# Patient Record
Sex: FEMALE | Race: WHITE | NOT HISPANIC OR LATINO | Employment: FULL TIME
[De-identification: names, ages, dates, MRNs, and addresses within clinical notes are randomized per-mention and may not be internally consistent; named-entity substitution may affect disease eponyms.]

---

## 2017-06-02 DIAGNOSIS — F41.9 ANXIETY: ICD-10-CM

## 2017-06-02 NOTE — TELEPHONE ENCOUNTER
sertraline (ZOLOFT) 50 MG tablet     Last Written Prescription Date: 12/02/16  Last Fill Quantity: 90, # refills: 1  Last Office Visit with [facility] primary care provider:  06/22/16        Last PHQ-9 score on record=   PHQ-9 SCORE 5/5/2015   Total Score 0           [facility]

## 2017-06-06 NOTE — TELEPHONE ENCOUNTER
Medication is being filled for 1 time refill only due to:  Patient needs to be seen because needs office visit for future refills.   Blanca Bell RN

## 2017-06-07 DIAGNOSIS — F41.9 ANXIETY: ICD-10-CM

## 2017-06-08 NOTE — TELEPHONE ENCOUNTER
The following prescription was sent to [place], [place]:    sertraline (ZOLOFT) 50 MG tablet 30 tablet 0 6/6/2017  No   Sig: TAKE 1 TABLET EVERY DAY   Class: E-Prescribe   Notes to Pharmacy: Medication is being filled for 1 time refill only due to:  Patient needs to be seen because needs office visit for future refills.   Order: [id number]   E-Prescribing Status: Receipt confirmed by pharmacy (6/6/2017 11:36 AM CDT)     Request denied too soon to fill.  Lety Garland RN

## 2017-06-23 ENCOUNTER — HEALTH MAINTENANCE LETTER (OUTPATIENT)
Age: 37
End: 2017-06-23

## 2017-06-27 ENCOUNTER — TELEPHONE (OUTPATIENT)
Dept: OBGYN | Facility: OTHER | Age: 37
End: 2017-06-27

## 2017-06-27 ENCOUNTER — OFFICE VISIT (OUTPATIENT)
Dept: FAMILY MEDICINE | Facility: OTHER | Age: 37
End: 2017-06-27
Payer: COMMERCIAL

## 2017-06-27 VITALS
RESPIRATION RATE: 14 BRPM | BODY MASS INDEX: 22.15 KG/M2 | TEMPERATURE: 98.7 F | SYSTOLIC BLOOD PRESSURE: 118 MMHG | WEIGHT: 125 LBS | OXYGEN SATURATION: 100 % | HEIGHT: 63 IN | DIASTOLIC BLOOD PRESSURE: 66 MMHG | HEART RATE: 61 BPM

## 2017-06-27 DIAGNOSIS — N89.8 VAGINAL DISCHARGE: ICD-10-CM

## 2017-06-27 DIAGNOSIS — Z12.4 SCREENING FOR CERVICAL CANCER: ICD-10-CM

## 2017-06-27 DIAGNOSIS — B96.89 BACTERIAL VAGINOSIS: Primary | ICD-10-CM

## 2017-06-27 DIAGNOSIS — N76.0 BACTERIAL VAGINOSIS: Primary | ICD-10-CM

## 2017-06-27 DIAGNOSIS — R06.83 SNORING: ICD-10-CM

## 2017-06-27 DIAGNOSIS — F41.9 ANXIETY: ICD-10-CM

## 2017-06-27 DIAGNOSIS — Z13.220 SCREENING FOR LIPOID DISORDERS: Primary | ICD-10-CM

## 2017-06-27 LAB
MICRO REPORT STATUS: ABNORMAL
SPECIMEN SOURCE: ABNORMAL
WET PREP SPEC: ABNORMAL

## 2017-06-27 PROCEDURE — G0145 SCR C/V CYTO,THINLAYER,RESCR: HCPCS | Performed by: FAMILY MEDICINE

## 2017-06-27 PROCEDURE — 99395 PREV VISIT EST AGE 18-39: CPT | Performed by: FAMILY MEDICINE

## 2017-06-27 PROCEDURE — 87210 SMEAR WET MOUNT SALINE/INK: CPT | Performed by: FAMILY MEDICINE

## 2017-06-27 PROCEDURE — 80061 LIPID PANEL: CPT | Performed by: FAMILY MEDICINE

## 2017-06-27 PROCEDURE — 36415 COLL VENOUS BLD VENIPUNCTURE: CPT | Performed by: FAMILY MEDICINE

## 2017-06-27 PROCEDURE — 87591 N.GONORRHOEAE DNA AMP PROB: CPT | Performed by: FAMILY MEDICINE

## 2017-06-27 PROCEDURE — 87491 CHLMYD TRACH DNA AMP PROBE: CPT | Performed by: FAMILY MEDICINE

## 2017-06-27 PROCEDURE — 87624 HPV HI-RISK TYP POOLED RSLT: CPT | Performed by: FAMILY MEDICINE

## 2017-06-27 RX ORDER — METRONIDAZOLE 500 MG/1
500 TABLET ORAL 2 TIMES DAILY
Qty: 14 TABLET | Refills: 0 | Status: SHIPPED | OUTPATIENT
Start: 2017-06-27 | End: 2018-05-11

## 2017-06-27 ASSESSMENT — PATIENT HEALTH QUESTIONNAIRE - PHQ9
10. IF YOU CHECKED OFF ANY PROBLEMS, HOW DIFFICULT HAVE THESE PROBLEMS MADE IT FOR YOU TO DO YOUR WORK, TAKE CARE OF THINGS AT HOME, OR GET ALONG WITH OTHER PEOPLE: NOT DIFFICULT AT ALL
SUM OF ALL RESPONSES TO PHQ QUESTIONS 1-9: 0
SUM OF ALL RESPONSES TO PHQ QUESTIONS 1-9: 0

## 2017-06-27 ASSESSMENT — ANXIETY QUESTIONNAIRES
7. FEELING AFRAID AS IF SOMETHING AWFUL MIGHT HAPPEN: NOT AT ALL
GAD7 TOTAL SCORE: 0
3. WORRYING TOO MUCH ABOUT DIFFERENT THINGS: NOT AT ALL
6. BECOMING EASILY ANNOYED OR IRRITABLE: NOT AT ALL
1. FEELING NERVOUS, ANXIOUS, OR ON EDGE: NOT AT ALL
7. FEELING AFRAID AS IF SOMETHING AWFUL MIGHT HAPPEN: NOT AT ALL
2. NOT BEING ABLE TO STOP OR CONTROL WORRYING: NOT AT ALL
GAD7 TOTAL SCORE: 0
GAD7 TOTAL SCORE: 0
4. TROUBLE RELAXING: NOT AT ALL
5. BEING SO RESTLESS THAT IT IS HARD TO SIT STILL: NOT AT ALL

## 2017-06-27 ASSESSMENT — PAIN SCALES - GENERAL: PAINLEVEL: NO PAIN (0)

## 2017-06-27 NOTE — MR AVS SNAPSHOT
After Visit Summary   6/27/2017    Zainab Bolton    MRN: [id number]           Patient Information     Date Of Birth          1980        Visit Information        Provider Department      6/27/2017 2:20 PM Tessie Sanchez MD [facility]        Today's Diagnoses     Screening for lipoid disorders    -  1    Anxiety        Screening for cervical cancer        Vaginal discharge        Snoring          Care Instructions      Preventive Health Recommendations  Female Ages 26 - 39  Yearly exam:   See your health care provider every year in order to    Review health changes.     Discuss preventive care.      Review your medicines if you your doctor has prescribed any.    Until age 30: Get a Pap test every three years (more often if you have had an abnormal result).    After age 30: Talk to your doctor about whether you should have a Pap test every 3 years or have a Pap test with HPV screening every 5 years.   You do not need a Pap test if your uterus was removed (hysterectomy) and you have not had cancer.  You should be tested each year for STDs (sexually transmitted diseases), if you're at risk.   Talk to your provider about how often to have your cholesterol checked.  If you are at risk for diabetes, you should have a diabetes test (fasting glucose).  Shots: Get a flu shot each year. Get a tetanus shot every 10 years.   Nutrition:     Eat at least 5 servings of fruits and vegetables each day.    Eat whole-grain bread, whole-wheat pasta and brown rice instead of white grains and rice.    Talk to your provider about Calcium and Vitamin D.     Lifestyle    Exercise at least 150 minutes a week (30 minutes a day, 5 days of the week). This will help you control your weight and prevent disease.    Limit alcohol to one drink per day.    No smoking.     Wear sunscreen to prevent skin cancer.    See your dentist every six months for an exam and cleaning.            Follow-ups after your  visit        Additional Services     SLEEP EVALUATION & MANAGEMENT REFERRAL - ADULT       Please be aware that coverage of these services is subject to the terms and limitations of your health insurance plan.  Call member services at your health plan with any benefit or coverage questions.      Please bring the following to your appointment:    >>   List of current medications   >>   This referral request   >>   Any documents/labs given to you for this referral    [facility]  Ph [phone]  (Age 13 if over 100 lbs and up)                  Follow-up notes from your care team     Return in about 1 year (around 6/27/2018).      Future tests that were ordered for you today     Open Future Orders        Priority Expected Expires Ordered    SLEEP EVALUATION & MANAGEMENT REFERRAL - ADULT Routine  6/27/2018 6/27/2017    Comprehensive metabolic panel Routine  6/27/2018 6/27/2017            Who to contact     If you have questions or need follow up information about today's clinic visit or your schedule please contact [facility] directly at [phone].  Normal or non-critical lab and imaging results will be communicated to you by DabKick[employer], letter or phone within 4 business days after the clinic has received the results. If you do not hear from us within 7 days, please contact the clinic through Anonym[employer] or phone. If you have a critical or abnormal lab result, we will notify you by phone as soon as possible.  Submit refill requests through [employer] or call your pharmacy and they will forward the refill request to us. Please allow 3 business days for your refill to be completed.          Additional Information About Your Visit        [employer] Information     [employer] gives you secure access to your electronic health record. If you see a primary care provider, you can also send messages to your care team and make appointments. If you have questions, please call your primary care clinic.  If  "you do not have a primary care provider, please call [phone] and they will assist you.        Care EveryWhere ID     This is your Care EveryWhere ID. This could be used by other organizations to access your [place] medical records  [id number]        Your Vitals Were     Pulse Temperature Respirations Height Last Period Pulse Oximetry    61 98.7  F (37.1  C) (Oral) 14 5' 3\" (1.6 m) 06/04/2017 100%    BMI (Body Mass Index)                   22.14 kg/m2            Blood Pressure from Last 3 Encounters:   06/27/17 118/66   06/22/16 109/74   05/11/16 110/73    Weight from Last 3 Encounters:   06/27/17 125 lb (56.7 kg)   06/22/16 134 lb (60.8 kg)   05/11/16 135 lb (61.2 kg)              We Performed the Following     CHLAMYDIA TRACHOMATIS PCR     HPV High Risk Types DNA Cervical     Lipid panel reflex to direct LDL     NEISSERIA GONORRHOEA PCR     Pap imaged thin layer screen with HPV - recommended age 30 - 65     Wet prep          Today's Medication Changes          These changes are accurate as of: 6/27/17  3:01 PM.  If you have any questions, ask your nurse or doctor.               These medicines have changed or have updated prescriptions.        Dose/Directions    sertraline 50 MG tablet   Commonly known as:  ZOLOFT   This may have changed:  See the new instructions.   Used for:  Anxiety   Changed by:  Tessie Sanchez MD        Dose:  50 mg   Take 1 tablet (50 mg) by mouth daily   Quantity:  90 tablet   Refills:  1            Where to get your medicines      These medications were sent to [place] [address] [place] [address] [address]     Phone:  [phone]     sertraline 50 MG tablet                Primary Care Provider Office Phone # Fax #    Kiarra Harrington [phone]       [address] [address]        Equal Access to Services     FLORA ARCOS AH: jhonny Jones, " bobyb cookalshobha carrasquilloshahla wally consuelo saha ah. So [facility] [phone].    ATENCIÓN: Si zeinala kimberly, tiene a arana disposición servicios gratuitos de asistencia lingüística. Margret al [phone].    We comply with applicable federal civil rights laws and [place] laws. We do not discriminate on the basis of race, color, national origin, age, disability sex, sexual orientation or gender identity.            Thank you!     Thank you for choosing [facility]  for your care. Our goal is always to provide you with excellent care. Hearing back from our patients is one way we can continue to improve our services. Please take a few minutes to complete the written survey that you may receive in the mail after your visit with us. Thank you!             Your Updated Medication List - Protect others around you: Learn how to safely use, store and throw away your medicines at www.disposemymeds.org.          This list is accurate as of: 6/27/17  3:01 PM.  Always use your most recent med list.                   Brand Name Dispense Instructions for use Diagnosis    sertraline 50 MG tablet    ZOLOFT    90 tablet    Take 1 tablet (50 mg) by mouth daily    Anxiety

## 2017-06-27 NOTE — NURSING NOTE
"Chief Complaint   Patient presents with     Physical     Health Maintenance       Initial /66 (BP Location: Right arm, Patient Position: Chair, Cuff Size: Adult Regular)  Pulse 61  Temp 98.7  F (37.1  C) (Oral)  Resp 14  Ht 5' 3\" (1.6 m)  Wt 125 lb (56.7 kg)  LMP 06/04/2017  SpO2 100%  BMI 22.14 kg/m2 Estimated body mass index is 22.14 kg/(m^2) as calculated from the following:    Height as of this encounter: 5' 3\" (1.6 m).    Weight as of this encounter: 125 lb (56.7 kg).  Medication Reconciliation: complete   Julia Macias CMA (AAMA)      "

## 2017-06-27 NOTE — PROGRESS NOTES
SUBJECTIVE:   CC: Zainab Bolton is an 36 year old woman who presents for preventive health visit.     Physical   Annual:     Getting at least 3 servings of Calcium per day::  Yes    Bi-annual eye exam::  Yes    Dental care twice a year::  Yes    Sleep apnea or symptoms of sleep apnea::  None    Diet::  Regular (no restrictions)    Frequency of exercise::  6-7 days/week    Duration of exercise::  45-60 minutes    Taking medications regularly::  Yes    Medication side effects::  None    Additional concerns today::  No    Answers for HPI/ROS submitted by the patient on 6/27/2017   PHQ-2 Score: 0  GEOVANY 7 TOTAL SCORE: 0  If you checked off any problems, how difficult have these problems made it for you to do your work, take care of things at home, or get along with other people?: Not difficult at all  PHQ9 TOTAL SCORE: 0    Today's PHQ-2 Score:   PHQ-2 ( 1999 Pfizer) 6/27/2017   Q1: Little interest or pleasure in doing things Not at all   Q2: Feeling down, depressed or hopeless Not at all   PHQ-2 Score 0       Abuse: Current or Past(Physical, Sexual or Emotional)- No  Do you feel safe in your environment - Yes    Social History   Substance Use Topics     Smoking status: Former Smoker     Packs/day: 1.00     Years: 4.00     Types: Cigarettes     Quit date: 7/1/2012     Smokeless tobacco: Former User     Quit date: 8/1/2013      Comment: since age 12, but stopped with each baby     Alcohol use 0.0 oz/week     0 Standard drinks or equivalent per week      Comment: 1-2  every 2-3 months if any     The patient does not drink >3 drinks per day nor >7 drinks per week.    Reviewed orders with patient.  Reviewed health maintenance and updated orders accordingly - Yes      Mammogram not appropriate for this patient based on age.    Pertinent mammograms are reviewed under the imaging tab.  History of abnormal Pap smear: YES - LÁZARO 2/3 on biopsy - PAP/HPV co-testing at 12, 24 months.  If two negative results repeat co-testing  "in 3 years, if negative then routine screening.    Reviewed and updated as needed this visit by clinical staff  Tobacco  Allergies  Med Hx  Surg Hx  Fam Hx  Soc Hx        Reviewed and updated as needed this visit by Provider          Past Medical History:   Diagnosis Date     Anxiety 2010     Anxiety state, unspecified     chronic anxiety     Hypercholesteremia 2010     Obesity 2010     Other acne      Papanicolaou smear of cervix with low grade squamous intraepithelial lesion (LGSIL) (aka LSIL) 5/5/15, 16    LSIL/+ HR HPV     TOBACCO ABUSE-CONTINUOUS       Past Surgical History:   Procedure Laterality Date     C LIGATE FALLOPIAN TUBE  10/3/2003     CONIZATION LEEP  7/16/15    LÁZARO 2     CONIZATION LEEP N/A 2015    Procedure: CONIZATION LEEP;  Surgeon: Omayra Cunningham DO;  Location:  OR     Obstetric History       T0      L0     SAB1   TAB0   Ectopic0   Multiple0   Live Births0       # Outcome Date GA Lbr Sal/2nd Weight Sex Delivery Anes PTL Lv   4             3             2             1 TAB                   ROS:  C: NEGATIVE for fever, chills, change in weight  I: NEGATIVE for worrisome rashes, moles or lesions  E: NEGATIVE for vision changes or irritation  ENT: NEGATIVE for ear, mouth and throat problems  R: NEGATIVE for significant cough or SOB  B: NEGATIVE for masses, tenderness or discharge  CV: NEGATIVE for chest pain, palpitations or peripheral edema  GI: NEGATIVE for nausea, abdominal pain, heartburn, or change in bowel habits  : NEGATIVE for unusual urinary or vaginal symptoms. Periods are regular.  M: NEGATIVE for significant arthralgias or myalgia  N: NEGATIVE for weakness, dizziness or paresthesias  P: NEGATIVE for changes in mood or affect     OBJECTIVE:   /66 (BP Location: Right arm, Patient Position: Chair, Cuff Size: Adult Regular)  Pulse 61  Temp 98.7  F (37.1  C) (Oral)  Resp 14  Ht 5' 3\" (1.6 m)  Wt 125 lb " "(56.7 kg)  LMP 06/04/2017  SpO2 100%  BMI 22.14 kg/m2  EXAM:  /66 (BP Location: Right arm, Patient Position: Chair, Cuff Size: Adult Regular)  Pulse 61  Temp 98.7  F (37.1  C) (Oral)  Resp 14  Ht 5' 3\" (1.6 m)  Wt 125 lb (56.7 kg)  LMP 06/04/2017  SpO2 100%  BMI 22.14 kg/m2  Physical Exam   Constitutional: She is oriented to person, place, and time. She appears well-developed and well-nourished.   HENT:   Head: Normocephalic and atraumatic.   Right Ear: External ear normal.   Left Ear: External ear normal.   Mouth/Throat: Oropharynx is clear and moist.   Eyes: EOM are normal.   Neck: Neck supple.   Cardiovascular: Normal rate and regular rhythm.    Pulmonary/Chest: Effort normal and breath sounds normal.   Abdominal: Soft. Bowel sounds are normal.   Genitourinary: Vaginal discharge found.   Musculoskeletal: Normal range of motion.   Neurological: She is alert and oriented to person, place, and time.   Psychiatric: She has a normal mood and affect.         ASSESSMENT/PLAN:     Problem List Items Addressed This Visit     Anxiety    Relevant Medications    sertraline (ZOLOFT) 50 MG tablet    Other Relevant Orders    Comprehensive metabolic panel      Other Visit Diagnoses     Screening for lipoid disorders    -  Primary    Relevant Orders    Lipid panel reflex to direct LDL    Screening for cervical cancer        Relevant Orders    Pap imaged thin layer screen with HPV - recommended age 30 - 65 (Completed)    HPV High Risk Types DNA Cervical    Vaginal discharge        Relevant Orders    Wet prep    NEISSERIA GONORRHOEA PCR    CHLAMYDIA TRACHOMATIS PCR            COUNSELING:  Reviewed preventive health counseling, as reflected in patient instructions       Regular exercise       Healthy diet/nutrition       Vision screening       Hearing screening    BP Screening:   Last 3 BP Readings:    BP Readings from Last 3 Encounters:   06/27/17 118/66   06/22/16 109/74   05/11/16 110/73            reports that " "she quit smoking about 4 years ago. Her smoking use included Cigarettes. She has a 4.00 pack-year smoking history. She quit smokeless tobacco use about 3 years ago.    Estimated body mass index is 22.14 kg/(m^2) as calculated from the following:    Height as of this encounter: 5' 3\" (1.6 m).    Weight as of this encounter: 125 lb (56.7 kg).       Counseling Resources:  ATP IV Guidelines  Pooled Cohorts Equation Calculator  Breast Cancer Risk Calculator  FRAX Risk Assessment  ICSI Preventive Guidelines  Dietary Guidelines for Americans, 2010  USDA's MyPlate  ASA Prophylaxis  Lung CA Screening    Tessie Sanchez MD  [facility]  "

## 2017-06-27 NOTE — TELEPHONE ENCOUNTER
Please inform pt that vaginal discharged showed signs of bacterial vagiosis(overgrowth of vaginal bacteria ). Recommend antibiotics. abx sent to pharmacy

## 2017-06-28 LAB
CHOLEST SERPL-MCNC: 158 MG/DL
HDLC SERPL-MCNC: 57 MG/DL
LDLC SERPL CALC-MCNC: 85 MG/DL
NONHDLC SERPL-MCNC: 101 MG/DL
TRIGL SERPL-MCNC: 82 MG/DL

## 2017-06-28 ASSESSMENT — ANXIETY QUESTIONNAIRES: GAD7 TOTAL SCORE: 0

## 2017-06-28 ASSESSMENT — PATIENT HEALTH QUESTIONNAIRE - PHQ9: SUM OF ALL RESPONSES TO PHQ QUESTIONS 1-9: 0

## 2017-06-29 LAB
C TRACH DNA SPEC QL NAA+PROBE: NORMAL
N GONORRHOEA DNA SPEC QL NAA+PROBE: NORMAL
SPECIMEN SOURCE: NORMAL
SPECIMEN SOURCE: NORMAL

## 2017-06-30 LAB
COPATH REPORT: NORMAL
PAP: NORMAL

## 2017-07-03 LAB
FINAL DIAGNOSIS: NORMAL
HPV HR 12 DNA CVX QL NAA+PROBE: NEGATIVE
HPV16 DNA SPEC QL NAA+PROBE: NEGATIVE
HPV18 DNA SPEC QL NAA+PROBE: NEGATIVE
SPECIMEN DESCRIPTION: NORMAL

## 2017-07-10 ENCOUNTER — MYC MEDICAL ADVICE (OUTPATIENT)
Dept: FAMILY MEDICINE | Facility: OTHER | Age: 37
End: 2017-07-10

## 2017-07-10 DIAGNOSIS — Z98.890 S/P LEEP OF CERVIX: ICD-10-CM

## 2017-07-10 NOTE — TELEPHONE ENCOUNTER
Advised patient of results through My Chart Result notes.   JESISCA SingletaryN, RN, Pap Tracking Nurse

## 2018-01-07 DIAGNOSIS — F41.9 ANXIETY: ICD-10-CM

## 2018-01-09 NOTE — TELEPHONE ENCOUNTER
"Requested Prescriptions   Pending Prescriptions Disp Refills     sertraline (ZOLOFT) 50 MG tablet [Pharmacy Med Name: SERTRALINE HCL 50 MG TABLET] 90 tablet 1     Sig: TAKE 1 TABLET (50 MG) BY MOUTH DAILY    SSRIs Protocol Failed    1/8/2018  4:25 PM       Failed - PHQ-9 score less than 5 in past 6 months    The PHQ-9 criteria is meant to fail. It requires a PHQ-9 score review  PHQ-9 score:    PHQ-9 SCORE 6/27/2017   Total Score -   Total Score MyChart 0   Total Score 0          DX anxiety           Failed - Recent (6 mo) or future visit with authorizing provider's specialty    Patient had office visit in the last 6 months or has a visit in the next 30 days with authorizing provider.  See \"Patient Info\" tab in inbasket, or \"Choose Columns\" in Meds & Orders section of the refill encounter.          Passed - Recent or future visit with authorizing provider    Patient had office visit in the last year or has a visit in the next 30 days with authorizing provider.  See \"Patient Info\" tab in inbasket, or \"Choose Columns\" in Meds & Orders section of the refill encounter.     06/27/2017:           Passed - Patient is age 18 or older       Passed - No active pregnancy on record       Passed - No positive pregnancy test in last 12 months        Prescription approved per [facility] Refill Protocol.  Emanuel Small, RN, BSN              "

## 2018-05-11 ENCOUNTER — OFFICE VISIT (OUTPATIENT)
Dept: FAMILY MEDICINE | Facility: CLINIC | Age: 38
End: 2018-05-11
Payer: COMMERCIAL

## 2018-05-11 VITALS
TEMPERATURE: 99.1 F | HEART RATE: 68 BPM | DIASTOLIC BLOOD PRESSURE: 77 MMHG | BODY MASS INDEX: 22.68 KG/M2 | SYSTOLIC BLOOD PRESSURE: 117 MMHG | WEIGHT: 128 LBS | HEIGHT: 63 IN

## 2018-05-11 DIAGNOSIS — F41.9 ANXIETY: Primary | ICD-10-CM

## 2018-05-11 PROCEDURE — 99214 OFFICE O/P EST MOD 30 MIN: CPT | Performed by: NURSE PRACTITIONER

## 2018-05-11 RX ORDER — HYDROXYZINE HYDROCHLORIDE 25 MG/1
25-50 TABLET, FILM COATED ORAL EVERY 6 HOURS PRN
Qty: 60 TABLET | Refills: 1 | Status: SHIPPED | OUTPATIENT
Start: 2018-05-11 | End: 2023-02-03

## 2018-05-11 ASSESSMENT — ANXIETY QUESTIONNAIRES
6. BECOMING EASILY ANNOYED OR IRRITABLE: NEARLY EVERY DAY
1. FEELING NERVOUS, ANXIOUS, OR ON EDGE: MORE THAN HALF THE DAYS
2. NOT BEING ABLE TO STOP OR CONTROL WORRYING: NEARLY EVERY DAY
5. BEING SO RESTLESS THAT IT IS HARD TO SIT STILL: SEVERAL DAYS
7. FEELING AFRAID AS IF SOMETHING AWFUL MIGHT HAPPEN: NEARLY EVERY DAY
IF YOU CHECKED OFF ANY PROBLEMS ON THIS QUESTIONNAIRE, HOW DIFFICULT HAVE THESE PROBLEMS MADE IT FOR YOU TO DO YOUR WORK, TAKE CARE OF THINGS AT HOME, OR GET ALONG WITH OTHER PEOPLE: SOMEWHAT DIFFICULT
3. WORRYING TOO MUCH ABOUT DIFFERENT THINGS: NEARLY EVERY DAY
GAD7 TOTAL SCORE: 17

## 2018-05-11 ASSESSMENT — PATIENT HEALTH QUESTIONNAIRE - PHQ9: 5. POOR APPETITE OR OVEREATING: MORE THAN HALF THE DAYS

## 2018-05-11 NOTE — MR AVS SNAPSHOT
"              After Visit Summary   5/11/2018    Zainab Bolton    MRN: [id number]           Patient Information     Date Of Birth          1980        Visit Information        Provider Department      5/11/2018 1:40 PM Yanci Camarena APRN CNP [facility]        Today's Diagnoses     Anxiety    -  1       Follow-ups after your visit        Who to contact     If you have questions or need follow up information about today's clinic visit or your schedule please contact [facility] directly at [phone].  Normal or non-critical lab and imaging results will be communicated to you by [employer]hart, letter or phone within 4 business days after the clinic has received the results. If you do not hear from us within 7 days, please contact the clinic through Somot[employer] or phone. If you have a critical or abnormal lab result, we will notify you by phone as soon as possible.  Submit refill requests through [employer] or call your pharmacy and they will forward the refill request to us. Please allow 3 business days for your refill to be completed.          Additional Information About Your Visit        MyChart Information     [employer] gives you secure access to your electronic health record. If you see a primary care provider, you can also send messages to your care team and make appointments. If you have questions, please call your primary care clinic.  If you do not have a primary care provider, please call [phone] and they will assist you.        Care EveryWhere ID     This is your Care EveryWhere ID. This could be used by other organizations to access your [place] medical records  [id number]        Your Vitals Were     Pulse Temperature Height BMI (Body Mass Index)          68 99.1  F (37.3  C) (Tympanic) 5' 3.25\" (1.607 m) 22.5 kg/m2         Blood Pressure from Last 3 Encounters:   05/11/18 117/77   06/27/17 118/66   06/22/16 109/74    Weight from Last 3 Encounters:   05/11/18 " 128 lb (58.1 kg)   06/27/17 125 lb (56.7 kg)   06/22/16 134 lb (60.8 kg)              Today, you had the following     No orders found for display         Today's Medication Changes          These changes are accurate as of 5/11/18  2:42 PM.  If you have any questions, ask your nurse or doctor.               Start taking these medicines.        Dose/Directions    hydrOXYzine 25 MG tablet   Commonly known as:  ATARAX   Used for:  Anxiety   Started by:  Yanci Camarena APRN CNP        Dose:  25-50 mg   Take 1-2 tablets (25-50 mg) by mouth every 6 hours as needed for anxiety   Quantity:  60 tablet   Refills:  1         These medicines have changed or have updated prescriptions.        Dose/Directions    sertraline 50 MG tablet   Commonly known as:  ZOLOFT   This may have changed:  See the new instructions.   Used for:  Anxiety   Changed by:  Yanci Camarena APRN CNP        Dose:  75 mg   Take 1.5 tablets (75 mg) by mouth daily   Quantity:  135 tablet   Refills:  1            Where to get your medicines      These medications were sent to [place] [address] [place] [address] [address]     Phone:  [phone]     hydrOXYzine 25 MG tablet    sertraline 50 MG tablet                Primary Care Provider Office Phone # Fax #    Kiarar Harrington [phone]       [address] [place] [address]        Equal Access to Services     [facility] AH: Hadii clement salomon hadasho Soelianaali, waaxda luqadaha, qaybta kaalmada adeegyada, lucero martin. So [facility] [phone].    ATENCIÓN: Si habla español, tiene a arana disposición servicios gratuitos de asistencia lingüística. Llame al [phone].    We comply with applicable federal civil rights laws and [place] laws. We do not discriminate on the basis of race, color, national origin, age, disability, sex, sexual orientation, or gender identity.            Thank  you!     Thank you for choosing [facility]  for your care. Our goal is always to provide you with excellent care. Hearing back from our patients is one way we can continue to improve our services. Please take a few minutes to complete the written survey that you may receive in the mail after your visit with us. Thank you!             Your Updated Medication List - Protect others around you: Learn how to safely use, store and throw away your medicines at www.disposemymeds.org.          This list is accurate as of 5/11/18  2:42 PM.  Always use your most recent med list.                   Brand Name Dispense Instructions for use Diagnosis    hydrOXYzine 25 MG tablet    ATARAX    60 tablet    Take 1-2 tablets (25-50 mg) by mouth every 6 hours as needed for anxiety    Anxiety       sertraline 50 MG tablet    ZOLOFT    135 tablet    Take 1.5 tablets (75 mg) by mouth daily    Anxiety

## 2018-05-11 NOTE — PROGRESS NOTES
"  SUBJECTIVE:   Zainab Bolton is a 37 year old female who presents to clinic today for the following health issues:      Anxiety Follow-Up    Status since last visit: Worsened     Lots of stress recently - moved, son is addicted to heroin, boyfriend got a new job and will be traveling a lot.    Lots of worried thoughts.    Can't sleep.    Crying a lot.    No thoughts of self harm or suicide.    Has been on Zoloft 50 mg daily since she was a teenager - has always worked well, no side effects.    Other associated symptoms:see phq9    Complicating factors:   Significant life event: Yes-  Move, driving from [place] to [place]   Current substance abuse: None  Depression symptoms: Yes  GEOVANY-7 SCORE 1/8/2014 5/6/2015 6/27/2017   Total Score 0 0 -   Total Score - - 0 (minimal anxiety)   Total Score - - 0         Amount of exercise or physical activity: tries to    Problems taking medications regularly: No    Medication side effects: none    Diet: regular (no restrictions)          Problem list and histories reviewed & adjusted, as indicated.  Additional history: as documented    Reviewed and updated as needed this visit by clinical staff  Tobacco  Allergies  Meds       Reviewed and updated as needed this visit by Provider         ROS:  Constitutional, HEENT, cardiovascular, pulmonary, gi and gu systems are negative, except as otherwise noted.    OBJECTIVE:     /77 (BP Location: Left arm)  Pulse 68  Temp 99.1  F (37.3  C) (Tympanic)  Ht 5' 3.25\" (1.607 m)  Wt 128 lb (58.1 kg)  BMI 22.5 kg/m2  Body mass index is 22.5 kg/(m^2).  GENERAL: healthy, alert and no distress  PSYCH: mentation appears normal, affect normal/bright    PHQ-9 SCORE 5/5/2015 6/27/2017 5/11/2018   Total Score 0 - -   Total Score MyChart - 0 -   Total Score - 0 7     GEOVANY-7 SCORE 5/6/2015 6/27/2017 5/11/2018   Total Score 0 - -   Total Score - 0 (minimal anxiety) -   Total Score - 0 17       ASSESSMENT/PLAN:       ICD-10-CM    1. Anxiety " F41.9 sertraline (ZOLOFT) 50 MG tablet     hydrOXYzine (ATARAX) 25 MG tablet     Poorly controlled.  Increase Zoloft to 75 mg daily.  Hydroxyzine 1-2 tabs every 6 hours as needed for anxiety - advised that it may cause drowsiness.  Recommended counseling - info given for our [facility].    The risks, benefits and treatment options of prescribed medications or other treatments have been discussed with the patient. The patient verbalized their understanding and should call or follow up if no improvement or if they develop further problems.    THOMAS [facility]

## 2018-05-12 ASSESSMENT — ANXIETY QUESTIONNAIRES: GAD7 TOTAL SCORE: 17

## 2018-05-12 ASSESSMENT — PATIENT HEALTH QUESTIONNAIRE - PHQ9: SUM OF ALL RESPONSES TO PHQ QUESTIONS 1-9: 7

## 2018-07-25 DIAGNOSIS — F41.9 ANXIETY: ICD-10-CM

## 2018-07-25 NOTE — TELEPHONE ENCOUNTER
Sertraline    Sent 5/11/2018 with 6 month supply. Refill not appropriate at this time.     lCair Garcia, RN, BSN

## 2018-07-28 ENCOUNTER — HEALTH MAINTENANCE LETTER (OUTPATIENT)
Age: 38
End: 2018-07-28

## 2018-08-01 DIAGNOSIS — F41.9 ANXIETY: ICD-10-CM

## 2018-08-01 NOTE — TELEPHONE ENCOUNTER
"Requested Prescriptions   Pending Prescriptions Disp Refills     sertraline (ZOLOFT) 50 MG tablet 135 tablet 1     Sig: Take 1.5 tablets (75 mg) by mouth daily    SSRIs Protocol Failed    8/1/2018  1:44 PM       Failed - Recent (12 mo) or future (30 days) visit within the authorizing provider's specialty    Patient had office visit in the last 12 months or has a visit in the next 30 days with authorizing provider or within the authorizing provider's specialty.  See \"Patient Info\" tab in inbasket, or \"Choose Columns\" in Meds & Orders section of the refill encounter.    PHQ-9 SCORE 5/5/2015 6/27/2017 5/11/2018   Total Score 0 - -   Total Score MyChart - 0 -   Total Score - 0 7          Passed - Patient is age 18 or older       Passed - No active pregnancy on record       Passed - No positive pregnancy test in last 12 months        sertraline (ZOLOFT) 50 MG tablet  Rx was sent 05/11/2018 for 135 tabs and 1 refills.   Pharmacy notified via E-prescribe refusal.  Marlena Yeung, RN, BSN       "

## 2018-08-24 ENCOUNTER — OFFICE VISIT (OUTPATIENT)
Dept: PSYCHOLOGY | Facility: CLINIC | Age: 38
End: 2018-08-24
Payer: COMMERCIAL

## 2018-08-24 DIAGNOSIS — F41.1 GENERALIZED ANXIETY DISORDER: ICD-10-CM

## 2018-08-24 DIAGNOSIS — F32.1 MAJOR DEPRESSIVE DISORDER, SINGLE EPISODE, MODERATE (H): Primary | ICD-10-CM

## 2018-08-24 PROCEDURE — 90791 PSYCH DIAGNOSTIC EVALUATION: CPT | Performed by: MARRIAGE & FAMILY THERAPIST

## 2018-08-24 ASSESSMENT — ANXIETY QUESTIONNAIRES
3. WORRYING TOO MUCH ABOUT DIFFERENT THINGS: NEARLY EVERY DAY
7. FEELING AFRAID AS IF SOMETHING AWFUL MIGHT HAPPEN: NEARLY EVERY DAY
2. NOT BEING ABLE TO STOP OR CONTROL WORRYING: NEARLY EVERY DAY
6. BECOMING EASILY ANNOYED OR IRRITABLE: NEARLY EVERY DAY
GAD7 TOTAL SCORE: 18
5. BEING SO RESTLESS THAT IT IS HARD TO SIT STILL: MORE THAN HALF THE DAYS
1. FEELING NERVOUS, ANXIOUS, OR ON EDGE: MORE THAN HALF THE DAYS
IF YOU CHECKED OFF ANY PROBLEMS ON THIS QUESTIONNAIRE, HOW DIFFICULT HAVE THESE PROBLEMS MADE IT FOR YOU TO DO YOUR WORK, TAKE CARE OF THINGS AT HOME, OR GET ALONG WITH OTHER PEOPLE: SOMEWHAT DIFFICULT

## 2018-08-24 ASSESSMENT — PATIENT HEALTH QUESTIONNAIRE - PHQ9: 5. POOR APPETITE OR OVEREATING: MORE THAN HALF THE DAYS

## 2018-08-24 NOTE — Clinical Note
Client is getting started in the counseling center and will be addressing anxiety, depressed mood and relational issues.  Thank you!  Julia Alfaro

## 2018-08-24 NOTE — PROGRESS NOTES
Progress Note - Initial Session    Client Name:  Zainab Bolton Date: 8-24-18         Service Type: Individual      Session Start Time: 11am  Session End Time: 12pm      Session Length: 53 - 60      Session #: 1     Attendees: Client attended alone         Diagnostic Assessment in progress.  Unable to complete documentation at the conclusion of the first session due to lack of paperwork time later in the day.        Mental Status Assessment:  Appearance:   Appropriate   Eye Contact:   Good   Psychomotor Behavior: Normal   Attitude:   Cooperative   Orientation:   All  Speech   Rate / Production: Normal    Volume:  Normal   Mood:    Anxious  Depressed   Affect:    Worrisome   Thought Content:  Clear   Thought Form:  Coherent  Logical   Insight:    Good       Safety Issues and Plan for Safety and Risk Management:  Client denies current fears or concerns for personal safety.  Client denies current or recent suicidal ideation or behaviors.  Client denies current or recent homicidal ideation or behaviors.  Client denies current or recent self injurious behavior or ideation.  Client denies other safety concerns.  A safety and risk management plan has not been developed at this time, however client was given the after-hours number / 911 should there be a change in any of these risk factors.  Client reports there are no firearms in the house.      Diagnostic Criteria:  A. Excessive anxiety and worry about a number of events or activities (such as work or school performance).   B. The person finds it difficult to control the worry.  C. Select 3 or more symptoms (required for diagnosis). Only one item is required in children.   - Restlessness or feeling keyed up or on edge.    - Being easily fatigued.    - Difficulty concentrating or mind going blank.    - Irritability.    - Muscle tension.    - Sleep disturbance (difficulty falling or staying asleep, or restless unsatisfying sleep).   D. The focus of the  anxiety and worry is not confined to features of an Axis I disorder.  E. The anxiety, worry, or physical symptoms cause clinically significant distress or impairment in social, occupational, or other important areas of functioning.   F. The disturbance is not due to the direct physiological effects of a substance (e.g., a drug of abuse, a medication) or a general medical condition (e.g., hyperthyroidism) and does not occur exclusively during a Mood Disorder, a Psychotic Disorder, or a Pervasive Developmental Disorder.    - The aformentioned symptoms began - year(s) ago and occurs 5 days per week and is experienced as moderate.  A) Single episode - symptoms have been present during the same 2-week period and represent a change from previous functioning 5 or more symptoms (required for diagnosis)   - Depressed mood. Note: In children and adolescents, can be irritable mood.     - Diminished interest or pleasure in all, or almost all, activities.    - Decreased sleep.    - Psychomotor activity retardation.    - Fatigue or loss of energy.    - Feelings of worthlessness or inappropriate guilt.    - Diminished ability to think or concentrate, or indecisiveness.   B) The symptoms cause clinically significant distress or impairment in social, occupational, or other important areas of functioning  C) The episode is not attributable to the physiological effects of a substance or to another medical condition  D) The occurence of major depressive episode is not better explained by other thought / psychotic disorders  E) There has never been a manic episode or hypomanic episode        DSM5 Diagnoses: (Sustained by DSM5 Criteria Listed Above)  Diagnoses: 296.22 (F32.1)  Major Depressive Disorder, Single Episode, Moderate _ and With anxious distress  300.02 (F41.1) Generalized Anxiety Disorder  Psychosocial & Contextual Factors: Relational stressors   WHODAS 2.0 (12 item)            This questionnaire asks about difficulties due to  health conditions. Health conditions  include  disease or illnesses, other health problems that may be short or long lasting,  injuries, mental health or emotional problems, and problems with alcohol or drugs.                     Think back over the past 30 days and answer these questions, thinking about how much  difficulty you had doing the following activities. For each question, please [place] only  one response.    S1 Standing for long periods such as 30 minutes? None =         1   S2 Taking care of household responsibilities? None =         1   S3 Learning a new task, for example, learning how to get to a new place? None =         1   S4 How much of a problem do you have joining community activities (for example, festivals, Yazidism or other activities) in the same way as anyone else can? Mild =           2   S5 How much have you been emotionally affected by your health problems? Moderate =   3     In the past 30 days, how much difficulty did you have in:   S6 Concentrating on doing something for ten minutes? Mild =           2   S7 Walking a long distance such as a kilometer (or equivalent)? None =         1   S8 Washing your whole body? None =         1   S9 Getting dressed? None =         1   S10 Dealing with people you do not know? Mild =           2   S11 Maintaining a friendship? Moderate =   3   S12 Your day to day work? None =         1     H1 Overall, in the past 30 days, how many days were these difficulties present? Record number of days 3   H2 In the past 30 days, for how many days were you totally unable to carry out your usual activities or work because of any health condition? Record number of days  0   H3 In the past 30 days, not counting the days that you were totally unable, for how many days did you cut back or reduce your usual activities or work because of any health condition? Record number of days 0       Collateral Reports Completed:  Routed note to PCP      PLAN: (Homework,  other):  Client stated that she may follow up for ongoing services with [facility].  Client has a follow up appointment in two weeks.        Julia Alfaro, TH

## 2018-08-24 NOTE — MR AVS SNAPSHOT
MRN:[id number]                      After Visit Summary   8/24/2018    Zainab Bolton    MRN: [id number]           Visit Information        Provider Department      8/24/2018 11:00 AM Julia Alfaro [facility]      Your next 10 appointments already scheduled     Sep 11, 2018 12:00 PM CDT   Return Visit with Julia Alfaro [facility])    [address] [address]   [phone]            Sep 25, 2018  2:00 PM CDT   Return Visit with Julia Alfaro [facility] ([facility])    [address] [id number] [phone]            Oct 15, 2018  5:00 PM CDT   Return Visit with Julia Alfaro [facility] ([facility])    [address] [place] [id number] [phone]            Oct 25, 2018  4:00 PM CDT   Return Visit with Julia Alfaro [facility] ([facility])    [address] [id number] [phone]              MyChart Information     [id number] gives you secure access to your electronic health record. If you see a primary care provider, you can also send messages to your care team and make appointments. If you have questions, please call your primary care clinic.  If you do not have a primary care provider, please call [phone] and they will assist you.        Care EveryWhere ID     This is your Care EveryWhere ID. This could be used by other organizations to access your [facility] medical records  [id number]        Equal Access to Services     SHAYLA ARCOS : Hadii clement Lozoya, waswapnilda lugordon, qaybta kaallucero carrasquillo. So [facility] [phone].    ATENCIÓN: Si habla español, tiene a arana disposición  servicios gratuitos de asistencia lingüística. Llame al [phone].    We comply with applicable federal civil rights laws and [place] laws. We do not discriminate on the basis of race, color, national origin, age, disability, sex, sexual orientation, or gender identity.

## 2018-08-25 ASSESSMENT — ANXIETY QUESTIONNAIRES: GAD7 TOTAL SCORE: 18

## 2018-08-25 ASSESSMENT — PATIENT HEALTH QUESTIONNAIRE - PHQ9: SUM OF ALL RESPONSES TO PHQ QUESTIONS 1-9: 11

## 2018-09-01 ENCOUNTER — HEALTH MAINTENANCE LETTER (OUTPATIENT)
Age: 38
End: 2018-09-01

## 2018-09-05 ENCOUNTER — FCC EXTENDED DOCUMENTATION (OUTPATIENT)
Dept: PSYCHOLOGY | Facility: CLINIC | Age: 38
End: 2018-09-05

## 2018-09-05 NOTE — PROGRESS NOTES
Adult Intake Structured Interview  Standard Diagnostic Assessment      CLIENT'S NAME: Zainab Bolton  MRN:   [id number]  :   1980  ACCT. NUMBER: [id number]  DATE OF SERVICE: 18      Identifying Information:  Client is a 38 year old, , partnered / significant other female. Client was referred for counseling by a family friend/ personal decision. Client is currently employed full time. Client attended the session alone.       Client's Statement of Presenting Concern:  Client reports the reason for seeking therapy at this time as new changes along with past issues.  She states she would like to address relational stressors as well as some issues tied to her significant other traveling for work.  Client stated that her symptoms have resulted in the following functional impairments: home life with family, relationship(s), self-care and social interactions      History of Presenting Concern:  Client reports that these problem(s) began a couple of years ago and recently escalated. Client has attempted to resolve these concerns in the past through self-help and thinking before she acts. Client reports that other professional(s) are not involved in providing support / services.       Social History:  Client reported she grew up in [place], [place]. They were the second born of 5 children. Parents are .  Client reported that her childhood was good but at times she felt like she did not know where she belonged. Client described her current relationships with family of origin as (did not specify).    Client reported a history of 3 committed relationships or marriages. Client has been partnered / significant other for a couple of years. Client reported having 3 children. Client identified some stable and meaningful social connections.  Client reported that she has not been involved with the legal system.  Client's highest education level was did not specify. Client did not identify any learning problems. There are no ethnic, cultural or Spiritism factors that may be relevant for therapy. Client identified her preferred language to be English. Client reported she does not need the assistance of an  or other support involved in therapy. Modifications will not be used to assist communication in therapy. Client did not serve in the .     Client reports family history includes Arthritis in her maternal grandmother; Cancer in her father and maternal grandfather; Diabetes in her maternal grandmother; HEART DISEASE in her maternal aunt; Hypertension in her maternal grandmother; Thyroid Disease in her maternal grandmother.    Mental Health History:  Client reported the following biological family members or relatives with mental health issues: Father experienced suicide and Son experienced ADHD and Depression.  Client previously received the following mental health diagnosis: Anxiety.  Client has received the following mental health services in the past: medication(s) from physician / PCP.  Hospitalizations: None.  Client is currently receiving the following services: physician / PCP.      Chemical Health History:  Client reported the following biological family members or relatives with chemical health issues: Son reportedly uses heroin . Client has not received chemical dependency treatment in the past. Client is not currently receiving any chemical dependency treatment. Client reports no problems as a result of their drinking / drug use.      Client Reports:  Client reports using alcohol 3 times per week and has 2 drinks at a time. Client first started drinking at age 37.  Client reports using tobacco 15 times per day. Client started using tobacco at age 37..  Client reports using marijuana very sparingly.    Client reports using  caffeine 2 times per day and drinks 1 at a time. Client started using caffeine at age did not specify.  Client denies using street drugs.  Client denies the non-medical use of prescription or over the counter drugs.    CAGE: C     Patient felt they ought to CUT down on your drinking (or drug use).  G     Patient felt bad or GUILTY about their drinking (or drug use).   Based on the positive Cage-Aid score and clinical interview there  are indications of drug or alcohol abuse. Recommendation for substance abuse disorder evaluation with a substance use professional was given. Therapist did recommend client to reduce use or abstain from alcohol or substance use. Therapist did recommend structured treatment and or community support (AA, 12 step group, etc.). Client is not interested in an assessment at this time.    Discussed the general effects of drugs and alcohol on health and well-being. Therapist gave client printed information about the effects of chemical use on her health and well being.      Significant Losses / Trauma / Abuse / Neglect Issues:  There are indications or report of significant loss, trauma, abuse or neglect issues related to: -   -Loss of father  -Relationship changes-  from spouse and still not   -Children were very distant for two years after the separation     Issues of possible neglect are not present.      Medical Issues:  Client has had a physical exam to rule out medical causes for current symptoms. Date of last physical exam was within the past year. Client was encouraged to follow up with PCP if symptoms were to develop. The client has a [facility] Provider, who is named Kiarra Harrington.. The client reports not having a psychiatrist. Client reports no current medical concerns. The client denies the presence of chronic or episodic pain. There are not significant nutritional concerns.     Client reports current meds as:   Current Outpatient Prescriptions    Medication Sig     hydrOXYzine (ATARAX) 25 MG tablet Take 1-2 tablets (25-50 mg) by mouth every 6 hours as needed for anxiety     sertraline (ZOLOFT) 50 MG tablet Take 1.5 tablets (75 mg) by mouth daily     No current facility-administered medications for this visit.        Client Allergies:  Allergies   Allergen Reactions     No Known Drug Allergies          Medical History:  Past Medical History:   Diagnosis Date     Anxiety 8/16/2010     Anxiety state, unspecified 1997    chronic anxiety     Hypercholesteremia 8/16/2010     Obesity 8/16/2010     Other acne      Papanicolaou smear of cervix with low grade squamous intraepithelial lesion (LGSIL) (aka LSIL) 5/5/15, 5/11/16    LSIL/+ HR HPV     TOBACCO ABUSE-CONTINUOUS          Medication Adherence:  Client reports taking prescribed medications as prescribed.    Client was provided recommendation to follow-up with prescribing physician.    Mental Status Assessment:  Appearance:                                                          Appropriate   Eye Contact:                                                         Good   Psychomotor Behavior:                    Normal   Attitude:                                                                 Cooperative   Orientation:                                                           All  Speech                        Rate / Production:                 Normal                         Volume:                                           Normal   Mood:                                                                              Anxious  Depressed   Affect:                                                                              Worrisome   Thought Content:                                        Clear   Thought Form:                                            Coherent  Logical   Insight:                                                                             Good     Review of Symptoms:  Depression: Sleep Interest Guilt  Energy Concentration Appetite Hopeless Helpless Ruminations Irritability  Laxmi:  No symptoms  Psychosis: No symptoms  Anxiety: Worries Nervousness  Panic:  Palpitations Shortness of Breath Sense of Impending Doom  Post Traumatic Stress Disorder: Trauma  Obsessive Compulsive Disorder: No symptoms  Eating Disorder: No symptoms  ADD / ADHD: No symptoms    Safety Assessment:    History of Safety Concerns:   Client reported a history of suicidal ideation.  Onset: years ago and frequency: 1 time.  Client identified the following triggers to suicidal ideation: relational issues  Client denied a history of suicide attempts.    Client denied a history of homicidal ideation.    Client denied a history of self-injurious ideation and behaviors.    Client denied a history of personal safety concerns.    Client denied a history of assaultive behaviors.        Current Safety Concerns:  Client denies current suicidal ideation.    Client denies current homicidal ideation and behaviors.  Client denies current self-injurious ideation and behaviors.    Client denies current concerns for personal safety.    Client reports the following protective factors: restricted access to lethal means no guns in the home, dedication to family/friends, safe and stable environment, regular sleep, regular physical activity, secure attachment, adherence with prescribed medication, living with other people, daily obligations, structured day, effective problem-solving skills, committment to well-being, sense of meaning, positive social skills, financial stability, strong sense of self-worth/esteem, sense of personal control or determination and access to a variety of clinical interventions    Client reports there are no firearms in the house.     Plan for Safety and Risk Management:  A safety and risk management plan has not been developed at this time, however client was given the after-hours number / 911 should there be a change in any of these risk  "factors.    Client's Strengths and Limitations:  Client identified the following strengths or resources that will help her succeed in counseling: commitment to health and well being, friends / good social support, family support, intelligence and positive work environment. Client identified the following supports: family and friends. Things that may interfere with the client's success in counseling include: \"negativity in the way I think.\"    Diagnostic Criteria:  A. Excessive anxiety and worry about a number of events or activities (such as work or school performance).   B. The person finds it difficult to control the worry.  C. Select 3 or more symptoms (required for diagnosis). Only one item is required in children.   - Restlessness or feeling keyed up or on edge.    - Being easily fatigued.    - Difficulty concentrating or mind going blank.    - Irritability.    - Muscle tension.    - Sleep disturbance (difficulty falling or staying asleep, or restless unsatisfying sleep).   D. The focus of the anxiety and worry is not confined to features of an Axis I disorder.  E. The anxiety, worry, or physical symptoms cause clinically significant distress or impairment in social, occupational, or other important areas of functioning.   F. The disturbance is not due to the direct physiological effects of a substance (e.g., a drug of abuse, a medication) or a general medical condition (e.g., hyperthyroidism) and does not occur exclusively during a Mood Disorder, a Psychotic Disorder, or a Pervasive Developmental Disorder.    - The aformentioned symptoms began - year(s) ago and occurs 5 days per week and is experienced as moderate.  A) Single episode - symptoms have been present during the same 2-week period and represent a change from previous functioning 5 or more symptoms (required for diagnosis)   - Depressed mood. Note: In children and adolescents, can be irritable mood.     - Diminished interest or pleasure in all, or " almost all, activities.    - Decreased sleep.    - Psychomotor activity retardation.    - Fatigue or loss of energy.    - Feelings of worthlessness or inappropriate guilt.    - Diminished ability to think or concentrate, or indecisiveness.   B) The symptoms cause clinically significant distress or impairment in social, occupational, or other important areas of functioning  C) The episode is not attributable to the physiological effects of a substance or to another medical condition  D) The occurence of major depressive episode is not better explained by other thought / psychotic disorders  E) There has never been a manic episode or hypomanic episode      Functional Status:  Client's symptoms have caused and are causing reduced functional status in the following areas:   Activities of Daily Living   Social / Relational       DSM5 Diagnoses: (Sustained by DSM5 Criteria Listed Above)  Diagnoses:            296.22 (F32.1)  Major Depressive Disorder, Single Episode, Moderate _ and With anxious distress  300.02 (F41.1) Generalized Anxiety Disorder  Psychosocial & Contextual Factors: Relational stressors   WHODAS 2.0 (12 item)                          This questionnaire asks about difficulties due to health conditions. Health conditions                   include                        disease or illnesses, other health problems that may be short or long lasting,                    injuries, mental health or emotional problems, and problems with alcohol or drugs.                              Think back over the past 30 days and answer these questions, thinking about how much              difficulty you had doing the following activities. For each question, please [place] only                   one response.     S1 Standing for long periods such as 30 minutes? None =         1   S2 Taking care of household responsibilities? None =         1   S3 Learning a new task, for example, learning how to get to a new place? None =          1   S4 How much of a problem do you have joining community activities (for example, festivals, [place] or other activities) in the same way as anyone else can? Mild =           2   S5 How much have you been emotionally affected by your health problems? Moderate =   3      In the past 30 days, how much difficulty did you have in:   S6 Concentrating on doing something for ten minutes? Mild =           2   S7 Walking a long distance such as a kilometer (or equivalent)? None =         1   S8 Washing your whole body? None =         1   S9 Getting dressed? None =         1   S10 Dealing with people you do not know? Mild =           2   S11 Maintaining a friendship? Moderate =   3   S12 Your day to day work? None =         1      H1 Overall, in the past 30 days, how many days were these difficulties present? Record number of days 3   H2 In the past 30 days, for how many days were you totally unable to carry out your usual activities or work because of any health condition? Record number of days  0   H3 In the past 30 days, not counting the days that you were totally unable, for how many days did you cut back or reduce your usual activities or work because of any health condition? Record number of days 0          Attendance Agreement:  Client has signed Attendance Agreement:Yes      Collaboration:  The client is receiving treatment / structured support from the following professional(s) / service and treatment. Collaboration will be initiated with: primary care physician.      Preliminary Treatment Plan:  The client reports no currently identified [place], ethnic or cultural issues relevant to therapy.     services are not indicated.    Modifications to assist communication are not indicated.    The concerns identified by the client will be addressed in therapy.    Initial Treatment will focus on: Relational Problems related to: Conflict or difficulties with partner/spouse.    As a preliminary treatment  goal, client will address relationship difficulties in a more adaptive manner.    The focus of initial interventions will be to alleviate anxiety, alleviate depressed mood, facilitate appropriate expression of feelings, increase ability to function adaptively, increase coping skills, teach CBT skills, teach communication skills, teach conflict management skills and teach stress mangement techniques.    Referral to another professional/service is not indicated at this time..    A Release of Information is not needed at this time.    Report to child / adult protection services was NA.    Client will have access to their [facility]' medical record.    Julia Alfaro, TH  September 5, 2018

## 2018-09-11 ENCOUNTER — OFFICE VISIT (OUTPATIENT)
Dept: PSYCHOLOGY | Facility: CLINIC | Age: 38
End: 2018-09-11
Payer: COMMERCIAL

## 2018-09-11 DIAGNOSIS — F41.1 GENERALIZED ANXIETY DISORDER: ICD-10-CM

## 2018-09-11 DIAGNOSIS — F32.1 MAJOR DEPRESSIVE DISORDER, SINGLE EPISODE, MODERATE (H): Primary | ICD-10-CM

## 2018-09-11 PROCEDURE — 90834 PSYTX W PT 45 MINUTES: CPT | Performed by: MARRIAGE & FAMILY THERAPIST

## 2018-09-11 NOTE — MR AVS SNAPSHOT
MRN:[id number]                      After Visit Summary   9/11/2018    Zainab Bolton    MRN: [id number]           Visit Information        Provider Department      9/11/2018 12:00 PM Julia Alfaro [facility] Generic      Your next 10 appointments already scheduled     Sep 25, 2018  2:00 PM CDT   Return Visit with Julia Alfaro [facility] ([facility])    [address] [address]   [phone]            Oct 15, 2018  5:00 PM CDT   Return Visit with Julia Alfaro [facility] ([facility])    [address] [id number] [phone]            Oct 25, 2018  4:00 PM CDT   Return Visit with Julia Alfaro [facility] ([facility])    [address] [id number] [phone]            Nov 14, 2018  4:00 PM CST   Return Visit with Julia Alfaro [facility] ([facility])    [address] [id number] [phone]              MyChart Information     [facility] gives you secure access to your electronic health record. If you see a primary care provider, you can also send messages to your care team and make appointments. If you have questions, please call your primary care clinic.  If you do not have a primary care provider, please call [phone] and they will assist you.        Care EveryWhere ID     This is your Care EveryWhere ID. This could be used by other organizations to access your [facility] medical records  [id number]        Equal Access to Services     SHAYLA ARCOS : Hadii clement Lozoya, waswapnilda lugordon, qaybta kaallucero carrasquillo. So [facility] [phone].    ATENCIÓN: Si habla español, tiene a araan disposición  servicios gratuitos de asistencia lingüística. Llame al [phone].    We comply with applicable federal civil rights laws and [place] laws. We do not discriminate on the basis of race, color, national origin, age, disability, sex, sexual orientation, or gender identity.

## 2018-09-12 ASSESSMENT — ANXIETY QUESTIONNAIRES
3. WORRYING TOO MUCH ABOUT DIFFERENT THINGS: MORE THAN HALF THE DAYS
GAD7 TOTAL SCORE: 14
6. BECOMING EASILY ANNOYED OR IRRITABLE: MORE THAN HALF THE DAYS
2. NOT BEING ABLE TO STOP OR CONTROL WORRYING: MORE THAN HALF THE DAYS
IF YOU CHECKED OFF ANY PROBLEMS ON THIS QUESTIONNAIRE, HOW DIFFICULT HAVE THESE PROBLEMS MADE IT FOR YOU TO DO YOUR WORK, TAKE CARE OF THINGS AT HOME, OR GET ALONG WITH OTHER PEOPLE: SOMEWHAT DIFFICULT
5. BEING SO RESTLESS THAT IT IS HARD TO SIT STILL: SEVERAL DAYS
7. FEELING AFRAID AS IF SOMETHING AWFUL MIGHT HAPPEN: MORE THAN HALF THE DAYS
1. FEELING NERVOUS, ANXIOUS, OR ON EDGE: NEARLY EVERY DAY

## 2018-09-12 ASSESSMENT — PATIENT HEALTH QUESTIONNAIRE - PHQ9: 5. POOR APPETITE OR OVEREATING: MORE THAN HALF THE DAYS

## 2018-09-12 NOTE — PROGRESS NOTES
"                                             Progress Note    Client Name: Zainab Bolton  Date: 9-11-18         Service Type: Individual      Session Start Time: 12pm  Session End Time: 1250pm      Session Length: 50min     Session #: 2     Attendees: Client attended alone    Treatment Plan Last Reviewed: 9-11-18  PHQ-9 / GEOVANY-7 : 9-11-18     DATA      Progress Since Last Session (Related to Symptoms / Goals / Homework):   Symptoms: Client reports anxiety tied to current relational stressors.      Homework: Completed in session      Episode of Care Goals: Minimal progress - ACTION (Actively working towards change); Intervened by reinforcing change plan / affirming steps taken     Current / Ongoing Stressors and Concerns:   -Client reports she has a difficult time trusting her significant other.  She reports at times he will only tell the \"half truth.\"  She reports her anxiety then starts to spiral and she thinks the worst.       Treatment Objective(s) Addressed in This Session:   Defining goals  Discussion of history of current stressors     Intervention:   Solution Focused: - Client will invite significant other in for a couples session.  She will communicate openly and clearly and stress her concerns in the relationship.          ASSESSMENT: Current Emotional / Mental Status (status of significant symptoms):   Risk status (Self / Other harm or suicidal ideation)   Client denies current fears or concerns for personal safety.   Client denies current or recent suicidal ideation or behaviors.   Client denies current or recent homicidal ideation or behaviors.   Client denies current or recent self injurious behavior or ideation.   Client denies other safety concerns.   Client Client reports there has been no change in risk factors since their last session.     Client Client reports there has been no change in protective factors since their last session.     A safety and risk management plan has not been developed " at this time, however client was given the after-hours number / 911 should there be a change in any of these risk factors.     Appearance:   Appropriate    Eye Contact:   Good    Psychomotor Behavior: Normal    Attitude:   Cooperative    Orientation:   All   Speech    Rate / Production: Normal     Volume:  Normal    Mood:    Anxious    Affect:    Worrisome    Thought Content:  Clear    Thought Form:  Coherent  Logical    Insight:    Good      Medication Review:   No changes to current psychiatric medication(s)     Medication Compliance:   Yes     Changes in Health Issues:   None reported     Chemical Use Review:   Substance Use: Chemical use reviewed, no active concerns identified      Tobacco Use: No change in amount of tobacco use since last session.  Reviewed information and resources for quitting     Collateral Reports Completed:   Not Applicable    PLAN: (Client Tasks / Therapist Tasks / Other)  Client will return in two weeks.  She will invite her significant other in for a future session to discuss relational concerns.          Julia Alfaro,                                                          ________________________________________________________________________    Treatment Plan    Client's Name: Zainab Bolton  YOB: 1980    Date: 9-11-18    Diagnoses:            296.22 (F32.1)  Major Depressive Disorder, Single Episode, Moderate _ and With anxious distress  300.02 (F41.1) Generalized Anxiety Disorder  Psychosocial & Contextual Factors: Relational stressors   WHODAS 2.0 (12 item)                          This questionnaire asks about difficulties due to health conditions. Health conditions                   include                        disease or illnesses, other health problems that may be short or long lasting,                    injuries, mental health or emotional problems, and problems with alcohol or drugs.                              Think back over the past 30 days  and answer these questions, thinking about how much              difficulty you had doing the following activities. For each question, please [place] only                   one response.      S1 Standing for long periods such as 30 minutes? None =         1   S2 Taking care of household responsibilities? None =         1   S3 Learning a new task, for example, learning how to get to a new place? None =         1   S4 How much of a problem do you have joining community activities (for example, festivals, [employer] orth[employer] or other activities) in the same way as anyone else can? Mild =           2   S5 How much have you been emotionally affected by your health problems? Moderate =   3            In the past 30 days, how much difficulty did you have in:   S6 Concentrating on doing something for ten minutes? Mild =           2   S7 Walking a long distance such as a kilometer (or equivalent)? None =         1   S8 Washing your whole body? None =         1   S9 Getting dressed? None =         1   S10 Dealing with people you do not know? Mild =           2   S11 Maintaining a friendship? Moderate =   3   S12 Your day to day work? None =         1       H1 Overall, in the past 30 days, how many days were these difficulties present? Record number of days 3   H2 In the past 30 days, for how many days were you totally unable to carry out your usual activities or work because of any health condition? Record number of days  0   H3 In the past 30 days, not counting the days that you were totally unable, for how many days did you cut back or reduce your usual activities or work because of any health condition? Record number of days 0            Referral / Collaboration:  Referral to another professional/service is not indicated at this time..    Anticipated number of session or this episode of care: 4-6      MeasurableTreatment Goal(s) related to diagnosis / functional impairment(s)  Goal 1: Client will decrease level of anxiety as measured  by GEOVANY-7 scoring.      I will know I've met my goal when I am not always second guessing.      Objective #A (Client Action)    Client will use at least 8 coping skills for anxiety management in the next 12 weeks.  Status: New - Date: 9-11-18     Intervention(s)  Therapist will use CBT and solution focused therapy.    Objective #B  Client will use relaxation strategies 2 times per day to reduce the physical symptoms of anxiety.  Status: New - Date: 9-11-18     Intervention(s)  Therapist will use CBT and solution focused therapy.    Objective #C  Client will track and record at least 5 pleasant exchanges with significant other each week.   Status: New - Date: 9-11-18     Intervention(s)  Therapist will encourge couples therapy.          Client has reviewed and agreed to the above plan.      Julia Alfaro, TH September 12, 2018

## 2018-09-13 ASSESSMENT — ANXIETY QUESTIONNAIRES: GAD7 TOTAL SCORE: 14

## 2018-09-13 ASSESSMENT — PATIENT HEALTH QUESTIONNAIRE - PHQ9: SUM OF ALL RESPONSES TO PHQ QUESTIONS 1-9: 6

## 2018-09-25 ENCOUNTER — OFFICE VISIT (OUTPATIENT)
Dept: PSYCHOLOGY | Facility: CLINIC | Age: 38
End: 2018-09-25
Payer: COMMERCIAL

## 2018-09-25 DIAGNOSIS — F41.1 GENERALIZED ANXIETY DISORDER: ICD-10-CM

## 2018-09-25 DIAGNOSIS — F32.1 MAJOR DEPRESSIVE DISORDER, SINGLE EPISODE, MODERATE (H): Primary | ICD-10-CM

## 2018-09-25 PROCEDURE — 90834 PSYTX W PT 45 MINUTES: CPT | Performed by: MARRIAGE & FAMILY THERAPIST

## 2018-09-25 NOTE — MR AVS SNAPSHOT
MRN:[id number]                      After Visit Summary   9/25/2018    Zainab Bolton    MRN: [id number]           Visit Information        Provider Department      9/25/2018 2:00 PM Julia Alfaro [facility]      Your next 10 appointments already scheduled     Oct 15, 2018  5:00 PM CDT   Return Visit with Julia Alfaro [facility] ([facility])    [address] [address]   [phone]            Oct 25, 2018  4:00 PM CDT   Return Visit with Julia Alfaro [facility] ([facility])    [address] [id number] [phone]            Nov 14, 2018  4:00 PM CST   Return Visit with Julia Alfaro [facility] ([facility])    [address] [id number] [phone]              MyChart Information     [employer] gives you secure access to your electronic health record. If you see a primary care provider, you can also send messages to your care team and make appointments. If you have questions, please call your primary care clinic.  If you do not have a primary care provider, please call [phone] and they will assist you.        Care EveryWhere ID     This is your Care EveryWhere ID. This could be used by other organizations to access your [place] medical records  [id number]        Equal Access to Services     FLORA ARCOS AH: Hadii clement herro Soelianaali, waaxda luqadaha, qaybta kaalmada adeegyada, lucero martin. So [facility] [phone].    ATENCIÓN: Si habla español, tiene a arana disposición servicios gratuitos de asistencia lingüística. Llame al [phone].    We comply with applicable federal civil rights laws and [place] laws. We do not discriminate on the basis of race, color, national origin, age,  disability, sex, sexual orientation, or gender identity.

## 2018-10-15 ENCOUNTER — OFFICE VISIT (OUTPATIENT)
Dept: PSYCHOLOGY | Facility: CLINIC | Age: 38
End: 2018-10-15
Payer: COMMERCIAL

## 2018-10-15 DIAGNOSIS — F41.1 GENERALIZED ANXIETY DISORDER: Primary | ICD-10-CM

## 2018-10-15 DIAGNOSIS — F32.1 MAJOR DEPRESSIVE DISORDER, SINGLE EPISODE, MODERATE (H): ICD-10-CM

## 2018-10-15 PROCEDURE — 90834 PSYTX W PT 45 MINUTES: CPT | Performed by: MARRIAGE & FAMILY THERAPIST

## 2018-10-15 ASSESSMENT — ANXIETY QUESTIONNAIRES
5. BEING SO RESTLESS THAT IT IS HARD TO SIT STILL: SEVERAL DAYS
1. FEELING NERVOUS, ANXIOUS, OR ON EDGE: MORE THAN HALF THE DAYS
GAD7 TOTAL SCORE: 11
3. WORRYING TOO MUCH ABOUT DIFFERENT THINGS: MORE THAN HALF THE DAYS
7. FEELING AFRAID AS IF SOMETHING AWFUL MIGHT HAPPEN: SEVERAL DAYS
6. BECOMING EASILY ANNOYED OR IRRITABLE: MORE THAN HALF THE DAYS
2. NOT BEING ABLE TO STOP OR CONTROL WORRYING: MORE THAN HALF THE DAYS
IF YOU CHECKED OFF ANY PROBLEMS ON THIS QUESTIONNAIRE, HOW DIFFICULT HAVE THESE PROBLEMS MADE IT FOR YOU TO DO YOUR WORK, TAKE CARE OF THINGS AT HOME, OR GET ALONG WITH OTHER PEOPLE: SOMEWHAT DIFFICULT

## 2018-10-15 ASSESSMENT — PATIENT HEALTH QUESTIONNAIRE - PHQ9: 5. POOR APPETITE OR OVEREATING: SEVERAL DAYS
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
## 2018-10-15 NOTE — MR AVS SNAPSHOT
MRN:[id number]                      After Visit Summary   10/15/2018    Zainab Bolton    MRN: [id number]           Visit Information        Provider Department      10/15/2018 5:00 PM Angelina Julia FRAZIER [facility]      Your next 10 appointments already scheduled     Oct 25, 2018  4:00 PM CDT   Return Visit with NICKY Fong   [facility] ([facility])    [address] [address] [phone]            Nov 14, 2018  4:00 PM CST   Return Visit with NICKY Fong   [facility] ([facility])    [address] [address] [phone]              MyChart Information     [employer] gives you secure access to your electronic health record. If you see a primary care provider, you can also send messages to your care team and make appointments. If you have questions, please call your primary care clinic.  If you do not have a primary care provider, please call [phone] and they will assist you.        Care EveryWhere ID     This is your Care EveryWhere ID. This could be used by other organizations to access your [place] medical records  [id number]        Equal Access to Services     LFORA ARCOS : Suleman burgess Soshanda, waaxda luqadaha, qaybta kaalmada adenirmal, lucero martin. So [facility] [phone].    ATENCIÓN: Si habla español, tiene a arana disposición servicios gratuitos de asistencia lingüística. Llame al [phone].    We comply with applicable federal civil rights laws and [place] laws. We do not discriminate on the basis of race, color, national origin, age, disability, sex, sexual orientation, or gender identity.

## 2018-10-16 ASSESSMENT — ANXIETY QUESTIONNAIRES: GAD7 TOTAL SCORE: 11

## 2018-10-16 ASSESSMENT — PATIENT HEALTH QUESTIONNAIRE - PHQ9: SUM OF ALL RESPONSES TO PHQ QUESTIONS 1-9: 4

## 2018-10-25 ENCOUNTER — OFFICE VISIT (OUTPATIENT)
Dept: PSYCHOLOGY | Facility: CLINIC | Age: 38
End: 2018-10-25
Payer: COMMERCIAL

## 2018-10-25 DIAGNOSIS — F32.1 MAJOR DEPRESSIVE DISORDER, SINGLE EPISODE, MODERATE (H): Primary | ICD-10-CM

## 2018-10-25 DIAGNOSIS — F41.1 GENERALIZED ANXIETY DISORDER: ICD-10-CM

## 2018-10-25 PROCEDURE — 90834 PSYTX W PT 45 MINUTES: CPT | Performed by: MARRIAGE & FAMILY THERAPIST

## 2018-10-25 NOTE — MR AVS SNAPSHOT
MRN:[id number]                      After Visit Summary   10/25/2018    Zainab Bolton    MRN: [id number]           Visit Information        Provider Department      10/25/2018 4:00 PM Angelina Julia FRAZIER [facility]      Your next 10 appointments already scheduled     Nov 14, 2018  4:00 PM CST   Return Visit with NICKY Fong   [facility] ([facility])    [address] [address] [phone]            Dec 10, 2018  4:00 PM CST   Return Visit with NICKY Fong   [facility] ([facility])    [address] [address] [phone]              MyChart Information     [employer] gives you secure access to your electronic health record. If you see a primary care provider, you can also send messages to your care team and make appointments. If you have questions, please call your primary care clinic.  If you do not have a primary care provider, please call [phone] and they will assist you.        Care EveryWhere ID     This is your Care EveryWhere ID. This could be used by other organizations to access your [place] medical records  [id number]        Equal Access to Services     FLORA ARCOS AH: Suleman burgess Soshanda, waswapnilda lulakishaadaha, qaybta kaalmada adenirmal, lucero martin. So [facility] [phone].    ATENCIÓN: Si habla español, tiene a arana disposición servicios gratuitos de asistencia lingüística. Llame al [phone].    We comply with applicable federal civil rights laws and [place] laws. We do not discriminate on the basis of race, color, national origin, age, disability, sex, sexual orientation, or gender identity.

## 2018-10-26 NOTE — PROGRESS NOTES
Progress Note    Client Name: Zainab Bolton  Date: 10-25-18         Service Type: Individual      Session Start Time: 4pm  Session End Time: 450pm      Session Length: 50min     Session #: 5     Attendees: Client    Treatment Plan Last Reviewed: 9-11-18  PHQ-9 / GEOVANY-7 : 10-15-18     DATA      Progress Since Last Session (Related to Symptoms / Goals / Homework):   Symptoms: Client reports stress tied to her son living in the home and continued struggles in her relationship.      Homework: Achieved / completed to satisfaction  Completed in session      Episode of Care Goals: Satisfactory progress - ACTION (Actively working towards change); Intervened by reinforcing change plan / affirming steps taken     Current / Ongoing Stressors and Concerns:   -Clients 18 year old son is now living with her.  She has to transport him to [facility] [facility] [employer] everyday which is a big challenge.   -Client reports her relational needs continue to not be met.       Treatment Objective(s) Addressed in This Session:   Supporting son  Relational issues      Intervention:   Solution Focused: - Client will accept help from others in order to get her son back and forth from school.  She will encourage her significant other to attend a future session.  She is working on being assertive about her personal needs and wants.           ASSESSMENT: Current Emotional / Mental Status (status of significant symptoms):   Risk status (Self / Other harm or suicidal ideation)   Client denies current fears or concerns for personal safety.   Client denies current or recent suicidal ideation or behaviors.   Client denies current or recent homicidal ideation or behaviors.   Client denies current or recent self injurious behavior or ideation.   Client denies other safety concerns.   Client Client reports there has been no change in risk factors since their last session.     Client Client reports there has been no  change in protective factors since their last session.     A safety and risk management plan has not been developed at this time, however client was given the after-hours number / 911 should there be a change in any of these risk factors.     Appearance:   Appropriate    Eye Contact:   Good    Psychomotor Behavior: Normal    Attitude:   Cooperative    Orientation:   All   Speech    Rate / Production: Normal     Volume:  Normal    Mood:    Anxious    Affect:    Worrisome    Thought Content:  Clear    Thought Form:  Coherent  Logical    Insight:    Good      Medication Review:   No changes to current psychiatric medication(s)     Medication Compliance:   Yes     Changes in Health Issues:   None reported     Chemical Use Review:   Substance Use: Chemical use reviewed, no active concerns identified      Tobacco Use: No change in amount of tobacco use since last session.  Reviewed information and resources for quitting     Collateral Reports Completed:   Not Applicable    PLAN: (Client Tasks / Therapist Tasks / Other)  Client will return in two weeks.  She will invite her significant other in for a future session to discuss relational concerns.  She will set clear boundaries with her son while offering good support.           Julia Alfaro,                                                          ________________________________________________________________________    Treatment Plan    Client's Name: Zainab Bolton  YOB: 1980    Date: 9-11-18    Diagnoses:            296.22 (F32.1)  Major Depressive Disorder, Single Episode, Moderate _ and With anxious distress  300.02 (F41.1) Generalized Anxiety Disorder  Psychosocial & Contextual Factors: Relational stressors   WHODAS 2.0 (12 item)                          This questionnaire asks about difficulties due to health conditions. Health conditions                   include                        disease or illnesses, other health problems that may  be short or long lasting,                    injuries, mental health or emotional problems, and problems with alcohol or drugs.                              Think back over the past 30 days and answer these questions, thinking about how much              difficulty you had doing the following activities. For each question, please [place] only                   one response.      S1 Standing for long periods such as 30 minutes? None =         1   S2 Taking care of household responsibilities? None =         1   S3 Learning a new task, for example, learning how to get to a new place? None =         1   S4 How much of a problem do you have joining community activities (for example, festivals, Sikhism or other activities) in the same way as anyone else can? Mild =           2   S5 How much have you been emotionally affected by your health problems? Moderate =   3            In the past 30 days, how much difficulty did you have in:   S6 Concentrating on doing something for ten minutes? Mild =           2   S7 Walking a long distance such as a kilometer (or equivalent)? None =         1   S8 Washing your whole body? None =         1   S9 Getting dressed? None =         1   S10 Dealing with people you do not know? Mild =           2   S11 Maintaining a friendship? Moderate =   3   S12 Your day to day work? None =         1       H1 Overall, in the past 30 days, how many days were these difficulties present? Record number of days 3   H2 In the past 30 days, for how many days were you totally unable to carry out your usual activities or work because of any health condition? Record number of days  0   H3 In the past 30 days, not counting the days that you were totally unable, for how many days did you cut back or reduce your usual activities or work because of any health condition? Record number of days 0            Referral / Collaboration:  Referral to another professional/service is not indicated at this  time..    Anticipated number of session or this episode of care: 4-6      MeasurableTreatment Goal(s) related to diagnosis / functional impairment(s)  Goal 1: Client will decrease level of anxiety as measured by GEOVANY-7 scoring.      I will know I've met my goal when I am not always second guessing.      Objective #A (Client Action)    Client will use at least 8 coping skills for anxiety management in the next 12 weeks.  Status: New - Date: 9-11-18     Intervention(s)  Therapist will use CBT and solution focused therapy.    Objective #B  Client will use relaxation strategies 2 times per day to reduce the physical symptoms of anxiety.  Status: New - Date: 9-11-18     Intervention(s)  Therapist will use CBT and solution focused therapy.    Objective #C  Client will track and record at least 5 pleasant exchanges with significant other each week.   Status: New - Date: 9-11-18     Intervention(s)  Therapist will encourge couples therapy.          Client has reviewed and agreed to the above plan.      Julia Alfaro, TH September 12, 2018

## 2018-11-14 ENCOUNTER — OFFICE VISIT (OUTPATIENT)
Dept: PSYCHOLOGY | Facility: CLINIC | Age: 38
End: 2018-11-14
Payer: COMMERCIAL

## 2018-11-14 DIAGNOSIS — F41.1 GENERALIZED ANXIETY DISORDER: ICD-10-CM

## 2018-11-14 DIAGNOSIS — F32.1 MAJOR DEPRESSIVE DISORDER, SINGLE EPISODE, MODERATE (H): Primary | ICD-10-CM

## 2018-11-14 PROCEDURE — 90834 PSYTX W PT 45 MINUTES: CPT | Performed by: MARRIAGE & FAMILY THERAPIST

## 2018-11-15 ASSESSMENT — ANXIETY QUESTIONNAIRES
6. BECOMING EASILY ANNOYED OR IRRITABLE: SEVERAL DAYS
1. FEELING NERVOUS, ANXIOUS, OR ON EDGE: NEARLY EVERY DAY
2. NOT BEING ABLE TO STOP OR CONTROL WORRYING: MORE THAN HALF THE DAYS
GAD7 TOTAL SCORE: 13
5. BEING SO RESTLESS THAT IT IS HARD TO SIT STILL: SEVERAL DAYS
7. FEELING AFRAID AS IF SOMETHING AWFUL MIGHT HAPPEN: MORE THAN HALF THE DAYS
3. WORRYING TOO MUCH ABOUT DIFFERENT THINGS: MORE THAN HALF THE DAYS
IF YOU CHECKED OFF ANY PROBLEMS ON THIS QUESTIONNAIRE, HOW DIFFICULT HAVE THESE PROBLEMS MADE IT FOR YOU TO DO YOUR WORK, TAKE CARE OF THINGS AT HOME, OR GET ALONG WITH OTHER PEOPLE: SOMEWHAT DIFFICULT

## 2018-11-15 ASSESSMENT — PATIENT HEALTH QUESTIONNAIRE - PHQ9
5. POOR APPETITE OR OVEREATING: MORE THAN HALF THE DAYS
SUM OF ALL RESPONSES TO PHQ QUESTIONS 1-9: 5

## 2018-11-15 NOTE — PROGRESS NOTES
Progress Note    Client Name: Zainab Bolton  Date: 11-14-18         Service Type: Individual      Session Start Time: 4pm  Session End Time: 450pm      Session Length: 50min     Session #: 6     Attendees: Client    Treatment Plan Last Reviewed: 9-11-18  PHQ-9 / GEOVANY-7 : 11-14-18     DATA      Progress Since Last Session (Related to Symptoms / Goals / Homework):   Symptoms: Client reports high anxiety tied to relational stressors and issues with her son.      Homework: Achieved / completed to satisfaction  Completed in session      Episode of Care Goals: Satisfactory progress - ACTION (Actively working towards change); Intervened by reinforcing change plan / affirming steps taken     Current / Ongoing Stressors and Concerns:   -Clients 18 year old son is now living with her.  She has to transport him to [employer] everyday which is a big challenge.  He was hospitalized after drinking too much one weekend.     -Client reports her relational needs continue to not be met.  Her significant other also travels for work, which causes stress in the relationship.      Treatment Objective(s) Addressed in This Session:   Supporting son  Relational issues      Intervention:   Solution Focused: - Client will accept help from others in order to get her son back and forth from school.  She will encourage her significant other to attend a future session.  She is working on being assertive about her personal needs and wants.  She has set firm limits with her son about drinking underage.          ASSESSMENT: Current Emotional / Mental Status (status of significant symptoms):   Risk status (Self / Other harm or suicidal ideation)   Client denies current fears or concerns for personal safety.   Client denies current or recent suicidal ideation or behaviors.   Client denies current or recent homicidal ideation or behaviors.   Client denies current or recent self injurious behavior  or ideation.   Client denies other safety concerns.   Client Client reports there has been no change in risk factors since their last session.     Client Client reports there has been no change in protective factors since their last session.     A safety and risk management plan has not been developed at this time, however client was given the after-hours number / 911 should there be a change in any of these risk factors.     Appearance:   Appropriate    Eye Contact:   Good    Psychomotor Behavior: Normal    Attitude:   Cooperative    Orientation:   All   Speech    Rate / Production: Normal     Volume:  Normal    Mood:    Anxious    Affect:    Worrisome    Thought Content:  Clear    Thought Form:  Coherent  Logical    Insight:    Good      Medication Review:   No changes to current psychiatric medication(s)     Medication Compliance:   Yes     Changes in Health Issues:   None reported     Chemical Use Review:   Substance Use: Chemical use reviewed, no active concerns identified      Tobacco Use: No change in amount of tobacco use since last session.  Reviewed information and resources for quitting     Collateral Reports Completed:   Not Applicable    PLAN: (Client Tasks / Therapist Tasks / Other)  Client will return in two weeks.  She will invite her significant other in for a future session to discuss relational concerns.  She will set clear boundaries with her son while offering good support.           Julia Alfaro,                                                          ________________________________________________________________________    Treatment Plan    Client's Name: Zainab Bolton  YOB: 1980    Date: 9-11-18    Diagnoses:            296.22 (F32.1)  Major Depressive Disorder, Single Episode, Moderate _ and With anxious distress  300.02 (F41.1) Generalized Anxiety Disorder  Psychosocial & Contextual Factors: Relational stressors   WHODAS 2.0 (12  item)                          This questionnaire asks about difficulties due to health conditions. Health conditions                   include                        disease or illnesses, other health problems that may be short or long lasting,                    injuries, mental health or emotional problems, and problems with alcohol or drugs.                              Think back over the past 30 days and answer these questions, thinking about how much              difficulty you had doing the following activities. For each question, please [place] only                   one response.      S1 Standing for long periods such as 30 minutes? None =         1   S2 Taking care of household responsibilities? None =         1   S3 Learning a new task, for example, learning how to get to a new place? None =         1   S4 How much of a problem do you have joining community activities (for example, festivals, Yazidism or other activities) in the same way as anyone else can? Mild =           2   S5 How much have you been emotionally affected by your health problems? Moderate =   3            In the past 30 days, how much difficulty did you have in:   S6 Concentrating on doing something for ten minutes? Mild =           2   S7 Walking a long distance such as a kilometer (or equivalent)? None =         1   S8 Washing your whole body? None =         1   S9 Getting dressed? None =         1   S10 Dealing with people you do not know? Mild =           2   S11 Maintaining a friendship? Moderate =   3   S12 Your day to day work? None =         1       H1 Overall, in the past 30 days, how many days were these difficulties present? Record number of days 3   H2 In the past 30 days, for how many days were you totally unable to carry out your usual activities or work because of any health condition? Record number of days  0   H3 In the past 30 days, not counting the days that you were totally unable, for how many days did you cut back  or reduce your usual activities or work because of any health condition? Record number of days 0            Referral / Collaboration:  Referral to another professional/service is not indicated at this time..    Anticipated number of session or this episode of care: 4-6      MeasurableTreatment Goal(s) related to diagnosis / functional impairment(s)  Goal 1: Client will decrease level of anxiety as measured by GEOVANY-7 scoring.      I will know I've met my goal when I am not always second guessing.      Objective #A (Client Action)    Client will use at least 8 coping skills for anxiety management in the next 12 weeks.  Status: New - Date: 9-11-18     Intervention(s)  Therapist will use CBT and solution focused therapy.    Objective #B  Client will use relaxation strategies 2 times per day to reduce the physical symptoms of anxiety.  Status: New - Date: 9-11-18     Intervention(s)  Therapist will use CBT and solution focused therapy.    Objective #C  Client will track and record at least 5 pleasant exchanges with significant other each week.   Status: New - Date: 9-11-18     Intervention(s)  Therapist will encourge couples therapy.          Client has reviewed and agreed to the above plan.      Julia Alfaro, TH September 12, 2018

## 2018-11-16 ASSESSMENT — ANXIETY QUESTIONNAIRES: GAD7 TOTAL SCORE: 13

## 2018-12-10 ENCOUNTER — OFFICE VISIT (OUTPATIENT)
Dept: PSYCHOLOGY | Facility: CLINIC | Age: 38
End: 2018-12-10
Payer: COMMERCIAL

## 2018-12-10 DIAGNOSIS — F41.1 GENERALIZED ANXIETY DISORDER: Primary | ICD-10-CM

## 2018-12-10 PROCEDURE — 90834 PSYTX W PT 45 MINUTES: CPT | Performed by: MARRIAGE & FAMILY THERAPIST

## 2018-12-10 ASSESSMENT — ANXIETY QUESTIONNAIRES
IF YOU CHECKED OFF ANY PROBLEMS ON THIS QUESTIONNAIRE, HOW DIFFICULT HAVE THESE PROBLEMS MADE IT FOR YOU TO DO YOUR WORK, TAKE CARE OF THINGS AT HOME, OR GET ALONG WITH OTHER PEOPLE: SOMEWHAT DIFFICULT
2. NOT BEING ABLE TO STOP OR CONTROL WORRYING: NEARLY EVERY DAY
7. FEELING AFRAID AS IF SOMETHING AWFUL MIGHT HAPPEN: MORE THAN HALF THE DAYS
GAD7 TOTAL SCORE: 14
6. BECOMING EASILY ANNOYED OR IRRITABLE: SEVERAL DAYS
1. FEELING NERVOUS, ANXIOUS, OR ON EDGE: NEARLY EVERY DAY
5. BEING SO RESTLESS THAT IT IS HARD TO SIT STILL: SEVERAL DAYS
3. WORRYING TOO MUCH ABOUT DIFFERENT THINGS: MORE THAN HALF THE DAYS

## 2018-12-10 ASSESSMENT — PATIENT HEALTH QUESTIONNAIRE - PHQ9
5. POOR APPETITE OR OVEREATING: MORE THAN HALF THE DAYS
SUM OF ALL RESPONSES TO PHQ QUESTIONS 1-9: 6

## 2018-12-11 ASSESSMENT — ANXIETY QUESTIONNAIRES: GAD7 TOTAL SCORE: 14

## 2018-12-11 NOTE — PROGRESS NOTES
Progress Note    Client Name: Zainab Bolton  Date: 12-10-18         Service Type: Individual      Session Start Time: 5pm  Session End Time: 550pm      Session Length: 50min     Session #: 67   Attendees: Client    Treatment Plan Last Reviewed: 9-11-18  PHQ-9 / GEOVANY-7 : 12-10-18     DATA      Progress Since Last Session (Related to Symptoms / Goals / Homework):   Symptoms: Client reports high anxiety tied to relational stressors.  She often catches her significant other being dishonest.  She reports today she would leave the relationship if she had someplace else to move to.      Homework: Achieved / completed to satisfaction  Completed in session      Episode of Care Goals: Satisfactory progress - ACTION (Actively working towards change); Intervened by reinforcing change plan / affirming steps taken     Current / Ongoing Stressors and Concerns:   -Clients 18 year old son is now living with her.  She has to transport him to [employer] everyday which is a big challenge.  He is having a hard time with motivation for school.       -Client reports her relational needs continue to not be met.  Her significant other also travels for work, which causes stress in the relationship. He is not always honest and she catches him doing things online.       Treatment Objective(s) Addressed in This Session:   Supporting son  Relational issues      Intervention:   Solution Focused: - Client will accept help from others in order to get her son back and forth from school.  She will encourage her significant other to attend a future session.  She is working on being assertive about her personal needs and wants.  She will talk with significant other about feeling disappointed to the point of wanting to leave the relationship.           ASSESSMENT: Current Emotional / Mental Status (status of significant symptoms):   Risk status (Self / Other harm or suicidal ideation)   Client  denies current fears or concerns for personal safety.   Client denies current or recent suicidal ideation or behaviors.   Client denies current or recent homicidal ideation or behaviors.   Client denies current or recent self injurious behavior or ideation.   Client denies other safety concerns.   Client Client reports there has been no change in risk factors since their last session.     Client Client reports there has been no change in protective factors since their last session.     A safety and risk management plan has not been developed at this time, however client was given the after-hours number / 911 should there be a change in any of these risk factors.     Appearance:   Appropriate    Eye Contact:   Good    Psychomotor Behavior: Normal    Attitude:   Cooperative    Orientation:   All   Speech    Rate / Production: Normal     Volume:  Normal    Mood:    Anxious    Affect:    Worrisome    Thought Content:  Clear    Thought Form:  Coherent  Logical    Insight:    Good      Medication Review:   No changes to current psychiatric medication(s)     Medication Compliance:   Yes     Changes in Health Issues:   None reported     Chemical Use Review:   Substance Use: Chemical use reviewed, no active concerns identified      Tobacco Use: No change in amount of tobacco use since last session.  Reviewed information and resources for quitting     Collateral Reports Completed:   Not Applicable    PLAN: (Client Tasks / Therapist Tasks / Other)  Client will return in two weeks.  She will invite her significant other in for a future session to discuss relational concerns.          Julia Alfaro,                                                          ________________________________________________________________________    Treatment Plan    [facility]  YOB: 1980    Date: 9-11-18    Diagnoses:            296.22 (F32.1)  Major Depressive Disorder, Single Episode, Moderate _ and With  anxious distress  300.02 (F41.1) Generalized Anxiety Disorder  Psychosocial & Contextual Factors: Relational stressors   WHODAS 2.0 (12 item)                          This questionnaire asks about difficulties due to health conditions. Health conditions                   include                        disease or illnesses, other health problems that may be short or long lasting,                    injuries, mental health or emotional problems, and problems with alcohol or drugs.                              Think back over the past 30 days and answer these questions, thinking about how much              difficulty you had doing the following activities. For each question, please [place] only                   one response.      S1 Standing for long periods such as 30 minutes? None =         1   S2 Taking care of household responsibilities? None =         1   S3 Learning a new task, for example, learning how to get to a new place? None =         1   S4 How much of a problem do you have joining community activities (for example, festivals, Jew or other activities) in the same way as anyone else can? Mild =           2   S5 How much have you been emotionally affected by your health problems? Moderate =   3            In the past 30 days, how much difficulty did you have in:   S6 Concentrating on doing something for ten minutes? Mild =           2   S7 Walking a long distance such as a kilometer (or equivalent)? None =         1   S8 Washing your whole body? None =         1   S9 Getting dressed? None =         1   S10 Dealing with people you do not know? Mild =           2   S11 Maintaining a friendship? Moderate =   3   S12 Your day to day work? None =         1       H1 Overall, in the past 30 days, how many days were these difficulties present? Record number of days 3   H2 In the past 30 days, for how many days were you totally unable to carry out your usual activities or work because of any health condition?  Record number of days  0   H3 In the past 30 days, not counting the days that you were totally unable, for how many days did you cut back or reduce your usual activities or work because of any health condition? Record number of days 0            Referral / Collaboration:  Referral to another professional/service is not indicated at this time..    Anticipated number of session or this episode of care: 4-6      MeasurableTreatment Goal(s) related to diagnosis / functional impairment(s)  Goal 1: Client will decrease level of anxiety as measured by GEOVANY-7 scoring.      I will know I've met my goal when I am not always second guessing.      Objective #A (Client Action)    Client will use at least 8 coping skills for anxiety management in the next 12 weeks.  Status: New - Date: 9-11-18     Intervention(s)  Therapist will use CBT and solution focused therapy.    Objective #B  Client will use relaxation strategies 2 times per day to reduce the physical symptoms of anxiety.  Status: New - Date: 9-11-18     Intervention(s)  Therapist will use CBT and solution focused therapy.    Objective #C  Client will track and record at least 5 pleasant exchanges with significant other each week.   Status: New - Date: 9-11-18     Intervention(s)  Therapist will encourge couples therapy.          Client has reviewed and agreed to the above plan.      Julia Alfaro, TH September 12, 2018

## 2018-12-18 ENCOUNTER — TELEPHONE (OUTPATIENT)
Dept: FAMILY MEDICINE | Facility: OTHER | Age: 38
End: 2018-12-18

## 2018-12-18 DIAGNOSIS — N87.1 DYSPLASIA OF CERVIX, HIGH GRADE CIN 2: ICD-10-CM

## 2018-12-18 NOTE — TELEPHONE ENCOUNTER
Pt is past due for f/u pap smear.  Spoke with pt, states she will call to schedule.   Henrietta Delgado,    Pap Tracking

## 2018-12-22 DIAGNOSIS — F41.9 ANXIETY: ICD-10-CM

## 2018-12-24 NOTE — TELEPHONE ENCOUNTER
"Requested Prescriptions   Pending Prescriptions Disp Refills     sertraline (ZOLOFT) 50 MG tablet [Pharmacy Med Name: SERTRALINE HCL 50 MG TABLET]  Last Written Prescription Date:  5/11/18  Last Fill Quantity: 135,  # refills: 1   Last office visit: 5/11/2018 with prescribing provider:  Nicol   Future Office Visit:   Next 5 appointments (look out 90 days)    Jan 08, 2019  4:00 PM CST  Return Visit with Julia Alfaro [address] [address]  [phone]   Jan 21, 2019  6:00 PM CST  Return Visit with Julia Alfaro [address] [address]  [phone]   Feb 05, 2019  4:00 PM CST  Return Visit with NICKY Fong  [address] [address]  [phone]          135 tablet 1     Sig: TAKE 1&1/2 TABLETS BY MOUTH DAILY    SSRIs Protocol Passed - 12/22/2018  8:59 AM       Passed - Recent (12 mo) or future (30 days) visit within the authorizing provider's specialty    Patient had office visit in the last 12 months or has a visit in the next 30 days with authorizing provider or within the authorizing provider's specialty.  See \"Patient Info\" tab in inbasket, or \"Choose Columns\" in Meds & Orders section of the refill encounter.             Passed - Patient is age 18 or older       Passed - No active pregnancy on record       Passed - No positive pregnancy test in last 12 months          "

## 2018-12-24 NOTE — TELEPHONE ENCOUNTER
Due for update on symptoms. 30 day supply sent. [email or web address] message sent with questionnaires.      JESSICA GalindoN, RN

## 2019-01-08 ENCOUNTER — OFFICE VISIT (OUTPATIENT)
Dept: PSYCHOLOGY | Facility: CLINIC | Age: 39
End: 2019-01-08
Payer: COMMERCIAL

## 2019-01-08 DIAGNOSIS — F41.1 GENERALIZED ANXIETY DISORDER: ICD-10-CM

## 2019-01-08 DIAGNOSIS — F32.1 MAJOR DEPRESSIVE DISORDER, SINGLE EPISODE, MODERATE (H): Primary | ICD-10-CM

## 2019-01-08 PROCEDURE — 90834 PSYTX W PT 45 MINUTES: CPT | Performed by: MARRIAGE & FAMILY THERAPIST

## 2019-01-09 DIAGNOSIS — F41.9 ANXIETY: ICD-10-CM

## 2019-01-09 NOTE — TELEPHONE ENCOUNTER
"Requested Prescriptions   Pending Prescriptions Disp Refills     sertraline (ZOLOFT) 50 MG tablet 45 tablet 0    SSRIs Protocol Passed - 1/9/2019  1:22 PM       Passed - Recent (12 mo) or future (30 days) visit within the authorizing provider's specialty    Patient had office visit in the last 12 months or has a visit in the next 30 days with authorizing provider or within the authorizing provider's specialty.  See \"Patient Info\" tab in inbasket, or \"Choose Columns\" in Meds & Orders section of the refill encounter.             Passed - Medication is active on med list       Passed - Patient is age 18 or older       Passed - No active pregnancy on record       Passed - No positive pregnancy test in last 12 months        Last Written Prescription Date:  12/24/18  Last Fill Quantity: 45,  # refills: 0   Last office visit: 5/11/2018 with prescribing provider:  Nicol   Future Office Visit:   Next 5 appointments (look out 90 days)    Jan 21, 2019  6:00 PM CST  Return Visit with Julia Alfaro [facility] ([address] [address]  [phone]   Feb 05, 2019  4:00 PM CST  Return Visit with NICKY Fong  [facility] ([address] [address]  [phone]   Feb 20, 2019  4:00 PM CST  Return Visit with NICKY Fong  [facility] ([address] [address]  [phone]           "

## 2019-01-10 ASSESSMENT — ANXIETY QUESTIONNAIRES
5. BEING SO RESTLESS THAT IT IS HARD TO SIT STILL: SEVERAL DAYS
6. BECOMING EASILY ANNOYED OR IRRITABLE: MORE THAN HALF THE DAYS
1. FEELING NERVOUS, ANXIOUS, OR ON EDGE: NEARLY EVERY DAY
2. NOT BEING ABLE TO STOP OR CONTROL WORRYING: NEARLY EVERY DAY
3. WORRYING TOO MUCH ABOUT DIFFERENT THINGS: MORE THAN HALF THE DAYS
GAD7 TOTAL SCORE: 13
7. FEELING AFRAID AS IF SOMETHING AWFUL MIGHT HAPPEN: SEVERAL DAYS
IF YOU CHECKED OFF ANY PROBLEMS ON THIS QUESTIONNAIRE, HOW DIFFICULT HAVE THESE PROBLEMS MADE IT FOR YOU TO DO YOUR WORK, TAKE CARE OF THINGS AT HOME, OR GET ALONG WITH OTHER PEOPLE: SOMEWHAT DIFFICULT

## 2019-01-10 ASSESSMENT — PATIENT HEALTH QUESTIONNAIRE - PHQ9
SUM OF ALL RESPONSES TO PHQ QUESTIONS 1-9: 9
5. POOR APPETITE OR OVEREATING: SEVERAL DAYS

## 2019-01-10 NOTE — PROGRESS NOTES
Progress Note    Client Name: Zainab oBlton  Date: 1-8-19         Service Type: Individual      Session Start Time: 4pm  Session End Time: 450pm      Session Length: 50min     Session #: 68   Attendees: Client    Treatment Plan Last Reviewed: 9-11-18  PHQ-9 / EGOVANY-7 : 1-8-19     DATA      Progress Since Last Session (Related to Symptoms / Goals / Homework):   Symptoms: Client reports high anxiety tied to relational stressors.  She reports the holidays went okay but one day she thought about leaving the house after her significant other was not honest about his partaking in his holiday party at work and then she saw pictures online in which he was surrounded by a bunch of women he works with.    Homework: Achieved / completed to satisfaction  Completed in session      Episode of Care Goals: Satisfactory progress - ACTION (Actively working towards change); Intervened by reinforcing change plan / affirming steps taken     Current / Ongoing Stressors and Concerns:   -Clients 18 year old son is now living with her.  She has to transport him to [place] everyday which is a big challenge.  He is having a hard time with motivation for school.       -Client reports her relational needs continue to not be met.  Her significant other also travels for work, which causes stress in the relationship. He is not always honest and she catches him doing things online.  He works with a lot of women and this makes the client uncomfortable.  His work environment is more casual then most places.     -Clients oldest son is in senior care on drug charges.     Treatment Objective(s) Addressed in This Session:   Supporting son  Relational issues      Intervention:   Solution Focused: - Client will accept help from others in order to get her son back and forth from school.  She will encourage her significant other to attend a future session.  She is working on being assertive about her personal  needs and wants.  Client has been getting some support from her significant others parents which also has been helpful.  Client reports her oldest son is in [facility] and she has decided he needs to stay there in order to get better.            ASSESSMENT: Current Emotional / Mental Status (status of significant symptoms):   Risk status (Self / Other harm or suicidal ideation)   Client denies current fears or concerns for personal safety.   Client denies current or recent suicidal ideation or behaviors.   Client denies current or recent homicidal ideation or behaviors.   Client denies current or recent self injurious behavior or ideation.   Client denies other safety concerns.   Client Client reports there has been no change in risk factors since their last session.     Client Client reports there has been no change in protective factors since their last session.     A safety and risk management plan has not been developed at this time, however client was given the after-hours number / 911 should there be a change in any of these risk factors.     Appearance:   Appropriate    Eye Contact:   Good    Psychomotor Behavior: Normal    Attitude:   Cooperative    Orientation:   All   Speech    Rate / Production: Normal     Volume:  Normal    Mood:    Anxious    Affect:    Worrisome    Thought Content:  Clear    Thought Form:  Coherent  Logical    Insight:    Good      Medication Review:   No changes to current psychiatric medication(s)     Medication Compliance:   Yes     Changes in Health Issues:   None reported     Chemical Use Review:   Substance Use: Chemical use reviewed, no active concerns identified      Tobacco Use: No change in amount of tobacco use since last session.  Reviewed information and resources for quitting     Collateral Reports Completed:   Not Applicable    PLAN: (Client Tasks / Therapist Tasks / Other)  Client will return in two weeks.  She will invite her significant other in for a future session to  discuss relational concerns.  She will set clear boundaries and expectations.  She will accept help from others in order to get son transported to [place] for school everyday.          Juliatyrone Alfaro,                                                          ________________________________________________________________________    Treatment Plan    Client's Name: Zainab Bolton  YOB: 1980    Date: 9-11-18    Diagnoses:            296.22 (F32.1)  Major Depressive Disorder, Single Episode, Moderate _ and With anxious distress  300.02 (F41.1) Generalized Anxiety Disorder  Psychosocial & Contextual Factors: Relational stressors   WHODAS 2.0 (12 item)                          This questionnaire asks about difficulties due to health conditions. Health conditions                   include                        disease or illnesses, other health problems that may be short or long lasting,                    injuries, mental health or emotional problems, and problems with alcohol or drugs.                              Think back over the past 30 days and answer these questions, thinking about how much              difficulty you had doing the following activities. For each question, please [place] only                   one response.      S1 Standing for long periods such as 30 minutes? None =         1   S2 Taking care of household responsibilities? None =         1   S3 Learning a new task, for example, learning how to get to a new place? None =         1   S4 How much of a problem do you have joining community activities (for example, festivals, Jehovah's witness or other activities) in the same way as anyone else can? Mild =           2   S5 How much have you been emotionally affected by your health problems? Moderate =   3            In the past 30 days, how much difficulty did you have in:   S6 Concentrating on doing something for ten minutes? Mild =           2   S7 Walking a long distance such as a  kilometer (or equivalent)? None =         1   S8 Washing your whole body? None =         1   S9 Getting dressed? None =         1   S10 Dealing with people you do not know? Mild =           2   S11 Maintaining a friendship? Moderate =   3   S12 Your day to day work? None =         1       H1 Overall, in the past 30 days, how many days were these difficulties present? Record number of days 3   H2 In the past 30 days, for how many days were you totally unable to carry out your usual activities or work because of any health condition? Record number of days  0   H3 In the past 30 days, not counting the days that you were totally unable, for how many days did you cut back or reduce your usual activities or work because of any health condition? Record number of days 0            Referral / Collaboration:  Referral to another professional/service is not indicated at this time..    Anticipated number of session or this episode of care: 4-6      MeasurableTreatment Goal(s) related to diagnosis / functional impairment(s)  Goal 1: Client will decrease level of anxiety as measured by GEOVANY-7 scoring.      I will know I've met my goal when I am not always second guessing.      Objective #A (Client Action)    Client will use at least 8 coping skills for anxiety management in the next 12 weeks.  Status: New - Date: 9-11-18     Intervention(s)  Therapist will use CBT and solution focused therapy.    Objective #B  Client will use relaxation strategies 2 times per day to reduce the physical symptoms of anxiety.  Status: New - Date: 9-11-18     Intervention(s)  Therapist will use CBT and solution focused therapy.    Objective #C  Client will track and record at least 5 pleasant exchanges with significant other each week.   Status: New - Date: 9-11-18     Intervention(s)  Therapist will encourge couples therapy.          Client has reviewed and agreed to the above plan.      Julia Alfaro, TH September 12, 2018

## 2019-01-11 ASSESSMENT — ANXIETY QUESTIONNAIRES: GAD7 TOTAL SCORE: 13

## 2019-01-11 NOTE — TELEPHONE ENCOUNTER
Prescription approved per [facility] [place]. Delfina HENSON RN

## 2019-03-07 ENCOUNTER — OFFICE VISIT (OUTPATIENT)
Dept: OBGYN | Facility: OTHER | Age: 39
End: 2019-03-07
Payer: COMMERCIAL

## 2019-03-07 VITALS
HEIGHT: 64 IN | TEMPERATURE: 98.8 F | SYSTOLIC BLOOD PRESSURE: 110 MMHG | BODY MASS INDEX: 20.49 KG/M2 | WEIGHT: 120 LBS | DIASTOLIC BLOOD PRESSURE: 72 MMHG

## 2019-03-07 DIAGNOSIS — Z71.6 ENCOUNTER FOR TOBACCO USE CESSATION COUNSELING: ICD-10-CM

## 2019-03-07 DIAGNOSIS — N87.1 DYSPLASIA OF CERVIX, HIGH GRADE CIN 2: ICD-10-CM

## 2019-03-07 DIAGNOSIS — Z01.419 ENCOUNTER FOR WELL WOMAN EXAM WITH ROUTINE GYNECOLOGICAL EXAM: Primary | ICD-10-CM

## 2019-03-07 PROCEDURE — 88175 CYTOPATH C/V AUTO FLUID REDO: CPT | Performed by: ADVANCED PRACTICE MIDWIFE

## 2019-03-07 PROCEDURE — 87624 HPV HI-RISK TYP POOLED RSLT: CPT | Performed by: ADVANCED PRACTICE MIDWIFE

## 2019-03-07 PROCEDURE — 99386 PREV VISIT NEW AGE 40-64: CPT | Performed by: ADVANCED PRACTICE MIDWIFE

## 2019-03-07 ASSESSMENT — MIFFLIN-ST. JEOR: SCORE: 1191.38

## 2019-03-07 NOTE — PROGRESS NOTES
SUBJECTIVE:   CC: Zainab Bolton is an 38 year old woman who presents for preventive health visit.     Healthy Habits:    Do you get at least three servings of calcium containing foods daily (dairy, green leafy vegetables, etc.)? yes    Amount of exercise or daily activities, outside of work: 3 day(s) per week    Problems taking medications regularly No    Medication side effects: No    Have you had an eye exam in the past two years? yes    Do you see a dentist twice per year? yes    Do you have sleep apnea, excessive snoring or daytime drowsiness?no    Concerns: None    Patient informed that anything we discuss that is not related to preventative medicine, may be billed for; patient verbalizes understanding.          Today's PHQ-2 Score:   PHQ-2 (  [employer]) 3/7/2019 2017   Q1: Little interest or pleasure in doing things 0 0   Q2: Feeling down, depressed or hopeless 0 0   PHQ-2 Score 0 0   Q1: Little interest or pleasure in doing things - Not at all   Q2: Feeling down, depressed or hopeless - Not at all   PHQ-2 Score - 0       Abuse: Current or Past(Physical, Sexual or Emotional)- No  Do you feel safe in your environment? Yes    Social History     Tobacco Use     Smoking status: Former Smoker     Packs/day: 1.00     Years: 10.00     Pack years: 10.00     Types: Cigarettes     Last attempt to quit: 2012     Years since quittin.6     Smokeless tobacco: Never Used     Tobacco comment: since age 12, but stopped with each baby   Substance Use Topics     Alcohol use: Yes     Alcohol/week: 0.0 oz     Comment: 1-2  every 2-3 months if any     If you drink alcohol do you typically have >3 drinks per day or >7 drinks per week? No                     Reviewed orders with patient.  Reviewed health maintenance and updated orders accordingly - Yes  BP Readings from Last 3 Encounters:   19 110/72   18 117/77   17 118/66    Wt Readings from Last 3 Encounters:   19 54.4 kg (120 lb)    18 58.1 kg (128 lb)   17 56.7 kg (125 lb)                  Patient Active Problem List   Diagnosis     Anxiety     Hypercholesteremia     HYPERLIPIDEMIA LDL GOAL <130     S/P LEEP of cervix     Dysplasia of cervix, high grade LÁZARO 2     High risk human papillomavirus infection     Papanicolaou smear of cervix with low grade squamous intraepithelial lesion (LGSIL)     Past Surgical History:   Procedure Laterality Date     C LIGATE FALLOPIAN TUBE  10/3/2003     CONIZATION LEEP  7/16/15    LÁZARO 2     CONIZATION LEEP N/A 2015    Procedure: CONIZATION LEEP;  Surgeon: Omayra Cunningham DO;  Location:  OR       Social History     Tobacco Use     Smoking status: Former Smoker     Packs/day: 1.00     Years: 10.00     Pack years: 10.00     Types: Cigarettes     Last attempt to quit: 2012     Years since quittin.6     Smokeless tobacco: Never Used     Tobacco comment: since age 12, but stopped with each baby   Substance Use Topics     Alcohol use: Yes     Alcohol/week: 0.0 oz     Comment: 1-2  every 2-3 months if any     Family History   Problem Relation Age of Onset     Cancer Father         liver     Arthritis Maternal Grandmother      Hypertension Maternal Grandmother      Diabetes Maternal Grandmother      Thyroid Disease Maternal Grandmother      Cancer Maternal Grandfather         leukemia     Heart Disease Maternal Aunt         MI approx age 46         Current Outpatient Medications   Medication Sig Dispense Refill     hydrOXYzine (ATARAX) 25 MG tablet Take 1-2 tablets (25-50 mg) by mouth every 6 hours as needed for anxiety 60 tablet 1     sertraline (ZOLOFT) 50 MG tablet TAKE 1&1/2 TABLETS BY MOUTH DAILY 135 tablet 1           Pertinent mammograms are reviewed under the imaging tab.  History of abnormal Pap smear:   Last 3 Pap and HPV Results:   PAP / HPV Latest Ref Rng & Units 2017   PAP - NIL LSIL(A) LSIL(A)   HPV 16 DNA NEG Negative Negative Positive(A)   HPV  "18 DNA NEG Negative Negative Negative   OTHER HR HPV NEG Negative Positive(A) Positive(A)     PAP / HPV Latest Ref Rng & Units 6/27/2017 5/11/2016 5/5/2015   PAP - NIL LSIL(A) LSIL(A)   HPV 16 DNA NEG Negative Negative Positive(A)   HPV 18 DNA NEG Negative Negative Negative   OTHER HR HPV NEG Negative Positive(A) Positive(A)     Reviewed and updated as needed this visit by clinical staff  Tobacco  Allergies  Meds  Soc Hx        Reviewed and updated as needed this visit by Provider            ROS:  CONSTITUTIONAL: NEGATIVE for fever, chills, change in weight  INTEGUMENTARU/SKIN: NEGATIVE for worrisome rashes, moles or lesions  EYES: NEGATIVE for vision changes or irritation  ENT: NEGATIVE for ear, mouth and throat problems  RESP: NEGATIVE for significant cough or SOB  BREAST: NEGATIVE for masses, tenderness or discharge  CV: NEGATIVE for chest pain, palpitations or peripheral edema  GI: NEGATIVE for nausea, abdominal pain, heartburn, or change in bowel habits  : NEGATIVE for unusual urinary or vaginal symptoms. Periods are regular.  MUSCULOSKELETAL: NEGATIVE for significant arthralgias or myalgia  NEURO: NEGATIVE for weakness, dizziness or paresthesias  ENDOCRINE: NEGATIVE for temperature intolerance, skin/hair changes  HEME/ALLERGY/IMMUNE: NEGATIVE for bleeding problems  PSYCHIATRIC: NEGATIVE for changes in mood or affect    OBJECTIVE:   /72   Temp 98.8  F (37.1  C) (Tympanic)   Ht 1.613 m (5' 3.5\")   Wt 54.4 kg (120 lb)   LMP 03/07/2019 (Exact Date)   Breastfeeding? No   BMI 20.92 kg/m    EXAM:  GENERAL: healthy, alert and no distress  EYES: Eyes grossly normal to inspection, PERRL and conjunctivae and sclerae normal  HENT: ear canals and TM's normal, nose and mouth without ulcers or lesions  NECK: no adenopathy, no asymmetry, masses, or scars and thyroid normal to palpation  RESP: lungs clear to auscultation - no rales, rhonchi or wheezes  BREAST: normal without masses, tenderness or nipple " "discharge and no palpable axillary masses or adenopathy  CV: regular rate and rhythm, normal S1 S2, no S3 or S4, no murmur, click or rub, no peripheral edema and peripheral pulses strong  ABDOMEN: soft, nontender, no hepatosplenomegaly, no masses and bowel sounds normal   (female): normal female external genitalia, normal urethral meatus, vaginal mucosa pink, moist, well rugated, and normal cervix without masses or discharge  MS: no gross musculoskeletal defects noted, no edema  SKIN: no suspicious lesions or rashes  NEURO: Normal strength and tone, mentation intact and speech normal  PSYCH: mentation appears normal, affect normal/bright    Diagnostic Test Results:  No results found for this or any previous visit (from the past 24 hour(s)).    ASSESSMENT/PLAN:       (Z01.419) Encounter for well woman exam with routine gynecological exam  (primary encounter diagnosis)  Comment:   Plan:     (N87.1) Dysplasia of cervix, high grade LÁZARO 2  Comment:   Plan: Pap imaged thin layer diagnostic with HPV         (select HPV order below), HPV High Risk Types         DNA Cervical            (Z71.6) Encounter for tobacco use cessation counseling  Comment:   Plan:     l              COUNSELING:   Reviewed preventive health counseling, as reflected in patient instructions       Vision screening       Contraception    BP Readings from Last 1 Encounters:   03/07/19 110/72     Estimated body mass index is 20.92 kg/m  as calculated from the following:    Height as of this encounter: 1.613 m (5' 3.5\").    Weight as of this encounter: 54.4 kg (120 lb).           reports that she quit smoking about 6 years ago. Her smoking use included cigarettes. She has a 10.00 pack-year smoking history. she has never used smokeless tobacco.  Tobacco Cessation Action Plan: has quit cold turkey in the past and is considering that again    Counseling Resources:  ATP IV Guidelines  Pooled Cohorts Equation Calculator  Breast Cancer Risk Calculator  FRAX " Risk Assessment  ICSI Preventive Guidelines  Dietary Guidelines for Americans, 2010  USDA's MyPlate  ASA Prophylaxis  Lung CA Screening    Day THOMAS Rodney MARIA E  [facility]

## 2019-03-11 LAB
COPATH REPORT: NORMAL
PAP: NORMAL

## 2019-03-12 LAB
FINAL DIAGNOSIS: NORMAL
HPV HR 12 DNA CVX QL NAA+PROBE: NEGATIVE
HPV16 DNA SPEC QL NAA+PROBE: NEGATIVE
HPV18 DNA SPEC QL NAA+PROBE: NEGATIVE
SPECIMEN DESCRIPTION: NORMAL
SPECIMEN SOURCE CVX/VAG CYTO: NORMAL

## 2019-03-21 ENCOUNTER — APPOINTMENT (OUTPATIENT)
Dept: MRI IMAGING | Facility: CLINIC | Age: 39
End: 2019-03-21
Attending: EMERGENCY MEDICINE
Payer: COMMERCIAL

## 2019-03-21 ENCOUNTER — OFFICE VISIT (OUTPATIENT)
Dept: FAMILY MEDICINE | Facility: CLINIC | Age: 39
End: 2019-03-21
Payer: COMMERCIAL

## 2019-03-21 ENCOUNTER — HOSPITAL ENCOUNTER (EMERGENCY)
Facility: CLINIC | Age: 39
Discharge: HOME | End: 2019-03-21
Attending: EMERGENCY MEDICINE | Admitting: EMERGENCY MEDICINE
Payer: COMMERCIAL

## 2019-03-21 VITALS
OXYGEN SATURATION: 96 % | RESPIRATION RATE: 19 BRPM | BODY MASS INDEX: 21.1 KG/M2 | DIASTOLIC BLOOD PRESSURE: 84 MMHG | WEIGHT: 121 LBS | SYSTOLIC BLOOD PRESSURE: 122 MMHG | HEART RATE: 68 BPM | TEMPERATURE: 97 F

## 2019-03-21 VITALS
RESPIRATION RATE: 16 BRPM | WEIGHT: 120 LBS | OXYGEN SATURATION: 100 % | DIASTOLIC BLOOD PRESSURE: 68 MMHG | SYSTOLIC BLOOD PRESSURE: 110 MMHG | BODY MASS INDEX: 20.92 KG/M2 | HEART RATE: 70 BPM | TEMPERATURE: 98.5 F

## 2019-03-21 DIAGNOSIS — R20.2 NUMBNESS AND TINGLING IN LEFT ARM: ICD-10-CM

## 2019-03-21 DIAGNOSIS — R20.2 PARESTHESIAS: ICD-10-CM

## 2019-03-21 DIAGNOSIS — R20.0 NUMBNESS AND TINGLING IN LEFT ARM: ICD-10-CM

## 2019-03-21 DIAGNOSIS — H53.9 VISUAL DISTURBANCE: Primary | ICD-10-CM

## 2019-03-21 DIAGNOSIS — H53.8 BLURRED VISION: ICD-10-CM

## 2019-03-21 LAB
ANION GAP SERPL CALCULATED.3IONS-SCNC: 5 MMOL/L (ref 3–14)
BASOPHILS # BLD AUTO: 0.1 10E9/L (ref 0–0.2)
BASOPHILS NFR BLD AUTO: 0.5 %
BUN SERPL-MCNC: 8 MG/DL (ref 7–30)
CALCIUM SERPL-MCNC: 8.4 MG/DL (ref 8.5–10.1)
CHLORIDE SERPL-SCNC: 107 MMOL/L (ref 94–109)
CO2 SERPL-SCNC: 29 MMOL/L (ref 20–32)
CREAT SERPL-MCNC: 0.74 MG/DL (ref 0.52–1.04)
DIFFERENTIAL METHOD BLD: NORMAL
EOSINOPHIL # BLD AUTO: 0 10E9/L (ref 0–0.7)
EOSINOPHIL NFR BLD AUTO: 0.3 %
ERYTHROCYTE [DISTWIDTH] IN BLOOD BY AUTOMATED COUNT: 13.2 % (ref 10–15)
GFR SERPL CREATININE-BSD FRML MDRD: >90 ML/MIN/{1.73_M2}
GLUCOSE BLDC GLUCOMTR-MCNC: 76 MG/DL (ref 70–99)
GLUCOSE SERPL-MCNC: 78 MG/DL (ref 70–99)
HCT VFR BLD AUTO: 43.4 % (ref 35–47)
HGB BLD-MCNC: 14.1 G/DL (ref 11.7–15.7)
IMM GRANULOCYTES # BLD: 0 10E9/L (ref 0–0.4)
IMM GRANULOCYTES NFR BLD: 0.3 %
LYMPHOCYTES # BLD AUTO: 2.2 10E9/L (ref 0.8–5.3)
LYMPHOCYTES NFR BLD AUTO: 23.6 %
MCH RBC QN AUTO: 30.1 PG (ref 26.5–33)
MCHC RBC AUTO-ENTMCNC: 32.5 G/DL (ref 31.5–36.5)
MCV RBC AUTO: 93 FL (ref 78–100)
MONOCYTES # BLD AUTO: 0.7 10E9/L (ref 0–1.3)
MONOCYTES NFR BLD AUTO: 8.1 %
NEUTROPHILS # BLD AUTO: 6.2 10E9/L (ref 1.6–8.3)
NEUTROPHILS NFR BLD AUTO: 67.2 %
NRBC # BLD AUTO: 0 10*3/UL
NRBC BLD AUTO-RTO: 0 /100
PLATELET # BLD AUTO: 383 10E9/L (ref 150–450)
POTASSIUM SERPL-SCNC: 3.4 MMOL/L (ref 3.4–5.3)
RBC # BLD AUTO: 4.68 10E12/L (ref 3.8–5.2)
SODIUM SERPL-SCNC: 141 MMOL/L (ref 133–144)
TSH SERPL DL<=0.005 MIU/L-ACNC: 2.89 MU/L (ref 0.4–4)
WBC # BLD AUTO: 9.2 10E9/L (ref 4–11)

## 2019-03-21 PROCEDURE — 93005 ELECTROCARDIOGRAM TRACING: CPT | Performed by: EMERGENCY MEDICINE

## 2019-03-21 PROCEDURE — 70553 MRI BRAIN STEM W/O & W/DYE: CPT

## 2019-03-21 PROCEDURE — 85025 COMPLETE CBC W/AUTO DIFF WBC: CPT | Performed by: EMERGENCY MEDICINE

## 2019-03-21 PROCEDURE — 99215 OFFICE O/P EST HI 40 MIN: CPT | Performed by: PHYSICIAN ASSISTANT

## 2019-03-21 PROCEDURE — 70549 MR ANGIOGRAPH NECK W/O&W/DYE: CPT

## 2019-03-21 PROCEDURE — 84443 ASSAY THYROID STIM HORMONE: CPT | Performed by: EMERGENCY MEDICINE

## 2019-03-21 PROCEDURE — A9585 GADOBUTROL INJECTION: HCPCS | Performed by: EMERGENCY MEDICINE

## 2019-03-21 PROCEDURE — 99285 EMERGENCY DEPT VISIT HI MDM: CPT | Mod: 25 | Performed by: EMERGENCY MEDICINE

## 2019-03-21 PROCEDURE — 93010 ELECTROCARDIOGRAM REPORT: CPT | Mod: Z6 | Performed by: EMERGENCY MEDICINE

## 2019-03-21 PROCEDURE — 25500064 ZZH RX 255 OP 636: Performed by: EMERGENCY MEDICINE

## 2019-03-21 PROCEDURE — 80048 BASIC METABOLIC PNL TOTAL CA: CPT | Performed by: EMERGENCY MEDICINE

## 2019-03-21 PROCEDURE — 70544 MR ANGIOGRAPHY HEAD W/O DYE: CPT

## 2019-03-21 PROCEDURE — 00000146 ZZHCL STATISTIC GLUCOSE BY METER IP

## 2019-03-21 RX ORDER — GADOBUTROL 604.72 MG/ML
10 INJECTION INTRAVENOUS ONCE
Status: COMPLETED | OUTPATIENT
Start: 2019-03-21 | End: 2019-03-21

## 2019-03-21 RX ADMIN — GADOBUTROL 10 ML: 604.72 INJECTION INTRAVENOUS at 17:47

## 2019-03-21 ASSESSMENT — ENCOUNTER SYMPTOMS
DIARRHEA: 0
MYALGIAS: 0
LIGHT-HEADEDNESS: 0
ARTHRALGIAS: 0
COUGH: 0
VOMITING: 0
CARDIOVASCULAR NEGATIVE: 1
ENDOCRINE NEGATIVE: 1
NAUSEA: 0
SORE THROAT: 0
BRUISES/BLEEDS EASILY: 0
CHILLS: 0
RESPIRATORY NEGATIVE: 1
JOINT SWELLING: 0
NECK PAIN: 0
FEVER: 0
DIZZINESS: 0
SHORTNESS OF BREATH: 0
WOUND: 0
ALLERGIC/IMMUNOLOGIC NEGATIVE: 1
HEADACHES: 0
NECK STIFFNESS: 0
BACK PAIN: 0
HEMATOLOGIC/LYMPHATIC NEGATIVE: 1
WEAKNESS: 0
NUMBNESS: 1
MUSCULOSKELETAL NEGATIVE: 1
PALPITATIONS: 0
RHINORRHEA: 0

## 2019-03-21 NOTE — NURSING NOTE
"Chief Complaint   Patient presents with     Eye Problem     Has had blurred vision for 5 days.  Now getting tingling in hands and feet.        Initial /68 (BP Location: Right arm, Patient Position: Chair, Cuff Size: Adult Regular)   Pulse 70   Temp 98.5  F (36.9  C) (Tympanic)   Resp 16   Wt 54.4 kg (120 lb)   LMP 03/07/2019 (Exact Date)   SpO2 100%   BMI 20.92 kg/m   Estimated body mass index is 20.92 kg/m  as calculated from the following:    Height as of 3/7/19: 1.613 m (5' 3.5\").    Weight as of this encounter: 54.4 kg (120 lb).    Patient presents to the clinic using No DME    Health Maintenance that is potentially due pending provider review:  NONE    n/a    Is there anyone who you would like to be able to receive your results? No  If yes have patient fill out [facility]  Heraclio Bower M.A.      "

## 2019-03-21 NOTE — ED NOTES
Pt here from home with a 5 day period of numbness and tingling in her extremities and states that she has had some vision changes over the last 24 hours with cloudiness.

## 2019-03-21 NOTE — PROGRESS NOTES
Chief Complaint:    Chief Complaint   Patient presents with     Eye Problem     Has had blurred vision for 5 days.  Now getting tingling in hands and feet.        HPI: Zainab Bolton is an 38 year old female who presents for evaluation and treatment of blurry vision, and numbness and tingling in the extremities.  Her symptoms started 5 days ago and have been worsening.  This morning the numbness and tingling on her L side arm worsened and she began to develop nausea.  She has no Hx of stroke in the past.  She denies any headache or dizziness.  No unilateral weakness or facial droop.  No slurring of words.  No recent illness.  No new medications.  No chest pain or shortness of breath.      ROS:      Review of Systems   Constitutional: Negative for chills and fever.   HENT: Negative for congestion, ear pain, rhinorrhea and sore throat.    Eyes: Positive for visual disturbance.   Respiratory: Negative.  Negative for cough and shortness of breath.    Cardiovascular: Negative.  Negative for chest pain and palpitations.   Gastrointestinal: Negative for diarrhea, nausea and vomiting.   Endocrine: Negative.    Genitourinary: Negative.    Musculoskeletal: Negative.  Negative for arthralgias, back pain, joint swelling, myalgias, neck pain and neck stiffness.   Skin: Negative.  Negative for rash and wound.   Allergic/Immunologic: Negative.  Negative for immunocompromised state.   Neurological: Positive for numbness. Negative for dizziness, weakness, light-headedness and headaches.   Hematological: Negative.  Does not bruise/bleed easily.        Family History   Family History   Problem Relation Age of Onset     Cancer Father         liver     Arthritis Maternal Grandmother      Hypertension Maternal Grandmother      Diabetes Maternal Grandmother      Thyroid Disease Maternal Grandmother      Cancer Maternal Grandfather         leukemia     Heart Disease Maternal Aunt         MI approx age 46       Social History  Social  History     Socioeconomic History     Marital status:      Spouse name: Not on file     Number of children: 3     Years of education: 12     Highest education level: Not on file   Occupational History     Occupation: Stay at home Mother   Social Needs     Financial resource strain: Not on file     Food insecurity:     Worry: Not on file     Inability: Not on file     Transportation needs:     Medical: Not on file     Non-medical: Not on file   Tobacco Use     Smoking status: Former Smoker     Packs/day: 1.00     Years: 10.00     Pack years: 10.00     Types: Cigarettes     Last attempt to quit: 2012     Years since quittin.6     Smokeless tobacco: Never Used     Tobacco comment: since age 12, but stopped with each baby   Substance and Sexual Activity     Alcohol use: Yes     Alcohol/week: 0.0 oz     Comment: 1-2  every 2-3 months if any     Drug use: No     Sexual activity: Yes     Partners: Male     Birth control/protection: Female Surgical   Lifestyle     Physical activity:     Days per week: Not on file     Minutes per session: Not on file     Stress: Not on file   Relationships     Social connections:     Talks on phone: Not on file     Gets together: Not on file     Attends [employer] service: Not on file     Active member of club or organization: Not on file     Attends meetings of clubs or organizations: Not on file     Relationship status: Not on file     Intimate partner violence:     Fear of current or ex partner: Not on file     Emotionally abused: Not on file     Physically abused: Not on file     Forced sexual activity: Not on file   Other Topics Concern      Service No     Blood Transfusions No     Caffeine Concern Yes     Comment: 3 cups every 2 weeks, 2 pops per week      Occupational Exposure No     Hobby Hazards No     Sleep Concern No     Stress Concern Yes     Weight Concern Yes     Special Diet No     Back Care No     Exercise No     Bike Helmet No     Seat Belt Yes      Self-Exams No     Parent/sibling w/ CABG, MI or angioplasty before 65F 55M? No   Social History Narrative     Not on file        Surgical History:  Past Surgical History:   Procedure Laterality Date     C LIGATE FALLOPIAN TUBE  10/3/2003     CONIZATION LEEP  7/16/15    LÁZARO 2     CONIZATION LEEP N/A 7/16/2015    Procedure: CONIZATION LEEP;  Surgeon: Omayra Cunningham DO;  Location:  OR        Problem List:  Patient Active Problem List   Diagnosis     Anxiety     Hypercholesteremia     HYPERLIPIDEMIA LDL GOAL <130     S/P LEEP of cervix     Dysplasia of cervix, high grade LÁZARO 2     High risk human papillomavirus infection     Papanicolaou smear of cervix with low grade squamous intraepithelial lesion (LGSIL)        Allergies:  Allergies   Allergen Reactions     No Known Drug Allergies         Current Meds:    Current Outpatient Medications:      sertraline (ZOLOFT) 50 MG tablet, TAKE 1&1/2 TABLETS BY MOUTH DAILY, Disp: 135 tablet, Rfl: 1     hydrOXYzine (ATARAX) 25 MG tablet, Take 1-2 tablets (25-50 mg) by mouth every 6 hours as needed for anxiety (Patient not taking: Reported on 3/21/2019), Disp: 60 tablet, Rfl: 1     PHYSICAL EXAM:     Vital signs noted and reviewed by Manuel Douglas  /68 (BP Location: Right arm, Patient Position: Chair, Cuff Size: Adult Regular)   Pulse 70   Temp 98.5  F (36.9  C) (Tympanic)   Resp 16   Wt 54.4 kg (120 lb)   LMP 03/07/2019 (Exact Date)   SpO2 100%   BMI 20.92 kg/m       PEFR:    Physical Exam   Constitutional: She is oriented to person, place, and time. She appears well-developed and well-nourished. She is cooperative.  Non-toxic appearance. She does not have a sickly appearance. She does not appear ill. No distress.   HENT:   Head: Normocephalic and atraumatic.   Right Ear: Tympanic membrane and external ear normal. Tympanic membrane is not perforated, not erythematous, not retracted and not bulging.   Left Ear: Tympanic membrane and external ear normal.  Tympanic membrane is not perforated, not erythematous, not retracted and not bulging.   Nose: No mucosal edema or rhinorrhea.   Mouth/Throat: Oropharynx is clear and moist. No oropharyngeal exudate, posterior oropharyngeal edema, posterior oropharyngeal erythema or tonsillar abscesses.   Eyes: Conjunctivae, EOM and lids are normal. Pupils are equal, round, and reactive to light.   Neck: Trachea normal, normal range of motion and full passive range of motion without pain. Neck supple.   Cardiovascular: Normal rate, regular rhythm, S1 normal, S2 normal, normal heart sounds and intact distal pulses. Exam reveals no gallop and no friction rub.   No murmur heard.  Pulmonary/Chest: Effort normal and breath sounds normal. No respiratory distress. She has no decreased breath sounds. She has no wheezes. She has no rhonchi. She has no rales.   Abdominal: Soft. Bowel sounds are normal. She exhibits no distension and no mass. There is no hepatosplenomegaly. There is no tenderness. There is no rigidity, no rebound, no guarding and no CVA tenderness.   Lymphadenopathy:     She has no cervical adenopathy.   Neurological: She is alert and oriented to person, place, and time. She has normal strength and normal reflexes. She displays no atrophy and normal reflexes. No cranial nerve deficit or sensory deficit. She exhibits normal muscle tone. She displays a negative Romberg sign. Coordination and gait normal. GCS eye subscore is 4. GCS verbal subscore is 5. GCS motor subscore is 6.   Reflex Scores:       Tricep reflexes are 2+ on the right side and 2+ on the left side.       Bicep reflexes are 2+ on the right side and 2+ on the left side.       Brachioradialis reflexes are 2+ on the right side and 2+ on the left side.       Patellar reflexes are 2+ on the right side and 2+ on the left side.       Achilles reflexes are 2+ on the right side and 2+ on the left side.  Skin: Skin is warm and dry. She is not diaphoretic.   Psychiatric: She  has a normal mood and affect. Her behavior is normal. Judgment and thought content normal.   Nursing note and vitals reviewed.       Labs:       Medical Decision Making:    Differential Diagnosis:  Intracranial bleed or mass.     ASSESSMENT:     1. Visual disturbance    2. Blurred vision    3. Numbness and tingling in left arm           PLAN:     Patient presents with 5 days of worsening visual disturbance, numbness in the L arm and nausea.  She is in no acute distress.  Vital signs are normal. Neurological exam was benign.  At this time, unclear what is causing this.  I canot rule out intracranial bleed or lesion.  Patient advised to go to the ED now for further evaluation.  Patient refused EMS transport and will travel by private vehicle.  She was brought to the  to schedule follow up appointment to establish care on Monday.  Patient verbalized understanding and agreed with this plan.  Patient discharged in stable condition.     Manuel Douglas  3/21/2019, 2:30 PM

## 2019-03-21 NOTE — ED AVS SNAPSHOT
[facility]  [address] [address]  Phone:  [phone]  Fax:  [phone]                                    Zainab Bolton   MRN: [id number]    Department:  [facility]   Date of Visit:  3/21/2019           After Visit Summary Signature Page    I have received my discharge instructions, and my questions have been answered. I have discussed any challenges I see with this plan with the nurse or doctor.    ..........................................................................................................................................  Patient/Patient Representative Signature      ..........................................................................................................................................  Patient Representative Print Name and Relationship to Patient    ..................................................               ................................................  Date                                   Time    ..........................................................................................................................................  Reviewed by Signature/Title    ...................................................              ..............................................  Date                                               Time          22EPIC Rev 08/18

## 2019-03-21 NOTE — ED PROVIDER NOTES
History     Chief Complaint   Patient presents with     Numbness     left arm numbness and tingling onset last night worse now     Eye Problem     cloudy, blurry vision      HPI  Zainab Bolton is a 38 year old female who presents with .  Patient describes 5 days of bilateral mild visual blurring, left eye little bit worse than the right, does not wear contacts, underwent LASEK surgery 2 years ago.  Tried some OTC drops with no real change in her vision.  She describes a couple days of nausea and last night had headache that was not atypical for her, characterized as mild and global.  She had no vomiting or fever.  Over the past 24 hours she describes tingling left shantel-aural and periorbital, left side of her nose, left hand and left distal foot.  She describes intermittent tingling in the past that lasts for 10 minutes at a time.  She denies associated hyperventilation.  No history of diabetes, hypertension or hyperlipidemia.  She is a smoker, also smokes marijuana on a daily basis, and drinks alcohol to intoxication on the weekends.  She takes sertraline 50 mg daily, she did increase to 75 mg a day about a year ago, and decreased back to 50 mg 3-4 months ago.  She denies any recent head injury, neck pain, fever, cough, sore throat, palpitations, abdominal pain, black or bloody stool, urinary symptoms, swollen joints.    Allergies:  Allergies   Allergen Reactions     No Known Drug Allergies        Problem List:    Patient Active Problem List    Diagnosis Date Noted     High risk human papillomavirus infection 06/22/2016     Priority: Medium     Papanicolaou smear of cervix with low grade squamous intraepithelial lesion (LGSIL) 06/22/2016     Priority: Medium     S/P LEEP of cervix 07/16/2015     Priority: Medium     5/5/15 LSIL/+ HR HPV 16 and other HR HPV. Plan: colposcopy  6/19/15 colposcopy. LÁZARO 2. Plan- LEEP  7/16/15 LEEP- LÁZARO 1/2. Unsure of margin. Plan: repeat pap in 6 months.   5/11/16 LSIL/+HR  HPV other. PLan: Colposcopy.   6/22/16 [place] bx: benign. Plan: cotest in 6 mo.(dbe)  6/27/17 NIL pap/neg HR HPV. Plan: cotest in 1 year.  1/25/19 Lost to follow-up for pap tracking  3/7/19 NIL pap, neg HR HPV. Plan: Cotest in 3 years         Dysplasia of cervix, high grade LÁZARO 2 07/13/2015     Priority: Medium     5/5/15 LSIL/+ HR HPV 16 and other HR HPV. Plan: colposcopy  6/19/15 colposcopy. LÁZARO 2. Plan- LEEP  7/16/15 LEEP- LÁZARO 1/2. Unsure of margin. Plan: repeat pap in 6 months.   5/11/16 LSIL/+HR HPV other. PLan: Colposcopy.   6/22/16 [place]. cervix biopsy was normal. Please follow up in 6 months for a repeat PAP with HPV testing.  3/7/19 NIL pap, neg HR HPV. Plan: cotest in 3 years.       HYPERLIPIDEMIA LDL GOAL <130 10/31/2010     Priority: Medium     Anxiety 08/16/2010     Priority: Medium     Hypercholesteremia 08/16/2010     Priority: Medium        Past Medical History:    Past Medical History:   Diagnosis Date     Anxiety 8/16/2010     Anxiety state, unspecified 1997     Hypercholesteremia 8/16/2010     Obesity 8/16/2010     Other acne      Papanicolaou smear of cervix with low grade squamous intraepithelial lesion (LGSIL) (aka LSIL) 5/5/15, 5/11/16     TOBACCO ABUSE-CONTINUOUS        Past Surgical History:    Past Surgical History:   Procedure Laterality Date     C LIGATE FALLOPIAN TUBE  10/3/2003     CONIZATION LEEP  7/16/15    LÁZARO 2     CONIZATION LEEP N/A 7/16/2015    Procedure: CONIZATION LEEP;  Surgeon: Omayra Cunningham DO;  Location: [facility] OR       Family History:    Family History   Problem Relation Age of Onset     Cancer Father         liver     Arthritis Maternal Grandmother      Hypertension Maternal Grandmother      Diabetes Maternal Grandmother      Thyroid Disease Maternal Grandmother      Cancer Maternal Grandfather         leukemia     Heart Disease Maternal Aunt         MI approx age 46       Social History:  Marital Status:   [2]  Social History     Tobacco Use     Smoking status:  Former Smoker     Packs/day: 1.00     Years: 10.00     Pack years: 10.00     Types: Cigarettes     Last attempt to quit: 2012     Years since quittin.6     Smokeless tobacco: Never Used     Tobacco comment: since age 12, but stopped with each baby   Substance Use Topics     Alcohol use: Yes     Alcohol/week: 0.0 oz     Comment: 1-2  every 2-3 months if any     Drug use: No        Medications:      hydrOXYzine (ATARAX) 25 MG tablet   sertraline (ZOLOFT) 50 MG tablet         Review of Systems  All other systems reviewed and are negative.    Physical Exam   BP: 152/88  Pulse: 66  Heart Rate: 67  Temp: 97  F (36.1  C)  Resp: 18  Weight: 54.9 kg (121 lb)  SpO2: 99 %      Physical Exam  Nontoxic-appearing no respiratory distress alert and oriented x3.    Head atraumatic normocephalic    Cranial nerves; vision baseline fields intact, PERRL, EOMI, facial sensation intact to light touch, although reports diminished sensation to light touch lateral periorbital on the left side, and perioral on the left side, facial muscle tone intact and symmetrical, hearing grossly intact,swallowing without difficulty, voice baseline and normal, SCM  strength intact, tongue protrudes midline.  Palatal elevation symmetric    TM's unremarkable, EACs clear, oropharynx moist without lesions or erythema.    Neck supple full active painless range of motion no posterior midline tenderness.    No cervical adenopathy    Spine nontender to palpation    Pelvis stable nontender    Lungs clear to auscultation no rales rhonchi or wheezes    Heart regular no murmur S3 or rub    Abdomen soft nontender bowel sounds positive no masses or HSM    Strength and sensation intact throughout the extremities, skin clear from rash or lesion.    There is no drift, no ataxia    National Institutes of Health Stroke Scale  Exam Interval: Baseline   Score    Level of consciousness: (0)   Alert, keenly responsive    LOC questions: (0)   Answers both questions  correctly    LOC commands: (0)   Performs both tasks correctly    Best gaze: (0)   Normal    Visual: (0)   No visual loss    Facial palsy: (0)   Normal symmetrical movements    Motor arm (left): (0)   No drift    Motor arm (right): (0)   No drift    Motor leg (left): (0)   No drift    Motor leg (right): (0)   No drift    Limb ataxia: (0)   Absent    Sensory: (1)   Mild to moderate sensory loss    Best language: (0)   Normal- no aphasia    Dysarthria: (0)   Normal    Extinction and inattention: (0)   No abnormality        Total Score:  1       ED Course        Procedures  ECG: Normal rate and rhythm, axis and intervals within normal limits, no acute ST or T wave changes. Read by Dr. Jaron Gandara                 Critical Care time:  none               Results for orders placed or performed during the hospital encounter of 03/21/19 (from the past 24 hour(s))   Glucose by meter   Result Value Ref Range    Glucose 76 70 - 99 mg/dL   CBC with platelets, differential   Result Value Ref Range    WBC 9.2 4.0 - 11.0 10e9/L    RBC Count 4.68 3.8 - 5.2 10e12/L    Hemoglobin 14.1 11.7 - 15.7 g/dL    Hematocrit 43.4 35.0 - 47.0 %    MCV 93 78 - 100 fl    MCH 30.1 26.5 - 33.0 pg    MCHC 32.5 31.5 - 36.5 g/dL    RDW 13.2 10.0 - 15.0 %    Platelet Count 383 150 - 450 10e9/L    Diff Method Automated Method     % Neutrophils 67.2 %    % Lymphocytes 23.6 %    % Monocytes 8.1 %    % Eosinophils 0.3 %    % Basophils 0.5 %    % Immature Granulocytes 0.3 %    Nucleated RBCs 0 0 /100    Absolute Neutrophil 6.2 1.6 - 8.3 10e9/L    Absolute Lymphocytes 2.2 0.8 - 5.3 10e9/L    Absolute Monocytes 0.7 0.0 - 1.3 10e9/L    Absolute Eosinophils 0.0 0.0 - 0.7 10e9/L    Absolute Basophils 0.1 0.0 - 0.2 10e9/L    Abs Immature Granulocytes 0.0 0 - 0.4 10e9/L    Absolute Nucleated RBC 0.0    Basic metabolic panel   Result Value Ref Range    Sodium 141 133 - 144 mmol/L    Potassium 3.4 3.4 - 5.3 mmol/L    Chloride 107 94 - 109 mmol/L    Carbon Dioxide  29 20 - 32 mmol/L    Anion Gap 5 3 - 14 mmol/L    Glucose 78 70 - 99 mg/dL    Urea Nitrogen 8 7 - 30 mg/dL    Creatinine 0.74 0.52 - 1.04 mg/dL    GFR Estimate >90 >60 mL/min/[1.73_m2]    GFR Estimate If Black >90 >60 mL/min/[1.73_m2]    Calcium 8.4 (L) 8.5 - 10.1 mg/dL   TSH with free T4 reflex   Result Value Ref Range    TSH 2.89 0.40 - 4.00 mU/L   MR Head w/o Contrast Angiogram    Narrative    MR ANGIOGRAM OF THE HEAD WITHOUT CONTRAST   3/21/2019 5:26 PM     COMPARISON: None    HISTORY: Left facial, hand and foot numbness    TECHNIQUE: 3D time-of-flight MR angiogram of the head without  contrast. MIP reconstruction of all MR angiographic data was  performed.    FINDINGS: There is normal variant fenestration of the A1 segment of  the left anterior cerebral artery. The bilateral distal internal  carotid, basilar, bilateral anterior cerebral, bilateral middle  cerebral and bilateral posterior cerebral arteries are patent and  unremarkable with no evidence for cerebral artery stenosis or  aneurysm. The posterior communicating arteries bilaterally are patent  and unremarkable.       Impression    IMPRESSION: Normal MR angiogram of the head.       MR Neck w/o & w Contrast Angiogram    Narrative    MRA NECK WITHOUT AND WITH CONTRAST  3/21/2019 5:47 PM     COMPARISON: None    HISTORY: Left facial, hand and foot numbness subacute    TECHNIQUE: 2D time-of-flight MR angiogram of the neck without contrast  and 3D MR angiogram of the neck with 10mL Gadavist gadolinium IV  contrast. MIP reconstruction of all MR angiographic data was  performed. Estimates of carotid stenoses are made relative to the  distal internal carotid artery diameters except as noted.    FINDINGS:    Carotids: The common carotid arteries bilaterally are widely patent.  The cervical internal carotid arteries bilaterally are patent without  stenosis.    Vertebrals: The vertebral arteries bilaterally are patent without  stenosis and demonstrate antegrade  flow.    Aortic arch: The arteries as they arise from the aortic arch are  normally arranged with no evidence for stenosis.      Impression    IMPRESSION: Normal MR angiogram of the neck.   MR Brain w/o & w Contrast    Narrative    MRI OF THE BRAIN WITHOUT AND WITH CONTRAST 3/21/2019 5:47 PM     COMPARISON: None.    HISTORY: Left face, hand and foot numbness subacute.    TECHNIQUE: Multisequence, multiplanar MRI images of the brain were  acquired before and after the administration of IV gadolinium (10mL  Gadavist).    FINDINGS: The ventricles and basal cisterns are normal in  configuration. There is no midline shift. There are no extra-axial  fluid collections. Gray-white differentiation is well maintained.  There is no evidence for stroke or acute intracranial hemorrhage.  There is no abnormal contrast enhancement in the brain or its  coverings.    There is no sinusitis or mastoiditis.      Impression    IMPRESSION: Normal brain MRI.       Medications   gadobutrol (GADAVIST) injection 10 mL (10 mLs Intravenous Given 3/21/19 1747)   sodium chloride (PF) 0.9% PF flush 50 mL (50 mLs Intracatheter Given 3/21/19 1747)       Assessments & Plan (with Medical Decision Making)  38-year-old female presents with facial foot paresthesias, exam is unremarkable, NIH stroke scale of 1.  MRI/MRA head and neck negative for acute finding, specifically no evidence for stroke, demyelinating disease, mass, bleed.  Chronic marijuana use may be playing a role in patient's symptom complex, chronic anxiety depression as well, currently treated with sertraline.  No indication for further evaluation.  Recommend follow-up neurology and psychotherapy.  Discussed abstinence from alcohol and marijuana.  Discussed return criteria.  Patient and spouse expressed understanding and agreement.  Remained stable throughout stay.     I have reviewed the nursing notes.    I have reviewed the findings, diagnosis, plan and need for follow up with the  patient.          Medication List      There are no discharge medications for this visit.         Final diagnoses:   Paresthesias       3/21/2019   [facility] EMERGENCY DEPARTMENT     Jaron Gandara MD  03/21/19 [id number]

## 2019-03-25 ENCOUNTER — OFFICE VISIT (OUTPATIENT)
Dept: FAMILY MEDICINE | Facility: CLINIC | Age: 39
End: 2019-03-25
Payer: COMMERCIAL

## 2019-03-25 VITALS
WEIGHT: 121 LBS | HEIGHT: 64 IN | SYSTOLIC BLOOD PRESSURE: 108 MMHG | HEART RATE: 52 BPM | BODY MASS INDEX: 20.66 KG/M2 | TEMPERATURE: 98.9 F | RESPIRATION RATE: 16 BRPM | DIASTOLIC BLOOD PRESSURE: 62 MMHG

## 2019-03-25 DIAGNOSIS — F41.9 ANXIETY: Primary | ICD-10-CM

## 2019-03-25 DIAGNOSIS — R20.2 PARESTHESIAS: ICD-10-CM

## 2019-03-25 PROCEDURE — 99214 OFFICE O/P EST MOD 30 MIN: CPT | Performed by: NURSE PRACTITIONER

## 2019-03-25 ASSESSMENT — ANXIETY QUESTIONNAIRES
4. TROUBLE RELAXING: MORE THAN HALF THE DAYS
GAD7 TOTAL SCORE: 18
GAD7 TOTAL SCORE: 18
6. BECOMING EASILY ANNOYED OR IRRITABLE: NEARLY EVERY DAY
5. BEING SO RESTLESS THAT IT IS HARD TO SIT STILL: SEVERAL DAYS
3. WORRYING TOO MUCH ABOUT DIFFERENT THINGS: NEARLY EVERY DAY
7. FEELING AFRAID AS IF SOMETHING AWFUL MIGHT HAPPEN: NEARLY EVERY DAY
1. FEELING NERVOUS, ANXIOUS, OR ON EDGE: NEARLY EVERY DAY
2. NOT BEING ABLE TO STOP OR CONTROL WORRYING: NEARLY EVERY DAY
GAD7 TOTAL SCORE: 18
7. FEELING AFRAID AS IF SOMETHING AWFUL MIGHT HAPPEN: NEARLY EVERY DAY

## 2019-03-25 ASSESSMENT — PATIENT HEALTH QUESTIONNAIRE - PHQ9
10. IF YOU CHECKED OFF ANY PROBLEMS, HOW DIFFICULT HAVE THESE PROBLEMS MADE IT FOR YOU TO DO YOUR WORK, TAKE CARE OF THINGS AT HOME, OR GET ALONG WITH OTHER PEOPLE: SOMEWHAT DIFFICULT
SUM OF ALL RESPONSES TO PHQ QUESTIONS 1-9: 8
SUM OF ALL RESPONSES TO PHQ QUESTIONS 1-9: 8

## 2019-03-25 ASSESSMENT — MIFFLIN-ST. JEOR: SCORE: 1195.91

## 2019-03-25 NOTE — PATIENT INSTRUCTIONS
Neurology referral placed    Increase the sertraline to 75 mg    Schedule with counselor    Follow up in 1-2 months, sooner if needed

## 2019-03-25 NOTE — PROGRESS NOTES
SUBJECTIVE:   Zainab Bolton is a 38 year old female who presents to clinic today for the following health issues:    Anxiety Follow-Up    Status since last visit: Worsened     Other associated symptoms:None    Complicating factors:   Significant life event: No   Current substance abuse: None  Depression symptoms: Yes  GEOVANY-7 SCORE 12/10/2018 1/10/2019 3/25/2019   Total Score - - -   Total Score - - 18 (severe anxiety)   Total Score 14 13 18     PHQ-9 SCORE 12/10/2018 1/10/2019 3/25/2019   PHQ-9 Total Score - - -   PHQ-9 Total Score MyChart - - 8 (Mild depression)   PHQ-9 Total Score 6 9 8       GEOVANY-7    ED follow up   Paresthesias on left-improved but not resolved  Vision changes  No headache with event  Work up in ED negative    Problem list and histories reviewed & adjusted, as indicated.  Additional history: as documented    Patient Active Problem List   Diagnosis     Anxiety     Hypercholesteremia     HYPERLIPIDEMIA LDL GOAL <130     S/P LEEP of cervix     Dysplasia of cervix, high grade LÁZARO 2     High risk human papillomavirus infection     Papanicolaou smear of cervix with low grade squamous intraepithelial lesion (LGSIL)     Past Surgical History:   Procedure Laterality Date     C LIGATE FALLOPIAN TUBE  10/3/2003     CONIZATION LEEP  7/16/15    LÁZARO 2     CONIZATION LEEP N/A 2015    Procedure: CONIZATION LEEP;  Surgeon: Omayra Cunningham DO;  Location:  OR       Social History     Tobacco Use     Smoking status: Former Smoker     Packs/day: 1.00     Years: 10.00     Pack years: 10.00     Types: Cigarettes     Last attempt to quit: 2012     Years since quittin.6     Smokeless tobacco: Never Used     Tobacco comment: since age 12, but stopped with each baby   Substance Use Topics     Alcohol use: Yes     Alcohol/week: 0.0 oz     Comment: 1-2  every 2-3 months if any     Family History   Problem Relation Age of Onset     Cancer Father         liver     Arthritis Maternal Grandmother       "Hypertension Maternal Grandmother      Diabetes Maternal Grandmother      Thyroid Disease Maternal Grandmother      Cancer Maternal Grandfather         leukemia     Heart Disease Maternal Aunt         MI approx age 46         Current Outpatient Medications   Medication Sig Dispense Refill     sertraline (ZOLOFT) 50 MG tablet TAKE 1&1/2 TABLETS BY MOUTH DAILY 135 tablet 1     hydrOXYzine (ATARAX) 25 MG tablet Take 1-2 tablets (25-50 mg) by mouth every 6 hours as needed for anxiety (Patient not taking: Reported on 3/25/2019) 60 tablet 1     Allergies   Allergen Reactions     No Known Drug Allergies      Labs reviewed in EPIC    Reviewed and updated as needed this visit by clinical staff  Tobacco  Allergies  Meds  Med Hx  Surg Hx  Fam Hx  Soc Hx      Reviewed and updated as needed this visit by Provider       ROS:  Constitutional, HEENT, cardiovascular, pulmonary, gi and gu systems are negative, except as otherwise noted.    OBJECTIVE:     /62 (Cuff Size: Adult Regular)   Pulse 52   Temp 98.9  F (37.2  C) (Tympanic)   Resp 16   Ht 1.613 m (5' 3.5\")   Wt 54.9 kg (121 lb)   LMP 03/07/2019 (Exact Date)   BMI 21.10 kg/m    Body mass index is 21.1 kg/m .  GENERAL: healthy, alert and no distress  NECK: no adenopathy, no asymmetry, masses, or scars and thyroid normal to palpation  RESP: lungs clear to auscultation - no rales, rhonchi or wheezes  CV: regular rate and rhythm, normal S1 S2, no S3 or S4, no murmur, click or rub, no peripheral edema and peripheral pulses strong  ABDOMEN: soft, nontender, no hepatosplenomegaly, no masses and bowel sounds normal  MS: no gross musculoskeletal defects noted, no edema  NEURO: Normal strength and tone, sensory exam grossly normal, mentation intact and cranial nerves 2-12 intact  PSYCH: mentation appears normal, affect normal/bright    Diagnostic Test Results:  none     ASSESSMENT/PLAN:     1. Anxiety  Not controlled.  Will increase the sertraline to 75 mg.  Potential " side effects discussed including but not limited to increased risk of self harm or suicide.  Zainab verbalized understanding of risks.  Referral for counseling made.   - sertraline (ZOLOFT) 50 MG tablet; TAKE 1&1/2 TABLETS BY MOUTH DAILY  Dispense: 135 tablet; Refill: 1  - MENTAL HEALTH REFERRAL  - Adult; Outpatient Treatment; Individual/Couples/Family/Group Therapy/Health Psychology; Other: [facility] Providers [phone]; We will contact you to schedule the appointment or please call with any questi...    2. Paresthesias  With ongoing symptoms will refer to neurology. Symptomatic care and follow up discussed.  - NEUROLOGY ADULT REFERRAL    Home care instructions were reviewed with the patient. The risks, benefits and treatment options of prescribed medications or other treatments have been discussed with the patient. The patient verbalized their understanding and should call or follow up if no improvement or if they develop further problems.    Patient Instructions   Neurology referral placed    Increase the sertraline to 75 mg    Schedule with counselor    Follow up in 1-2 months, sooner if needed      THOMAS Hanson [facility]

## 2019-03-26 ASSESSMENT — PATIENT HEALTH QUESTIONNAIRE - PHQ9: SUM OF ALL RESPONSES TO PHQ QUESTIONS 1-9: 8

## 2019-03-26 ASSESSMENT — ANXIETY QUESTIONNAIRES: GAD7 TOTAL SCORE: 18

## 2019-09-25 ENCOUNTER — FCC EXTENDED DOCUMENTATION (OUTPATIENT)
Dept: PSYCHOLOGY | Facility: CLINIC | Age: 39
End: 2019-09-25

## 2019-09-25 NOTE — PROGRESS NOTES
Discharge Summary  Multiple Sessions    Client Name: Zainab Bolton MRN#: [id number] YOB: 1980      Intake / Discharge Date: 8-24-18 and 1-8-19      DSM5 Diagnoses: (Sustained by DSM5 Criteria Listed Above)  Diagnoses:            296.22 (F32.1)  Major Depressive Disorder, Single Episode, Moderate _ and With anxious distress  300.02 (F41.1) Generalized Anxiety Disorder  Psychosocial & Contextual Factors: Relational stressors   WHODAS 2.0 (12 item)                          This questionnaire asks about difficulties due to health conditions. Health conditions                   include                        disease or illnesses, other health problems that may be short or long lasting,                    injuries, mental health or emotional problems, and problems with alcohol or drugs.                              Think back over the past 30 days and answer these questions, thinking about how much              difficulty you had doing the following activities. For each question, please [place] only                   one response.      S1 Standing for long periods such as 30 minutes? None =         1   S2 Taking care of household responsibilities? None =         1   S3 Learning a new task, for example, learning how to get to a new place? None =         1   S4 How much of a problem do you have joining community activities (for example, festivals, Jainism or other activities) in the same way as anyone else can? Mild =           2   S5 How much have you been emotionally affected by your health problems? Moderate =   3               In the past 30 days, how much difficulty did you have in:   S6 Concentrating on doing something for ten minutes? Mild =           2   S7 Walking a long distance such as a kilometer (or equivalent)? None =         1   S8 Washing your whole body? None =         1   S9 Getting dressed? None =         1   S10 Dealing with people you do not know? Mild =           2    S11 Maintaining a friendship? Moderate =   3   S12 Your day to day work? None =         1       H1 Overall, in the past 30 days, how many days were these difficulties present? Record number of days 3   H2 In the past 30 days, for how many days were you totally unable to carry out your usual activities or work because of any health condition? Record number of days  0   H3 In the past 30 days, not counting the days that you were totally unable, for how many days did you cut back or reduce your usual activities or work because of any health condition? Record number of days 0                  Presenting Concern:  Anxiety and relational issues       Reason for Discharge:  Client is satisfied with progress      Disposition at Time of Last Encounter:   Comments:   Client made progress in all goal areas      Risk Management:   Client denies a history of suicidal ideation, suicide attempts, self-injurious behavior, homicidal ideation, homicidal behavior and and other safety concerns  Recommended that patient call 911 or go to the local ED should there be a change in any of these risk factors.      Referred To:  Does not apply         Julia Alfaro,    9/25/2019

## 2019-09-28 ENCOUNTER — HEALTH MAINTENANCE LETTER (OUTPATIENT)
Age: 39
End: 2019-09-28

## 2019-10-27 ENCOUNTER — HEALTH MAINTENANCE LETTER (OUTPATIENT)
Age: 39
End: 2019-10-27

## 2020-05-04 DIAGNOSIS — F41.9 ANXIETY: ICD-10-CM

## 2020-05-18 DIAGNOSIS — F41.9 ANXIETY: ICD-10-CM

## 2020-05-27 DIAGNOSIS — F41.9 ANXIETY: ICD-10-CM

## 2020-08-26 NOTE — PROGRESS NOTES
"                                             Progress Note    Client Name: Zainab Bolton  Date: 9-25-18         Service Type: Individual      Session Start Time: 2pm  Session End Time: 250pm      Session Length: 50min     Session #: 3     Attendees: Client attended alone    Treatment Plan Last Reviewed: 9-11-18  PHQ-9 / GEOVANY-7 : 9-11-18     DATA      Progress Since Last Session (Related to Symptoms / Goals / Homework):   Symptoms: Client reports anxiety tied to current relational stressors.  She reports her older son is struggling with chemical health.      Homework: Completed in session      Episode of Care Goals: Minimal progress - ACTION (Actively working towards change); Intervened by reinforcing change plan / affirming steps taken     Current / Ongoing Stressors and Concerns:   -Client reports she has a difficult time trusting her significant other.  She reports at times he will only tell the \"half truth.\"  She reports her anxiety then starts to spiral and she thinks the worst.  She states she has intercepted some inappropriate e-mails as well as believes he was on a dating site.    -Clients son is struggling with chemical health and is facing some legal issues.       Treatment Objective(s) Addressed in This Session:   Supporting son  Relational issues      Intervention:   Solution Focused: - Client will invite significant other in for a couples session.  She will communicate openly and clearly and stress her concerns in the relationship.  She will set clear boundaries and be assertive.  She will support her son while also having clear rules and expectations that if he lives in the house he will go to treatment.          ASSESSMENT: Current Emotional / Mental Status (status of significant symptoms):   Risk status (Self / Other harm or suicidal ideation)   Client denies current fears or concerns for personal safety.   Client denies current or recent suicidal ideation or behaviors.   Client denies current or " recent homicidal ideation or behaviors.   Client denies current or recent self injurious behavior or ideation.   Client denies other safety concerns.   Client Client reports there has been no change in risk factors since their last session.     Client Client reports there has been no change in protective factors since their last session.     A safety and risk management plan has not been developed at this time, however client was given the after-hours number / 911 should there be a change in any of these risk factors.     Appearance:   Appropriate    Eye Contact:   Good    Psychomotor Behavior: Normal    Attitude:   Cooperative    Orientation:   All   Speech    Rate / Production: Normal     Volume:  Normal    Mood:    Anxious    Affect:    Worrisome    Thought Content:  Clear    Thought Form:  Coherent  Logical    Insight:    Good      Medication Review:   No changes to current psychiatric medication(s)     Medication Compliance:   Yes     Changes in Health Issues:   None reported     Chemical Use Review:   Substance Use: Chemical use reviewed, no active concerns identified      Tobacco Use: No change in amount of tobacco use since last session.  Reviewed information and resources for quitting     Collateral Reports Completed:   Not Applicable    PLAN: (Client Tasks / Therapist Tasks / Other)  Client will return in two weeks.  She will invite her significant other in for a future session to discuss relational concerns.  She will set clear boundaries with her son and state the rules if he plans to lives in the household.          Julia Alfaro,                                                          ________________________________________________________________________    Treatment Plan    Client's Name: Zainab Bolton  YOB: 1980    Date: 9-11-18    Diagnoses:            296.22 (F32.1)  Major Depressive Disorder, Single Episode, Moderate _ and With anxious distress  300.02 (F41.1)  Generalized Anxiety Disorder  Psychosocial & Contextual Factors: Relational stressors   WHODAS 2.0 (12 item)                          This questionnaire asks about difficulties due to health conditions. Health conditions                   include                        disease or illnesses, other health problems that may be short or long lasting,                    injuries, mental health or emotional problems, and problems with alcohol or drugs.                              Think back over the past 30 days and answer these questions, thinking about how much              difficulty you had doing the following activities. For each question, please [place] only                   one response.      S1 Standing for long periods such as 30 minutes? None =         1   S2 Taking care of household responsibilities? None =         1   S3 Learning a new task, for example, learning how to get to a new place? None =         1   S4 How much of a problem do you have joining community activities (for example, festivals, Orthodoxy or other activities) in the same way as anyone else can? Mild =           2   S5 How much have you been emotionally affected by your health problems? Moderate =   3            In the past 30 days, how much difficulty did you have in:   S6 Concentrating on doing something for ten minutes? Mild =           2   S7 Walking a long distance such as a kilometer (or equivalent)? None =         1   S8 Washing your whole body? None =         1   S9 Getting dressed? None =         1   S10 Dealing with people you do not know? Mild =           2   S11 Maintaining a friendship? Moderate =   3   S12 Your day to day work? None =         1       H1 Overall, in the past 30 days, how many days were these difficulties present? Record number of days 3   H2 In the past 30 days, for how many days were you totally unable to carry out your usual activities or work because of any health condition? Record number of days  0   H3 In  the past 30 days, not counting the days that you were totally unable, for how many days did you cut back or reduce your usual activities or work because of any health condition? Record number of days 0            Referral / Collaboration:  Referral to another professional/service is not indicated at this time..    Anticipated number of session or this episode of care: 4-6      MeasurableTreatment Goal(s) related to diagnosis / functional impairment(s)  Goal 1: Client will decrease level of anxiety as measured by GEOVANY-7 scoring.      I will know I've met my goal when I am not always second guessing.      Objective #A (Client Action)    Client will use at least 8 coping skills for anxiety management in the next 12 weeks.  Status: New - Date: 9-11-18     Intervention(s)  Therapist will use CBT and solution focused therapy.    Objective #B  Client will use relaxation strategies 2 times per day to reduce the physical symptoms of anxiety.  Status: New - Date: 9-11-18     Intervention(s)  Therapist will use CBT and solution focused therapy.    Objective #C  Client will track and record at least 5 pleasant exchanges with significant other each week.   Status: New - Date: 9-11-18     Intervention(s)  Therapist will encourge couples therapy.          Client has reviewed and agreed to the above plan.      Julia Alfaro, TH September 12, 2018   47.6

## 2020-09-04 NOTE — MR AVS SNAPSHOT
MRN:[id number]                      After Visit Summary   11/14/2018    Zainab Bolton    MRN: [id number]           Visit Information        Provider Department      11/14/2018 4:00 PM Julia Alfaro [facility] Generic      Your next 10 appointments already scheduled     Dec 10, 2018  4:00 PM CST   Return Visit with Julia Alfaro [facility] ([facility])    [address] [address]   [phone]            Jan 08, 2019  4:00 PM CST   Return Visit with Julia Alfaro [facility] ([facility])    [address] [id number] [phone]            Jan 21, 2019  6:00 PM CST   Return Visit with Julia Alfaro [facility] ([facility])    [address] [id number] [phone]              MyChart Information     [employer] gives you secure access to your electronic health record. If you see a primary care provider, you can also send messages to your care team and make appointments. If you have questions, please call your primary care clinic.  If you do not have a primary care provider, please call [phone] and they will assist you.        Care EveryWhere ID     This is your Care EveryWhere ID. This could be used by other organizations to access your [facility] medical records  [id number]        Equal Access to Services     FLORA ARCOS AH: Hadii clement herro Soshanda, waaxda luqadaha, qaybta kaalmada adeegyada, lucero martin. So [facility] [phone].    ATENCIÓN: Si habla español, tiene a arana disposición servicios gratuitos de asistencia lingüística. Llame al [phone].    We comply with applicable federal civil rights laws and [place] laws. We do not discriminate on the basis of race, color, national origin, age,  Routing refill request to provider for review/approval because:  Drug not on the [facility] refill protocol     Alessia Campos RN   [facility] -- Triage Nurse           disability, sex, sexual orientation, or gender identity.

## 2020-10-14 ENCOUNTER — VIRTUAL VISIT (OUTPATIENT)
Dept: FAMILY MEDICINE | Facility: CLINIC | Age: 40
End: 2020-10-14
Payer: COMMERCIAL

## 2020-10-14 DIAGNOSIS — F41.8 DEPRESSION WITH ANXIETY: Primary | ICD-10-CM

## 2020-10-14 PROCEDURE — 99213 OFFICE O/P EST LOW 20 MIN: CPT | Mod: GT | Performed by: FAMILY MEDICINE

## 2020-10-14 ASSESSMENT — ANXIETY QUESTIONNAIRES
2. NOT BEING ABLE TO STOP OR CONTROL WORRYING: SEVERAL DAYS
1. FEELING NERVOUS, ANXIOUS, OR ON EDGE: SEVERAL DAYS
3. WORRYING TOO MUCH ABOUT DIFFERENT THINGS: SEVERAL DAYS
5. BEING SO RESTLESS THAT IT IS HARD TO SIT STILL: SEVERAL DAYS
GAD7 TOTAL SCORE: 7
7. FEELING AFRAID AS IF SOMETHING AWFUL MIGHT HAPPEN: MORE THAN HALF THE DAYS
6. BECOMING EASILY ANNOYED OR IRRITABLE: SEVERAL DAYS

## 2020-10-14 ASSESSMENT — PATIENT HEALTH QUESTIONNAIRE - PHQ9
SUM OF ALL RESPONSES TO PHQ QUESTIONS 1-9: 2
5. POOR APPETITE OR OVEREATING: NOT AT ALL

## 2020-10-14 NOTE — PATIENT INSTRUCTIONS
Patient Education     Anxiety Reaction  Anxiety is the feeling we all get when we think something bad might happen. It is a normal response to stress and usually causes only a mild reaction. When anxiety becomes more severe, it can interfere with daily life. In some cases, you may not even be aware of what it is you re anxious about. There may also be a genetic link or it may be a learned behavior in the home.  Both psychological and physical triggers cause stress reaction. It's often a response to fear or emotional stress, real or imagined. This stress may come from home, family, work, or social relationships.  During an anxiety reaction, you may feel:    Helpless    Nervous    Depressed    Irritable  Your body may show signs of anxiety in many ways. You may experience:    Dry mouth    Shakiness    Dizziness    Weakness    Trouble breathing    Breathing fast (hyperventilating)    Chest pressure    Sweating    Headache    Nausea    Diarrhea    Tiredness    Inability to sleep    Sexual problems  Home care    Try to locate the sources of stress in your life. They may not be obvious. These may include:  ? Daily hassles of life (such as traffic jams, missed appointments, or car troubles)  ? Major life changes, both good (new baby or job promotion) and bad (loss of job or loss of loved one)  ? Overload: feeling that you have too many responsibilities and can't take care of all of them at once  ? Feeling helpless or feeling that your problems are beyond what you re able to solve    Notice how your body reacts to stress. Learn to listen to your body signals. This will help you take action before the stress becomes severe.    When you can, do something about the source of your stress. (Avoid hassles, limit the amount of change that happens in your life at one time and take a break when you feel overloaded).    Unfortunately, many stressful situations can't be avoided. It is necessary to learn how to better manage stress.  There are many proven methods that will reduce your anxiety. These include simple things like exercise, good nutrition, and adequate rest. Also, there are certain techniques that are helpful:  ? Relaxation  ? Breathing exercises  ? Visualization  ? Biofeedback  ? Meditation  For more information about this, consult your healthcare provider or go to a local bookstore and review the many books and tapes available on this subject.  Follow-up care  If you feel that your anxiety is not responding to self-help measures, contact your healthcare provider or make an appointment with a counselor. You may need short-term psychological counseling and temporary medicine to help you manage stress.  Call 911  Call 911 if any of these happen:    Trouble breathing    Confusion    Drowsiness or trouble wakening    Fainting or loss of consciousness    Rapid heart rate    Seizure    New chest pain that becomes more severe, lasts longer, or spreads into your shoulder, arm, neck, jaw, or back  When to seek medical advice  Call your healthcare provider right away if any of these happen:    Your symptoms get worse    Severe headache not relieved by rest and mild pain reliever  Date Last Reviewed: 10/1/2017    3320-7008 [employer]. [address] [place] [address], [place] [address]. All rights reserved. This information is not intended as a substitute for professional medical care. Always follow your healthcare professional's instructions.

## 2020-10-14 NOTE — PROGRESS NOTES
"Zainab Bolton is a 40 year old female who is being evaluated via a billable video visit.      The patient has been notified of following:     \"This video visit will be conducted via a call between you and your physician/provider. We have found that certain health care needs can be provided without the need for an in-person physical exam.  This service lets us provide the care you need with a video conversation.  If a prescription is necessary we can send it directly to your pharmacy.  If lab work is needed we can place an order for that and you can then stop by our lab to have the test done at a later time.    Video visits are billed at different rates depending on your insurance coverage.  Please reach out to your insurance provider with any questions.    If during the course of the call the physician/provider feels a video visit is not appropriate, you will not be charged for this service.\"    Patient has given verbal consent for Video visit? Yes  How would you like to obtain your AVS? MyChart  If you are dropped from the video visit, the video invite should be resent to: Text to cell phone: [phone]  Will anyone else be joining your video visit? No    Subjective     Zainab Bolton is a 40 year old female who presents today via video visit for the following health issues:      HPI     Depression Followup    How are you doing with your depression since your last visit? No change up and down    Are you having other symptoms that might be associated with depression? No    Have you had a significant life event?  OTHER: personal things up and down      Are you feeling anxious or having panic attacks?   No    Do you have any concerns with your use of alcohol or other drugs? No    Social History     Tobacco Use     Smoking status: Former Smoker     Packs/day: 1.00     Years: 10.00     Pack years: 10.00     Types: Cigarettes     Quit date: 2012     Years since quittin.2     Smokeless tobacco: Never " Used     Tobacco comment: since age 12, but stopped with each baby   Substance Use Topics     Alcohol use: Yes     Alcohol/week: 0.0 standard drinks     Comment: 1-2  every 2-3 months if any     Drug use: No     PHQ 12/10/2018 1/10/2019 3/25/2019   PHQ-9 Total Score 6 9 8   Q9: Thoughts of better off dead/self-harm past 2 weeks Not at all Not at all Not at all     GEOVANY-7 SCORE 12/10/2018 1/10/2019 3/25/2019   Total Score - - -   Total Score - - 18 (severe anxiety)   Total Score 14 13 18     Last PHQ-9 10/14/2020   1.  Little interest or pleasure in doing things 0   2.  Feeling down, depressed, or hopeless 1   3.  Trouble falling or staying asleep, or sleeping too much 0   4.  Feeling tired or having little energy 1   5.  Poor appetite or overeating 0   6.  Feeling bad about yourself 0   7.  Trouble concentrating 0   8.  Moving slowly or restless 0   Q9: Thoughts of better off dead/self-harm past 2 weeks 0   PHQ-9 Total Score 2   Difficulty at work, home, or with people -     GEOVANY-7  10/14/2020   1. Feeling nervous, anxious, or on edge 1   2. Not being able to stop or control worrying 1   3. Worrying too much about different things 1   4. Trouble relaxing 0   5. Being so restless that it is hard to sit still 1   6. Becoming easily annoyed or irritable 1   7. Feeling afraid, as if something awful might happen 2   GEOVANY-7 Total Score 7   If you checked any problems, how difficult have they made it for you to do your work, take care of things at home, or get along with other people? -         Suicide Assessment Five-step Evaluation and Treatment (SAFE-T)       Video Start Time: 12:32 PM      Review of Systems   Constitutional, HEENT, cardiovascular, pulmonary, gi and gu systems are negative, except as otherwise noted.      Objective           Vitals:  No vitals were obtained today due to virtual visit.    Physical Exam     GENERAL: alert and no distress  EYES: Eyes grossly normal to inspection.  No discharge or erythema, or  obvious scleral/conjunctival abnormalities.  RESP: No audible wheeze, cough, or visible cyanosis.  No visible retractions or increased work of breathing.    SKIN: Visible skin clear. No significant rash, abnormal pigmentation or lesions.  NEURO: Cranial nerves grossly intact.  Mentation and speech appropriate for age.  PSYCH: Mentation appears normal, affect normal/bright, judgement and insight intact, normal speech and appearance well-groomed.        Assessment & Plan     Depression with anxiety  --Known to have depression with anxiety, on Zoloft for almost 20 years, denies any medication side effects, medication refilled today.  Suggested counseling as well.  Stressed on smoking cessation, associated health hazards explained in detail.  Follow-up in 6 months or earlier if needed.  All questions answered.  - sertraline (ZOLOFT) 50 MG tablet; TAKE 1 & 1/2 TABLETS BY MOUTH DAILY-due for follow up in clinic for additional refills  - MENTAL HEALTH REFERRAL  - Adult; Outpatient Treatment; Individual/Couples/Family/Group Therapy/Health Psychology; [facility] [place]: [facility] [phone]; We will contact you to schedule the appointment or please call with any questions       Patient Instructions     Patient Education     Anxiety Reaction  Anxiety is the feeling we all get when we think something bad might happen. It is a normal response to stress and usually causes only a mild reaction. When anxiety becomes more severe, it can interfere with daily life. In some cases, you may not even be aware of what it is you re anxious about. There may also be a genetic link or it may be a learned behavior in the home.  Both psychological and physical triggers cause stress reaction. It's often a response to fear or emotional stress, real or imagined. This stress may come from home, family, work, or social relationships.  During an anxiety reaction, you may feel:    Helpless    Nervous    Depressed    Irritable  Your body may  show signs of anxiety in many ways. You may experience:    Dry mouth    Shakiness    Dizziness    Weakness    Trouble breathing    Breathing fast (hyperventilating)    Chest pressure    Sweating    Headache    Nausea    Diarrhea    Tiredness    Inability to sleep    Sexual problems  Home care    Try to locate the sources of stress in your life. They may not be obvious. These may include:  ? Daily hassles of life (such as traffic jams, missed appointments, or car troubles)  ? Major life changes, both good (new baby or job promotion) and bad (loss of job or loss of loved one)  ? Overload: feeling that you have too many responsibilities and can't take care of all of them at once  ? Feeling helpless or feeling that your problems are beyond what you re able to solve    Notice how your body reacts to stress. Learn to listen to your body signals. This will help you take action before the stress becomes severe.    When you can, do something about the source of your stress. (Avoid hassles, limit the amount of change that happens in your life at one time and take a break when you feel overloaded).    Unfortunately, many stressful situations can't be avoided. It is necessary to learn how to better manage stress. There are many proven methods that will reduce your anxiety. These include simple things like exercise, good nutrition, and adequate rest. Also, there are certain techniques that are helpful:  ? Relaxation  ? Breathing exercises  ? Visualization  ? Biofeedback  ? Meditation  For more information about this, consult your healthcare provider or go to a local bookstore and review the many books and tapes available on this subject.  Follow-up care  If you feel that your anxiety is not responding to self-help measures, contact your healthcare provider or make an appointment with a counselor. You may need short-term psychological counseling and temporary medicine to help you manage stress.  Call 911  Call 911 if any of these  happen:    Trouble breathing    Confusion    Drowsiness or trouble wakening    Fainting or loss of consciousness    Rapid heart rate    Seizure    New chest pain that becomes more severe, lasts longer, or spreads into your shoulder, arm, neck, jaw, or back  When to seek medical advice  Call your healthcare provider right away if any of these happen:    Your symptoms get worse    Severe headache not relieved by rest and mild pain reliever  Date Last Reviewed: 10/1/2017    9906-9930 [employer]. [address] [place] [address]. All rights reserved. This information is not intended as a substitute for professional medical care. Always follow your healthcare professional's instructions.                 Austin Green MD  [facility]      Video-Visit Details    Type of service:  Video Visit    Video End Time:12:41 PM    Originating Location (pt. Location): Home    Distant Location (provider location):  [facility]     Platform used for Video Visit: Zach

## 2020-10-15 ASSESSMENT — ANXIETY QUESTIONNAIRES: GAD7 TOTAL SCORE: 7

## 2021-01-10 ENCOUNTER — HEALTH MAINTENANCE LETTER (OUTPATIENT)
Age: 41
End: 2021-01-10

## 2021-07-13 ENCOUNTER — TELEPHONE (OUTPATIENT)
Dept: FAMILY MEDICINE | Facility: CLINIC | Age: 41
End: 2021-07-13

## 2021-07-13 DIAGNOSIS — F41.8 DEPRESSION WITH ANXIETY: ICD-10-CM

## 2021-07-19 NOTE — TELEPHONE ENCOUNTER
"Requested Prescriptions   Pending Prescriptions Disp Refills     sertraline (ZOLOFT) 50 MG tablet [Pharmacy Med Name: SERTRALINE 50MG] 180 tablet 0     Sig: TAKE 1 & 1/2 TABLETS BY MOUTH ONCE DAILY - DUE FOR FOLLOW UP IN CLINIC FOR ADDITIONAL REFILLS       SSRIs Protocol Failed - 7/13/2021  2:31 PM        Failed - PHQ-9 score less than 5 in past 6 months     Please review last PHQ-9 score.           Failed - Recent (6 mo) or future (30 days) visit within the authorizing provider's specialty     Patient had office visit in the last 6 months or has a visit in the next 30 days with authorizing provider or within the authorizing provider's specialty.  See \"Patient Info\" tab in inbasket, or \"Choose Columns\" in Meds & Orders section of the refill encounter.            Passed - Medication is active on med list        Passed - Patient is age 18 or older        Passed - No active pregnancy on record        Passed - No positive pregnancy test in last 12 months             "

## 2021-10-13 DIAGNOSIS — F41.8 DEPRESSION WITH ANXIETY: ICD-10-CM

## 2021-10-23 ENCOUNTER — HEALTH MAINTENANCE LETTER (OUTPATIENT)
Age: 41
End: 2021-10-23

## 2021-11-24 DIAGNOSIS — F41.8 DEPRESSION WITH ANXIETY: ICD-10-CM

## 2021-11-26 NOTE — TELEPHONE ENCOUNTER
"Requested Prescriptions   Pending Prescriptions Disp Refills     sertraline (ZOLOFT) 50 MG tablet [Pharmacy Med Name: SERTRALINE 50MG] 45 tablet 0     Sig: TAKE 1 & 1/2 TABLETS BY MOUTH ONCE DAILY - DUE FOR FOLLOW UP IN CLINIC FOR ADDITIONAL REFILLS       SSRIs Protocol Failed - 11/24/2021 10:52 AM        Failed - PHQ-9 score less than 5 in past 6 months     Please review last PHQ-9 score.           Failed - Recent (6 mo) or future (30 days) visit within the authorizing provider's specialty     Patient had office visit in the last 6 months or has a visit in the next 30 days with authorizing provider or within the authorizing provider's specialty.  See \"Patient Info\" tab in inbasket, or \"Choose Columns\" in Meds & Orders section of the refill encounter.            Passed - Medication is active on med list        Passed - Patient is age 18 or older        Passed - No active pregnancy on record        Passed - No positive pregnancy test in last 12 months             "

## 2022-01-11 ENCOUNTER — MYC REFILL (OUTPATIENT)
Dept: FAMILY MEDICINE | Facility: CLINIC | Age: 42
End: 2022-01-11
Payer: COMMERCIAL

## 2022-01-11 DIAGNOSIS — F41.8 DEPRESSION WITH ANXIETY: ICD-10-CM

## 2022-02-09 ENCOUNTER — TRANSFERRED RECORDS (OUTPATIENT)
Dept: MULTI SPECIALTY CLINIC | Facility: CLINIC | Age: 42
End: 2022-02-09

## 2022-02-12 ENCOUNTER — HEALTH MAINTENANCE LETTER (OUTPATIENT)
Age: 42
End: 2022-02-12

## 2022-10-09 ENCOUNTER — HEALTH MAINTENANCE LETTER (OUTPATIENT)
Age: 42
End: 2022-10-09

## 2023-01-30 ASSESSMENT — ENCOUNTER SYMPTOMS
NAUSEA: 1
SHORTNESS OF BREATH: 1
BREAST MASS: 0
NERVOUS/ANXIOUS: 1

## 2023-02-01 ENCOUNTER — OFFICE VISIT (OUTPATIENT)
Dept: URGENT CARE | Facility: URGENT CARE | Age: 43
End: 2023-02-01
Payer: COMMERCIAL

## 2023-02-01 ENCOUNTER — TELEPHONE (OUTPATIENT)
Dept: URGENT CARE | Facility: URGENT CARE | Age: 43
End: 2023-02-01

## 2023-02-01 VITALS
BODY MASS INDEX: 24.06 KG/M2 | HEART RATE: 66 BPM | TEMPERATURE: 98.7 F | DIASTOLIC BLOOD PRESSURE: 89 MMHG | SYSTOLIC BLOOD PRESSURE: 139 MMHG | OXYGEN SATURATION: 100 % | WEIGHT: 138 LBS

## 2023-02-01 DIAGNOSIS — F41.9 ANXIETY: Primary | ICD-10-CM

## 2023-02-01 PROCEDURE — 99213 OFFICE O/P EST LOW 20 MIN: CPT

## 2023-02-01 RX ORDER — HYDROXYZINE HYDROCHLORIDE 25 MG/1
25 TABLET, FILM COATED ORAL EVERY 6 HOURS PRN
Qty: 30 TABLET | Refills: 0 | Status: SHIPPED | OUTPATIENT
Start: 2023-02-01 | End: 2023-02-11

## 2023-02-01 NOTE — TELEPHONE ENCOUNTER
Patient calling because she is at the pharmacy and there was no prescription sent. She was expecting hydroxyzine to be sent. She threw her old Rx away as it was outdated.   Please send to [facility].  Thu GALLAGHER RN

## 2023-02-01 NOTE — PROGRESS NOTES
Assessment & Plan   (F41.9) Anxiety  (primary encounter diagnosis)  Plan: Adult Mental Health  Referral    Informed the patient to take hydroxyzine as needed for anxiety as she had previously tolerated this well taking sertraline.  Anxiety, your body's response to patient instructions discussed and provided.  Informed the patient to follow-up with psychiatry to start cognitive behavioral therapy.  Discussed the need to return to clinic with any new or worsening symptoms.  Patient acknowledged her understanding of the above plan.    THOMAS Duong CNP  [facility]    Lashay Albarran is a 42 year old female who presents to clinic today for the following health issues:  Chief Complaint   Patient presents with     Breathing Problem     Been going on for about 2 wks, had pain under her breast, had diarrhea, has been very gassy. Ears are ringing, and legs have been shaky, does have anxiety, says she has to take deep breaths, says she has panic attacks every day.       HPI  The patient reports having gas starting 2 weeks ago and then the patient had diarrhea yesterday.  The patient also reports her anxiety has gotten worse over the past two weeks and ringing in her ears.  The patient states she recently quit smoking then started smoking again.  She reports she quit smoking again 1.5 weeks ago.  She also reports stopping drinking alcohol but she is still under a lot of stress.  She states that she does deep breathing and an eye massager to help with her anxiety.      Review of Systems  Negative except noted above.       Objective    /89   Pulse 66   Temp 98.7  F (37.1  C) (Tympanic)   Wt 62.6 kg (138 lb)   SpO2 100%   BMI 24.06 kg/m    Physical Exam   GENERAL: healthy, alert and no distress  RESP: lungs clear to auscultation - no rales, rhonchi or wheezes  CV: regular rate and rhythm, normal S1 S2, no S3 or S4, no murmur, click or rub, no peripheral edema  and peripheral pulses strong  SKIN: no suspicious lesions or rashes  PSYCH: mentation appears normal, affect normal/bright

## 2023-02-03 ENCOUNTER — OFFICE VISIT (OUTPATIENT)
Dept: FAMILY MEDICINE | Facility: CLINIC | Age: 43
End: 2023-02-03
Payer: COMMERCIAL

## 2023-02-03 VITALS
HEART RATE: 62 BPM | TEMPERATURE: 99 F | WEIGHT: 138 LBS | DIASTOLIC BLOOD PRESSURE: 80 MMHG | BODY MASS INDEX: 24.45 KG/M2 | SYSTOLIC BLOOD PRESSURE: 130 MMHG | OXYGEN SATURATION: 100 % | HEIGHT: 63 IN

## 2023-02-03 DIAGNOSIS — Z11.59 NEED FOR HEPATITIS C SCREENING TEST: ICD-10-CM

## 2023-02-03 DIAGNOSIS — Z23 NEED FOR VACCINATION: ICD-10-CM

## 2023-02-03 DIAGNOSIS — F41.8 DEPRESSION WITH ANXIETY: ICD-10-CM

## 2023-02-03 DIAGNOSIS — Z12.4 CERVICAL CANCER SCREENING: ICD-10-CM

## 2023-02-03 DIAGNOSIS — Z00.01 ENCOUNTER FOR GENERAL ADULT MEDICAL EXAMINATION WITH ABNORMAL FINDINGS: Primary | ICD-10-CM

## 2023-02-03 DIAGNOSIS — E78.5 HYPERLIPIDEMIA LDL GOAL <130: ICD-10-CM

## 2023-02-03 DIAGNOSIS — N87.1 DYSPLASIA OF CERVIX, HIGH GRADE CIN 2: ICD-10-CM

## 2023-02-03 DIAGNOSIS — R87.612 PAPANICOLAOU SMEAR OF CERVIX WITH LOW GRADE SQUAMOUS INTRAEPITHELIAL LESION (LGSIL): ICD-10-CM

## 2023-02-03 DIAGNOSIS — F41.1 GAD (GENERALIZED ANXIETY DISORDER): ICD-10-CM

## 2023-02-03 DIAGNOSIS — Z13.220 SCREENING FOR HYPERLIPIDEMIA: ICD-10-CM

## 2023-02-03 DIAGNOSIS — Z98.890 S/P LEEP OF CERVIX: ICD-10-CM

## 2023-02-03 DIAGNOSIS — Z13.220 LIPID SCREENING: ICD-10-CM

## 2023-02-03 DIAGNOSIS — Z13.1 SCREENING FOR DIABETES MELLITUS: ICD-10-CM

## 2023-02-03 DIAGNOSIS — Z12.31 ENCOUNTER FOR SCREENING MAMMOGRAM FOR BREAST CANCER: ICD-10-CM

## 2023-02-03 LAB
CHOLEST SERPL-MCNC: 155 MG/DL
HDLC SERPL-MCNC: 45 MG/DL
LDLC SERPL CALC-MCNC: 96 MG/DL
NONHDLC SERPL-MCNC: 110 MG/DL
TRIGL SERPL-MCNC: 70 MG/DL

## 2023-02-03 PROCEDURE — 0124A COVID-19 VACCINE BIVALENT BOOSTER 12+ (PFIZER): CPT | Performed by: FAMILY MEDICINE

## 2023-02-03 PROCEDURE — G0145 SCR C/V CYTO,THINLAYER,RESCR: HCPCS | Performed by: FAMILY MEDICINE

## 2023-02-03 PROCEDURE — 90471 IMMUNIZATION ADMIN: CPT | Performed by: FAMILY MEDICINE

## 2023-02-03 PROCEDURE — 36415 COLL VENOUS BLD VENIPUNCTURE: CPT | Performed by: FAMILY MEDICINE

## 2023-02-03 PROCEDURE — 80061 LIPID PANEL: CPT | Performed by: FAMILY MEDICINE

## 2023-02-03 PROCEDURE — 90686 IIV4 VACC NO PRSV 0.5 ML IM: CPT | Performed by: FAMILY MEDICINE

## 2023-02-03 PROCEDURE — 86803 HEPATITIS C AB TEST: CPT | Performed by: FAMILY MEDICINE

## 2023-02-03 PROCEDURE — 87624 HPV HI-RISK TYP POOLED RSLT: CPT | Performed by: FAMILY MEDICINE

## 2023-02-03 PROCEDURE — 99396 PREV VISIT EST AGE 40-64: CPT | Mod: 25 | Performed by: FAMILY MEDICINE

## 2023-02-03 PROCEDURE — 99214 OFFICE O/P EST MOD 30 MIN: CPT | Mod: 25 | Performed by: FAMILY MEDICINE

## 2023-02-03 PROCEDURE — 91312 COVID-19 VACCINE BIVALENT BOOSTER 12+ (PFIZER): CPT | Performed by: FAMILY MEDICINE

## 2023-02-03 PROCEDURE — 90714 TD VACC NO PRESV 7 YRS+ IM: CPT | Performed by: FAMILY MEDICINE

## 2023-02-03 PROCEDURE — 90472 IMMUNIZATION ADMIN EACH ADD: CPT | Performed by: FAMILY MEDICINE

## 2023-02-03 PROCEDURE — 96127 BRIEF EMOTIONAL/BEHAV ASSMT: CPT | Performed by: FAMILY MEDICINE

## 2023-02-03 ASSESSMENT — ANXIETY QUESTIONNAIRES
6. BECOMING EASILY ANNOYED OR IRRITABLE: NEARLY EVERY DAY
7. FEELING AFRAID AS IF SOMETHING AWFUL MIGHT HAPPEN: NEARLY EVERY DAY
3. WORRYING TOO MUCH ABOUT DIFFERENT THINGS: NEARLY EVERY DAY
GAD7 TOTAL SCORE: 21
GAD7 TOTAL SCORE: 21
2. NOT BEING ABLE TO STOP OR CONTROL WORRYING: NEARLY EVERY DAY
1. FEELING NERVOUS, ANXIOUS, OR ON EDGE: NEARLY EVERY DAY
5. BEING SO RESTLESS THAT IT IS HARD TO SIT STILL: NEARLY EVERY DAY

## 2023-02-03 ASSESSMENT — ENCOUNTER SYMPTOMS
BREAST MASS: 0
SHORTNESS OF BREATH: 1
NERVOUS/ANXIOUS: 1
NAUSEA: 1

## 2023-02-03 ASSESSMENT — PATIENT HEALTH QUESTIONNAIRE - PHQ9
5. POOR APPETITE OR OVEREATING: NEARLY EVERY DAY
SUM OF ALL RESPONSES TO PHQ QUESTIONS 1-9: 15

## 2023-02-03 NOTE — PROGRESS NOTES
SUBJECTIVE:   CC: Avis is an 42 year old who presents for preventive health visit.   Patient has been advised of split billing requirements and indicates understanding: Yes  Healthy Habits:     Getting at least 3 servings of Calcium per day:  Yes    Bi-annual eye exam:  NO    Dental care twice a year:  Yes    Sleep apnea or symptoms of sleep apnea:  Excessive snoring    Diet:  Regular (no restrictions)    Frequency of exercise:  2-3 days/week    Duration of exercise:  45-60 minutes    Taking medications regularly:  Yes    Medication side effects:  None    PHQ-2 Total Score: 2    Additional concerns today:  Yes    New patient.   Highly anxious    Anxiety has been high x 2 weeks  Out of work right now  Too much time at home   Anxious since she was 17yo    Currently taking sertraline 50   Tried 75 mg and felt it was too much   Has been on sertraline for years  Tried wellbutrin 20 y ago  - didn't like that at all - can't say why     Went to  a few days ago due to anxiety  Did get mental health referral  - no phone call from them yet     Has been taking 12.5 of hydroxyzine - it helps     No si/hi  Denies depression    Today's PHQ-2 Score: 2  PHQ-2 ( 1999 Pf[employer]) 1/30/2023   Q1: Little interest or pleasure in doing things 1   Q2: Feeling down, depressed or hopeless 1   PHQ-2 Score 2   PHQ-2 Total Score (12-17 Years)- Positive if 3 or more points; Administer PHQ-A if positive -   Q1: Little interest or pleasure in doing things Several days   Q2: Feeling down, depressed or hopeless Several days   PHQ-2 Score 2       Have you ever done Advance Care Planning? (For example, a Health Directive, POLST, or a discussion with a medical provider or your loved ones about your wishes): No, advance care planning information given to patient to review.  Patient plans to discuss their wishes with loved ones or provider.      Social History     Tobacco Use     Smoking status: Former     Packs/day: 1.00     Years: 10.00     Pack  "years: 10.00     Types: Cigarettes     Quit date: 8/1/2012     Years since quitting: 10.5     Smokeless tobacco: Never     Tobacco comments:     since age 12, but stopped with each baby   Substance Use Topics     Alcohol use: Yes     Alcohol/week: 0.0 standard drinks     Comment: 1-2  every 2-3 months if any     If you drink alcohol do you typically have >3 drinks per day or >7 drinks per week? No    Alcohol Use 1/30/2023   Prescreen: >3 drinks/day or >7 drinks/week? No   Prescreen: >3 drinks/day or >7 drinks/week? -   No flowsheet data found.    Reviewed orders with patient.  Reviewed health maintenance and updated orders accordingly - Yes  Labs reviewed in EPIC    Breast Cancer Screening:    Breast CA Risk Assessment (FHS-7) 1/30/2023   Do you have a family history of breast, colon, or ovarian cancer? No / Unknown         due  Pertinent mammograms are reviewed under the imaging tab.    History of abnormal Pap smear:   PAP / HPV Latest Ref Rng & Units 3/7/2019 6/27/2017 5/11/2016   PAP (Historical) - NIL NIL LSIL(A)   HPV16 NEG:Negative Negative Negative Negative   HPV18 NEG:Negative Negative Negative Negative   HRHPV NEG:Negative Negative Negative Positive(A)     Reviewed and updated as needed this visit by clinical staff                  Reviewed and updated as needed this visit by Provider                     Review of Systems   Eyes: Positive for visual disturbance.   Respiratory: Positive for shortness of breath.    Gastrointestinal: Positive for nausea.   Breasts:  Negative for tenderness, breast mass and discharge.   Genitourinary: Negative for pelvic pain, vaginal bleeding and vaginal discharge.   Psychiatric/Behavioral: The patient is nervous/anxious.           OBJECTIVE:   /80   Pulse 62   Temp 99  F (37.2  C) (Tympanic)   Ht 1.607 m (5' 3.25\")   Wt 62.6 kg (138 lb)   SpO2 100%   BMI 24.25 kg/m    Physical Exam  GENERAL: healthy, alert and no distress  GENERAL: anxious  EYES: Eyes grossly " normal to inspection, PERRL and conjunctivae and sclerae normal  HENT: ear canals and TM's normal, nose and mouth without ulcers or lesions  NECK: no adenopathy, no asymmetry, masses, or scars and thyroid normal to palpation  RESP: lungs clear to auscultation - no rales, rhonchi or wheezes  BREAST: normal without masses, tenderness or nipple discharge and no palpable axillary masses or adenopathy  CV: regular rate and rhythm, normal S1 S2, no S3 or S4, no murmur, click or rub, no peripheral edema and peripheral pulses strong  ABDOMEN: soft, nontender, no hepatosplenomegaly, no masses and bowel sounds normal  MS: no gross musculoskeletal defects noted, no edema  NEURO: Normal strength and tone, mentation intact and speech normal  PSYCH: mentation appears normal, affect normal/bright  PSYCH: speech pressured and speaking quickly       ASSESSMENT/PLAN:   (Z00.01) Encounter for general adult medical examination with abnormal findings  (primary encounter diagnosis)  Comment: We discussed self breast exams, exercise 30mins/day, and calcium with vitamin D at 1200mg/day, preferably from dietary sources.   Exercise was discussed as well.       (F41.1) GEOVANY (generalized anxiety disorder)  (F41.8) Depression with anxiety  Comment: I encouraged her to stay at 75mg for a few more days, then increase to 100mg and stay at that dose.  Get counseling scheduled. Try to get some time outside/exercise every day, connect with friends.    Plan: sertraline (ZOLOFT) 50 MG tablet            (Z11.59) Need for hepatitis C screening test  Comment: discussed   Plan: Hepatitis C Screen Reflex to HCV RNA Quant and         Genotype            (Z13.220) Screening for hyperlipidemia  Comment: discussed   Plan: Lipid panel reflex to direct LDL Non-fasting            (E78.5) Hyperlipidemia LDL goal <130  Comment:   Plan: Lipid panel reflex to direct LDL Non-fasting            (Z12.4) Cervical cancer screening  Comment: discussed and done today   Plan:  Pap Screen with HPV - recommended age 30 - 65         years            (R87.612) Papanicolaou smear of cervix with low grade squamous intraepithelial lesion (LGSIL)  Comment: done today   Plan:     (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31) Encounter for screening mammogram for breast cancer  Comment: discussed   Plan: MA Screen Bilateral w/Rony              Patient has been advised of split billing requirements and indicates understanding: Yes      COUNSELING:  Reviewed preventive health counseling, as reflected in patient instructions       Regular exercise       Healthy diet/nutrition        She reports that she quit smoking about 10 years ago. Her smoking use included cigarettes. She has a 10.00 pack-year smoking history. She has never used smokeless tobacco.      Jessi Concepcion MD  [facility]

## 2023-02-03 NOTE — NURSING NOTE
Prior to immunization administration, verified patients identity using patient s name and date of birth. Please see Immunization Activity for additional information.     Screening Questionnaire for Adult Immunization    Are you sick today?   No   Do you have allergies to medications, food, a vaccine component or latex?   No   Have you ever had a serious reaction after receiving a vaccination?   No   Do you have a long-term health problem with heart, lung, kidney, or metabolic disease (e.g., diabetes), asthma, a blood disorder, no spleen, complement component deficiency, a cochlear implant, or a spinal fluid leak?  Are you on long-term aspirin therapy?   No   Do you have cancer, leukemia, HIV/AIDS, or any other immune system problem?   No   Do you have a parent, brother, or sister with an immune system problem?   No   In the past 3 months, have you taken medications that affect  your immune system, such as prednisone, other steroids, or anticancer drugs; drugs for the treatment of rheumatoid arthritis, Crohn s disease, or psoriasis; or have you had radiation treatments?   No   Have you had a seizure, or a brain or other nervous system problem?   No   During the past year, have you received a transfusion of blood or blood    products, or been given immune (gamma) globulin or antiviral drug?   No   For women: Are you pregnant or is there a chance you could become       pregnant during the next month?   No   Have you received any vaccinations in the past 4 weeks?   No     Immunization questionnaire answers were all negative.        Per orders of Dr. Jessi Concepcion, injection of TD, flu shot, covid booster given by Clair CHILDRESS LPN. Patient instructed to remain in clinic for 15 minutes afterwards, and to report any adverse reaction to me immediately.       Screening performed by Clair Herrera LPN on 2/3/2023 at 11:18 AM.

## 2023-02-03 NOTE — PATIENT INSTRUCTIONS
Taper up on zoloft from 75 to 100 and stay at this dose     Follow-up in clinic in 3-4 weeks     Call for counseling appointment

## 2023-02-04 LAB — HCV AB SERPL QL IA: NONREACTIVE

## 2023-02-07 LAB
BKR LAB AP GYN ADEQUACY: NORMAL
BKR LAB AP GYN INTERPRETATION: NORMAL
BKR LAB AP HPV REFLEX: NORMAL
BKR LAB AP PREVIOUS ABNL DX: NORMAL
BKR LAB AP PREVIOUS ABNORMAL: NORMAL
PATH REPORT.COMMENTS IMP SPEC: NORMAL
PATH REPORT.COMMENTS IMP SPEC: NORMAL
PATH REPORT.RELEVANT HX SPEC: NORMAL

## 2023-02-08 LAB
HUMAN PAPILLOMA VIRUS 16 DNA: NEGATIVE
HUMAN PAPILLOMA VIRUS 18 DNA: NEGATIVE
HUMAN PAPILLOMA VIRUS FINAL DIAGNOSIS: NORMAL
HUMAN PAPILLOMA VIRUS OTHER HR: NEGATIVE

## 2023-03-02 ENCOUNTER — HOSPITAL ENCOUNTER (OUTPATIENT)
Dept: MAMMOGRAPHY | Facility: CLINIC | Age: 43
Discharge: HOME | End: 2023-03-02
Attending: FAMILY MEDICINE | Admitting: FAMILY MEDICINE
Payer: COMMERCIAL

## 2023-03-02 DIAGNOSIS — Z12.31 ENCOUNTER FOR SCREENING MAMMOGRAM FOR BREAST CANCER: ICD-10-CM

## 2023-03-02 PROCEDURE — 77067 SCR MAMMO BI INCL CAD: CPT

## 2023-03-03 ENCOUNTER — OFFICE VISIT (OUTPATIENT)
Dept: FAMILY MEDICINE | Facility: CLINIC | Age: 43
End: 2023-03-03
Payer: COMMERCIAL

## 2023-03-03 VITALS
DIASTOLIC BLOOD PRESSURE: 70 MMHG | RESPIRATION RATE: 22 BRPM | HEART RATE: 73 BPM | WEIGHT: 143.5 LBS | HEIGHT: 63 IN | OXYGEN SATURATION: 99 % | TEMPERATURE: 97.7 F | BODY MASS INDEX: 25.43 KG/M2 | SYSTOLIC BLOOD PRESSURE: 110 MMHG

## 2023-03-03 DIAGNOSIS — F41.1 GAD (GENERALIZED ANXIETY DISORDER): Primary | ICD-10-CM

## 2023-03-03 PROBLEM — I10 ESSENTIAL HYPERTENSION: Status: ACTIVE | Noted: 2023-03-03

## 2023-03-03 PROBLEM — I10 ESSENTIAL HYPERTENSION: Status: RESOLVED | Noted: 2023-03-03 | Resolved: 2023-03-03

## 2023-03-03 PROCEDURE — 99214 OFFICE O/P EST MOD 30 MIN: CPT | Performed by: FAMILY MEDICINE

## 2023-03-03 RX ORDER — HYDROXYZINE HYDROCHLORIDE 25 MG/1
TABLET, FILM COATED ORAL
COMMUNITY
Start: 2023-02-03 | End: 2023-03-20

## 2023-03-03 RX ORDER — HYDROXYZINE HYDROCHLORIDE 10 MG/1
10 TABLET, FILM COATED ORAL 3 TIMES DAILY PRN
Qty: 90 TABLET | Refills: 1 | Status: SHIPPED | OUTPATIENT
Start: 2023-03-03 | End: 2023-05-10

## 2023-03-03 RX ORDER — BUSPIRONE HYDROCHLORIDE 10 MG/1
10 TABLET ORAL 2 TIMES DAILY
Qty: 60 TABLET | Refills: 1 | Status: SHIPPED | OUTPATIENT
Start: 2023-03-03 | End: 2023-05-19

## 2023-03-03 ASSESSMENT — ENCOUNTER SYMPTOMS: NERVOUS/ANXIOUS: 1

## 2023-03-03 ASSESSMENT — PAIN SCALES - GENERAL: PAINLEVEL: NO PAIN (0)

## 2023-03-03 NOTE — PROGRESS NOTES
"  Assessment & Plan     GEOVANY (generalized anxiety disorder)  She is still highly anxious. The atarax helps but she has been taking a tiny dose. I encouraged her to take more if needed and would like her to take it at least bid for a couple more weeks. Will continue the zoloft and add in buspar - starting at 10mg bid. Has counseling appointment upcoming.   Recheck in one month.   - hydrOXYzine (ATARAX) 10 MG tablet; Take 1 tablet (10 mg) by mouth 3 times daily as needed for anxiety  - busPIRone (BUSPAR) 10 MG tablet; Take 1 tablet (10 mg) by mouth 2 times daily      BMI:   Estimated body mass index is 25.42 kg/m  as calculated from the following:    Height as of this encounter: 1.6 m (5' 3\").    Weight as of this encounter: 65.1 kg (143 lb 8 oz).       Regular exercise    Return in about 4 weeks (around 3/31/2023) for Depression/Anxiety follow-up.    Jessi Concepcion MD  [facility] ARACELI Albarran is a 42 year old, presenting for the following health issues:  Anxiety and Depression      Anxiety    History of Present Illness       Reason for visit:  Follow-up from Preventative Care visit on 2/3/2023    She eats 2-3 servings of fruits and vegetables daily.She consumes 1 sweetened beverage(s) daily.She exercises with enough effort to increase her heart rate 30 to 60 minutes per day.  She exercises with enough effort to increase her heart rate 6 days per week.   She is taking medications regularly.     I saw her on 2/3/23 - new patient that day, very anxious  Increased her zoloft to 100mg and asked her schedule counseling   Here for f/u today       Abnormal Mood Symptoms  Onset/Duration: Depression and anxiety    Description: mind is racing, worries about her health, ongoing since age 16  Depression (if yes, do PHQ-9): YES  Anxiety (if yes, do GEOVANY-7): YES  Accompanying Signs & Symptoms:  Still participating in activities that you used to enjoy: No  Fatigue: no   Irritability: No  Difficulty " "concentrating: YES  Changes in appetite: No  Problems with sleep: YES just wants to sleep  Heart racing/beating fast: YES- occasionally   Abnormally elevated, expansive, or irritable mood: YES- varies  Persistently increased activity or energy: No  Thoughts of hurting yourself or others: No  History:  Recent stress or major life event: YES doesn't have a job   Prior depression or anxiety: yes   Family history of depression or anxiety: No  Alcohol/drug use: YES- no alcohol but does smoke THC occasionally   Difficulty sleeping: No  Precipitating or alleviating factors: None  Therapies tried and outcome: none  PHQ 3/25/2019 10/14/2020 2/3/2023   PHQ-9 Total Score 8 2 15   Q9: Thoughts of better off dead/self-harm past 2 weeks Not at all Not at all Several days     GEOVANY-7 SCORE 3/25/2019 10/14/2020 2/3/2023   Total Score - - -   Total Score 18 (severe anxiety) - -   Total Score 18 7 21     hydroxyine 25 - taking 1/4 of a pill - about 8 mg  Half pill makes her tired but it helped the anxiety    zoloft - not sure if it has helped to go to 100 - feels that anxiety has worsened     Has an eye appointment   Has an appointment with a therapist     Mammogram - abnormal yesterday - she expected that- has an area in the left lateral breast that needed extra evaluation last year      Review of Systems   Psychiatric/Behavioral: The patient is nervous/anxious.       Constitutional, HEENT, cardiovascular, pulmonary, gi and gu systems are negative, except as otherwise noted.      Objective    /70 (BP Location: Right arm, Patient Position: Sitting, Cuff Size: Adult Regular)   Pulse 73   Temp 97.7  F (36.5  C) (Tympanic)   Resp 22   Ht 1.6 m (5' 3\")   Wt 65.1 kg (143 lb 8 oz)   LMP 02/24/2023   SpO2 99%   BMI 25.42 kg/m    Body mass index is 25.42 kg/m .  Physical Exam   GENERAL - Pt is alert and oriented in no acute distress.  Affect is appropriate. Good eye contact.  HEET - Head is normocephalic, atraumatic.  "   PERRLA,EEMI. Conjunctiva are free of icterus or erythema.  TMs bilaterally normal.  RESPIRATORY - Clear to auscultation bilaterally.  No wheezing noted  CV - RRR, no murmurs, rubs, gallops.

## 2023-03-03 NOTE — PATIENT INSTRUCTIONS
Try hydroxyzine at 10mg - take it twice a day - up to 4x/day  -   You can also tweak the dose 10-30mg    Stay on zoloft 100mg    Add in buspar 10mg/day for a week, then increase to 10 mg twice a day

## 2023-03-16 ENCOUNTER — ANCILLARY PROCEDURE (OUTPATIENT)
Dept: MAMMOGRAPHY | Facility: CLINIC | Age: 43
End: 2023-03-16
Attending: FAMILY MEDICINE
Payer: COMMERCIAL

## 2023-03-16 DIAGNOSIS — R92.8 ABNORMAL MAMMOGRAM: ICD-10-CM

## 2023-03-16 PROCEDURE — 77061 BREAST TOMOSYNTHESIS UNI: CPT | Mod: LT

## 2023-03-16 PROCEDURE — 76642 ULTRASOUND BREAST LIMITED: CPT | Mod: LT

## 2023-03-16 ASSESSMENT — ANXIETY QUESTIONNAIRES
1. FEELING NERVOUS, ANXIOUS, OR ON EDGE: NEARLY EVERY DAY
6. BECOMING EASILY ANNOYED OR IRRITABLE: SEVERAL DAYS
4. TROUBLE RELAXING: SEVERAL DAYS
7. FEELING AFRAID AS IF SOMETHING AWFUL MIGHT HAPPEN: NEARLY EVERY DAY
IF YOU CHECKED OFF ANY PROBLEMS ON THIS QUESTIONNAIRE, HOW DIFFICULT HAVE THESE PROBLEMS MADE IT FOR YOU TO DO YOUR WORK, TAKE CARE OF THINGS AT HOME, OR GET ALONG WITH OTHER PEOPLE: SOMEWHAT DIFFICULT
3. WORRYING TOO MUCH ABOUT DIFFERENT THINGS: NEARLY EVERY DAY
GAD7 TOTAL SCORE: 15
8. IF YOU CHECKED OFF ANY PROBLEMS, HOW DIFFICULT HAVE THESE MADE IT FOR YOU TO DO YOUR WORK, TAKE CARE OF THINGS AT HOME, OR GET ALONG WITH OTHER PEOPLE?: SOMEWHAT DIFFICULT
GAD7 TOTAL SCORE: 15
7. FEELING AFRAID AS IF SOMETHING AWFUL MIGHT HAPPEN: NEARLY EVERY DAY
2. NOT BEING ABLE TO STOP OR CONTROL WORRYING: NEARLY EVERY DAY
5. BEING SO RESTLESS THAT IT IS HARD TO SIT STILL: SEVERAL DAYS
GAD7 TOTAL SCORE: 15

## 2023-03-16 ASSESSMENT — PATIENT HEALTH QUESTIONNAIRE - PHQ9
SUM OF ALL RESPONSES TO PHQ QUESTIONS 1-9: 0
SUM OF ALL RESPONSES TO PHQ QUESTIONS 1-9: 0
10. IF YOU CHECKED OFF ANY PROBLEMS, HOW DIFFICULT HAVE THESE PROBLEMS MADE IT FOR YOU TO DO YOUR WORK, TAKE CARE OF THINGS AT HOME, OR GET ALONG WITH OTHER PEOPLE: NOT DIFFICULT AT ALL

## 2023-03-20 ENCOUNTER — ANCILLARY PROCEDURE (OUTPATIENT)
Dept: MAMMOGRAPHY | Facility: CLINIC | Age: 43
End: 2023-03-20
Attending: FAMILY MEDICINE
Payer: COMMERCIAL

## 2023-03-20 DIAGNOSIS — N63.20 BREAST MASS, LEFT: ICD-10-CM

## 2023-03-20 DIAGNOSIS — R92.8 ABNORMAL MAMMOGRAM: ICD-10-CM

## 2023-03-20 PROCEDURE — 999N000065 MA POST PROCEDURE LEFT

## 2023-03-20 PROCEDURE — 88377 M/PHMTRC ALYS ISHQUANT/SEMIQ: CPT | Mod: 26 | Performed by: MEDICAL GENETICS

## 2023-03-20 PROCEDURE — 272N000431 US BREAST BIOPSY CORE NEEDLE LEFT

## 2023-03-20 PROCEDURE — 88342 IMHCHEM/IMCYTCHM 1ST ANTB: CPT | Mod: 26 | Performed by: PATHOLOGY

## 2023-03-20 PROCEDURE — 250N000009 HC RX 250: Performed by: FAMILY MEDICINE

## 2023-03-20 PROCEDURE — 88360 TUMOR IMMUNOHISTOCHEM/MANUAL: CPT | Mod: 26 | Performed by: PATHOLOGY

## 2023-03-20 PROCEDURE — 88377 M/PHMTRC ALYS ISHQUANT/SEMIQ: CPT | Performed by: FAMILY MEDICINE

## 2023-03-20 PROCEDURE — 88341 IMHCHEM/IMCYTCHM EA ADD ANTB: CPT | Mod: 26 | Performed by: PATHOLOGY

## 2023-03-20 PROCEDURE — 88305 TISSUE EXAM BY PATHOLOGIST: CPT | Mod: 26 | Performed by: PATHOLOGY

## 2023-03-20 PROCEDURE — 88305 TISSUE EXAM BY PATHOLOGIST: CPT | Mod: TC | Performed by: FAMILY MEDICINE

## 2023-03-20 RX ADMIN — LIDOCAINE HYDROCHLORIDE 10 ML: 10 INJECTION, SOLUTION INFILTRATION; PERINEURAL at 10:43

## 2023-03-22 ENCOUNTER — VIRTUAL VISIT (OUTPATIENT)
Dept: PSYCHOLOGY | Facility: CLINIC | Age: 43
End: 2023-03-22
Payer: COMMERCIAL

## 2023-03-22 DIAGNOSIS — F41.0 GENERALIZED ANXIETY DISORDER WITH PANIC ATTACKS: Primary | ICD-10-CM

## 2023-03-22 DIAGNOSIS — F41.1 GENERALIZED ANXIETY DISORDER WITH PANIC ATTACKS: Primary | ICD-10-CM

## 2023-03-22 PROCEDURE — 90834 PSYTX W PT 45 MINUTES: CPT | Mod: VID

## 2023-03-22 ASSESSMENT — COLUMBIA-SUICIDE SEVERITY RATING SCALE - C-SSRS
TOTAL  NUMBER OF INTERRUPTED ATTEMPTS LIFETIME: NO
REASONS FOR IDEATION PAST MONTH: COMPLETELY TO END OR STOP THE PAIN (YOU COULDN'T GO ON LIVING WITH THE PAIN OR HOW YOU WERE FEELING)
ATTEMPT LIFETIME: NO
4. HAVE YOU HAD THESE THOUGHTS AND HAD SOME INTENTION OF ACTING ON THEM?: NO
REASONS FOR IDEATION LIFETIME: COMPLETELY TO END OR STOP THE PAIN (YOU COULDN'T GO ON LIVING WITH THE PAIN OR HOW YOU WERE FEELING)
6. HAVE YOU EVER DONE ANYTHING, STARTED TO DO ANYTHING, OR PREPARED TO DO ANYTHING TO END YOUR LIFE?: NO
5. HAVE YOU STARTED TO WORK OUT OR WORKED OUT THE DETAILS OF HOW TO KILL YOURSELF? DO YOU INTEND TO CARRY OUT THIS PLAN?: NO
2. HAVE YOU ACTUALLY HAD ANY THOUGHTS OF KILLING YOURSELF?: YES
2. HAVE YOU ACTUALLY HAD ANY THOUGHTS OF KILLING YOURSELF?: NO
3. HAVE YOU BEEN THINKING ABOUT HOW YOU MIGHT KILL YOURSELF?: NO
1. HAVE YOU WISHED YOU WERE DEAD OR WISHED YOU COULD GO TO SLEEP AND NOT WAKE UP?: YES
TOTAL  NUMBER OF ABORTED OR SELF INTERRUPTED ATTEMPTS LIFETIME: NO
1. IN THE PAST MONTH, HAVE YOU WISHED YOU WERE DEAD OR WISHED YOU COULD GO TO SLEEP AND NOT WAKE UP?: YES

## 2023-03-22 NOTE — PROGRESS NOTES
"[facility]   Mental Health & Addiction Services     Progress Note - Initial Visit    Patient  Name:  Zainab Bolton Date: 3/22/23         Service Type: Individual     Visit Start Time: 10:10am  Visit End Time: 10:55am    Visit #: 1    Attendees: Client attended alone    Service Modality:  Video Visit:      Provider verified identity through the following two step process.  Patient provided:  Patient  and Patient address    Telemedicine Visit: The patient's condition can be safely assessed and treated via synchronous audio and visual telemedicine encounter.      Reason for Telemedicine Visit: Patient has requested telehealth visit and Patient convenience (e.g. access to timely appointments / distance to available provider)    Originating Site (Patient Location): Patient's home    Distant Site (Provider Location): Provider Remote Setting- Home Office    Consent:  The patient/guardian has verbally consented to: the potential risks and benefits of telemedicine (video visit) versus in person care; bill my insurance or make self-payment for services provided; and responsibility for payment of non-covered services.     Patient would like the video invitation sent by:  My Chart    Mode of Communication:  Video Conference via Amwell    Distant Location (Provider):  On-site    As the provider I attest to compliance with applicable laws and regulations related to telemedicine.       DATA:   Interactive Complexity: No   Crisis: No     Presenting Concerns/  Current Stressors:   Pt reports the largest stressor in her life at this time is the continued feelings of anxiety she has been experiencing since the age of 16. Pt and provider completed the C-SSRS assessment where pt screened as low risk as well as began the DA. Pt denied wanting to create a safety plan at the time of the appointment stating \"I feel stable now and do not feel worried about completing suicide. I could never do that to my " "children\". Pt reports she has good support in her life and many people to turn to if she would need support. Pt denies any SIB or HI at the time of the session or history of. See below for plan until next session.      ASSESSMENT:  Mental Status Assessment:  Appearance:   Appropriate   Eye Contact:   Good   Psychomotor Behavior: Normal   Attitude:   Cooperative  Interested Pleasant Attentive  Orientation:   All  Speech   Rate / Production: Normal/ Responsive   Volume:  Normal   Mood:    Anxious   Affect:    Appropriate   Thought Content:  Clear   Thought Form:  Coherent  Logical   Insight:    Good       Safety Issues and Plan for Safety and Risk Management:     Maybee Suicide Severity Rating Scale (Lifetime/Recent)  Maybee Suicide Severity Rating (Lifetime/Recent) 3/21/2019 3/22/2023   Q1 Wished to be Dead (Past Month) no -   Q2 Suicidal Thoughts (Past Month) no -   1. Wish to be Dead (Lifetime) - 1   Wish to be Dead Description (Lifetime) - \"when you are at your worst due to a break up or something an I am at my worst I do get those thoughts. But I quickly realize how much I have\". \"My dad completed suicide 7 years ago and could never do that to my children\"   1. Wish to be Dead (Past 1 Month) - 1   Wish to be Dead Description (Past 1 Month) - \"very limited within the last month. I went to go see a doctor who was able to reassess medication and have been feeling so much better.\"   2. Non-Specific Active Suicidal Thoughts (Lifetime) - 1   Non-Specific Active Suicidal Thought Description (Lifetime) - \"Oh yes, the last time was a few months ago when my anxiety was really bad. Nothing recently\"   2. Non-Specific Active Suicidal Thoughts (Past 1 Month) - 0   3. Active Suicidal Ideation with any Methods (Not Plan) Without Intent to Act (Lifetime) - 0   4. Active Suicidal Ideation with Some Intent to Act, Without Specific Plan (Lifetime) - 0   5. Active Suicidal Ideation with Specific Plan and Intent (Lifetime) - 0 "   Most Severe Ideation Rating (Lifetime) - 2   Frequency (Lifetime) - 2   Frequency (Past 1 Month) - 1   Duration (Lifetime) - 2   Duration (Past 1 Month) - 1   Controllability (Lifetime) - 2   Controllability (Past 1 Month) - 1   Deterrents (Lifetime) - 1   Deterrents (Past 1 Month) - 1   Reasons for Ideation (Lifetime) - 5   Reasons for Ideation (Past 1 Month) - 5   Actual Attempt (Lifetime) - 0   Has subject engaged in non-suicidal self-injurious behavior? (Lifetime) - 0   Interrupted Attempts (Lifetime) - 0   Aborted or Self-Interrupted Attempt (Lifetime) - 0   Preparatory Acts or Behavior (Lifetime) - 0   Calculated C-SSRS Risk Score (Lifetime/Recent) - Low Risk     Patient denies current fears or concerns for personal safety.  Patient denies current or recent suicidal ideation or behaviors.  Patient denies current or recent homicidal ideation or behaviors.  Patient denies current or recent self injurious behavior or ideation.  Patient denies other safety concerns.  Recommended that patient call 911 or go to the local ED should there be a change in any of these risk factors.  Patient reports there are no firearms in the house.     Diagnostic Criteria:  Generalized Anxiety Disorder  A. Excessive anxiety and worry about a number of events or activities (such as work or school performance).   B. The person finds it difficult to control the worry.  C. Select 3 or more symptoms (required for diagnosis). Only one item is required in children.   - Restlessness or feeling keyed up or on edge.    - Being easily fatigued.    - Difficulty concentrating or mind going blank.    - Irritability.    - Muscle tension.   D. The focus of the anxiety and worry is not confined to features of an Axis I disorder.  E. The anxiety, worry, or physical symptoms cause clinically significant distress or impairment in social, occupational, or other important areas of functioning.   F. The disturbance is not due to the direct physiological  effects of a substance (e.g., a drug of abuse, a medication) or a general medical condition (e.g., hyperthyroidism) and does not occur exclusively during a Mood Disorder, a Psychotic Disorder, or a Pervasive Developmental Disorder.    - The aformentioned symptoms began to worsen 6 month(s) ago and occurs 7 days per week and is experienced as severe.      DSM5 Diagnoses: (Sustained by DSM5 Criteria Listed Above)  Diagnoses: 300.02 (F41.1) Generalized Anxiety Disorder with panic attacks  Psychosocial & Contextual Factors: stress within family relationships, loss of significant relationships, stress finding work  PROMIS-10    In general, would you say your health is:: 3    In general, would you say your quality of life is:: 3    In general, how would you rate your physical health?: 3    In general, how would you rate your mental health, including your mood and your ability to think?: 2    In general, how would you rate your satisfaction with your social activities and relationships?: 3    In general, please rate how well you carry out your usual social activities and roles. (This includes activities at home, at work and in your community, and responsibilities as a parent, child, spouse, employee, friend, etc.): 3    To what extent are you able to carry out your everyday physical activities such as walking, climbing stairs, carrying groceries, or moving a chair?: 5    In the past 7 days, how often have you been bothered by emotional problems such as feeling anxious, depressed, or irritable?: 4  In the past 7 days, how would you rate your fatigue on average?: 2  In the past 7 days, how would you rate your pain on average, where 0 means no pain, and 10 means worst imaginable pain?: 3    PROMIS GLOBAL SCORES    Mental health question re-calculation - no clinical value - Mental health question re-calculation - no clinical value: 2  Physical health question re-calculation - no clinical value - Physical health question  re-calculation - no clinical value: 4  Pain question re-calculation - no clinical value - Pain question re-calculation - no clinical value: 4  Global Mental Health Score - Global Mental Health Score: 10  Global Physical Health Score - Global Physical Health Score: 16  PROMIS TOTAL - SUBSCORES - PROMIS TOTAL - SUBSCORES: 26      [id number] PROMIS HEALTH ORGANIZATION AND PROMIS COOPERATIVE GROUP VERSION 1.1      Intervention:   CBT- Patient was educated on the CBT model and asked to bring in examples at next session and Completed through review of safety issues and safety interventions  Collateral Reports Completed:  Not Applicable      PLAN: (Homework, other):  1. Provider will continue Diagnostic Assessment.  Patient was given the following to do until next session:  Begin journaling times of high anxiety as well as situation that led to those feelings.      2. Provider recommended the following referrals: none at the time of the assessment.      3.  Suicide Risk and Safety Concerns were assessed for Zainab Bolton.    Patient meets the following risk assessment and triage: When the patient identifies the following:  Suicidal Ideation Without method, intent, plan, or behavior (Yes to C-SSRS Suicidal Ideation #1 or #2 and No to #3,4,5)    The following is recommended:   Document risk factors and interventions to mitigate risk factors, pt has low risk factors and many protective factors. Pt denied wanting to make a safety plan at the time of the session. See above for more information.        Daksha Daugherty, Psychotherapist Trainee, [facility]    March 22, 2023    This note has been reviewed and I agree with the plan of care. This note is co-signed by Love Lin MA, [facility] Supervisor, on: 3/23/2023.

## 2023-03-23 ENCOUNTER — TELEPHONE (OUTPATIENT)
Dept: MAMMOGRAPHY | Facility: CLINIC | Age: 43
End: 2023-03-23
Payer: COMMERCIAL

## 2023-03-23 NOTE — TELEPHONE ENCOUNTER
Pathology report was reviewed with our Breast Center Radiologist, Dr. Andres, who confirmed the recent breast imaging is concordant with the final surgical pathology results.    I phoned patient, confirmed her full name, date of birth, and notified patient of the following breast biopsy results:      Final Diagnosis     BREAST BIOPSY CONTAINING NEOPLASTIC LESION:        -  BIOPSY TYPE: ULTRASOUND-GUIDED CORE BIOPSIES        -  BIOPSY SITE: LEFT BREAST, 2:00, 7 CM FROM NIPPLE        -  HISTOLOGIC TYPE: INVASIVE DUCTAL CARCINOMA        -  HISTOLOGIC GRADE (LOUIE HISTOLOGIC SCORE):              -  TUBULAR DIFFERENTIATION SCORE 1              -  NUCLEAR PLEOMORPHISM SCORE 2              -  MITOTIC RATE SCORE 1              -  OVERALL GRADE 1 (TOTAL SCORE 4/9)        -  DUCTAL CARCINOMA IN SITU (DCIS): FOCALLY PRESENT (NEGATIVE FOR EIC)        -  MICROCALCIFICATIONS: PRESENT, ASSOCIATED WITH ATYPICAL DUCTAL HYPERPLASIA        -  SMALL-VESSEL LYMPHATIC INVASION: NOT IDENTIFIED        -  ADDITIONAL FINDINGS: ATYPICAL DUCTAL HYPERPLASIA (ADH)        -  ADDITIONAL STUDIES:              -  ESTROGEN RECEPTORS POSITIVE (90% OF TUMOR NUCLEI STAIN STRONGLY)              -  PROGESTERONE RECEPTORS POSITIVE (70% OF TUMOR NUCLEI STAIN STRONGLY)              -  HER2/KENNEY RECEPTORS EQUIVOCAL (2+ MEMBRANE STAINING)       Per Breast Center Radiologist, I have assisted scheduling patient for the following:    Surgical Consult:  Dr. Villasenor 3/28/23 @ 2:40   [facility] [place]  [address], [address]   [place] [address] [phone]    Patient denies any concerns at her biopsy site.  Questions were answered and support provided. New diagnosis information and resource folder will be provided to patient at surgical consult.  She has my phone number if she has further questions.  Patient verbalized understanding and agrees with the plan of care.    Ordering provider has been notified of the results and plan.        Omayra Colón, RN, BSN, PHN, CBCN  Imaging Nurse Coordinator  [facility]  [phone]

## 2023-03-27 LAB — INTERPRETATION: NORMAL

## 2023-03-28 ENCOUNTER — PATIENT OUTREACH (OUTPATIENT)
Dept: ONCOLOGY | Facility: CLINIC | Age: 43
End: 2023-03-28

## 2023-03-28 ENCOUNTER — LAB (OUTPATIENT)
Dept: LAB | Facility: CLINIC | Age: 43
End: 2023-03-28
Payer: COMMERCIAL

## 2023-03-28 ENCOUNTER — OFFICE VISIT (OUTPATIENT)
Dept: SURGERY | Facility: CLINIC | Age: 43
End: 2023-03-28
Attending: FAMILY MEDICINE
Payer: COMMERCIAL

## 2023-03-28 VITALS
HEIGHT: 64 IN | DIASTOLIC BLOOD PRESSURE: 83 MMHG | WEIGHT: 143 LBS | RESPIRATION RATE: 16 BRPM | SYSTOLIC BLOOD PRESSURE: 122 MMHG | BODY MASS INDEX: 24.41 KG/M2 | HEART RATE: 76 BPM

## 2023-03-28 DIAGNOSIS — Z84.81 FAMILY HISTORY OF BRCA2 GENE POSITIVE: ICD-10-CM

## 2023-03-28 DIAGNOSIS — Z17.0 MALIGNANT NEOPLASM OF UPPER-OUTER QUADRANT OF LEFT BREAST IN FEMALE, ESTROGEN RECEPTOR POSITIVE (H): Primary | ICD-10-CM

## 2023-03-28 DIAGNOSIS — C50.412 MALIGNANT NEOPLASM OF UPPER-OUTER QUADRANT OF LEFT BREAST IN FEMALE, ESTROGEN RECEPTOR POSITIVE (H): ICD-10-CM

## 2023-03-28 DIAGNOSIS — Z17.0 MALIGNANT NEOPLASM OF UPPER-OUTER QUADRANT OF LEFT BREAST IN FEMALE, ESTROGEN RECEPTOR POSITIVE (H): ICD-10-CM

## 2023-03-28 DIAGNOSIS — C50.412 MALIGNANT NEOPLASM OF UPPER-OUTER QUADRANT OF LEFT BREAST IN FEMALE, ESTROGEN RECEPTOR POSITIVE (H): Primary | ICD-10-CM

## 2023-03-28 PROCEDURE — 99205 OFFICE O/P NEW HI 60 MIN: CPT | Performed by: SPECIALIST

## 2023-03-28 PROCEDURE — 36415 COLL VENOUS BLD VENIPUNCTURE: CPT

## 2023-03-28 PROCEDURE — G0463 HOSPITAL OUTPT CLINIC VISIT: HCPCS | Performed by: SPECIALIST

## 2023-03-28 RX ORDER — SERTRALINE HYDROCHLORIDE 100 MG/1
0.5 TABLET, FILM COATED ORAL DAILY
COMMUNITY
Start: 2022-01-31 | End: 2023-05-19

## 2023-03-28 NOTE — LETTER
3/28/2023         RE: Zainab Bolton  [address] [address]        Dear Colleague,    Thank you for referring your patient, Zainab Bolton, to the [facility] [place]. Please see a copy of my visit note below.    This is a 42 year old  female who I'm asked to see by Jessi Concepcion for evaluation of a left breast cancer.  This was picked up  on screening mammogram.   Now that she knows it is there, she can feel it.  She states that she thought she felt something a year ago.  A mammogram and ultrasound were done then that were normal.  On the current mammogram however they could see a lesion.  Now she can clearly feel it.  A needle biopsy was done which shows an invasive ductal carcinoma.  It is estrogen receptor strongly positive , progesterone receptor positive  and HER-2 negative.     She has no family history of breast cancer.  However, she has a niece (the daughter of her twin sister) who was found to have a colon polyp at the age of 25.  Because of her young age when she was found to have that, she had genetic testing and has been found to be a BRCA2 carrier.  Nobody else in the family has been tested yet as this has been a recent find.  Her sister is sent to be tested.      PAST MEDICAL HISTORY:  Past Medical History:   Diagnosis Date     Anxiety 08/16/2010     Anxiety state, unspecified 1997    chronic anxiety     Essential hypertension 3/3/2023     Hypercholesteremia 08/16/2010     Obesity 08/16/2010     Other acne      Papanicolaou smear of cervix with low grade squamous intraepithelial lesion (LGSIL) (aka LSIL) 05/05/2015    LSIL/+ HR HPV     TOBACCO ABUSE-CONTINUOUS      Past Surgical History:   Procedure Laterality Date     BIOPSY       CONIZATION LEEP  07/16/2015    LÁZARO 2     CONIZATION LEEP N/A 07/16/2015    Procedure: CONIZATION LEEP;  Surgeon: Omayra Cunningham DO;  Location: [facility] OR     EYE SURGERY  1/5/2016     [facility] LIGATE FALLOPIAN TUBE   "10/03/2003       Medications:    Current Outpatient Medications:      busPIRone (BUSPAR) 10 MG tablet, Take 1 tablet (10 mg) by mouth 2 times daily, Disp: 60 tablet, Rfl: 1     hydrOXYzine (ATARAX) 10 MG tablet, Take 1 tablet (10 mg) by mouth 3 times daily as needed for anxiety, Disp: 90 tablet, Rfl: 1     sertraline (ZOLOFT) 50 MG tablet, Take 2 tablets (100 mg) by mouth daily Tapering up from 75mg/day x one week, then increase to 100mg/day, Disp: 60 tablet, Rfl: 1    Allergies:  Allergies   Allergen Reactions     No Known Drug Allergies         Social History:  Social History     Tobacco Use     Smoking status: Former     Packs/day: 1.00     Years: 10.00     Pack years: 10.00     Types: Cigarettes     Quit date: 8/1/2012     Years since quitting: 10.6     Smokeless tobacco: Never     Tobacco comments:     since age 12, but stopped with each baby   Substance Use Topics     Alcohol use: Yes     Comment: 1-2  every 2-3 months if any     Drug use: No        ROS:  Pertinent items are noted in HPI.    Physical Exam  /83 (BP Location: Left arm)   Pulse 76   Resp 16   Ht 1.626 m (5' 4\")   Wt 64.9 kg (143 lb)   LMP 02/24/2023   BMI 24.55 kg/m    General:alert, appears stated age and cooperative  Lungs:clear to auscultation bilaterally  CV:regular rate and rhythm  Breasts: Clearly palpable mass in the upper outer quadrant of the left breast.  Fairly close to the axilla.  Measures close to 2 cm in size.  Lymph Nodes:Supple without adenopathy  Neuro:Grossly normal  Musculoskeletal: Normal range of motion of her upper extremities.  Skin: Normal skin turgor.    Reviewed her mammograms and ultrasound and pathology.     Impression: Left Breast Cancer. Clinically T1-2, N0.  Discussed the surgical options for treatment of breast cancer which generally are a lumpectomy (partial mastectomy) combined with radiation versus a mastectomy.  Explained that the survival benefit is the same for both.  The difference is in local " recurrence risk.  Before making a recommendation on what she should do, I think she should have both an MRI and genetic testing.  She has very dense breast tissue.  It is hard to see the lesion in question on one of the views so I think an MRI would be very reasonable.  She has a very interesting family history also with her niece testing positive for BRCA2 but no family history of breast cancer.  I did explain that if she came back positive for that, she she is at very high risk for local recurrence.  Explained that most women would choose bilateral mastectomies but did tell her it is still her choice.  Discussed that at this point we do not know yet whether or not she will need chemotherapy and we may not know until we get all of the results of surgery back.  Often times, a test called an Oncotype is needed to determine whether or not chemotherapy is needed.  This can take several weeks to get the results back after surgery.      Plan: We'll schedule for an MRI and have also started genetic testing having her blood drawn today.  Talked her briefly about what a lumpectomy looks like and what mastectomies look like although I did not go into detail yet about that.  Almost all of our breast surgeries are now done as an outpatient.  The risks and benefits of surgery were explained.  Also talked about expected recovery time.  Typically arrangements for oncology and radiation oncology are made after surgery once we have the results.              Again, thank you for allowing me to participate in the care of your patient.        Sincerely,        Kylee Villasenor MD

## 2023-03-28 NOTE — PATIENT INSTRUCTIONS
Stop down to lab today before leaving to have your blood drawn for your Genetic Testing.    Dr. Villasenor will call with those results when available, usually in 3-4 weeks.    You will also receive a call to get set up with a Genetic Counselor appointment.    Breast MRI, Wednesday, 3-29-23, check in at 6:00 pm, [facility], [address], [facility].  Dr. Villasenor will call with results.     Call with any questions or concerns.    Thank you!!      Susan Lombardi, RN CBCN  RN Breast Surgery Coordinator  [facility]  [phone]

## 2023-03-28 NOTE — PROGRESS NOTES
This is a 42 year old  female who I'm asked to see by Jessi Concepcion for evaluation of a left breast cancer.  This was picked up  on screening mammogram.   Now that she knows it is there, she can feel it.  She states that she thought she felt something a year ago.  A mammogram and ultrasound were done then that were normal.  On the current mammogram however they could see a lesion.  Now she can clearly feel it.  A needle biopsy was done which shows an invasive ductal carcinoma.  It is estrogen receptor strongly positive , progesterone receptor positive  and HER-2 negative.     She has no family history of breast cancer.  However, she has a niece (the daughter of her twin sister) who was found to have a colon polyp at the age of 25.  Because of her young age when she was found to have that, she had genetic testing and has been found to be a BRCA2 carrier.  Nobody else in the family has been tested yet as this has been a recent find.  Her sister is sent to be tested.      PAST MEDICAL HISTORY:  Past Medical History:   Diagnosis Date     Anxiety 08/16/2010     Anxiety state, unspecified 1997    chronic anxiety     Essential hypertension 3/3/2023     Hypercholesteremia 08/16/2010     Obesity 08/16/2010     Other acne      Papanicolaou smear of cervix with low grade squamous intraepithelial lesion (LGSIL) (aka LSIL) 05/05/2015    LSIL/+ HR HPV     TOBACCO ABUSE-CONTINUOUS      Past Surgical History:   Procedure Laterality Date     BIOPSY       CONIZATION LEEP  07/16/2015    LÁZARO 2     CONIZATION LEEP N/A 07/16/2015    Procedure: CONIZATION LEEP;  Surgeon: Omayra Cunningham DO;  Location: [facility] OR     EYE SURGERY  1/5/2016     [facility] LIGATE FALLOPIAN TUBE  10/03/2003       Medications:    Current Outpatient Medications:      busPIRone (BUSPAR) 10 MG tablet, Take 1 tablet (10 mg) by mouth 2 times daily, Disp: 60 tablet, Rfl: 1     hydrOXYzine (ATARAX) 10 MG tablet, Take 1 tablet (10 mg) by mouth 3 times daily as needed  "for anxiety, Disp: 90 tablet, Rfl: 1     sertraline (ZOLOFT) 50 MG tablet, Take 2 tablets (100 mg) by mouth daily Tapering up from 75mg/day x one week, then increase to 100mg/day, Disp: 60 tablet, Rfl: 1    Allergies:  Allergies   Allergen Reactions     No Known Drug Allergies         Social History:  Social History     Tobacco Use     Smoking status: Former     Packs/day: 1.00     Years: 10.00     Pack years: 10.00     Types: Cigarettes     Quit date: 8/1/2012     Years since quitting: 10.6     Smokeless tobacco: Never     Tobacco comments:     since age 12, but stopped with each baby   Substance Use Topics     Alcohol use: Yes     Comment: 1-2  every 2-3 months if any     Drug use: No        ROS:  Pertinent items are noted in HPI.    Physical Exam  /83 (BP Location: Left arm)   Pulse 76   Resp 16   Ht 1.626 m (5' 4\")   Wt 64.9 kg (143 lb)   LMP 02/24/2023   BMI 24.55 kg/m    General:alert, appears stated age and cooperative  Lungs:clear to auscultation bilaterally  CV:regular rate and rhythm  Breasts: Clearly palpable mass in the upper outer quadrant of the left breast.  Fairly close to the axilla.  Measures close to 2 cm in size.  Lymph Nodes:Supple without adenopathy  Neuro:Grossly normal  Musculoskeletal: Normal range of motion of her upper extremities.  Skin: Normal skin turgor.    Reviewed her mammograms and ultrasound and pathology.     Impression: Left Breast Cancer. Clinically T1-2, N0.  Discussed the surgical options for treatment of breast cancer which generally are a lumpectomy (partial mastectomy) combined with radiation versus a mastectomy.  Explained that the survival benefit is the same for both.  The difference is in local recurrence risk.  Before making a recommendation on what she should do, I think she should have both an MRI and genetic testing.  She has very dense breast tissue.  It is hard to see the lesion in question on one of the views so I think an MRI would be very " reasonable.  She has a very interesting family history also with her niece testing positive for BRCA2 but no family history of breast cancer.  I did explain that if she came back positive for that, she she is at very high risk for local recurrence.  Explained that most women would choose bilateral mastectomies but did tell her it is still her choice.  Discussed that at this point we do not know yet whether or not she will need chemotherapy and we may not know until we get all of the results of surgery back.  Often times, a test called an Oncotype is needed to determine whether or not chemotherapy is needed.  This can take several weeks to get the results back after surgery.      Plan: We'll schedule for an MRI and have also started genetic testing having her blood drawn today.  Talked her briefly about what a lumpectomy looks like and what mastectomies look like although I did not go into detail yet about that.  Almost all of our breast surgeries are now done as an outpatient.  The risks and benefits of surgery were explained.  Also talked about expected recovery time.  Typically arrangements for oncology and radiation oncology are made after surgery once we have the results.

## 2023-03-28 NOTE — PROGRESS NOTES
Writer received referral to Cancer Risk Management/Genetic Counseling.  Referred for: Breast Cancer, family history of BRCA 2 Gene Mutation  Genetic testing results may change type of surgery and is requested in 1-2 weeks  Referral reviewed for appropriate plan, and sent to New Patient Scheduling for completion.    Suyapa Hamilton, RN, BSN  Oncology New Patient Nurse Navigator   [facility]  [phone]

## 2023-03-28 NOTE — NURSING NOTE
"Avis presents to [facility] today for a surgical consult with Dr. Villasenor  regarding her newly diagnosed left breast cancer.  She is accompanied by her boyfriend for consult.  RN assessment and EMR update. /83 (BP Location: Left arm)   Pulse 76   Resp 16   Ht 1.626 m (5' 4\")   Wt 64.9 kg (143 lb)   LMP 02/24/2023   BMI 24.55 kg/m     Patient given a Breast Cancer Packet, contents reviewed with emphasis on young survivors. She does have children, but they are all young adults and not living at home.  She met with Dr. Villasenor  see dictation for details of visit. She will plan to have genetic testing lab draw today. Consent signed. Told her she will receive a call to schedule with a genetic counselor.  Patient states her twin sister's daughter has tested + for BRCA 2 gene mutation.  I have also scheduled her for breast MRI 3-29-23 at 6:30 pm, 6:00 pm check in.  Support provided, invited calls.  RN time 30 mins.  "

## 2023-03-29 ENCOUNTER — HOSPITAL ENCOUNTER (OUTPATIENT)
Dept: MRI IMAGING | Facility: HOSPITAL | Age: 43
Discharge: HOME | End: 2023-03-29
Attending: SPECIALIST | Admitting: SPECIALIST
Payer: COMMERCIAL

## 2023-03-29 DIAGNOSIS — C50.412 MALIGNANT NEOPLASM OF UPPER-OUTER QUADRANT OF LEFT BREAST IN FEMALE, ESTROGEN RECEPTOR POSITIVE (H): ICD-10-CM

## 2023-03-29 DIAGNOSIS — Z17.0 MALIGNANT NEOPLASM OF UPPER-OUTER QUADRANT OF LEFT BREAST IN FEMALE, ESTROGEN RECEPTOR POSITIVE (H): ICD-10-CM

## 2023-03-29 LAB — INTERPRETATION: NORMAL

## 2023-03-29 PROCEDURE — A9585 GADOBUTROL INJECTION: HCPCS | Performed by: SPECIALIST

## 2023-03-29 PROCEDURE — 77049 MRI BREAST C-+ W/CAD BI: CPT

## 2023-03-29 PROCEDURE — 255N000002 HC RX 255 OP 636: Performed by: SPECIALIST

## 2023-03-29 RX ORDER — GADOBUTROL 604.72 MG/ML
0.1 INJECTION INTRAVENOUS ONCE
Status: COMPLETED | OUTPATIENT
Start: 2023-03-29 | End: 2023-03-29

## 2023-03-29 RX ADMIN — GADOBUTROL 6 ML: 604.72 INJECTION INTRAVENOUS at 19:03

## 2023-03-30 ENCOUNTER — MYC MEDICAL ADVICE (OUTPATIENT)
Dept: FAMILY MEDICINE | Facility: CLINIC | Age: 43
End: 2023-03-30
Payer: COMMERCIAL

## 2023-03-30 ENCOUNTER — OFFICE VISIT (OUTPATIENT)
Dept: URGENT CARE | Facility: URGENT CARE | Age: 43
End: 2023-03-30
Payer: COMMERCIAL

## 2023-03-30 ENCOUNTER — TELEPHONE (OUTPATIENT)
Dept: SURGERY | Facility: CLINIC | Age: 43
End: 2023-03-30
Payer: COMMERCIAL

## 2023-03-30 VITALS
WEIGHT: 138 LBS | TEMPERATURE: 98.9 F | HEART RATE: 83 BPM | BODY MASS INDEX: 23.69 KG/M2 | OXYGEN SATURATION: 97 % | SYSTOLIC BLOOD PRESSURE: 117 MMHG | DIASTOLIC BLOOD PRESSURE: 77 MMHG

## 2023-03-30 DIAGNOSIS — G44.209 TENSION HEADACHE: Primary | ICD-10-CM

## 2023-03-30 PROCEDURE — 99213 OFFICE O/P EST LOW 20 MIN: CPT

## 2023-03-30 RX ORDER — PREDNISONE 20 MG/1
40 TABLET ORAL DAILY
Qty: 10 TABLET | Refills: 0 | Status: SHIPPED | OUTPATIENT
Start: 2023-03-30 | End: 2023-04-04

## 2023-03-30 NOTE — TELEPHONE ENCOUNTER
"Called patient.   She stated 2 months ago she started having ringing in her left ear.   This last Friday she noticed pressure and some pain in the \"back, center, left\" of her head and noticed her ear has had pressure, it feels plugged causing difficulty with her hearing.    Has taken ibuprofen and tylenol but pain only slightly improves. Has been constant for 6 days now.     Patient stated she has increased anxiety given her new cancer diagnosis.     Given length of symptoms and recent cancer diagnosis RN recommended she be seen in a UC or ED today to have symptoms evaluated.     Patient understands. She will go be seen. No other questions or concerns.     Heidi Jason RN on 3/30/2023 at 3:17 PM    "

## 2023-03-30 NOTE — PROGRESS NOTES
URGENT CARE  Assessment & Plan   Assessment:   Zainab Bolton is a 42 year old female who's clinical presentation today is consistent with:   1. Tension headache  - predniSONE (DELTASONE) 20 MG tablet; Take 2 tablets  - Primary Care Referral; Future    No alarm signs or symptoms present   Differential Diagnoses for this patient's CC include    migraine, cluster headache, tension headache, sinus headache,    temporal arteritis,     ischemic event, brain mass/tumor, lesion,    pseudotumor, meningitis, glaucoma,   Neck trauma, hypertension, dehydration,   infectious etiology, stress, anxiety, somatization     Plan:  Given patient has already tried home remdies and conservative measures such as Tylenol/NSAIDs, caffeine, increasing hydration, will treat patient's migraine  with abortive therapy such as: a steroid medication.   We discussed if symptoms do not improve after starting today's treatment (or if symptoms worsen) to follow up in 2-3 days.  Additionally encouraged patient to follow up with their PCP regarding their headache to discuss headache prophylaxis and prescriptions for as needed abortive therapies to have on hand   discussed with patient other nonpharmacological treatment options such as: lifestyle changes, acupuncture, and identifying and avoiding triggers   Educated patient to monitor for progression to status migrainosus or the debilitating features of migraine attacks, and to present to ED if headache lasts >72 hours, educated patient to monitor for chest pain or difficulty breathing,  monitor for new numbness or weakness or changes to aura    Patient  is  agreeable to treatment plan and state they will follow-up if symptoms do not improve and/or if symptoms worsen (see patient's AVS 'monitor for' section for specific patient instructions given and discussed regarding what to watch for and when to follow up)    Medications ordered are listed above, please see AVS for patient's specific and  "personalized discharge instructions given     THOMAS Johnson [facility] [place]      ______________________________________________________________________        Subjective  Subjective     HPI: Zainab Bolton \"Avis\"} is a 42 year old  female who presents today for evaluation the following concerns:   Patient presents today endorsing a headache which is located in the back  posterior aspect of their head . They describe the headache as: band-like extending  Around her head.   Onset was 6 days ago and the course has been constant pain .   She has no history of headaches but endorses she has recently been under a lot of stress and anxiety with her new breast cancer diagnosis   She denies any associated nausea, vomiting, photophobia, phonophobia, or auras  she denies any: visual field loss, blurry vision, changes in odor sensation, visual changes, numbness, and weakness.   Patient also denies any associated balance difficulty, weakness, recent head trauma, fever/chills, or other neurologic symptoms.}  Patient states they have tried at home: acetaminophen, aspirin, OTC NSAID's, and lying in a darkened room}.   Patient states there are no associated abnormal neurological symptoms such as TIA's, loss of balance, loss of vision or speech, numbness or weakness on review.    Patient denies this is the worst headache ever, of maximum severity at onset, recent head trauma, fevers, or stiff neck.    Patient denies there are any focal neurologic symptoms or signs or cognitive changes.  Patient's past neurological history: negative for stroke, MS, epilepsy, or brain tumor.       Allergies   Allergen Reactions     No Known Drug Allergies      Patient Active Problem List   Diagnosis     Hypercholesteremia     S/P LEEP of cervix     High risk human papillomavirus infection     Papanicolaou smear of cervix with low grade squamous intraepithelial lesion (LGSIL)     GEOVANY (generalized anxiety disorder) "       Review of Systems:  Pertinent review of systems as reflected in HPI, otherwise negative.     Objective  Objective    Physical Exam:  Vitals:    03/30/23 1548   BP: 117/77   Pulse: 83   Temp: 98.9  F (37.2  C)   TempSrc: Tympanic   SpO2: 97%   Weight: 62.6 kg (138 lb)      HEENT: PEERLA, extra ocular motor intact without} discomfort  Eyes tracking, convergence normal, normal head shape: normocephalic, scalp/head/face non-tender  palpation    No nausea, no dizziness noted   Neck:   Neck can flex, rotate without symptoms  no cervical masses, normal cervical spine, no tenderness, full range of motion without pain,   No tenderness over lower cervical spine and nuchal area,    No lymphadenopathy  MSK:   No numbness or tinging noted   Full strength, Full ROM   Neuro: complete exam:alert, oriented x4, speech normal in context and clarity, memory  intact grossly}   cranial nerves II-XII intact, motor strength: bilateral strength and sensation equal +5   no involuntary movements - no ataxia, no tremors,   sensation: intact to light touch,   cerebellar: finger-to-nose and heel-to-shin intact,   gait: normal,  reflexes: full and symmetric  Romberg negative;   Rapid alternating movements negative;   Finger to nose negative; Heel to shin negative;   Heel and toe walking negative;   Tandem walking negative.         I explained my diagnostic considerations and recommendations to the patient, who voiced understanding and agreement with the treatment plan.   All questions were answered.   We discussed potential side effects, risks and benefits of any prescribed or recommended therapies, as well as expectations for response to treatments.  Please see AVS for any patient instructions & handouts given.   Patient was advised to contact the Nurse Care Line, their Primary Care provider, Urgent Care, or the Emergency Department if there are new or worsening symptoms, or call 911 for  emergencies.        ______________________________________________________________________          Patient Instructions   Diagnosis: headache and ringing in ears   Today we did:  Exam   Concern for tumor or brain bleed - not suspicious     Plan:     Take steroids for headache     Follow up with pcp for further dive     Monitor for:     Not getting better after this treatment

## 2023-03-30 NOTE — PATIENT INSTRUCTIONS
Diagnosis: headache and ringing in ears   Today we did:  Exam   Concern for tumor or brain bleed - not suspicious     Plan:   Take steroids for headache   Follow up with pcp for further dive     Monitor for:   Not getting better after this treatment

## 2023-03-30 NOTE — TELEPHONE ENCOUNTER
Called patient with the results of her breast MRI.  LMOM for her with these results.  Invited calls with questions or concerns.

## 2023-03-31 ENCOUNTER — VIRTUAL VISIT (OUTPATIENT)
Dept: PSYCHOLOGY | Facility: CLINIC | Age: 43
End: 2023-03-31
Payer: COMMERCIAL

## 2023-03-31 DIAGNOSIS — F41.1 GENERALIZED ANXIETY DISORDER WITH PANIC ATTACKS: Primary | ICD-10-CM

## 2023-03-31 DIAGNOSIS — F41.0 GENERALIZED ANXIETY DISORDER WITH PANIC ATTACKS: Primary | ICD-10-CM

## 2023-03-31 PROCEDURE — 90791 PSYCH DIAGNOSTIC EVALUATION: CPT | Mod: VID
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
## 2023-04-03 ENCOUNTER — APPOINTMENT (OUTPATIENT)
Dept: CT IMAGING | Facility: CLINIC | Age: 43
End: 2023-04-03
Attending: NURSE PRACTITIONER
Payer: COMMERCIAL

## 2023-04-03 ENCOUNTER — HOSPITAL ENCOUNTER (EMERGENCY)
Facility: CLINIC | Age: 43
Discharge: HOME | End: 2023-04-03
Attending: NURSE PRACTITIONER | Admitting: NURSE PRACTITIONER
Payer: COMMERCIAL

## 2023-04-03 VITALS
OXYGEN SATURATION: 95 % | BODY MASS INDEX: 23.56 KG/M2 | RESPIRATION RATE: 22 BRPM | HEART RATE: 56 BPM | HEIGHT: 64 IN | DIASTOLIC BLOOD PRESSURE: 74 MMHG | WEIGHT: 138 LBS | SYSTOLIC BLOOD PRESSURE: 116 MMHG | TEMPERATURE: 99.3 F

## 2023-04-03 DIAGNOSIS — G44.229 CHRONIC TENSION-TYPE HEADACHE, NOT INTRACTABLE: ICD-10-CM

## 2023-04-03 PROCEDURE — 96374 THER/PROPH/DIAG INJ IV PUSH: CPT | Performed by: NURSE PRACTITIONER

## 2023-04-03 PROCEDURE — 96361 HYDRATE IV INFUSION ADD-ON: CPT | Performed by: NURSE PRACTITIONER

## 2023-04-03 PROCEDURE — 99285 EMERGENCY DEPT VISIT HI MDM: CPT | Mod: 25 | Performed by: NURSE PRACTITIONER

## 2023-04-03 PROCEDURE — 250N000011 HC RX IP 250 OP 636: Performed by: NURSE PRACTITIONER

## 2023-04-03 PROCEDURE — 70450 CT HEAD/BRAIN W/O DYE: CPT

## 2023-04-03 PROCEDURE — 99284 EMERGENCY DEPT VISIT MOD MDM: CPT | Performed by: NURSE PRACTITIONER

## 2023-04-03 PROCEDURE — 96375 TX/PRO/DX INJ NEW DRUG ADDON: CPT | Performed by: NURSE PRACTITIONER

## 2023-04-03 PROCEDURE — 250N000013 HC RX MED GY IP 250 OP 250 PS 637: Performed by: NURSE PRACTITIONER

## 2023-04-03 PROCEDURE — 258N000003 HC RX IP 258 OP 636: Performed by: NURSE PRACTITIONER

## 2023-04-03 RX ORDER — ACETAMINOPHEN 500 MG
1000 TABLET ORAL ONCE
Status: COMPLETED | OUTPATIENT
Start: 2023-04-03 | End: 2023-04-03

## 2023-04-03 RX ORDER — DIPHENHYDRAMINE HYDROCHLORIDE 50 MG/ML
25 INJECTION INTRAMUSCULAR; INTRAVENOUS ONCE
Status: COMPLETED | OUTPATIENT
Start: 2023-04-03 | End: 2023-04-03

## 2023-04-03 RX ORDER — KETOROLAC TROMETHAMINE 15 MG/ML
15 INJECTION, SOLUTION INTRAMUSCULAR; INTRAVENOUS ONCE
Status: COMPLETED | OUTPATIENT
Start: 2023-04-03 | End: 2023-04-03

## 2023-04-03 RX ADMIN — SODIUM CHLORIDE 1000 ML: 9 INJECTION, SOLUTION INTRAVENOUS at 18:44

## 2023-04-03 RX ADMIN — ACETAMINOPHEN 1000 MG: 500 TABLET ORAL at 18:48

## 2023-04-03 RX ADMIN — KETOROLAC TROMETHAMINE 15 MG: 15 INJECTION, SOLUTION INTRAMUSCULAR; INTRAVENOUS at 18:48

## 2023-04-03 RX ADMIN — PROCHLORPERAZINE EDISYLATE 5 MG: 5 INJECTION, SOLUTION INTRAMUSCULAR; INTRAVENOUS at 18:49

## 2023-04-03 RX ADMIN — DIPHENHYDRAMINE HYDROCHLORIDE 25 MG: 50 INJECTION, SOLUTION INTRAMUSCULAR; INTRAVENOUS at 18:51

## 2023-04-03 ASSESSMENT — ACTIVITIES OF DAILY LIVING (ADL): ADLS_ACUITY_SCORE: 35

## 2023-04-03 NOTE — ED TRIAGE NOTES
Patient reports ~ 9 days of posterior headache. Left sided arm and leg tingling intermittent. Similar episode with work up 4 years ago. Also reports left ear pressure and ringing in ears. Recent breast CA diagnosis 2 weeks ago. Anxiety increased over last 2 months.    Triage Assessment     Row Name 04/03/23 3503       Triage Assessment (Adult)    Airway WDL WDL       Respiratory WDL    Respiratory WDL WDL       Skin Circulation/Temperature WDL    Skin Circulation/Temperature WDL WDL       Cardiac WDL    Cardiac WDL WDL       Peripheral/Neurovascular WDL    Peripheral Neurovascular WDL WDL       Cognitive/Neuro/Behavioral WDL    Cognitive/Neuro/Behavioral WDL WDL

## 2023-04-03 NOTE — ED PROVIDER NOTES
"  History     Chief Complaint   Patient presents with     Headache     Tingling     HPI  Zainab Bolton is a 42 year old female with past medical history of anxiety, depression, intraductal carcinoma of the left breast diagnosed 2 weeks ago who presents emergency department with a 9-day history of headache.  Patient reports that the left side of her head \"just kind of aches\".  Patient reports the headache is predominantly present there and then moves around her head.  Patient rates her current pain level 3 out of 10.  Patient denies any vomiting with this, mental confusion, speech changes, gait changes, left or right-sided facial droop, trauma.  Patient has treated with prednisone without improvement, Tylenol without improvement, ibuprofen without improvement.  Patient has also been to urgent care, telehealth visit, and primary care for the same concerns within the past 9 days.  Patient does state that she did increase her sertraline to 100 mg daily as well.    Allergies:  Allergies   Allergen Reactions     No Known Drug Allergies        Problem List:    Patient Active Problem List    Diagnosis Date Noted     GEOVANY (generalized anxiety disorder) 02/03/2023     Priority: Medium     High risk human papillomavirus infection 06/22/2016     Priority: Medium     Papanicolaou smear of cervix with low grade squamous intraepithelial lesion (LGSIL) 06/22/2016     Priority: Medium     S/P LEEP of cervix 07/16/2015     Priority: Medium     5/5/15 LSIL, + HR HPV 16 and other. Plan: colposcopy  6/19/15 Colpo LÁZARO 2. Plan LEEP  7/16/15 LEEP LÁZARO 1/2. Unsure of margin. Plan: repeat pap in 6 months.   5/11/16 LSIL, +HR HPV other. Plan: Colposcopy.   6/22/16 [place] bx benign. Plan: cotest in 6 mo  6/27/17 NIL pap, neg HPV. Plan: cotest in 1 year.  1/25/19 Lost to follow-up for pap tracking  3/7/19 NIL pap, neg HR HPV. Plan: Cotest in 3 years  2/3/23 NIL pap, neg HPV. Plan: cotest every 3 years         Hypercholesteremia 08/16/2010     " Priority: Medium        Past Medical History:    Past Medical History:   Diagnosis Date     Anxiety 2010     Anxiety state, unspecified      Essential hypertension 3/3/2023     Hypercholesteremia 2010     Obesity 2010     Other acne      Papanicolaou smear of cervix with low grade squamous intraepithelial lesion (LGSIL) (aka LSIL) 2015     TOBACCO ABUSE-CONTINUOUS        Past Surgical History:    Past Surgical History:   Procedure Laterality Date     BIOPSY       CONIZATION LEEP  2015    LÁZARO 2     CONIZATION LEEP N/A 2015    Procedure: CONIZATION LEEP;  Surgeon: Omayra Cunningham DO;  Location: [facility] OR     EYE SURGERY  2016     ZZC LIGATE FALLOPIAN TUBE  10/03/2003       Family History:    Family History   Problem Relation Age of Onset     Cancer Father         liver     Other Cancer Father         Stomach and liver cancer diagnosis     Arthritis Maternal Grandmother      Hypertension Maternal Grandmother      Diabetes Maternal Grandmother      Thyroid Disease Maternal Grandmother      Cancer Maternal Grandfather         leukemia     Heart Disease Maternal Aunt         MI approx age 46       Social History:  Marital Status:   [4]  Social History     Tobacco Use     Smoking status: Former     Packs/day: 1.00     Years: 10.00     Pack years: 10.00     Types: Cigarettes     Quit date: 3/28/2023     Years since quittin.0     Smokeless tobacco: Never     Tobacco comments:     since age 12, but stopped with each baby   Substance Use Topics     Alcohol use: Yes     Comment: 1-2  every 2-3 months if any     Drug use: No        Medications:    busPIRone (BUSPAR) 10 MG tablet  hydrOXYzine (ATARAX) 10 MG tablet  predniSONE (DELTASONE) 20 MG tablet  sertraline (ZOLOFT) 100 MG tablet          Review of Systems  As mentioned above in the history present illness. All other systems were reviewed and are negative.    Physical Exam   BP: (!) 139/92  Pulse: 83  Temp: 99.3  F  "(37.4  C)  Resp: 22  Height: 162.6 cm (5' 4\")  Weight: 62.6 kg (138 lb)  SpO2: 98 %      Physical Exam  Vitals and nursing note reviewed.   Constitutional:       General: She is not in acute distress.     Appearance: Normal appearance. She is well-developed. She is not ill-appearing, toxic-appearing or diaphoretic.   HENT:      Head: Normocephalic and atraumatic. No raccoon eyes, Bassett's sign or contusion.      Right Ear: Hearing, tympanic membrane, ear canal and external ear normal. No drainage.      Left Ear: Hearing, tympanic membrane, ear canal and external ear normal. No drainage.      Nose: Nose normal. No rhinorrhea.      Mouth/Throat:      Mouth: Mucous membranes are moist.      Pharynx: Uvula midline. No oropharyngeal exudate, posterior oropharyngeal erythema or uvula swelling.      Tonsils: No tonsillar exudate. 0 on the right. 0 on the left.   Eyes:      General:         Right eye: No discharge.         Left eye: No discharge.      Extraocular Movements: Extraocular movements intact.      Right eye: Normal extraocular motion and no nystagmus.      Left eye: Normal extraocular motion and no nystagmus.      Conjunctiva/sclera: Conjunctivae normal.      Right eye: Right conjunctiva is not injected. No exudate or hemorrhage.     Left eye: Left conjunctiva is not injected. No exudate or hemorrhage.     Pupils: Pupils are equal, round, and reactive to light.   Neck:      Trachea: No tracheal deviation.   Cardiovascular:      Rate and Rhythm: Normal rate and regular rhythm.      Heart sounds: Normal heart sounds. No murmur heard.     No friction rub. No gallop.   Pulmonary:      Effort: Pulmonary effort is normal. No respiratory distress.      Breath sounds: Normal breath sounds. No stridor. No wheezing or rales.   Musculoskeletal:         General: Normal range of motion.      Cervical back: Normal range of motion and neck supple.   Lymphadenopathy:      Cervical: No cervical adenopathy.   Skin:     General: " Skin is warm.      Capillary Refill: Capillary refill takes less than 2 seconds.      Coloration: Skin is not pale.      Findings: No erythema or rash.   Neurological:      General: No focal deficit present.      Mental Status: She is alert and oriented to person, place, and time.      GCS: GCS eye subscore is 4. GCS verbal subscore is 5. GCS motor subscore is 6.      Cranial Nerves: No cranial nerve deficit, dysarthria or facial asymmetry.      Sensory: No sensory deficit.      Motor: No abnormal muscle tone.      Coordination: Coordination normal.      Gait: Gait is intact. Gait normal.      Deep Tendon Reflexes: Reflexes are normal and symmetric. Reflexes normal.   Psychiatric:         Attention and Perception: Attention normal.         Mood and Affect: Mood is anxious.         Behavior: Behavior is cooperative.         Thought Content: Thought content normal. Thought content does not include homicidal or suicidal ideation.         ED Course              ED Course as of 04/03/23 2010 Mon Apr 03, 2023 1958 Patient reports no change in her headache.  Patient states she feels much better knowing there is no acute brain bleed.  Discussed with patient that if she has persistent headaches I would recommend neurology follow-up.  I also recommend headache delivery.  I recommend daily exercise.  Patient verbalized understanding denies any questions at this point in time.  Did discuss that she could consider initiating the BuSpar that was prescribed by her primary care provider.  Patient acknowledges this recommendation.     Procedures    Results for orders placed or performed during the hospital encounter of 04/03/23 (from the past 24 hour(s))   CT Head w/o Contrast    Narrative    EXAM: CT HEAD W/O CONTRAST  LOCATION: [facility]  DATE/TIME: 4/3/2023 7:19 PM    INDICATION: Nine day history of headache, persistent, no trauma.  COMPARISON: Brain MRI dated 03/21/2019.  TECHNIQUE: Routine CT  Head without IV contrast. Multiplanar reformats. Dose reduction techniques were used.    FINDINGS:  INTRACRANIAL CONTENTS: No intracranial hemorrhage, extraaxial collection, or mass effect.  No CT evidence of acute infarct. Normal parenchymal attenuation. Normal ventricles and sulci.     VISUALIZED ORBITS/SINUSES/MASTOIDS: No intraorbital abnormality. No paranasal sinus mucosal disease. No middle ear or mastoid effusion.    BONES/SOFT TISSUES: No acute abnormality.      Impression    IMPRESSION: No CT findings of acute intracranial process.       Medications   0.9% sodium chloride BOLUS (1,000 mLs Intravenous $New Bag 4/3/23 1844)   prochlorperazine (COMPAZINE) injection 5 mg (5 mg Intravenous $Given 4/3/23 1849)   acetaminophen (TYLENOL) tablet 1,000 mg (1,000 mg Oral $Given 4/3/23 1848)   ketorolac (TORADOL) injection 15 mg (15 mg Intravenous $Given 4/3/23 1848)   diphenhydrAMINE (BENADRYL) injection 25 mg (25 mg Intravenous $Given 4/3/23 1851)       Assessments & Plan (with Medical Decision Making)     I have reviewed the nursing notes.    I have reviewed the findings, diagnosis, plan and need for follow up with the patient.  Zainab Bolton is a 42 year old female with past medical history of anxiety, depression, intraductal carcinoma of the left breast diagnosed 2 weeks ago who presents emergency department with a 9-day history of headache.  Patient describing the headache and left side of her head and moving around.  Patient has no formal diagnosis of migraines.  Patient admits to stress currently including recent diagnosis of breast cancer and also reports she is post to start a new job tomorrow.    Review of oncology visits, telehealth visits with primary care and recommendations for anxiety and discussions regarding breast cancer diagnoses.      On exam blood pressure 139/92, temp 99.3, pulse 83, respiratory rate is 22, O2 saturation 98%.  Neurologically patient is intact.  Patient does appear anxious  and is cooperative.  Work-up to include CT of the head and will treat with a liter of IV fluids, Benadryl, Compazine and Toradol and oral Tylenol and assess of responsiveness to medications.    Differential Diagnoses includes:              migraine, cluster headache, tension headache, sinus headache,               temporal arteritis,                      ischemic event, brain mass/tumor, lesion,               pseudotumor, meningitis, glaucoma,   Neck trauma, hypertension, dehydration,   infectious etiology, stress, anxiety, somatization         CT imaging of the head reveals no acute process of bleed nor mastoid or sinus etiology.  Patient reports headache is still present but reports it is not severe.  Discussed a headache diary.  Discussed daily exercise and follow-up with neurology.  Recommended consideration of BuSpar twice daily.  Offered Ativan and this was declined.  Patient reports she is sleeping without difficulty.  Patient denies any further questions or concerns.  Patient discharged in stable condition.    New Prescriptions    No medications on file       Final diagnoses:   Chronic tension-type headache, not intractable       4/3/2023   [facility] EMERGENCY DEPT     Zainab Grant APRN CNP  04/03/23 2010

## 2023-04-04 ENCOUNTER — MYC REFILL (OUTPATIENT)
Dept: FAMILY MEDICINE | Facility: CLINIC | Age: 43
End: 2023-04-04
Payer: COMMERCIAL

## 2023-04-04 DIAGNOSIS — F41.8 DEPRESSION WITH ANXIETY: ICD-10-CM

## 2023-04-04 DIAGNOSIS — F41.1 GAD (GENERALIZED ANXIETY DISORDER): ICD-10-CM

## 2023-04-04 NOTE — DISCHARGE INSTRUCTIONS
Recommend making sure you are getting 6 glasses of water and daily.  I recommend daily exercise 15 minutes daily.  Consider taking the BuSpar twice daily that was recommended by your primary care provider  Follow-up with neurology if no improvement in symptoms.  Start a headache diary and continue this as well.

## 2023-04-04 NOTE — ED NOTES
"Pt back from imaging, reports she is \"feeling pretty good\", informed we were just waiting on CT results to which she replied \"good, if everythings good then I can go home\". Pt denies any needs at this time.  "

## 2023-04-05 LAB
PATH REPORT.ADDENDUM SPEC: ABNORMAL
PATH REPORT.COMMENTS IMP SPEC: ABNORMAL
PATH REPORT.COMMENTS IMP SPEC: YES
PATH REPORT.FINAL DX SPEC: ABNORMAL
PATH REPORT.GROSS SPEC: ABNORMAL
PATH REPORT.MICROSCOPIC SPEC OTHER STN: ABNORMAL
PATH REPORT.RELEVANT HX SPEC: ABNORMAL
PHOTO IMAGE: ABNORMAL

## 2023-04-05 NOTE — TELEPHONE ENCOUNTER
Routing refill request to provider for review/approval because:  PCP to determine    Heidi Jason RN on 4/5/2023 at 9:50 AM

## 2023-04-06 DIAGNOSIS — F41.1 GAD (GENERALIZED ANXIETY DISORDER): ICD-10-CM

## 2023-04-06 DIAGNOSIS — C50.412 MALIGNANT NEOPLASM OF UPPER-OUTER QUADRANT OF LEFT BREAST IN FEMALE, ESTROGEN RECEPTOR POSITIVE (H): Primary | ICD-10-CM

## 2023-04-06 DIAGNOSIS — F41.8 DEPRESSION WITH ANXIETY: ICD-10-CM

## 2023-04-06 DIAGNOSIS — Z17.0 MALIGNANT NEOPLASM OF UPPER-OUTER QUADRANT OF LEFT BREAST IN FEMALE, ESTROGEN RECEPTOR POSITIVE (H): Primary | ICD-10-CM

## 2023-04-06 RX ORDER — SERTRALINE HYDROCHLORIDE 100 MG/1
50 TABLET, FILM COATED ORAL DAILY
OUTPATIENT
Start: 2023-04-06

## 2023-04-06 RX ORDER — TAMOXIFEN CITRATE 20 MG/1
20 TABLET ORAL DAILY
Qty: 30 TABLET | Refills: 1 | Status: SHIPPED | OUTPATIENT
Start: 2023-04-06 | End: 2023-06-06

## 2023-04-10 ENCOUNTER — VIRTUAL VISIT (OUTPATIENT)
Dept: PSYCHOLOGY | Facility: CLINIC | Age: 43
End: 2023-04-10
Payer: COMMERCIAL

## 2023-04-10 DIAGNOSIS — Z91.199 NO-SHOW FOR APPOINTMENT: Primary | ICD-10-CM

## 2023-04-12 ENCOUNTER — VIRTUAL VISIT (OUTPATIENT)
Dept: ONCOLOGY | Facility: CLINIC | Age: 43
End: 2023-04-12
Attending: SPECIALIST
Payer: COMMERCIAL

## 2023-04-12 DIAGNOSIS — Z84.81 FAMILY HISTORY OF CARRIER OF GENETIC DISEASE: ICD-10-CM

## 2023-04-12 DIAGNOSIS — C50.412 MALIGNANT NEOPLASM OF UPPER-OUTER QUADRANT OF LEFT BREAST IN FEMALE, ESTROGEN RECEPTOR POSITIVE (H): Primary | ICD-10-CM

## 2023-04-12 DIAGNOSIS — Z80.0 FAMILY HISTORY OF MALIGNANT NEOPLASM OF STOMACH: ICD-10-CM

## 2023-04-12 DIAGNOSIS — Z17.0 MALIGNANT NEOPLASM OF UPPER-OUTER QUADRANT OF LEFT BREAST IN FEMALE, ESTROGEN RECEPTOR POSITIVE (H): Primary | ICD-10-CM

## 2023-04-12 PROCEDURE — 96040 HC GENETIC COUNSELING, EACH 30 MINUTES: CPT | Mod: 95 | Performed by: GENETIC COUNSELOR, MS

## 2023-04-12 NOTE — NURSING NOTE
Is the patient currently in the state of [place]? YES    Visit mode:TELEPHONE    If the visit is dropped, the patient can be reconnected by: TELEPHONE VISIT: Phone number: [phone]    Will anyone else be joining the visit? NO      How would you like to obtain your AVS? MyChart    Are changes needed to the allergy or medication list? NO    Reason for visit: RAJIV Brito VF

## 2023-04-12 NOTE — PROGRESS NOTES
2023    Referring Provider: Kylee Villasenor MD    Presenting Information:   I met with Zainab for her telephone genetic counseling visit, through the Cancer Risk Management Program, to discuss her personal history of breast cancer and family history of stomach cancer. Today we reviewed this history, cancer screening recommendations, and available genetic testing options.    Personal History:  Zainab is a 42 year old year old female. She was diagnosed with invasive ductal carcinoma (IDC) in her left breast at age 42 (ER/NE positive, HER2 negative); treatment is to be determined. She currently has genetic testing pending for 11 genes through the Breast Actionable panel at the [facility]: LAURA, BARD1, BRCA1, BRCA2, CDH1, CHEK2, NF1, PALB2, PTEN, STK11, and TP53.    She had her first menstrual period at age 11, her first child at age 14, and is premenopausal.  Zainab has her ovaries, fallopian tubes and uterus in place, and she has had no ovarian cancer screening to date. She reports that she has not used hormone replacement therapy.      She has annual clinical breast exams and mammograms; her most recent mammogram in 2023 found an irregular, hypoechoic mass that was identified to be IDC. Zainab has not had a colonoscopy. She does not regularly do any other cancer screening at this time. Zainab reported previous tobacco use.    Family History: (Please see scanned pedigree for detailed family history information)    Zainab's father was diagnosed with stomach cancer at age 50. He was later diagnosed with liver cancer at age 55 and  at age 69.    A paternal cousin was diagnosed with brain cancer at age 25 and  at age 30.    A niece was reported to carrier a BRCA2 mutation, however, a copy of this report was not available for today's appointment.    There is no other reported family history of cancer on either side of her family.    Her maternal  ethnicity is English, Malian, Filipino, and Scandinavian. Her paternal ethnicity is [place], Kos[place], Malian, and English. There is no known Ashkenazi Anabaptist ancestry on either side of her family. There is no reported consanguinity.    Discussion:    Zainab's personal and family history of breast and stomach is suggestive of a hereditary cancer syndrome.    We reviewed the features of sporadic, familial, and hereditary cancers. We discussed that mutations in either BRCA1 or BRCA2 could be possible hereditary explanations for her family history of cancer. Mutations in the BRCA1 or BRCA2 gene are known to cause Hereditary Breast and Ovarian Cancer Syndrome (HBOC). HBOC typically presents with multiple family members diagnosed with breast cancer before age 50 and/or ovarian cancer. Other cancer risks associated with HBOC include male breast cancer, prostate cancer, pancreatic cancer, and melanoma. Of note, her niece is reported to have a BRCA2 mutation.    We discussed the natural history and genetics of hereditary cancer. A detailed handout regarding hereditary cancer, along with the other information we discussed, will be mailed to Zainab at the end of our appointment today and can be found in the after visit summary. Topics included: inheritance pattern, cancer risks, cancer screening recommendations, and also risks, benefits and limitations of testing.    Based on her personal and family history, Zainab meets current National Comprehensive Cancer Network (NCCN) criteria for genetic testing of BRCA1/2 along with other high-penetrance breast cancer susceptibility genes (I.e. CDH1, PALB2, PTEN, and TP53).    We discussed that there are additional genes that could cause increased risk for breast cancer. As many of these genes present with overlapping features in a family and accurate cancer risk cannot always be established based upon the pedigree analysis alone, it would be reasonable for Zainab to consider  panel genetic testing to analyze multiple genes at once.    Genetic testing is available for the breast actionable panel as part of the patient's core panel. This will then be automatically reflexed to the Comprehensive 40 Cancer panel.   Genetic testing is available for 40 genes associated with hereditary cancer: Comprehensive 40 (APC, LAURA, AXIN2, BAP1, BARD1, BMPR1A, BRCA1, BRCA2, BRIP1, CDH1, CDK4, CDKN2A, CHEK2, DICER1, EPCAM, GREM1, HOXB13, MITF, MLH1, MRE11A, MSH2, MSH3, MSH6, MUTYH, NBN, NF1, NTHL1, PALB2, PMS2, POLD1, POLE, POT1, PTEN, RAD50, RAD51C, RAD51D, SMAD4, SMARCA4, STK11, and TP53).  We discussed that many of the genes in the Comprehensive 40 panel are associated with specific hereditary cancer syndromes and published management guidelines: Hereditary Breast and Ovarian Cancer syndrome (BRCA1, BRCA2), Duffy syndrome (MLH1, MSH2, MSH6, PMS2, EPCAM), Familial Adenomatous Polyposis (APC), Hereditary Diffuse Gastric Cancer (CDH1), Familial Atypical Multiple Mole Melanoma syndrome (CDK4, CDKN2A), Juvenile Polyposis syndrome (BMPR1A, SMAD4), Cowden syndrome (PTEN), Li Fraumeni syndrome (TP53), Peutz-Jeghers syndrome (STK11), MUTYH Associated Polyposis (MUTYH), and Neurofibromatosis type 1 (NF1).   The LAURA, AXIN2, BRIP1, CHEK2, GREM1, MSH3, NBN, NTHL1, PALB2, POLD1, POLE, RAD51C, and RAD51D genes are associated with increased cancer risk and have published management guidelines for certain cancers.    The remaining genes (BAP1, BARD1, DICER1, HOXB13, MITF, MRE11A, POT1, RAD50, and SMARCA4) are associated with increased cancer risk and may allow us to make medical recommendations when mutations are identified.      A blood sample has already been drawn for her genetic testing and is currently pending.    Verbal consent was given over the phone and written on the consent form. Turnaround time for the expanded testing is approximately 4 weeks.    Medical Management: For Zainab, we reviewed that the  information from genetic testing may determine:    surgery to treat Zainab's active cancer diagnosis (i.e. lumpectomy versus bilateral mastectomy),    additional cancer screening for which Zainab may qualify (i.e. mammogram and breast MRI, more frequent colonoscopies, more frequent dermatologic exams, etc.),    options for risk reducing surgeries Zainab could consider (i.e. bilateral mastectomy, surgery to remove her ovaries and/or uterus, etc.),      and targeted chemotherapies for Zainab's active cancer, or if she were to develop certain cancers in the future (i.e. immunotherapy for individuals with Duffy syndrome, PARP inhibitors, etc.).     These recommendations and possible targeted chemotherapies will be discussed in detail once genetic testing is completed.     Plan:  1) Today Zainab elected to proceed with Breast Actionable panel with automatic reflex to the Comprehensive 40 Cancer panel.  2) A copy of the consent form and the after visit summary will be mailed to Zainab.  3) This information should be available in approximately 4 weeks, once the lab receives the sample.  4) I will call Zainab with the results once they become available.    Time spent on the phone: 52 minutes    Froilan Cuevas MS, [facility]  Licensed, Certified Genetic Counselor      Virtual Visit Details    Type of service:  Telephone Visit

## 2023-04-12 NOTE — LETTER
2023    [facility]  [address] [facility] [address]      Dear Zainab,    It was a pleasure speaking with you on the phone on 2023. Here is a copy of the progress note from our discussion. If you have any additional questions, please feel free to call.    Referring Provider: Kylee Villasenor MD    Presenting Information:   I met with Zainab for her telephone genetic counseling visit, through the Cancer Risk Management Program, to discuss her personal history of breast cancer and family history of stomach cancer. Today we reviewed this history, cancer screening recommendations, and available genetic testing options.    Personal History:  Zainab is a 42 year old year old female. 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 She currently has genetic testing pending for 11 genes through the Breast Actionable panel at the [facility]: LAURA, BARD1, BRCA1, BRCA2, CDH1, CHEK2, NF1, PALB2, PTEN, STK11, and TP53.    She had her first menstrual period at age 11, her first child at age 14, and is premenopausal.  Zainab has her ovaries, fallopian tubes and uterus in place, and she has had no ovarian cancer screening to date. She reports that she has not used hormone replacement therapy.      She has annual clinical breast exams and mammograms; her most recent mammogram in 2023 found an irregular, hypoechoic mass that was identified to be IDC. Zainab has not had a colonoscopy. She does not regularly do any other cancer screening at this time. Zainab reported previous tobacco use.    Family History: (Please see scanned pedigree for detailed family history information)    Zainab's father was diagnosed with stomach cancer at age 50. He was later diagnosed with liver cancer at age 55 and  at age 69.    A paternal cousin was diagnosed with  brain cancer at age 25 and  at age 30.    A niece was reported to carrier a BRCA2 mutation, however, a copy of this report was not available for today's appointment.    There is no other reported family history of cancer on either side of her family.    Her maternal ethnicity is English, [place], [place], and Scandinavian. Her paternal ethnicity is Luxembourgish, Korean, [place], and English. There is no known Ashkenazi Congregational ancestry on either side of her family. There is no reported consanguinity.    Discussion:    Zainab's personal and family history of breast and stomach is suggestive of a hereditary cancer syndrome.    We reviewed the features of sporadic, familial, and hereditary cancers. We discussed that mutations in either BRCA1 or BRCA2 could be possible hereditary explanations for her family history of cancer. Mutations in the BRCA1 or BRCA2 gene are known to cause Hereditary Breast and Ovarian Cancer Syndrome (HBOC). HBOC typically presents with multiple family members diagnosed with breast cancer before age 50 and/or ovarian cancer. Other cancer risks associated with HBOC include male breast cancer, prostate cancer, pancreatic cancer, and melanoma. Of note, her niece is reported to have a BRCA2 mutation.    We discussed the natural history and genetics of hereditary cancer. A detailed handout regarding hereditary cancer, along with the other information we discussed, will be mailed to Zainab at the end of our appointment today and can be found in the after visit summary. Topics included: inheritance pattern, cancer risks, cancer screening recommendations, and also risks, benefits and limitations of testing.    Based on her personal and family history, Zainab meets current National Comprehensive Cancer Network (NCCN) criteria for genetic testing of BRCA1/2 along with other high-penetrance breast cancer susceptibility genes (I.e. CDH1, PALB2, PTEN, and TP53).    We discussed that there are additional  genes that could cause increased risk for breast cancer. As many of these genes present with overlapping features in a family and accurate cancer risk cannot always be established based upon the pedigree analysis alone, it would be reasonable for Zainab to consider panel genetic testing to analyze multiple genes at once.    Genetic testing is available for the breast actionable panel as part of the patient's core panel. This will then be automatically reflexed to the Comprehensive 40 Cancer panel.     Genetic testing is available for 40 genes associated with hereditary cancer: Comprehensive 40 (APC, ALURA, AXIN2, BAP1, BARD1, BMPR1A, BRCA1, BRCA2, BRIP1, CDH1, CDK4, CDKN2A, CHEK2, DICER1, EPCAM, GREM1, HOXB13, MITF, MLH1, MRE11A, MSH2, MSH3, MSH6, MUTYH, NBN, NF1, NTHL1, PALB2, PMS2, POLD1, POLE, POT1, PTEN, RAD50, RAD51C, RAD51D, SMAD4, SMARCA4, STK11, and TP53).    We discussed that many of the genes in the Comprehensive 40 panel are associated with specific hereditary cancer syndromes and published management guidelines: Hereditary Breast and Ovarian Cancer syndrome (BRCA1, BRCA2), Duffy syndrome (MLH1, MSH2, MSH6, PMS2, EPCAM), Familial Adenomatous Polyposis (APC), Hereditary Diffuse Gastric Cancer (CDH1), Familial Atypical Multiple Mole Melanoma syndrome (CDK4, CDKN2A), Juvenile Polyposis syndrome (BMPR1A, SMAD4), Cowden syndrome (PTEN), Li Fraumeni syndrome (TP53), Peutz-Jeghers syndrome (STK11), MUTYH Associated Polyposis (MUTYH), and Neurofibromatosis type 1 (NF1).     The LAURA, AXIN2, BRIP1, CHEK2, GREM1, MSH3, NBN, NTHL1, PALB2, POLD1, POLE, RAD51C, and RAD51D genes are associated with increased cancer risk and have published management guidelines for certain cancers.      The remaining genes (BAP1, BARD1, DICER1, HOXB13, MITF, MRE11A, POT1, RAD50, and SMARCA4) are associated with increased cancer risk and may allow us to make medical recommendations when mutations are identified.      A blood sample has  already been drawn for her genetic testing and is currently pending.    Verbal consent was given over the phone and written on the consent form. Turnaround time for the expanded testing is approximately 4 weeks.    Medical Management: For Zainab, we reviewed that the information from genetic testing may determine:    surgery to treat Zainab's active cancer diagnosis (i.e. lumpectomy versus bilateral mastectomy),    additional cancer screening for which Zainab may qualify (i.e. mammogram and breast MRI, more frequent colonoscopies, more frequent dermatologic exams, etc.),    options for risk reducing surgeries Zainab could consider (i.e. bilateral mastectomy, surgery to remove her ovaries and/or uterus, etc.),      and targeted chemotherapies for Zainab's active cancer, or if she were to develop certain cancers in the future (i.e. immunotherapy for individuals with Duffy syndrome, PARP inhibitors, etc.).     These recommendations and possible targeted chemotherapies will be discussed in detail once genetic testing is completed.     Plan:  1) Today Zainab elected to proceed with Breast Actionable panel with automatic reflex to the Comprehensive 40 Cancer panel.  2) A copy of the consent form and the after visit summary will be mailed to Zainab.  3) This information should be available in approximately 4 weeks, once the lab receives the sample.  4) I will call Zainab with the results once they become available.    Time spent on the phone: 52 minutes    Froilan Cuevas MS, [facility] [place]  Licensed, Certified Genetic Counselor

## 2023-04-12 NOTE — Clinical Note
4/12/2023         RE: Zainab Bolton  [address] [address] [facility] [address]        Dear Colleague,    Thank you for referring your patient, Zainab Bolton, to the [facility]. Please see a copy of my visit note below.    4/12/2023    Referring Provider: ***    Presenting Information:   I met with Zainab for her video genetic counseling visit, through the Cancer Risk Management Program, to discuss her personal *** and family history of *** cancer. Today we reviewed this history, cancer screening recommendations, and available genetic testing options.    Personal History:  Zainab is a 42 year old year old female. She was diagnosed with ***; treatment included ***  / does not have any personal history of cancer.    ***She had her first menstrual period at age ***, her first child at age ***, and is ***.  ***Zainab has her ovaries, fallopian tubes and uterus in place, and she has had *** ovarian cancer screening to date. She reports that she *** hormone replacement therapy.      She has *** clinical breast exams and mammograms; her most recent mammogram in *** was ***. ***Zainab began having colonoscopies at the age of ***/Zainab has not had a colonoscopy. Her most recent colonoscopy in *** was *** and follow-up was recommended in ***. ***She does not regularly do any other cancer screening at this time. Zainab reported *** tobacco use and *** alcohol use.    Family History: (Please see scanned pedigree for detailed family history information)    ***    ***    Her maternal ethnicity is ***. Her paternal ethnicity is ***. There is no known Ashkenazi Latter day ancestry on either side of her family. There is no reported consanguinity.    Discussion:    Zainab's personal and family history of *** is suggestive of a hereditary cancer syndrome.    We reviewed the features of sporadic, familial, and hereditary cancers. ***    We discussed the natural history and genetics of  hereditary cancer. A detailed handout regarding hereditary cancer, along with the other information we discussed, will be mailed to Zainab at the end of our appointment today and can be found in the after visit summary. Topics included: inheritance pattern, cancer risks, cancer screening recommendations, and also risks, benefits and limitations of testing.    Based on her personal and family history, Zainab meets current National Comprehensive Cancer Network (NCCN) criteria for genetic testing of ***.    We discussed that there are additional genes that could cause increased risk for *** cancer. As many of these genes present with overlapping features in a family and accurate cancer risk cannot always be established based upon the pedigree analysis alone, it would be reasonable for Zainab to consider panel genetic testing to analyze multiple genes at once.    ***    Zainab stated that she would prefer to submit a saliva kit for her genetic testing. ***Sonal will send a kit directly to her home with directions on how to collect a saliva sample. We discussed that there is a small chance for sample failure due to contamination of the sample. To help minimize this, she should follow the directions that are sent with the kit. Zainab verbalized understanding of this. Once the sample is collected, she will send it to ***[facility] using the return envelope and prepaid shipping label.     Verbal consent was given over video*** and written on the consent form. Turnaround time is approximately 4 weeks once the lab receives the sample.    Medical Management: For Zainab, we reviewed that the information from genetic testing may determine:    ***surgery to treat Zainab's active cancer diagnosis (i.e. lumpectomy versus bilateral mastectomy, partial versus total colectomy, etc.***),    additional cancer screening for which Zainab may qualify (i.e. mammogram and breast MRI, more frequent colonoscopies, more frequent  dermatologic exams, etc.***),    options for risk reducing surgeries Zainab could consider (i.e. bilateral mastectomy, surgery to remove her ovaries and/or uterus, etc.***),      and targeted chemotherapies for Zainab's *** active cancer, or ***if she were to develop certain cancers in the future (i.e. immunotherapy for individuals with Duffy syndrome, PARP inhibitors, etc.***).     These recommendations and possible targeted chemotherapies will be discussed in detail once genetic testing is completed.     Plan:  1) Today Zainab elected to proceed with ***.  2) A copy of the consent form and the after visit summary will be mailed to Zainab.  3) This information should be available in approximately 4 weeks, once the lab receives the sample.  4) I will call Zainab with the results once they become available.    Time spent on video: 52 minutes    Froilan Cuevas MS, [facility] [place]  Licensed, Certified Genetic Counselor    ***      Virtual Visit Details    Type of service:  Telephone Visit   Phone call duration: *** minutes       Again, thank you for allowing me to participate in the care of your patient.        Sincerely,        Froilan Cuevas GC

## 2023-04-17 ENCOUNTER — LAB (OUTPATIENT)
Dept: LAB | Facility: CLINIC | Age: 43
End: 2023-04-17
Payer: COMMERCIAL

## 2023-04-17 DIAGNOSIS — Z17.0 MALIGNANT NEOPLASM OF UPPER-OUTER QUADRANT OF LEFT BREAST IN FEMALE, ESTROGEN RECEPTOR POSITIVE (H): Primary | ICD-10-CM

## 2023-04-17 DIAGNOSIS — C50.412 MALIGNANT NEOPLASM OF UPPER-OUTER QUADRANT OF LEFT BREAST IN FEMALE, ESTROGEN RECEPTOR POSITIVE (H): Primary | ICD-10-CM

## 2023-04-17 LAB
INTERPRETATION: ABNORMAL
LAB PDF RESULT: ABNORMAL
MOLECULAR ADDENDUM: ABNORMAL
SIGNIFICANT RESULTS: ABNORMAL
SPECIMEN DESCRIPTION: ABNORMAL
TEST DETAILS, MDL: ABNORMAL

## 2023-04-17 PROCEDURE — 81162 BRCA1&2 GEN FULL SEQ DUP/DEL: CPT | Performed by: SPECIALIST

## 2023-04-18 PROCEDURE — 81162 BRCA1&2 GEN FULL SEQ DUP/DEL: CPT | Performed by: SPECIALIST

## 2023-04-18 PROCEDURE — G0452 MOLECULAR PATHOLOGY INTERPR: HCPCS | Mod: 26 | Performed by: PATHOLOGY

## 2023-04-20 ENCOUNTER — TELEPHONE (OUTPATIENT)
Dept: SURGERY | Facility: CLINIC | Age: 43
End: 2023-04-20
Payer: COMMERCIAL

## 2023-04-20 NOTE — TELEPHONE ENCOUNTER
----- Message from Susan L Lombardi, RN sent at 4/20/2023  9:08 AM CDT -----  Regarding: Dr. Aguilar appt  Ilan Clementedawn!!  I saw Keiry's note about being out until Monday.  Are either of you able to help me get a Dr. Aguilar appointment or should I tell the patient we will need to wait until Monday?    Let me know!!  :)    Thank you!!  Rhiannon

## 2023-04-21 ENCOUNTER — VIRTUAL VISIT (OUTPATIENT)
Dept: ONCOLOGY | Facility: CLINIC | Age: 43
End: 2023-04-21
Attending: GENETIC COUNSELOR, MS
Payer: COMMERCIAL

## 2023-04-21 DIAGNOSIS — C50.412 MALIGNANT NEOPLASM OF UPPER-OUTER QUADRANT OF LEFT BREAST IN FEMALE, ESTROGEN RECEPTOR POSITIVE (H): ICD-10-CM

## 2023-04-21 DIAGNOSIS — Z17.0 MALIGNANT NEOPLASM OF UPPER-OUTER QUADRANT OF LEFT BREAST IN FEMALE, ESTROGEN RECEPTOR POSITIVE (H): ICD-10-CM

## 2023-04-21 DIAGNOSIS — Z84.81 FAMILY HISTORY OF CARRIER OF GENETIC DISEASE: ICD-10-CM

## 2023-04-21 DIAGNOSIS — Z15.09 BRCA2 GENE MUTATION POSITIVE: Primary | ICD-10-CM

## 2023-04-21 DIAGNOSIS — Z15.01 BRCA2 GENE MUTATION POSITIVE: Primary | ICD-10-CM

## 2023-04-21 PROCEDURE — 96040 HC GENETIC COUNSELING, EACH 30 MINUTES: CPT | Mod: GT,95 | Performed by: GENETIC COUNSELOR, MS

## 2023-04-21 NOTE — Clinical Note
"    4/21/2023         RE: Zainab Bolton  [address] [address] [facility] [address] [facility] [address]        Dear Colleague,    Thank you for referring your patient, Zainab Bolton, to the [facility]. Please see a copy of my visit note below.    IVirtual Visit Details    Type of service:  Video Visit     Originating Location (pt. Location): {video visit patient location:646959::\"Home\"}  {PROVIDER LOCATION On-site should be selected for visits conducted from your clinic location or adjoining [facility] hospital, academic office, or other nearby [facility] building. Off-site should be selected for all other provider locations, including home:560276}  Distant Location (provider location):  {virtual location provider:015263}  Platform used for Video Visit: {Virtual Visit Platforms:999772::\"[facility] [facility]\"}        4/21/2023    Referring Provider: ***    Presenting Information:   I spoke to Zainab by video today to discuss her genetic testing results. Her blood was drawn on ***. *** test was ordered from ***[facility]. This testing was done because of Zainab's personal and family history of ***.     Genetic Testing Results: POSITIVE  Zainab is POSITIVE for a *** mutation. Specifically her mutation is called ***. We discussed that this mutation is associated with a diagnosis of *** and increased risk for ***. We discussed the impact of this testing on Zainab in detail.     Of note, Zainab tested negative for mutations in the following genes by sequencing and deletion/duplication analysis: ***.  We reviewed the autosomal dominant inheritance of these genes. Zainab cannot pass on a mutation in any of these genes to her children based on this test result. Mutations in these genes do not skip generations.      ***A copy of the test report can be found in the Laboratory tab, dated ***, and named \"LABORATORY MISCELLANEOUS ORDER\". The report is scanned in as a linked document.    ***A copy of the test report can be found in " "the Laboratory tab, dated ***, and named \"Next generation sequencing\".     Cancer Risks:   ***    Cancer Screening and Prevention:  The following screening is recommended for individuals who have a mutation in the *** gene.    ***        Other screening based on Zainab's personal and family history:    ***    We discussed that Zainab could participate in our Cancer Risk Management Program in which our nursing specialist provides an individual screening plan and assists with medical management. A referral was placed to see MARIO Enrique for this service.    Of note, the above information is based on our current understanding of Zainab's genetic findings. Zainab is encouraged to reach out to me regularly regarding any pertinent updates to her personal and/or family history of cancer, as our understanding of the genetic findings in her family may change over time.     Implications for Family Members:  We reviewed that mutations in the *** gene are inherited in an autosomal dominant pattern. This means that each of Zainab's children have a 50% chance of inheriting the same mutation. Likewise, each of her siblings has a 50% risk of having the same mutation. ***Extended relatives may also carry this mutation, and she is encouraged to share this information with her family members on both sides of the family. I am happy to help her relatives connect with a genetic counselor in their area if they would like to discuss testing.    ***Reproductive risk    Additional Testing Considerations:  ***It is possible Zainab does carry another gene mutation or combination of genes and environment that increase her risk for *** cancer. We again reviewed the option of larger gene panels covering more moderate risk genes associated with *** cancer. Zainab is encouraged to contact me if she wishes to readdress these larger gene panel options.    ***Even though Zainab's genetic testing result was positive, other relatives " "may carry a different gene mutation associated with *** cancer. Genetic counseling is recommended for *** to discuss genetic testing options. *** If any of these relatives do pursue genetic testing, Zainab is encouraged to contact me so that we may review the impact of their test results on her.    Support Resources:  I provided Zainab with information regarding national and local support resources including ***.    Plan:  1.  A copy of the test results will be mailed to Zainab.  2.  I will provide a \"Dear Relative\" letter for Zainab to share with her family members.  3.  She plans to follow up with ***.  4.  *** A referral was placed for Zainab to meet with MARIO Enrique to discuss screening associated with a *** mutation.    If Zainab has additional questions, I encouraged her to contact me directly at ***.     Time spent on video: 26 minutes    Froilan Cuevas MS, [facility]  Licensed, Certified Genetic Counselor    ***     Virtual Visit Details    Type of service:  Video Visit     Originating Location (pt. Location): Home  Distant Location (provider location):  Off-site  Platform used for Video Visit: [employer]      Again, thank you for allowing me to participate in the care of your patient.        Sincerely,        Froilan Cuevas, RAJIV  "

## 2023-04-21 NOTE — PROGRESS NOTES
"4/21/2023    Referring Provider: Kylee Villasenor MD    Presenting Information:   I spoke to Zainab by video today to discuss her genetic testing results. Her blood was drawn on 3/28/23. The Breast Actionable Panel panel was ordered from [facility]. This testing was done because of Zainab's personal history of breast cancer.     Genetic Testing Results: POSITIVE  Zainab is POSITIVE for a BRCA2 mutation. Specifically her mutation is called c.[id number]>C ([id number]). We discussed that this mutation is associated with a diagnosis of Hereditary Breast and/or Ovarian Cancer syndrome and increased risk for cancer. We discussed the impact of this testing on Zainab in detail.     Of note, Zainab is negative for mutations in the LAURA, BARD1, BRCA1, CDH1, CHEK2, NF1, PALB2, PTEN, STK11, and TP53 genes. No mutations were found in any of the other 10 genes analyzed. This test involved sequencing and deletion/duplication analysis of all genes. We reviewed the autosomal dominant inheritance of these genes. Zainab cannot pass on a mutation in any of these genes to her children based on this test result. Mutations in these genes do not skip generations.      A copy of the test report can be found in the Laboratory tab, dated 4/18/23, and named \"Next generation sequencing\".     BRCA2 Cancer Risks:    Breast cancer risk (first primary):  o Women who carry a BRCA2 pathogenic/likely pathogenic variant have a 60-80% lifetime risk of developing breast cancer. This is much greater than the general population breast cancer risk of 12%  o Male breast cancer risks are also increased: approximately 1.8-7.1% by age 70.      Ovarian cancer risk: 13-29% (compared to the general population risk of 1-2%)    Pancreatic cancer risk: 5-10%     Prostate cancer risk: up to 19-61%, including a higher likelihood for advanced or metastatic disease.     Increased risk for other cancers, including melanoma, " may be present. No exact lifetime risks are available at this time.     Cancer Screening and Prevention:  The following recommendations are based on current National Comprehensive Cancer Network guidelines for BRCA2.    Earlier and more frequent breast screening is recommended:  o Breast awareness starting at age 18  o Clinical breast exams starting at age 25; repeated every 6-12 months  o Annual breast MRI from age 25-29  o Annual mammograms with consideration of tomosynthesis and breast MRI alternating every 6 months starting at age 30  o Consider risk reducing mastectomy, which reduces breast cancer risk by 90%    Screening for male breast cancer includes self breast exams annual clinical breast exams beginning at age 35  o Annual mammogram screening may be considered in men with gynecomastia starting at age 50 or 10 years prior to the earliest male breast cancer in the family    Bilateral salpingo-oophorectomy (removal of both ovaries and fallopian tubes) reduces the risk of ovarian cancer and is typically recommended between ages 35-40, or after child bearing  o Per current NCCN guidelines, individuals at risk for ovarian cancer due to BRCA2 may choose to delay this surgery to ages 40-45. For some patients with a BRCA2 mutation, though, surgery can be considered at an earlier age, based on family history. As with any surgery, risks are involved, and this option should be discussed in greater detail with a gyn-oncologist.    o Consider transvaginal ultrasound and a  blood test starting at age 30-35, though the benefits of this screening are unclear.    Screening for pancreatic cancer may be considered for some families.   o Consider pancreatic cancer screening beginning at age 50 (or 10 years younger than the earliest pancreatic cancer diagnosis) for individuals with a family history of pancreatic cancer in a first or second degree relative (who presumably carries the BRCA2 variant).    o This screening  typically involves endoscopic ultrasonography (EUS) and/or MRI/magnetic resonance cholangiopancreatography.    Individuals at risk for prostate cancer may begin prostate screening at age 40, as recommended in the NCCN Guidelines for Prostate Early Detection    General melanoma risk management, including annual full body skin exams and safe sun practices are appropriate.      Chemoprevention with Tamoxifen can reduce breast cancer risk in some women.  Surgical risk reduction options and Tamoxifen use should be discussed with Zainab's physician.      We discussed that using oral contraceptives for five years or more has been shown to reduce ovarian cancer risk by around 50%.  The data are still inconclusive as to whether or not using oral contraceptives will increase breast cancer risk in BRCA2 mutation carriers.     Some chemotherapies for certain cancers may be more effective in individuals with BRCA2 mutations. Zainab should discuss this with her physicians, if chemotherapy is indicated for her in the future.     Other screening based on Zainab's personal and family history:    Other population cancer screening options, such as those recommended by the American Cancer Society and the National Comprehensive Cancer Network (NCCN), are also appropriate for Zainab and her family. These screening recommendations may change if there are changes to Zainab's personal and/or family history of cancer. Final screening recommendations should be made by each individual's primary care provider.    We discussed that Zainab could participate in our Cancer Risk Management Program in which our nursing specialist provides an individual screening plan and assists with medical management. Given that Zainab is currently receiving treatment for her breast cancer diagnosis, she is not interested in this service at this time. She will contact me if she would like to be followed for additional screening in the future.    Of note, the  above information is based on our current understanding of Zainab's genetic findings. Zainab is encouraged to reach out to me regularly regarding any pertinent updates to her personal and/or family history of cancer, as our understanding of the genetic findings in her family may change over time.     Implications for Family Members:  We reviewed that mutations in the BRCA2 gene are inherited in an autosomal dominant pattern. This means that each of Zainab's children have a 50% chance of inheriting the same mutation. Likewise, each of her siblings has a 50% risk of having the same mutation. Extended relatives may also carry this mutation, and she is encouraged to share this information with her family members on both sides of the family. I am happy to help her relatives connect with a genetic counselor in their area if they would like to discuss testing.    In rare situations in which both parents have a mutation in the BRCA2 gene, their children each have a 25% risk for Fanconi anemia. Fanconi anemia is a rare condition with onset in childhood that often results in physical abnormalities, growth retardation, bone marrow failure, and increased risk for cancer. For individuals of childbearing age with BRCA2 mutations, genetic counseling and genetic testing may be advised for their partners.    Additional Testing Considerations:  It is possible Zainab does carry another gene mutation or combination of genes and environment that increase her risk for breast and/or stomach cancer. We again reviewed the option of larger gene panels covering more moderate risk genes associated with breast and/or stomach cancer. Zainab is encouraged to contact me if she wishes to readdress these larger gene panel options.    Even though Zainab's genetic testing result was positive, other relatives may carry a different gene mutation associated with breast and/or stomach cancer. Genetic counseling is recommended for her sister, her  children (once they are old enough), and other close relatives on both sides of the family to discuss genetic testing options. If any of these relatives do pursue genetic testing, Zainab is encouraged to contact me so that we may review the impact of their test results on her.    Resources for Hereditary Breast and Ovarian Cancer  [employer]    www.CloudOn.org  [employer] is the only national non-profit organization focused on prevention and early detection of breast and ovarian cancer in young women.  B[employer] aims to reach the 52 million young women in the [place] between the ages of 18 and 45 with innovative, life-saving breast and ovarian health programs, thereby empowering this and future generations of women to live healthier, happier, and longer lives.  [employer]: Facing Our Risk of Cancer Empowered  www.[employer].org  FORCE s mission is to improve the lives of individuals and families affected by hereditary breast, ovarian, and related cancers by creating awareness, supplying information and support to the community, advocating for and supporting research, and working with the research and medical communities.     Helpline: 1-167.213.4234    Message boards    Peer Navigation and local support groups  Talking About BRCA in Your Family Tree http://www.facingWattics.org/understanding-brca-and-hboc/publications/documents/ynfnwdn-haftcnc-pbimm-brca-family.pdf  This free booklet helps parents discuss BRCA-related issues with their children and loved ones  [employer]: [place] [facility] [place] http://mnovarian.org/  MOCA was started in 1999 by a small group of ovarian cancer survivors who came together to fund ovarian cancer research, raise awareness of the disease, and provide support to women with ovarian cancer and their families.  [employer] Sister[employer]   www.Midokurasisterhood.org  Based in [facility] [employer] connects women diagnosed with breast cancer with trained breast  cancer survivors to offer support, guidance and hope.  [facility] [place] www.wilfredsclubtwincities.org  [facility] [facility] [place] is a 501(c)3 nonprofit and the local affiliate of the Cancer Support Community, a network of more than 54 supportive, free and welcoming  clubhouses  where everyone living with cancer can come for social, emotional and psychological support. Clubs are healing environments where individuals learn from each other with guidance from licensed professionals.  BRCA Information Support Group  People with BRCA1 or BRAC2 mutations face complex emotional challenges and complicated medical decisions due to high cancer risks and their impact on individuals and families. This group brings people with a BRAC1 or BRAC2 genetic diagnosis together with others facing similar challenges. The group meets nine times per year. For information, please contact [email or web address] or call [phone].  National Society of Genetic Counselors https://www.nsgc.org  The National Society of Genetic Counselors advances the various roles of genetic counselors in health care by fostering education, research, and public policy to ensure the availability of quality genetic services.  A number of resources are available through this website for providers and patients seeking additional information about genetic counseling services.   Books:    The Breast Reconstruction Guidebook: Issues and Answers from Research to Recovery (2012), by Jacey Duke    Confronting Hereditary Breast and Ovarian Cancer: Identify Your Risk, Understand Your Options, Change Your Rasheeda (2012), by Rhiannon Orona    Positive Results: Making the Best Decisions When You re at High Risk for Breast or Ovarian Cancer (2010), by Katarina Peralta and Dr. Leonila Luke.     Previvors: Facing the Breast Cancer Gene and Making Life-Changing Decisions (2010), by Kari Kahn    Plan:  1.  A copy of the test results will be mailed to Zainab.  2.  I  "will provide a \"Dear Relative\" letter for Zainab to share with her family members.  3.  She plans to follow up with her other providres.    If Zainab has additional questions, I encouraged her to contact me directly via [employer].     Time spent on video: 26 minutes    Froilan Cuevas MS, [facility]  Licensed, Certified Genetic Counselor       Virtual Visit Details    Type of service:  Video Visit     Originating Location (pt. Location): Home  Distant Location (provider location):  Off-site  Platform used for Video Visit: Quiana"

## 2023-04-21 NOTE — NURSING NOTE
s the patient currently in the state of [place]? YES    Visit mode:VIDEO    If the visit is dropped, the patient can be reconnected by: VIDEO VISIT: Text to cell phone: [phone]    Will anyone else be joining the visit? NO      How would you like to obtain your AVS? MyChart    Are changes needed to the allergy or medication list? NO    Reason for visit: Video Visit    Hakeem Villarreal

## 2023-04-21 NOTE — LETTER
"April 21, 2023    [facility]  [address] [facility] [address]    Dear Zainab,    It was a pleasure speaking with you over video on 4/21/2023. Here is a copy of the progress note from our discussion. If you have any additional questions, please feel free to call.    Referring Provider: Kylee Villasenor MD    Presenting Information:   I spoke to Zainab by video today to discuss her genetic testing results. Her blood was drawn on 3/28/23. The Breast Actionable Panel panel was ordered from [facility]. This testing was done because of Zainab's personal history of breast cancer.     Genetic Testing Results: POSITIVE  Zainab is POSITIVE for a BRCA2 mutation. Specifically her mutation is called c.[id number]>C ([id number]). We discussed that this mutation is associated with a diagnosis of Hereditary Breast and/or Ovarian Cancer syndrome and increased risk for cancer. We discussed the impact of this testing on Zainab in detail.     Of note, Zainab is negative for mutations in the LAURA, BARD1, BRCA1, CDH1, CHEK2, NF1, PALB2, PTEN, STK11, and TP53 genes. No mutations were found in any of the other 10 genes analyzed. This test involved sequencing and deletion/duplication analysis of all genes. We reviewed the autosomal dominant inheritance of these genes. Zainab cannot pass on a mutation in any of these genes to her children based on this test result. Mutations in these genes do not skip generations.      A copy of the test report can be found in the Laboratory tab, dated 4/18/23, and named \"Next generation sequencing\".     BRCA2 Cancer Risks:    Breast cancer risk (first primary):  o Women who carry a BRCA2 pathogenic/likely pathogenic variant have a 60-80% lifetime risk of developing breast cancer. This is much greater than the general population breast cancer risk of 12%  o Male breast cancer risks are also increased: approximately 1.8-7.1% by age 70.  "     Ovarian cancer risk: 13-29% (compared to the general population risk of 1-2%)    Pancreatic cancer risk: 5-10%     Prostate cancer risk: up to 19-61%, including a higher likelihood for advanced or metastatic disease.     Increased risk for other cancers, including melanoma, may be present. No exact lifetime risks are available at this time.     Cancer Screening and Prevention:  The following recommendations are based on current National Comprehensive Cancer Network guidelines for BRCA2.    Earlier and more frequent breast screening is recommended:  o Breast awareness starting at age 18  o Clinical breast exams starting at age 25; repeated every 6-12 months  o Annual breast MRI from age 25-29  o Annual mammograms with consideration of tomosynthesis and breast MRI alternating every 6 months starting at age 30  o Consider risk reducing mastectomy, which reduces breast cancer risk by 90%    Screening for male breast cancer includes self breast exams annual clinical breast exams beginning at age 35  o Annual mammogram screening may be considered in men with gynecomastia starting at age 50 or 10 years prior to the earliest male breast cancer in the family    Bilateral salpingo-oophorectomy (removal of both ovaries and fallopian tubes) reduces the risk of ovarian cancer and is typically recommended between ages 35-40, or after child bearing  o Per current NCCN guidelines, individuals at risk for ovarian cancer due to BRCA2 may choose to delay this surgery to ages 40-45. For some patients with a BRCA2 mutation, though, surgery can be considered at an earlier age, based on family history. As with any surgery, risks are involved, and this option should be discussed in greater detail with a gyn-oncologist.    o Consider transvaginal ultrasound and a  blood test starting at age 30-35, though the benefits of this screening are unclear.    Screening for pancreatic cancer may be considered for some families.   o Consider  pancreatic cancer screening beginning at age 50 (or 10 years younger than the earliest pancreatic cancer diagnosis) for individuals with a family history of pancreatic cancer in a first or second degree relative (who presumably carries the BRCA2 variant).    o This screening typically involves endoscopic ultrasonography (EUS) and/or MRI/magnetic resonance cholangiopancreatography.    Individuals at risk for prostate cancer may begin prostate screening at age 40, as recommended in the NCCN Guidelines for Prostate Early Detection    General melanoma risk management, including annual full body skin exams and safe sun practices are appropriate.      Chemoprevention with Tamoxifen can reduce breast cancer risk in some women.  Surgical risk reduction options and Tamoxifen use should be discussed with Zainab's physician.      We discussed that using oral contraceptives for five years or more has been shown to reduce ovarian cancer risk by around 50%.  The data are still inconclusive as to whether or not using oral contraceptives will increase breast cancer risk in BRCA2 mutation carriers.     Some chemotherapies for certain cancers may be more effective in individuals with BRCA2 mutations. Zainab should discuss this with her physicians, if chemotherapy is indicated for her in the future.         Other screening based on Zainab's personal and family history:    Other population cancer screening options, such as those recommended by the American Cancer Society and the National Comprehensive Cancer Network (NCCN), are also appropriate for Zainab and her family. These screening recommendations may change if there are changes to Zainab's personal and/or family history of cancer. Final screening recommendations should be made by each individual's primary care provider.    We discussed that Zainab could participate in our Cancer Risk Management Program in which our nursing specialist provides an individual screening plan  and assists with medical management. Given that Zainab is currently receiving treatment for her breast cancer diagnosis, she is not interested in this service at this time. She will contact me if she would like to be followed for additional screening in the future.    Of note, the above information is based on our current understanding of Zainab's genetic findings. Zainab is encouraged to reach out to me regularly regarding any pertinent updates to her personal and/or family history of cancer, as our understanding of the genetic findings in her family may change over time.     Implications for Family Members:  We reviewed that mutations in the BRCA2 gene are inherited in an autosomal dominant pattern. This means that each of Zainab's children have a 50% chance of inheriting the same mutation. Likewise, each of her siblings has a 50% risk of having the same mutation. Extended relatives may also carry this mutation, and she is encouraged to share this information with her family members on both sides of the family. I am happy to help her relatives connect with a genetic counselor in their area if they would like to discuss testing.    In rare situations in which both parents have a mutation in the BRCA2 gene, their children each have a 25% risk for Fanconi anemia. Fanconi anemia is a rare condition with onset in childhood that often results in physical abnormalities, growth retardation, bone marrow failure, and increased risk for cancer. For individuals of childbearing age with BRCA2 mutations, genetic counseling and genetic testing may be advised for their partners.    Additional Testing Considerations:  It is possible Zainab does carry another gene mutation or combination of genes and environment that increase her risk for breast and/or stomach cancer. We again reviewed the option of larger gene panels covering more moderate risk genes associated with breast and/or stomach cancer. Zainab is encouraged to  contact me if she wishes to readdress these larger gene panel options.    Even though Zainab's genetic testing result was positive, other relatives may carry a different gene mutation associated with breast and/or stomach cancer. Genetic counseling is recommended for her sister, her children (once they are old enough), and other close relatives on both sides of the family to discuss genetic testing options. If any of these relatives do pursue genetic testing, Zainab is encouraged to contact me so that we may review the impact of their test results on her.    Resources for Hereditary Breast and Ovarian Cancer  B[employer]    www.[employer].org  [employer] is the only national non-profit organization focused on prevention and early detection of breast and ovarian cancer in young women.    Bright Pink aims to reach the 52 million young women in the [place] between the ages of 18 and 45 with innovative, life-saving breast and ovarian health programs, thereby empowering this and future generations of women to live healthier, happier, and longer lives.  FORCE: Facing Our Risk of Cancer Empowered  www.[employer].org  FORCE s mission is to improve the lives of individuals and families affected by hereditary breast, ovarian, and related cancers by creating awareness, supplying information and support to the community, advocating for and supporting research, and working with the research and medical communities.     Helpline: 1-339.946.6782    Message boards    Peer Navigation and local support groups  Talking About BRCA in Your Family Tree http://www.facingCass Art.org/understanding-brca-and-hboc/publications/documents/ptrlsuw-rnyawqt-npmjn-brca-family.pdf  This free booklet helps parents discuss BRCA-related issues with their children and loved ones  MOCA: [place] [facility] [place] http://mnovarian.org/  MOCA was started in 1999 by a small group of ovarian cancer survivors who came together to fund  ovarian cancer research, raise awareness of the disease, and provide support to women with ovarian cancer and their families.  Firefly Sisterhood   www.fireflysisterhood.org  Based in the [facility], the Firefly Sisterhood connects women diagnosed with breast cancer with trained breast cancer survivors to offer support, guidance and hope.  [employer] [place] www.wilfredsclubtwincities.org  [employer] [place] is a 501(c)3 nonprofit and the local affiliate of the Cancer Support Community, a network of more than 54 supportive, free and welcoming  clubhouses  where everyone living with cancer can come for social, emotional and psychological support. Clubs are healing environments where individuals learn from each other with guidance from licensed professionals.  BRCA Information Support Group  People with BRCA1 or BRAC2 mutations face complex emotional challenges and complicated medical decisions due to high cancer risks and their impact on individuals and families. This group brings people with a BRAC1 or BRAC2 genetic diagnosis together with others facing similar challenges. The group meets nine times per year. For information, please contact [email or web address] or call [phone].  National Society of Genetic Counselors https://www.nsgc.org  The National Society of Genetic Counselors advances the various roles of genetic counselors in health care by fostering education, research, and public policy to ensure the availability of quality genetic services.  A number of resources are available through this website for providers and patients seeking additional information about genetic counseling services.   Books:    The Breast Reconstruction Guidebook: Issues and Answers from Research to Recovery (2012), by Jacey Duke    Confronting Hereditary Breast and Ovarian Cancer: Identify Your Risk, Understand Your Options, Change Your Rasheeda (2012), by Rhiannon Orona    Positive Results: Making the Best  "Decisions When You re at High Risk for Breast or Ovarian Cancer (2010), by Katarina Peralta and Dr. Leonila Luke.     Previvors: Facing the Breast Cancer Gene and Making Life-Changing Decisions (2010), by Kari Kahn    Plan:  1.  A copy of the test results will be mailed to Zainab.  2.  I will provide a \"Dear Relative\" letter for Zainab to share with her family members.  3.  She plans to follow up with her other providres.    If Zainab has additional questions, I encouraged her to contact me directly via [employer].     Time spent on video: 26 minutes    Froilan Cuevas MS, [facility]  Licensed, Certified Genetic Counselor  "

## 2023-04-24 PROCEDURE — G0452 MOLECULAR PATHOLOGY INTERPR: HCPCS | Mod: 26 | Performed by: STUDENT IN AN ORGANIZED HEALTH CARE EDUCATION/TRAINING PROGRAM

## 2023-04-28 ENCOUNTER — OFFICE VISIT (OUTPATIENT)
Dept: PLASTIC SURGERY | Facility: AMBULATORY SURGERY CENTER | Age: 43
End: 2023-04-28
Payer: COMMERCIAL

## 2023-04-28 VITALS
DIASTOLIC BLOOD PRESSURE: 70 MMHG | HEIGHT: 63 IN | SYSTOLIC BLOOD PRESSURE: 112 MMHG | HEART RATE: 60 BPM | WEIGHT: 138 LBS | BODY MASS INDEX: 24.45 KG/M2

## 2023-04-28 DIAGNOSIS — C50.112 MALIGNANT NEOPLASM OF CENTRAL PORTION OF LEFT BREAST IN FEMALE, ESTROGEN RECEPTOR POSITIVE (H): Primary | ICD-10-CM

## 2023-04-28 DIAGNOSIS — Z17.0 MALIGNANT NEOPLASM OF CENTRAL PORTION OF LEFT BREAST IN FEMALE, ESTROGEN RECEPTOR POSITIVE (H): Primary | ICD-10-CM

## 2023-04-28 DIAGNOSIS — Z98.890 STATUS POST BILATERAL BREAST RECONSTRUCTION: ICD-10-CM

## 2023-04-28 PROCEDURE — 99417 PROLNG OP E/M EACH 15 MIN: CPT | Performed by: PLASTIC SURGERY

## 2023-04-28 PROCEDURE — 99205 OFFICE O/P NEW HI 60 MIN: CPT | Performed by: PLASTIC SURGERY

## 2023-04-28 NOTE — LETTER
2023         RE: Zainab Bolton  [address] [address] [facility] [address]        Dear Colleague,    Thank you for referring your patient, Zainab Bolton, to the [facility] [place]. Please see a copy of my visit note below.    Chief complaint:  Left breast cancer    History of present illness:  This is a 42 year old lady with newly diagnosis of left breast cancer, invasive ductal carcinoma.  This lesion was picked up by the patient herself. Her receptor status is: ER/VT positive, HER2 negative.  This is a patient of Dr. Villasenor who has referred the patient to me to discuss reconstructive options.    Past medical history:  Past Medical History:   Diagnosis Date     Anxiety 2010     Anxiety state, unspecified     chronic anxiety     Essential hypertension 3/3/2023     Hypercholesteremia 2010     Obesity 2010     Other acne      Papanicolaou smear of cervix with low grade squamous intraepithelial lesion (LGSIL) (aka LSIL) 2015    LSIL/+ HR HPV     TOBACCO ABUSE-CONTINUOUS        Past surgical history:  Tubal ligation    Allergies:  No known drug allergies    Medications:    Current Outpatient Medications:      busPIRone (BUSPAR) 10 MG tablet, Take 1 tablet (10 mg) by mouth 2 times daily (Patient not taking: Reported on 3/30/2023), Disp: 60 tablet, Rfl: 1     hydrOXYzine (ATARAX) 10 MG tablet, Take 1 tablet (10 mg) by mouth 3 times daily as needed for anxiety, Disp: 90 tablet, Rfl: 1     sertraline (ZOLOFT) 100 MG tablet, Take 0.5 tablets by mouth daily, Disp: , Rfl:      sertraline (ZOLOFT) 50 MG tablet, Take 2 tablets (100 mg) by mouth daily, Disp: 60 tablet, Rfl: 0     tamoxifen (NOLVADEX) 20 MG tablet, Take 1 tablet (20 mg) by mouth daily, Disp: 30 tablet, Rfl: 1    Family history:  Father with stomach cancer    GYN history:  G4, 3P, 1     Social History:  Patient used to be a heavy smoker, approximately a pack a day for the last  "20 years.  She quit about 3 months ago.  Patient used to drink alcohol as well, she quit 3 months ago.    Review of systems:  General ROS: No complaints or constitutional symptoms  Skin: No complaints or symptoms   Hematologic/Lymphatic: No symptoms or complaints  Psychiatric: No symptoms or complaints  Endocrine: No excessive fatigue, no hypermetabolic symptoms reported  Respiratory ROS: No cough, shortness of breath, or wheezing  Cardiovascular ROS: No chest pain or dyspnea on exertion  Breast ROS: Palpable breast mass left breast.  Otherwise denies nipple inversion, denies nipple discharge, denies peau d'orange.  Gastrointestinal ROS: No abdominal pain, nausea, diarrhea, or constipation  Musculoskeletal ROS: No recent injuries reported  Neurological ROS: No focal neurologic defects reported.      Physical exam:  /70 (BP Location: Right arm, Patient Position: Sitting)   Pulse 60   Ht 1.6 m (5' 3\")   Wt 62.6 kg (138 lb)   LMP 02/24/2023   BMI 24.45 kg/m    General: Alert, cooperative, appears stated age   Skin: Skin color, texture, turgor normal, no rashes or lesions   Lymphatic: No obvious adenopathy, no swelling   Eyes: No scleral icterus, pupils equal  HENT: No traumatic injury to the head or face, no gross abnormalities  Lungs: Normal respiratory effort, breath sounds equal bilaterally  Heart: Regular rate and rhythm  Breasts:  Bilateral grade 2 ptosis.  Right breast is larger than the left breast.  No nipple inversion, no peau d'orange.  No palpable breast masses or nipple discharge (I could not feel the tumor).  No palpable axillary lymphadenopathies bilaterally.  On the right breast the sternal notch to nipple distance 27 cm, nipple to inframammary fold distance is 7 cm, and breast diameter is 22 cm.  On the left breast the sternal notch to nipple distance is 26 cm, nipple to inframammary fold distance is 7 cm, and breast diameter 20 cm.  Abdomen: Soft, non-distended and non-tender to " palpation  Neurologic: Grossly intact                                  ASSESSMENT:    This is a 42 year old lady with newly diagnosed left breast invasive ductal carcinoma.     Patient presents with bilateral breast grade 2 ptosis.    She has history of tobacco use.  She has quit 3 months ago.     Patient is scheduled for bilateral skin sparing mastectomies with Dr. Villasenor.     PLAN:      I believe that this patient will be an excellent candidate for immediate/delayed, prepectoral, bilateral breast reconstruction with tissue expanders utilizing a Fernandez pattern of breast reduction for her bilateral mastectomies.  I will utilize bilateral auto dermal flaps as adjacent soft tissue transfer to cover her bilateral tissue expanders.     Based upon her ptosis, chest frame and breast volume, I will utilize Allergan, full height extra projection tissue expanders of 350 cc.    I have told her that for her second stage reconstruction we will have to wait at least 3 months.    Patient has expressed interest in autologous breast reconstruction with a Judi inferior epigastric  (JUDI) abdominal flap for her permanent reconstruction.  I will make referral to my partner Dr. Aaron for consultation.     Risks were explained to the patient and they include but are not limited to scarring, infection, bleeding, seroma, hematoma, explantation of the implant secondary to infection, bilateral breast asymmetry, different mastectomy skin flap thickness between both breasts, inframammary fold (IMF) asymmetries between both breasts, capsular contracture, permanent anesthesia of the skin envelope covering both breasts, need for further surgeries in the future, relationship and intimacy problems even leading to separation and or breakup/divorce.  No guarantees that her reconstructive breasts will be of the exact size or shape that she has right now were given to the patient. I have stressed to the patient that this is reconstructive  breast surgery after breast cancer or to prevent breast cancer not plain cosmetic breast surgery.    Patient has acknowledged all these risks and has agreed to proceed.    Time spent with the patient 90 minutes.       Basil Aguilar MD, FACS   Diplomate American Board of Plastic Surgery  Diplomate American Board of Surgery  [facility]  Division of Plastic & Reconstructive Surgery  Office: [phone]   4/28/2023 at 5:04 PM        Preoperative prescriptions has been sent to patient's pharmacy.    Basil Aguilar MD , FACS   Diplomate American Board of Plastic Surgery  Diplomate American Board of Surgery  Adj. Assistant Professor of Surgery  Division of Plastic & Reconstructive Surgery   [facility]  Office: [phone]   5/3/2023 at 9:39 PM         Again, thank you for allowing me to participate in the care of your patient.        Sincerely,        Basil Aguilar MD

## 2023-04-28 NOTE — PROGRESS NOTES
Chief complaint:  Left breast cancer    History of present illness:  This is a 42 year old lady with newly diagnosis of left breast cancer, invasive ductal carcinoma.  This lesion was picked up by the patient herself. Her receptor status is: ER/ID positive, HER2 negative.  This is a patient of Dr. Villasenor who has referred the patient to me to discuss reconstructive options.    Past medical history:  Past Medical History:   Diagnosis Date     Anxiety 2010     Anxiety state, unspecified     chronic anxiety     Essential hypertension 3/3/2023     Hypercholesteremia 2010     Obesity 2010     Other acne      Papanicolaou smear of cervix with low grade squamous intraepithelial lesion (LGSIL) (aka LSIL) 2015    LSIL/+ HR HPV     TOBACCO ABUSE-CONTINUOUS        Past surgical history:  Tubal ligation    Allergies:  No known drug allergies    Medications:    Current Outpatient Medications:      busPIRone (BUSPAR) 10 MG tablet, Take 1 tablet (10 mg) by mouth 2 times daily (Patient not taking: Reported on 3/30/2023), Disp: 60 tablet, Rfl: 1     hydrOXYzine (ATARAX) 10 MG tablet, Take 1 tablet (10 mg) by mouth 3 times daily as needed for anxiety, Disp: 90 tablet, Rfl: 1     sertraline (ZOLOFT) 100 MG tablet, Take 0.5 tablets by mouth daily, Disp: , Rfl:      sertraline (ZOLOFT) 50 MG tablet, Take 2 tablets (100 mg) by mouth daily, Disp: 60 tablet, Rfl: 0     tamoxifen (NOLVADEX) 20 MG tablet, Take 1 tablet (20 mg) by mouth daily, Disp: 30 tablet, Rfl: 1    Family history:  Father with stomach cancer    GYN history:  G4, 3P, 1     Social History:  Patient used to be a heavy smoker, approximately a pack a day for the last 20 years.  She quit about 3 months ago.  Patient used to drink alcohol as well, she quit 3 months ago.    Review of systems:  General ROS: No complaints or constitutional symptoms  Skin: No complaints or symptoms   Hematologic/Lymphatic: No symptoms or complaints  Psychiatric: No  "symptoms or complaints  Endocrine: No excessive fatigue, no hypermetabolic symptoms reported  Respiratory ROS: No cough, shortness of breath, or wheezing  Cardiovascular ROS: No chest pain or dyspnea on exertion  Breast ROS: Palpable breast mass left breast.  Otherwise denies nipple inversion, denies nipple discharge, denies peau d'orange.  Gastrointestinal ROS: No abdominal pain, nausea, diarrhea, or constipation  Musculoskeletal ROS: No recent injuries reported  Neurological ROS: No focal neurologic defects reported.      Physical exam:  /70 (BP Location: Right arm, Patient Position: Sitting)   Pulse 60   Ht 1.6 m (5' 3\")   Wt 62.6 kg (138 lb)   LMP 02/24/2023   BMI 24.45 kg/m    General: Alert, cooperative, appears stated age   Skin: Skin color, texture, turgor normal, no rashes or lesions   Lymphatic: No obvious adenopathy, no swelling   Eyes: No scleral icterus, pupils equal  HENT: No traumatic injury to the head or face, no gross abnormalities  Lungs: Normal respiratory effort, breath sounds equal bilaterally  Heart: Regular rate and rhythm  Breasts:  Bilateral grade 2 ptosis.  Right breast is larger than the left breast.  No nipple inversion, no peau d'orange.  No palpable breast masses or nipple discharge (I could not feel the tumor).  No palpable axillary lymphadenopathies bilaterally.  On the right breast the sternal notch to nipple distance 27 cm, nipple to inframammary fold distance is 7 cm, and breast diameter is 22 cm.  On the left breast the sternal notch to nipple distance is 26 cm, nipple to inframammary fold distance is 7 cm, and breast diameter 20 cm.  Abdomen: Soft, non-distended and non-tender to palpation  Neurologic: Grossly intact                                  ASSESSMENT:    This is a 42 year old lady with newly diagnosed left breast invasive ductal carcinoma.     Patient presents with bilateral breast grade 2 ptosis.    She has history of tobacco use.  She has quit 3 months " ago.     Patient is scheduled for bilateral skin sparing mastectomies with Dr. Villasenor.     PLAN:      I believe that this patient will be an excellent candidate for immediate/delayed, prepectoral, bilateral breast reconstruction with tissue expanders utilizing a Fernandez pattern of breast reduction for her bilateral mastectomies.  I will utilize bilateral auto dermal flaps as adjacent soft tissue transfer to cover her bilateral tissue expanders.     Based upon her ptosis, chest frame and breast volume, I will utilize Allergan, full height extra projection tissue expanders of 350 cc.    I have told her that for her second stage reconstruction we will have to wait at least 3 months.    Patient has expressed interest in autologous breast reconstruction with a Judi inferior epigastric  (JUDI) abdominal flap for her permanent reconstruction.  I will make referral to my partner Dr. Aaron for consultation.     Risks were explained to the patient and they include but are not limited to scarring, infection, bleeding, seroma, hematoma, explantation of the implant secondary to infection, bilateral breast asymmetry, different mastectomy skin flap thickness between both breasts, inframammary fold (IMF) asymmetries between both breasts, capsular contracture, permanent anesthesia of the skin envelope covering both breasts, need for further surgeries in the future, relationship and intimacy problems even leading to separation and or breakup/divorce.  No guarantees that her reconstructive breasts will be of the exact size or shape that she has right now were given to the patient. I have stressed to the patient that this is reconstructive breast surgery after breast cancer or to prevent breast cancer not plain cosmetic breast surgery.    Patient has acknowledged all these risks and has agreed to proceed.    Time spent with the patient 90 minutes.       Basil Aguilar MD, FACS   Diplomate American Board of Plastic  Surgery  Diplomate American Board of Surgery  [facility]  Division of Plastic & Reconstructive Surgery  Office: [phone]   4/28/2023 at 5:04 PM

## 2023-05-01 ENCOUNTER — MYC MEDICAL ADVICE (OUTPATIENT)
Dept: SURGERY | Facility: CLINIC | Age: 43
End: 2023-05-01
Payer: COMMERCIAL

## 2023-05-01 NOTE — LETTER
We've received instruction to get you scheduled for surgery with Dr Villasenor and Dr. Aguilar. We have that arranged as follows:     Pre-op Physical: 5/19/2023 at 2:00 pm with Dr. Concepcion at the [facility]    Surgery Date: 5/31/2023.     Location: [place] [address]    Approximate Arrival Time: 11:00 am  (Unless instructed differently by the pre-op call nurse)     Post op Appointment: 6/2/2023 at  10:30 am with  Dr. Aguilar . [facility], [place] [address].    Prep Tasks and Info:     A pre-op physical with your primary care doctor is required before surgery. This must be 10-30 days before surgery.  Since it required by anesthesia, your surgery will be cancelled if it's not done. Call your clinic asap to get this scheduled.    Review your medications with your primary care or prescribing physician; they will advise you which meds to stop and when, and when you can resume taking.  Certain medications like blood thinners need to be stopped in advance of surgery to proceed safely.    Please shower the evening before and morning of surgery with Hibiclens soap.  This can be found at your local pharmacy.      Fasting instructions will be provided by the pre-op nurse who will call you 1-3 days before surgery.  Typically we advise normal food up to 8 hours before you arrive for surgery. Clear liquids only from then until 2 hours before you arrive surgery then nothing at all by mouth.  The nurse will review your specific instructions with you at the call.     Smoking impacts your body's ability to heal properly.  If you are a smoker, we strongly urge you to stop smoking. Plastic surgery patients are required to be nicotine free for at least 8 weeks before surgery.     You will need an adult to drive you home and stay with you 24 hours after surgery. Public transportation or Medical Van Services are  not permitted.    Visitor restrictions are subject to change, please verify with the pre-op nurse how many people may accompany you when they call.    We always encourage you to notify your insurance any time you have medical tests or procedures scheduled including surgery. The number is usually right on the back of your insurance card. Please call [facility] Cost of Care at [phone]  if you'd like a surgery quote.       Call our office if you have any questions! Thank you!

## 2023-05-02 DIAGNOSIS — C50.412 MALIGNANT NEOPLASM OF UPPER-OUTER QUADRANT OF LEFT BREAST IN FEMALE, ESTROGEN RECEPTOR POSITIVE (H): Primary | ICD-10-CM

## 2023-05-02 DIAGNOSIS — Z17.0 MALIGNANT NEOPLASM OF UPPER-OUTER QUADRANT OF LEFT BREAST IN FEMALE, ESTROGEN RECEPTOR POSITIVE (H): Primary | ICD-10-CM

## 2023-05-02 RX ORDER — ACETAMINOPHEN 325 MG/1
975 TABLET ORAL ONCE
Status: CANCELLED | OUTPATIENT
Start: 2023-05-31 | End: 2023-05-02

## 2023-05-02 RX ORDER — CEFAZOLIN SODIUM/WATER 2 G/20 ML
2 SYRINGE (ML) INTRAVENOUS SEE ADMIN INSTRUCTIONS
Status: CANCELLED | OUTPATIENT
Start: 2023-05-31

## 2023-05-02 RX ORDER — CEFAZOLIN SODIUM/WATER 2 G/20 ML
2 SYRINGE (ML) INTRAVENOUS
Status: CANCELLED | OUTPATIENT
Start: 2023-05-31

## 2023-05-02 NOTE — TELEPHONE ENCOUNTER
----- Message from Susan L Lombardi, RN sent at 5/1/2023  1:56 PM CDT -----  Regarding: Surgery date  Hi,  Avis left me a message about getting a date for her surgery.  Colleen, will you have time to call her soon?    Thank you!!  Rhiannon

## 2023-05-02 NOTE — TELEPHONE ENCOUNTER
Left a message regarding surgery scheduling, asked patient to contact me back to discuss details.    Surgery Date 5/31  @ [facility]      Letter created and sent via [employer]

## 2023-05-03 RX ORDER — MUPIROCIN 20 MG/G
OINTMENT TOPICAL 3 TIMES DAILY
Qty: 30 G | Refills: 0 | Status: SHIPPED | OUTPATIENT
Start: 2023-05-03 | End: 2023-05-19

## 2023-05-03 RX ORDER — CEPHALEXIN 500 MG/1
500 CAPSULE ORAL 3 TIMES DAILY
Qty: 21 CAPSULE | Refills: 0 | Status: SHIPPED | OUTPATIENT
Start: 2023-05-03 | End: 2023-05-19

## 2023-05-03 RX ORDER — ONDANSETRON 4 MG/1
4 TABLET, FILM COATED ORAL EVERY 6 HOURS PRN
Qty: 16 TABLET | Refills: 0 | Status: SHIPPED | OUTPATIENT
Start: 2023-05-03 | End: 2023-06-27

## 2023-05-03 RX ORDER — OXYCODONE AND ACETAMINOPHEN 5; 325 MG/1; MG/1
1 TABLET ORAL EVERY 6 HOURS PRN
Qty: 21 TABLET | Refills: 0 | Status: SHIPPED | OUTPATIENT
Start: 2023-05-03 | End: 2023-05-08

## 2023-05-03 RX ORDER — DOCUSATE SODIUM 100 MG/1
100 CAPSULE, LIQUID FILLED ORAL 2 TIMES DAILY
Qty: 20 CAPSULE | Refills: 0 | Status: SHIPPED | OUTPATIENT
Start: 2023-05-03 | End: 2023-06-27

## 2023-05-04 ENCOUNTER — PATIENT OUTREACH (OUTPATIENT)
Dept: PLASTIC SURGERY | Facility: CLINIC | Age: 43
End: 2023-05-04
Payer: COMMERCIAL

## 2023-05-04 NOTE — PROGRESS NOTES
Preoperative prescriptions has been sent to patient's pharmacy.    Basil Aguilar MD , FACS   Diplomate American Board of Plastic Surgery  Diplomate American Board of Surgery  Adj. Assistant Professor of Surgery  Division of Plastic & Reconstructive Surgery   [facility]  Office: [phone]   5/3/2023 at 9:39 PM

## 2023-05-04 NOTE — TELEPHONE ENCOUNTER
Left message for patient to return call to [phone] for scheduling a consult with Dr Aaron for discussion of breast reconstruction. [employer] message sent as well.

## 2023-05-06 DIAGNOSIS — F41.8 DEPRESSION WITH ANXIETY: ICD-10-CM

## 2023-05-06 DIAGNOSIS — F41.1 GAD (GENERALIZED ANXIETY DISORDER): ICD-10-CM

## 2023-05-08 NOTE — TELEPHONE ENCOUNTER
Routing refill request to provider for review/approval because:  GAD7 greater than 5.   Heidi Jason RN on 5/8/2023 at 11:46 AM

## 2023-05-09 ENCOUNTER — OFFICE VISIT (OUTPATIENT)
Dept: PLASTIC SURGERY | Facility: CLINIC | Age: 43
End: 2023-05-09
Attending: PLASTIC SURGERY
Payer: COMMERCIAL

## 2023-05-09 VITALS
TEMPERATURE: 99.2 F | BODY MASS INDEX: 24.84 KG/M2 | HEIGHT: 63 IN | OXYGEN SATURATION: 95 % | DIASTOLIC BLOOD PRESSURE: 82 MMHG | SYSTOLIC BLOOD PRESSURE: 134 MMHG | HEART RATE: 82 BPM | WEIGHT: 140.2 LBS | RESPIRATION RATE: 16 BRPM

## 2023-05-09 DIAGNOSIS — Z90.13 S/P BILATERAL MASTECTOMY: Primary | ICD-10-CM

## 2023-05-09 DIAGNOSIS — C50.112 MALIGNANT NEOPLASM OF CENTRAL PORTION OF LEFT BREAST IN FEMALE, ESTROGEN RECEPTOR POSITIVE (H): Primary | ICD-10-CM

## 2023-05-09 DIAGNOSIS — Z17.0 MALIGNANT NEOPLASM OF CENTRAL PORTION OF LEFT BREAST IN FEMALE, ESTROGEN RECEPTOR POSITIVE (H): Primary | ICD-10-CM

## 2023-05-09 PROCEDURE — G0463 HOSPITAL OUTPT CLINIC VISIT: HCPCS | Performed by: PLASTIC SURGERY

## 2023-05-09 PROCEDURE — 99215 OFFICE O/P EST HI 40 MIN: CPT | Performed by: PLASTIC SURGERY

## 2023-05-09 RX ORDER — CEFAZOLIN SODIUM 2 G/50ML
2 SOLUTION INTRAVENOUS
Status: CANCELLED | OUTPATIENT
Start: 2023-05-09

## 2023-05-09 RX ORDER — CEFAZOLIN SODIUM 2 G/50ML
2 SOLUTION INTRAVENOUS SEE ADMIN INSTRUCTIONS
Status: CANCELLED | OUTPATIENT
Start: 2023-05-09

## 2023-05-09 RX ORDER — ENOXAPARIN SODIUM 100 MG/ML
40 INJECTION SUBCUTANEOUS
Status: CANCELLED | OUTPATIENT
Start: 2023-05-09

## 2023-05-09 ASSESSMENT — PAIN SCALES - GENERAL: PAINLEVEL: MILD PAIN (3)

## 2023-05-09 NOTE — LETTER
5/9/2023         RE: Zainab Bolton  [id number] [address] [address]        Dear Colleague,    Thank you for referring your patient, Zainab Bolton, to the [facility]. Please see a copy of my visit note below.    Service Date: 05/09/2023    CONSULT NOTE    REFERRING PROVIDER:  Basil Aguilar MD    PRESENTING COMPLAINT:  Consultation for breast reconstruction.    HISTORY OF PRESENTING COMPLAINT:  Ms. Bolton is 42 years old.  She has been diagnosed with left-sided breast cancer, is undergoing a bilateral nipple-nonsparing mastectomy and first stage tissue expanders on 05/31/2023 and is interested in stage II BENIGNO flap reconstructions.  She is also BRCA2 gene mutation positive.  The patient wants to be around a B to C cup, around the same size that she currently is.  She is not interested in implant-based reconstruction.  Chances for radiation therapy are low but not zero.    PAST MEDICAL HISTORY:  Anxiety disorder.    PAST SURGICAL HISTORY:  Tubal ligation, laparoscopic LEEP procedure and D&C.    MEDICATIONS:  Sertraline and tamoxifen.    ALLERGIES:  Nil.    SOCIAL HISTORY:  Does not smoke anymore.  Used to smoke about a pack a day for 25 years, quit in 04/2023, so recently quit.  Lives in [place].  Works as an .    REVIEW OF SYSTEMS:  Denies chest pain, shortness of breath, MI, CVA, DVT and PE.    PHYSICAL EXAMINATION:  Vital signs are stable.  She is afebrile, in no obvious distress.  She is 5 feet 4 inches, 140 pounds, BMI 24 kg/m2.  On examination of her breasts, patient has grade 3 ptosis.  She has enough tissue in her abdomen to give her the size match to her chest size currently.  No abdominal hernias.    ASSESSMENT AND PLAN:  Based upon the above findings, a diagnosis of left breast cancer, undergoing bilateral nipple-nonsparing mastectomy, interested in reconstruction with free BENIGNO flaps was made.  I had a magdi detailed  discussion with the patient and her boyfriend in the presence of my nurse, Bren Gandara.  Went over the concept of breast reconstruction, the elective nature of reconstruction, the staged nature of reconstruction, implant-based versus autologous reconstruction, the pros and cons of each.  The patient is not interested in implant-based reconstruction and wants BENIGNO bilateral breast reconstruction.  I think that is very reasonable.  Plan is to do mastectomy and tissue expander-based reconstruction on 2023, then see me back after she has healed.  We will then plan the BENGINO flap sometime in fall.  Went over the concept of a BENIGNO flap, where the scars would be and what to expect.  All risks, benefits and alternatives including pain, infection, bleeding, scarring, asymmetries, seromas, hematomas, wound breakdown, wound dehiscence, loss of flaps, abdominal wall healing issues, abdominal wall weakness, bulges, hernias umbilical sacrifice, requirement of further surgeries in the future, numbness, DVT, PE, MI, CVA, pneumonia, renal failure and death.  She understood them all and wants to proceed.  I look forward to helping her out in the near future.  All her questions were answered.  She was happy with the visit.    Total time spent in the encounter today, including chart review, visit itself and post-visit paperwork, was 60 minutes.    HILARIO Aaron MD    cc:  Basil Aguilar MD  [facility] - Plastic Surgery  [facility], [address]  [place], [place]  [address] Jd Aaron MD        D: 2023   T: 2023   MT: Marcy    Name:     RAJ KOVACS  MRN:      [id number]        Account:      [id number]   :      1980           Service Date: 2023       Document: [id number]

## 2023-05-09 NOTE — NURSING NOTE
"Oncology Rooming Note    May 9, 2023 10:34 AM   Zainab Bolton is a 42 year old female who presents for:    Chief Complaint   Patient presents with     Oncology Clinic Visit     New- BENIGNO consult     Initial Vitals: /82 (BP Location: Right arm, Patient Position: Sitting, Cuff Size: Adult Regular)   Pulse 82   Temp 99.2  F (37.3  C) (Oral)   Resp 16   Ht 1.61 m (5' 3.39\")   Wt 63.6 kg (140 lb 3.2 oz)   SpO2 95%   BMI 24.53 kg/m   Estimated body mass index is 24.53 kg/m  as calculated from the following:    Height as of this encounter: 1.61 m (5' 3.39\").    Weight as of this encounter: 63.6 kg (140 lb 3.2 oz). Body surface area is 1.69 meters squared.  Mild Pain (3) Comment: Data Unavailable   No LMP recorded.  Allergies reviewed: Yes  Medications reviewed: Yes    Medications: Medication refills not needed today.  Pharmacy name entered into [facility]:    [id number] [place] [address] [facility] - [address]    Clinical concerns: The patient has been having left lower quadrant/left suprapubic pain. She is concerned it may be her ovary.      Shayy Rolon"

## 2023-05-10 RX ORDER — HYDROXYZINE HYDROCHLORIDE 10 MG/1
TABLET, FILM COATED ORAL
Qty: 90 TABLET | Refills: 0 | Status: SHIPPED | OUTPATIENT
Start: 2023-05-10 | End: 2023-06-16

## 2023-05-10 NOTE — PROGRESS NOTES
Service Date: 05/09/2023    CONSULT NOTE    REFERRING PROVIDER:  Basil Aguilar MD    PRESENTING COMPLAINT:  Consultation for breast reconstruction.    HISTORY OF PRESENTING COMPLAINT:  Ms. Bolton is 42 years old.  She has been diagnosed with left-sided breast cancer, is undergoing a bilateral nipple-nonsparing mastectomy and first stage tissue expanders on 05/31/2023 and is interested in stage II BENIGNO flap reconstructions.  She is also BRCA2 gene mutation positive.  The patient wants to be around a B to C cup, around the same size that she currently is.  She is not interested in implant-based reconstruction.  Chances for radiation therapy are low but not zero.    PAST MEDICAL HISTORY:  Anxiety disorder.    PAST SURGICAL HISTORY:  Tubal ligation, laparoscopic LEEP procedure and D&C.    MEDICATIONS:  Sertraline and tamoxifen.    ALLERGIES:  Nil.    SOCIAL HISTORY:  Does not smoke anymore.  Used to smoke about a pack a day for 25 years, quit in 04/2023, so recently quit.  Lives in [place].  Works as an .    REVIEW OF SYSTEMS:  Denies chest pain, shortness of breath, MI, CVA, DVT and PE.    PHYSICAL EXAMINATION:  Vital signs are stable.  She is afebrile, in no obvious distress.  She is 5 feet 4 inches, 140 pounds, BMI 24 kg/m2.  On examination of her breasts, patient has grade 3 ptosis.  She has enough tissue in her abdomen to give her the size match to her chest size currently.  No abdominal hernias.    ASSESSMENT AND PLAN:  Based upon the above findings, a diagnosis of left breast cancer, undergoing bilateral nipple-nonsparing mastectomy, interested in reconstruction with free BENIGNO flaps was made.  I had a magdi detailed discussion with the patient and her boyfriend in the presence of my nurse, Bren Gandara.  Went over the concept of breast reconstruction, the elective nature of reconstruction, the staged nature of reconstruction, implant-based versus autologous reconstruction, the pros  and cons of each.  The patient is not interested in implant-based reconstruction and wants BENIGNO bilateral breast reconstruction.  I think that is very reasonable.  Plan is to do mastectomy and tissue expander-based reconstruction on 2023, then see me back after she has healed.  We will then plan the BENIGNO flap sometime in fall.  Went over the concept of a BENIGNO flap, where the scars would be and what to expect.  All risks, benefits and alternatives including pain, infection, bleeding, scarring, asymmetries, seromas, hematomas, wound breakdown, wound dehiscence, loss of flaps, abdominal wall healing issues, abdominal wall weakness, bulges, hernias umbilical sacrifice, requirement of further surgeries in the future, numbness, DVT, PE, MI, CVA, pneumonia, renal failure and death.  She understood them all and wants to proceed.  I look forward to helping her out in the near future.  All her questions were answered.  She was happy with the visit.    Total time spent in the encounter today, including chart review, visit itself and post-visit paperwork, was 60 minutes.    HILARIO Aaron MD    cc:  Basil Aguilar MD  [facility] - Plastic Surgery  [facility], [address]  [place], [place]  [address] Jd Aaron MD        D: 2023   T: 2023   MT: Marcy    Name:     RAJ KOVACS  MRN:      [id number]        Account:      [id number]   :      1980           Service Date: 2023       Document: [id number]

## 2023-05-11 ENCOUNTER — PATIENT OUTREACH (OUTPATIENT)
Dept: PLASTIC SURGERY | Facility: CLINIC | Age: 43
End: 2023-05-11
Payer: COMMERCIAL

## 2023-05-11 DIAGNOSIS — Z17.0 MALIGNANT NEOPLASM OF CENTRAL PORTION OF LEFT BREAST IN FEMALE, ESTROGEN RECEPTOR POSITIVE (H): Primary | ICD-10-CM

## 2023-05-11 DIAGNOSIS — C50.112 MALIGNANT NEOPLASM OF CENTRAL PORTION OF LEFT BREAST IN FEMALE, ESTROGEN RECEPTOR POSITIVE (H): Primary | ICD-10-CM

## 2023-05-11 NOTE — TELEPHONE ENCOUNTER
meds changed at her last visit in 3/3/23 - she was asked to follow-up in a month.     Please call her and schedule her for an appointment     I will send in 30d    RAMESH Concepcion MD

## 2023-05-17 ENCOUNTER — HOSPITAL ENCOUNTER (OUTPATIENT)
Dept: CT IMAGING | Facility: HOSPITAL | Age: 43
Discharge: HOME | End: 2023-05-17
Attending: PLASTIC SURGERY | Admitting: PLASTIC SURGERY
Payer: COMMERCIAL

## 2023-05-17 DIAGNOSIS — Z17.0 MALIGNANT NEOPLASM OF CENTRAL PORTION OF LEFT BREAST IN FEMALE, ESTROGEN RECEPTOR POSITIVE (H): ICD-10-CM

## 2023-05-17 DIAGNOSIS — C50.112 MALIGNANT NEOPLASM OF CENTRAL PORTION OF LEFT BREAST IN FEMALE, ESTROGEN RECEPTOR POSITIVE (H): ICD-10-CM

## 2023-05-17 LAB
CREAT BLD-MCNC: 0.7 MG/DL (ref 0.6–1.1)
GFR SERPL CREATININE-BSD FRML MDRD: >60 ML/MIN/1.73M2

## 2023-05-17 PROCEDURE — 74174 CTA ABD&PLVS W/CONTRAST: CPT

## 2023-05-17 PROCEDURE — 250N000011 HC RX IP 250 OP 636: Performed by: PLASTIC SURGERY

## 2023-05-17 PROCEDURE — 82565 ASSAY OF CREATININE: CPT

## 2023-05-17 RX ORDER — IOPAMIDOL 755 MG/ML
90 INJECTION, SOLUTION INTRAVASCULAR ONCE
Status: COMPLETED | OUTPATIENT
Start: 2023-05-17 | End: 2023-05-17

## 2023-05-17 RX ADMIN — IOPAMIDOL 90 ML: 755 INJECTION, SOLUTION INTRAVENOUS at 16:37

## 2023-05-19 ENCOUNTER — OFFICE VISIT (OUTPATIENT)
Dept: FAMILY MEDICINE | Facility: CLINIC | Age: 43
End: 2023-05-19
Payer: COMMERCIAL

## 2023-05-19 VITALS
HEART RATE: 70 BPM | OXYGEN SATURATION: 99 % | RESPIRATION RATE: 16 BRPM | TEMPERATURE: 99.3 F | HEIGHT: 63 IN | DIASTOLIC BLOOD PRESSURE: 84 MMHG | WEIGHT: 142 LBS | SYSTOLIC BLOOD PRESSURE: 132 MMHG | BODY MASS INDEX: 25.16 KG/M2

## 2023-05-19 DIAGNOSIS — Z15.01 BRCA2 POSITIVE: ICD-10-CM

## 2023-05-19 DIAGNOSIS — F41.1 GAD (GENERALIZED ANXIETY DISORDER): ICD-10-CM

## 2023-05-19 DIAGNOSIS — Z01.818 PREOP GENERAL PHYSICAL EXAM: Primary | ICD-10-CM

## 2023-05-19 DIAGNOSIS — Z17.0 MALIGNANT NEOPLASM OF CENTRAL PORTION OF LEFT BREAST IN FEMALE, ESTROGEN RECEPTOR POSITIVE (H): ICD-10-CM

## 2023-05-19 DIAGNOSIS — C50.919 MALIGNANT NEOPLASM OF FEMALE BREAST, UNSPECIFIED ESTROGEN RECEPTOR STATUS, UNSPECIFIED LATERALITY, UNSPECIFIED SITE OF BREAST (H): ICD-10-CM

## 2023-05-19 DIAGNOSIS — F41.8 DEPRESSION WITH ANXIETY: ICD-10-CM

## 2023-05-19 DIAGNOSIS — Z15.09 BRCA2 POSITIVE: ICD-10-CM

## 2023-05-19 DIAGNOSIS — C50.112 MALIGNANT NEOPLASM OF CENTRAL PORTION OF LEFT BREAST IN FEMALE, ESTROGEN RECEPTOR POSITIVE (H): ICD-10-CM

## 2023-05-19 LAB
ERYTHROCYTE [DISTWIDTH] IN BLOOD BY AUTOMATED COUNT: 13 % (ref 10–15)
HCT VFR BLD AUTO: 38.5 % (ref 35–47)
HGB BLD-MCNC: 13 G/DL (ref 11.7–15.7)
MCH RBC QN AUTO: 29.6 PG (ref 26.5–33)
MCHC RBC AUTO-ENTMCNC: 33.8 G/DL (ref 31.5–36.5)
MCV RBC AUTO: 88 FL (ref 78–100)
PLATELET # BLD AUTO: 276 10E3/UL (ref 150–450)
RBC # BLD AUTO: 4.39 10E6/UL (ref 3.8–5.2)
WBC # BLD AUTO: 8.5 10E3/UL (ref 4–11)

## 2023-05-19 PROCEDURE — 85027 COMPLETE CBC AUTOMATED: CPT | Performed by: FAMILY MEDICINE

## 2023-05-19 PROCEDURE — 36415 COLL VENOUS BLD VENIPUNCTURE: CPT | Performed by: FAMILY MEDICINE

## 2023-05-19 PROCEDURE — 99214 OFFICE O/P EST MOD 30 MIN: CPT | Performed by: FAMILY MEDICINE

## 2023-05-19 RX ORDER — SERTRALINE HYDROCHLORIDE 100 MG/1
100 TABLET, FILM COATED ORAL DAILY
Qty: 90 TABLET | Refills: 1 | Status: SHIPPED | OUTPATIENT
Start: 2023-05-19 | End: 2023-12-19

## 2023-05-19 NOTE — PROGRESS NOTES
[facility]  [address] [facility] [address]  Phone: [phone]  Primary Provider: Joaquin Concepcion  Pre-op Performing Provider: JOAQUIN CONCEPCION      PREOPERATIVE EVALUATION:  Today's date: 5/19/2023    Zainab Bolton is a 42 year old female who presents for a preoperative evaluation.      Surgical Information:  Surgery/Procedure:BILATERAL MASTECTOMIES, LEFT SENTINEL LYMPH NODE BIOPSY,  RECONSTRUCTION, BREAST, IMMEDIATE OR DELAYED, WITH PERMANENT BREAST IMPLANT  Surgery Location: [facility]  Surgeon: Dr. Kylee Villasenor, Dr. Basil Aguilar  Surgery Date: 5/31/2023  Time of Surgery: 1:00 pm  Where patient plans to recover: Other: Will be spending night in hospital release  Fax number for surgical facility: Note does not need to be faxed, will be available electronically in Epic.    Assessment & Plan     The proposed surgical procedure is considered INTERMEDIATE risk.    Preop general physical exam      Malignant neoplasm of female breast, unspecified estrogen receptor status, unspecified laterality, unspecified site of breast (H)      BRCA2 positive    Depression with anxiety  GEOVANY (generalized anxiety disorder)  Doing well on the zoloft. It has helped a lot with anxiety   - sertraline (ZOLOFT) 100 MG tablet; Take 1 tablet (100 mg) by mouth daily  - CBC with platelets; Future          - No identified additional risk factors other than previously addressed    Antiplatelet or Anticoagulation Medication Instructions:   - Patient is on no antiplatelet or anticoagulation medications.    Additional Medication Instructions:  Patient is to take all scheduled medications on the day of surgery    RECOMMENDATION:  APPROVAL GIVEN to proceed with proposed procedure, without further diagnostic evaluation.      Subjective       HPI related to upcoming procedure: breast cancer with BRCA2 gene        5/13/2023     2:49 AM   Preop Questions   1. Have you ever had a heart  attack or stroke? No   2. Have you ever had surgery on your heart or blood vessels, such as a stent placement, a coronary artery bypass, or surgery on an artery in your head, neck, heart, or legs? No   3. Do you have chest pain with activity? No   4. Do you have a history of  heart failure? No   5. Do you currently have a cold, bronchitis or symptoms of other infection? No   6. Do you have a cough, shortness of breath, or wheezing? No   7. Do you or anyone in your family have previous history of blood clots? No   8. Do you or does anyone in your family have a serious bleeding problem such as prolonged bleeding following surgeries or cuts? No   9. Have you ever had problems with anemia or been told to take iron pills? No   10. Have you had any abnormal blood loss such as black, tarry or bloody stools, or abnormal vaginal bleeding? No   11. Have you ever had a blood transfusion? No   12. Are you willing to have a blood transfusion if it is medically needed before, during, or after your surgery? Yes   13. Have you or any of your relatives ever had problems with anesthesia? No   14. Do you have sleep apnea, excessive snoring or daytime drowsiness? Snores. Hasn't done a sleep study    15. Do you have any artifical heart valves or other implanted medical devices like a pacemaker, defibrillator, or continuous glucose monitor? No   16. Do you have artificial joints? No   17. Are you allergic to latex? No   18. Is there any chance that you may be pregnant? No       Health Care Directive:  Patient does not have a Health Care Directive or Living Will: Discussed advance care planning with patient; information given to patient to review.    Preoperative Review of :   reviewed - no record of controlled substances prescribed.      Status of Chronic Conditions:  See problem list for active medical problems.  Problems all longstanding and stable, except as noted/documented.  See ROS for pertinent symptoms related to these  conditions.      Review of Systems  CONSTITUTIONAL: NEGATIVE for fever, chills, change in weight  INTEGUMENTARY/SKIN: NEGATIVE for worrisome rashes, moles or lesions  EYES: NEGATIVE for vision changes or irritation  ENT/MOUTH: NEGATIVE for ear, mouth and throat problems  RESP: NEGATIVE for significant cough or SOB  CV: NEGATIVE for chest pain, palpitations or peripheral edema  GI: NEGATIVE for nausea, abdominal pain, heartburn, or change in bowel habits  : NEGATIVE for frequency, dysuria, or hematuria  MUSCULOSKELETAL: NEGATIVE for significant arthralgias or myalgia  NEURO: NEGATIVE for weakness, dizziness or paresthesias  ENDOCRINE: NEGATIVE for temperature intolerance, skin/hair changes  HEME: NEGATIVE for bleeding problems  PSYCHIATRIC: NEGATIVE for changes in mood or affect    Patient Active Problem List    Diagnosis Date Noted     BRCA2 positive 05/19/2023     Priority: Medium     Malignant neoplasm of central portion of left breast in female, estrogen receptor positive (H) 05/19/2023     Priority: Medium     GEOVANY (generalized anxiety disorder) 02/03/2023     Priority: Medium     High risk human papillomavirus infection 06/22/2016     Priority: Medium     Papanicolaou smear of cervix with low grade squamous intraepithelial lesion (LGSIL) 06/22/2016     Priority: Medium     S/P LEEP of cervix 07/16/2015     Priority: Medium     5/5/15 LSIL, + HR HPV 16 and other. Plan: colposcopy  6/19/15 Colpo LÁZARO 2. Plan LEEP  7/16/15 LEEP LÁZARO 1/2. Unsure of margin. Plan: repeat pap in 6 months.   5/11/16 LSIL, +HR HPV other. Plan: Colposcopy.   6/22/16 [place] bx benign. Plan: cotest in 6 mo  6/27/17 NIL pap, neg HPV. Plan: cotest in 1 year.  1/25/19 Lost to follow-up for pap tracking  3/7/19 NIL pap, neg HR HPV. Plan: Cotest in 3 years  2/3/23 NIL pap, neg HPV. Plan: cotest every 3 years         Hypercholesteremia 08/16/2010     Priority: Medium     Depression with anxiety 03/21/2001     Priority: Medium     with anxiety         Past Medical History:   Diagnosis Date     Anxiety 08/16/2010     Anxiety state, unspecified 1997    chronic anxiety     Essential hypertension 3/3/2023     Hypercholesteremia 08/16/2010     Obesity 08/16/2010     Other acne      Papanicolaou smear of cervix with low grade squamous intraepithelial lesion (LGSIL) (aka LSIL) 05/05/2015    LSIL/+ HR HPV     TOBACCO ABUSE-CONTINUOUS      Past Surgical History:   Procedure Laterality Date     BIOPSY       CONIZATION LEEP  07/16/2015    LÁZARO 2     CONIZATION LEEP N/A 07/16/2015    Procedure: CONIZATION LEEP;  Surgeon: Omayra Cunningham DO;  Location: [facility] OR     EYE SURGERY  1/5/2016     Z LIGATE FALLOPIAN TUBE  10/03/2003     Current Outpatient Medications   Medication Sig Dispense Refill     docusate sodium (COLACE) 100 MG capsule Take 1 capsule (100 mg) by mouth 2 times daily 20 capsule 0     hydrOXYzine (ATARAX) 10 MG tablet TAKE 1 TABLET BY MOUTH 3 TIMES DAILY AS NEEDED FOR ANXIETY 90 tablet 0     sertraline (ZOLOFT) 100 MG tablet Take 1 tablet (100 mg) by mouth daily 90 tablet 1     sertraline (ZOLOFT) 50 MG tablet TAKE 2 TABLETS BY MOUTH DAILY 60 tablet 0     tamoxifen (NOLVADEX) 20 MG tablet Take 1 tablet (20 mg) by mouth daily 30 tablet 1     chlorhexidine (HIBICLENS) 4 % liquid Apply topically daily as needed for wound care Please wash your chest including bilateral nipples, inframammary folds and armpits.  Begin the washings with the Hibiclens 7 days prior to your scheduled surgery date and continue daily, including the morning of your surgery date. (Patient not taking: Reported on 5/9/2023) 946 mL 0     ondansetron (ZOFRAN) 4 MG tablet Take 1 tablet (4 mg) by mouth every 6 hours as needed for nausea (Patient not taking: Reported on 5/9/2023) 16 tablet 0       Allergies   Allergen Reactions     No Known Drug Allergy         Social History     Tobacco Use     Smoking status: Former     Packs/day: 1.00     Years: 10.00     Pack years: 10.00     Types:  "Cigarettes     Quit date: 3/28/2023     Years since quittin.1     Smokeless tobacco: Never     Tobacco comments:     since age 12, but stopped with each baby   Vaping Use     Vaping status: Not on file   Substance Use Topics     Alcohol use: Yes     Comment: 1-2  every 2-3 months if any         Objective     /84   Pulse 70   Temp 99.3  F (37.4  C) (Tympanic)   Resp 16   Ht 1.61 m (5' 3.39\")   Wt 64.4 kg (142 lb)   SpO2 99%   BMI 24.85 kg/m      Physical Exam    GENERAL APPEARANCE: healthy, alert and no distress     EYES: EOMI, PERRL     HENT: ear canals and TM's normal and nose and mouth without ulcers or lesions     NECK: no adenopathy, no asymmetry, masses, or scars and thyroid normal to palpation     RESP: lungs clear to auscultation - no rales, rhonchi or wheezes     CV: regular rates and rhythm, normal S1 S2, no S3 or S4 and no murmur, click or rub     ABDOMEN:  soft, nontender, no HSM or masses and bowel sounds normal     MS: extremities normal- no gross deformities noted, no evidence of inflammation in joints, FROM in all extremities.     SKIN: no suspicious lesions or rashes     NEURO: Normal strength and tone, sensory exam grossly normal, mentation intact and speech normal     PSYCH: mentation appears normal. and affect normal/bright     LYMPHATICS: No cervical adenopathy    Recent Labs   Lab Test 23  1610   CR 0.7        Diagnostics:  Recent Results (from the past 24 hour(s))   CBC with platelets    Collection Time: 23  2:47 PM   Result Value Ref Range    WBC Count 8.5 4.0 - 11.0 10e3/uL    RBC Count 4.39 3.80 - 5.20 10e6/uL    Hemoglobin 13.0 11.7 - 15.7 g/dL    Hematocrit 38.5 35.0 - 47.0 %    MCV 88 78 - 100 fL    MCH 29.6 26.5 - 33.0 pg    MCHC 33.8 31.5 - 36.5 g/dL    RDW 13.0 10.0 - 15.0 %    Platelet Count 276 150 - 450 10e3/uL      No EKG required, no history of coronary heart disease, significant arrhythmia, peripheral arterial disease or other structural heart " disease.    Revised Cardiac Risk Index (RCRI):  The patient has the following serious cardiovascular risks for perioperative complications:   - No serious cardiac risks = 0 points     RCRI Interpretation: 0 points: Class I (very low risk - 0.4% complication rate)           Signed Electronically by: Jessi Concepcion MD  Copy of this evaluation report is provided to requesting physician.

## 2023-05-19 NOTE — H&P (VIEW-ONLY)
[facility]  [address] [address] [place] [address]  Phone: [phone]  Primary Provider: Joaquin Concepcion  Pre-op Performing Provider: JOAQUIN CONCEPCION      PREOPERATIVE EVALUATION:  Today's date: 5/19/2023    Zainab Bolton is a 42 year old female who presents for a preoperative evaluation.      Surgical Information:  Surgery/Procedure:BILATERAL MASTECTOMIES, LEFT SENTINEL LYMPH NODE BIOPSY,  RECONSTRUCTION, BREAST, IMMEDIATE OR DELAYED, WITH PERMANENT BREAST IMPLANT  Surgery Location: [facility]  Surgeon: Dr. Kylee Villasenor, Dr. Basil Aguilar  Surgery Date: 5/31/2023  Time of Surgery: 1:00 pm  Where patient plans to recover: Other: Will be spending night in hospital release  Fax number for surgical facility: Note does not need to be faxed, will be available electronically in Epic.    Assessment & Plan     The proposed surgical procedure is considered INTERMEDIATE risk.    Preop general physical exam      Malignant neoplasm of female breast, unspecified estrogen receptor status, unspecified laterality, unspecified site of breast (H)      BRCA2 positive    Depression with anxiety  GEOVANY (generalized anxiety disorder)  Doing well on the zoloft. It has helped a lot with anxiety   - sertraline (ZOLOFT) 100 MG tablet; Take 1 tablet (100 mg) by mouth daily  - CBC with platelets; Future          - No identified additional risk factors other than previously addressed    Antiplatelet or Anticoagulation Medication Instructions:   - Patient is on no antiplatelet or anticoagulation medications.    Additional Medication Instructions:  Patient is to take all scheduled medications on the day of surgery    RECOMMENDATION:  APPROVAL GIVEN to proceed with proposed procedure, without further diagnostic evaluation.      Subjective       HPI related to upcoming procedure: breast cancer with BRCA2 gene        5/13/2023     2:49 AM   Preop Questions   1. Have you ever had a heart  attack or stroke? No   2. Have you ever had surgery on your heart or blood vessels, such as a stent placement, a coronary artery bypass, or surgery on an artery in your head, neck, heart, or legs? No   3. Do you have chest pain with activity? No   4. Do you have a history of  heart failure? No   5. Do you currently have a cold, bronchitis or symptoms of other infection? No   6. Do you have a cough, shortness of breath, or wheezing? No   7. Do you or anyone in your family have previous history of blood clots? No   8. Do you or does anyone in your family have a serious bleeding problem such as prolonged bleeding following surgeries or cuts? No   9. Have you ever had problems with anemia or been told to take iron pills? No   10. Have you had any abnormal blood loss such as black, tarry or bloody stools, or abnormal vaginal bleeding? No   11. Have you ever had a blood transfusion? No   12. Are you willing to have a blood transfusion if it is medically needed before, during, or after your surgery? Yes   13. Have you or any of your relatives ever had problems with anesthesia? No   14. Do you have sleep apnea, excessive snoring or daytime drowsiness? Snores. Hasn't done a sleep study    15. Do you have any artifical heart valves or other implanted medical devices like a pacemaker, defibrillator, or continuous glucose monitor? No   16. Do you have artificial joints? No   17. Are you allergic to latex? No   18. Is there any chance that you may be pregnant? No       Health Care Directive:  Patient does not have a Health Care Directive or Living Will: Discussed advance care planning with patient; information given to patient to review.    Preoperative Review of :   reviewed - no record of controlled substances prescribed.      Status of Chronic Conditions:  See problem list for active medical problems.  Problems all longstanding and stable, except as noted/documented.  See ROS for pertinent symptoms related to these  conditions.      Review of Systems  CONSTITUTIONAL: NEGATIVE for fever, chills, change in weight  INTEGUMENTARY/SKIN: NEGATIVE for worrisome rashes, moles or lesions  EYES: NEGATIVE for vision changes or irritation  ENT/MOUTH: NEGATIVE for ear, mouth and throat problems  RESP: NEGATIVE for significant cough or SOB  CV: NEGATIVE for chest pain, palpitations or peripheral edema  GI: NEGATIVE for nausea, abdominal pain, heartburn, or change in bowel habits  : NEGATIVE for frequency, dysuria, or hematuria  MUSCULOSKELETAL: NEGATIVE for significant arthralgias or myalgia  NEURO: NEGATIVE for weakness, dizziness or paresthesias  ENDOCRINE: NEGATIVE for temperature intolerance, skin/hair changes  HEME: NEGATIVE for bleeding problems  PSYCHIATRIC: NEGATIVE for changes in mood or affect    Patient Active Problem List    Diagnosis Date Noted     BRCA2 positive 05/19/2023     Priority: Medium     Malignant neoplasm of central portion of left breast in female, estrogen receptor positive (H) 05/19/2023     Priority: Medium     GEOVANY (generalized anxiety disorder) 02/03/2023     Priority: Medium     High risk human papillomavirus infection 06/22/2016     Priority: Medium     Papanicolaou smear of cervix with low grade squamous intraepithelial lesion (LGSIL) 06/22/2016     Priority: Medium     S/P LEEP of cervix 07/16/2015     Priority: Medium     5/5/15 LSIL, + HR HPV 16 and other. Plan: colposcopy  6/19/15 Colpo LÁZARO 2. Plan LEEP  7/16/15 LEEP LÁZARO 1/2. Unsure of margin. Plan: repeat pap in 6 months.   5/11/16 LSIL, +HR HPV other. Plan: Colposcopy.   6/22/16 [place] bx benign. Plan: cotest in 6 mo  6/27/17 NIL pap, neg HPV. Plan: cotest in 1 year.  1/25/19 Lost to follow-up for pap tracking  3/7/19 NIL pap, neg HR HPV. Plan: Cotest in 3 years  2/3/23 NIL pap, neg HPV. Plan: cotest every 3 years         Hypercholesteremia 08/16/2010     Priority: Medium     Depression with anxiety 03/21/2001     Priority: Medium     with anxiety         Past Medical History:   Diagnosis Date     Anxiety 08/16/2010     Anxiety state, unspecified 1997    chronic anxiety     Essential hypertension 3/3/2023     Hypercholesteremia 08/16/2010     Obesity 08/16/2010     Other acne      Papanicolaou smear of cervix with low grade squamous intraepithelial lesion (LGSIL) (aka LSIL) 05/05/2015    LSIL/+ HR HPV     TOBACCO ABUSE-CONTINUOUS      Past Surgical History:   Procedure Laterality Date     BIOPSY       CONIZATION LEEP  07/16/2015    LÁZARO 2     CONIZATION LEEP N/A 07/16/2015    Procedure: CONIZATION LEEP;  Surgeon: Omayra Cunningham DO;  Location: [facility] OR     EYE SURGERY  1/5/2016     Z LIGATE FALLOPIAN TUBE  10/03/2003     Current Outpatient Medications   Medication Sig Dispense Refill     docusate sodium (COLACE) 100 MG capsule Take 1 capsule (100 mg) by mouth 2 times daily 20 capsule 0     hydrOXYzine (ATARAX) 10 MG tablet TAKE 1 TABLET BY MOUTH 3 TIMES DAILY AS NEEDED FOR ANXIETY 90 tablet 0     sertraline (ZOLOFT) 100 MG tablet Take 1 tablet (100 mg) by mouth daily 90 tablet 1     sertraline (ZOLOFT) 50 MG tablet TAKE 2 TABLETS BY MOUTH DAILY 60 tablet 0     tamoxifen (NOLVADEX) 20 MG tablet Take 1 tablet (20 mg) by mouth daily 30 tablet 1     chlorhexidine (HIBICLENS) 4 % liquid Apply topically daily as needed for wound care Please wash your chest including bilateral nipples, inframammary folds and armpits.  Begin the washings with the Hibiclens 7 days prior to your scheduled surgery date and continue daily, including the morning of your surgery date. (Patient not taking: Reported on 5/9/2023) 946 mL 0     ondansetron (ZOFRAN) 4 MG tablet Take 1 tablet (4 mg) by mouth every 6 hours as needed for nausea (Patient not taking: Reported on 5/9/2023) 16 tablet 0       Allergies   Allergen Reactions     No Known Drug Allergy         Social History     Tobacco Use     Smoking status: Former     Packs/day: 1.00     Years: 10.00     Pack years: 10.00     Types:  "Cigarettes     Quit date: 3/28/2023     Years since quittin.1     Smokeless tobacco: Never     Tobacco comments:     since age 12, but stopped with each baby   Vaping Use     Vaping status: Not on file   Substance Use Topics     Alcohol use: Yes     Comment: 1-2  every 2-3 months if any         Objective     /84   Pulse 70   Temp 99.3  F (37.4  C) (Tympanic)   Resp 16   Ht 1.61 m (5' 3.39\")   Wt 64.4 kg (142 lb)   SpO2 99%   BMI 24.85 kg/m      Physical Exam    GENERAL APPEARANCE: healthy, alert and no distress     EYES: EOMI, PERRL     HENT: ear canals and TM's normal and nose and mouth without ulcers or lesions     NECK: no adenopathy, no asymmetry, masses, or scars and thyroid normal to palpation     RESP: lungs clear to auscultation - no rales, rhonchi or wheezes     CV: regular rates and rhythm, normal S1 S2, no S3 or S4 and no murmur, click or rub     ABDOMEN:  soft, nontender, no HSM or masses and bowel sounds normal     MS: extremities normal- no gross deformities noted, no evidence of inflammation in joints, FROM in all extremities.     SKIN: no suspicious lesions or rashes     NEURO: Normal strength and tone, sensory exam grossly normal, mentation intact and speech normal     PSYCH: mentation appears normal. and affect normal/bright     LYMPHATICS: No cervical adenopathy    Recent Labs   Lab Test 23  1610   CR 0.7        Diagnostics:  Recent Results (from the past 24 hour(s))   CBC with platelets    Collection Time: 23  2:47 PM   Result Value Ref Range    WBC Count 8.5 4.0 - 11.0 10e3/uL    RBC Count 4.39 3.80 - 5.20 10e6/uL    Hemoglobin 13.0 11.7 - 15.7 g/dL    Hematocrit 38.5 35.0 - 47.0 %    MCV 88 78 - 100 fL    MCH 29.6 26.5 - 33.0 pg    MCHC 33.8 31.5 - 36.5 g/dL    RDW 13.0 10.0 - 15.0 %    Platelet Count 276 150 - 450 10e3/uL      No EKG required, no history of coronary heart disease, significant arrhythmia, peripheral arterial disease or other structural heart " disease.    Revised Cardiac Risk Index (RCRI):  The patient has the following serious cardiovascular risks for perioperative complications:   - No serious cardiac risks = 0 points     RCRI Interpretation: 0 points: Class I (very low risk - 0.4% complication rate)           Signed Electronically by: Jessi Concepcion MD  Copy of this evaluation report is provided to requesting physician.

## 2023-05-19 NOTE — PATIENT INSTRUCTIONS
For informational purposes only. Not to replace the advice of your health care provider. Copyright   2003,  [facility]. All rights reserved. Clinically reviewed by Rozina Cross MD. [employer] [id number] - REV .  Preparing for Your Surgery  Getting started  A nurse will call you to review your health history and instructions. They will give you an arrival time based on your scheduled surgery time. Please be ready to share:  Your doctor's clinic name and phone number  Your medical, surgical, and anesthesia history  A list of allergies and sensitivities  A list of medicines, including herbal treatments and over-the-counter drugs  Whether the patient has a legal guardian (ask how to send us the papers in advance)  Please tell us if you're pregnant--or if there's any chance you might be pregnant. Some surgeries may injure a fetus (unborn baby), so they require a pregnancy test. Surgeries that are safe for a fetus don't always need a test, and you can choose whether to have one.   If you have a child who's having surgery, please ask for a copy of Preparing for Your Child's Surgery.    Preparing for surgery  Within 10 to 30 days of surgery: Have a pre-op exam (sometimes called an H&P, or History and Physical). This can be done at a clinic or pre-operative center.  If you're having a , you may not need this exam. Talk to your care team.  At your pre-op exam, talk to your care team about all medicines you take. If you need to stop any medicines before surgery, ask when to start taking them again.  We do this for your safety. Many medicines can make you bleed too much during surgery. Some change how well surgery (anesthesia) drugs work.  Call your insurance company to let them know you're having surgery. (If you don't have insurance, call [phone].)  Call your clinic if there's any change in your health. This includes signs of a cold or flu (sore throat, runny nose, cough, rash, fever). It  also includes a scrape or scratch near the surgery site.  If you have questions on the day of surgery, call your hospital or surgery center.  Eating and drinking guidelines  For your safety: Unless your surgeon tells you otherwise, follow the guidelines below.  Eat and drink as usual until 8 hours before you arrive for surgery. After that, no food or milk.  Drink clear liquids until 2 hours before you arrive. These are liquids you can see through, like water, Gatorade, and Propel Water. They also include plain black coffee and tea (no cream or milk), candy, and breath mints. You can spit out gum when you arrive.  If you drink alcohol: Stop drinking it the night before surgery.  If your care team tells you to take medicine on the morning of surgery, it's okay to take it with a sip of water.  Preventing infection  Shower or bathe the night before and morning of your surgery. Follow the instructions your clinic gave you. (If no instructions, use regular soap.)  Don't shave or clip hair near your surgery site. We'll remove the hair if needed.  Don't smoke or vape the morning of surgery. You may chew nicotine gum up to 2 hours before surgery. A nicotine patch is okay.  Note: Some surgeries require you to completely quit smoking and nicotine. Check with your surgeon.  Your care team will make every effort to keep you safe from infection. We will:  Clean our hands often with soap and water (or an alcohol-based hand rub).  Clean the skin at your surgery site with a special soap that kills germs.  Give you a special gown to keep you warm. (Cold raises the risk of infection.)  Wear special hair covers, masks, gowns and gloves during surgery.  Give antibiotic medicine, if prescribed. Not all surgeries need antibiotics.  What to bring on the day of surgery  Photo ID and insurance card  Copy of your health care directive, if you have one  Glasses and hearing aids (bring cases)  You can't wear contacts during surgery  Inhaler and  eye drops, if you use them (tell us about these when you arrive)  CPAP machine or breathing device, if you use them  A few personal items, if spending the night  If you have . . .  A pacemaker, ICD (cardiac defibrillator) or other implant: Bring the ID card.  An implanted stimulator: Bring the remote control.  A legal guardian: Bring a copy of the certified (court-stamped) guardianship papers.  Please remove any jewelry, including body piercings. Leave jewelry and other valuables at home.  If you're going home the day of surgery  You must have a responsible adult drive you home. They should stay with you overnight as well.  If you don't have someone to stay with you, and you aren't safe to go home alone, we may keep you overnight. Insurance often won't pay for this.  After surgery  If it's hard to control your pain or you need more pain medicine, please call your surgeon's office.  Questions?   If you have any questions for your care team, list them here: _________________________________________________________________________________________________________________________________________________________________________ ____________________________________ ____________________________________ ____________________________________    How to Take Your Medication Before Surgery  - Take all of your medications before surgery as usual    For informational purposes only. Not to replace the advice of your health care provider. Copyright   2003, 2019 [employer]. All rights reserved. Clinically reviewed by Rozina Cross MD. [employer] [id number] - REV 12/22.  Preparing for Your Surgery  Getting started  A nurse will call you to review your health history and instructions. They will give you an arrival time based on your scheduled surgery time. Please be ready to share:  Your doctor's clinic name and phone number  Your medical, surgical, and anesthesia history  A list of allergies and sensitivities  A list of  medicines, including herbal treatments and over-the-counter drugs  Whether the patient has a legal guardian (ask how to send us the papers in advance)  Please tell us if you're pregnant--or if there's any chance you might be pregnant. Some surgeries may injure a fetus (unborn baby), so they require a pregnancy test. Surgeries that are safe for a fetus don't always need a test, and you can choose whether to have one.   If you have a child who's having surgery, please ask for a copy of Preparing for Your Child's Surgery.    Preparing for surgery  Within 10 to 30 days of surgery: Have a pre-op exam (sometimes called an H&P, or History and Physical). This can be done at a clinic or pre-operative center.  If you're having a , you may not need this exam. Talk to your care team.  At your pre-op exam, talk to your care team about all medicines you take. If you need to stop any medicines before surgery, ask when to start taking them again.  We do this for your safety. Many medicines can make you bleed too much during surgery. Some change how well surgery (anesthesia) drugs work.  Call your insurance company to let them know you're having surgery. (If you don't have insurance, call [phone].)  Call your clinic if there's any change in your health. This includes signs of a cold or flu (sore throat, runny nose, cough, rash, fever). It also includes a scrape or scratch near the surgery site.  If you have questions on the day of surgery, call your hospital or surgery center.  Eating and drinking guidelines  For your safety: Unless your surgeon tells you otherwise, follow the guidelines below.  Eat and drink as usual until 8 hours before you arrive for surgery. After that, no food or milk.  Drink clear liquids until 2 hours before you arrive. These are liquids you can see through, like water, Gatorade, and Propel Water. They also include plain black coffee and tea (no cream or milk), candy, and breath mints. You can  spit out gum when you arrive.  If you drink alcohol: Stop drinking it the night before surgery.  If your care team tells you to take medicine on the morning of surgery, it's okay to take it with a sip of water.  Preventing infection  Shower or bathe the night before and morning of your surgery. Follow the instructions your clinic gave you. (If no instructions, use regular soap.)  Don't shave or clip hair near your surgery site. We'll remove the hair if needed.  Don't smoke or vape the morning of surgery. You may chew nicotine gum up to 2 hours before surgery. A nicotine patch is okay.  Note: Some surgeries require you to completely quit smoking and nicotine. Check with your surgeon.  Your care team will make every effort to keep you safe from infection. We will:  Clean our hands often with soap and water (or an alcohol-based hand rub).  Clean the skin at your surgery site with a special soap that kills germs.  Give you a special gown to keep you warm. (Cold raises the risk of infection.)  Wear special hair covers, masks, gowns and gloves during surgery.  Give antibiotic medicine, if prescribed. Not all surgeries need antibiotics.  What to bring on the day of surgery  Photo ID and insurance card  Copy of your health care directive, if you have one  Glasses and hearing aids (bring cases)  You can't wear contacts during surgery  Inhaler and eye drops, if you use them (tell us about these when you arrive)  CPAP machine or breathing device, if you use them  A few personal items, if spending the night  If you have . . .  A pacemaker, ICD (cardiac defibrillator) or other implant: Bring the ID card.  An implanted stimulator: Bring the remote control.  A legal guardian: Bring a copy of the certified (court-stamped) guardianship papers.  Please remove any jewelry, including body piercings. Leave jewelry and other valuables at home.  If you're going home the day of surgery  You must have a responsible adult drive you home.  They should stay with you overnight as well.  If you don't have someone to stay with you, and you aren't safe to go home alone, we may keep you overnight. Insurance often won't pay for this.  After surgery  If it's hard to control your pain or you need more pain medicine, please call your surgeon's office.  Questions?   If you have any questions for your care team, list them here: _________________________________________________________________________________________________________________________________________________________________________ ____________________________________ ____________________________________ ____________________________________    How to Take Your Medication Before Surgery  - Take all of your medications before surgery as usual

## 2023-05-26 ENCOUNTER — VIRTUAL VISIT (OUTPATIENT)
Dept: ONCOLOGY | Facility: CLINIC | Age: 43
End: 2023-05-26
Attending: GENETIC COUNSELOR, MS
Payer: COMMERCIAL

## 2023-05-26 DIAGNOSIS — Z15.01 BRCA2 GENE MUTATION POSITIVE: Primary | ICD-10-CM

## 2023-05-26 DIAGNOSIS — Z84.81 FAMILY HISTORY OF CARRIER OF GENETIC DISEASE: ICD-10-CM

## 2023-05-26 DIAGNOSIS — Z80.0 FAMILY HISTORY OF MALIGNANT NEOPLASM OF STOMACH: ICD-10-CM

## 2023-05-26 DIAGNOSIS — Z15.09 BRCA2 GENE MUTATION POSITIVE: Primary | ICD-10-CM

## 2023-05-26 DIAGNOSIS — C50.412 MALIGNANT NEOPLASM OF UPPER-OUTER QUADRANT OF LEFT BREAST IN FEMALE, ESTROGEN RECEPTOR POSITIVE (H): ICD-10-CM

## 2023-05-26 DIAGNOSIS — Z17.0 MALIGNANT NEOPLASM OF UPPER-OUTER QUADRANT OF LEFT BREAST IN FEMALE, ESTROGEN RECEPTOR POSITIVE (H): ICD-10-CM

## 2023-05-26 PROCEDURE — 96040 HC GENETIC COUNSELING, EACH 30 MINUTES: CPT | Mod: GT,95 | Performed by: GENETIC COUNSELOR, MS

## 2023-05-26 NOTE — Clinical Note
"    5/26/2023         RE: Zainab Bolton  [address] [address]        Dear Colleague,    Thank you for referring your patient, Zainab Bolton, to the [facility]. Please see a copy of my visit note below.    Virtual Visit Details    Type of service:  Video Visit     Originating Location (pt. Location): {video visit patient location:143339::\"Home\"}  {PROVIDER LOCATION On-site should be selected for visits conducted from your clinic location or adjoining [facility] hospital, academic office, or other nearby [facility] building. Off-site should be selected for all other provider locations, including home:192441}  Distant Location (provider location):  {virtual location provider:441803}  Platform used for Video Visit: {Virtual Visit Platforms:662494::\"AmWell\"}    ***already knew about BRCA2 mutation, negative Comp 40***    Virtual Visit Details    Type of service:  Video Visit     Originating Location (pt. Location): Home  Distant Location (provider location):  Off-site  Platform used for Video Visit: CueSongs      Again, thank you for allowing me to participate in the care of your patient.        Sincerely,        Froilan Cuevas GC  "

## 2023-05-26 NOTE — NURSING NOTE
Is the patient currently in the state of [place]? YES    Visit mode:VIDEO    If the visit is dropped, the patient can be reconnected by: VIDEO VISIT: Text to cell phone: [phone]    Will anyone else be joining the visit? NO      How would you like to obtain your AVS? MyChart    Are changes needed to the allergy or medication list? NO    Reason for visit: RECHECK      Marisol Hopkins VF

## 2023-05-26 NOTE — PROGRESS NOTES
"5/26/2023    Referring Provider: Kylee Villasenor MD    Presenting Information:   I spoke to Zainab by video today to discuss her genetic testing results. Her blood was drawn on 3/28/23. The Comprehensive 40 Cancer panel was ordered from [facility] at [facility]. This testing was done because of Zainab's personal history of breast cancer.     Genetic Testing Results: POSITIVE  Zainab is POSITIVE for a BRCA2 mutation. This was identified on her previous round of genetic testing. Given that the reflex testing for the 29 other genes were negative, there are no other specific screening recommendations for Zainab. She should follow the recommendations outlined in my previous note from 4/21/23 for individuals with a BRCA2 mutation.    Of note, Zainab tested negative for mutations in the APC, LAURA, AXIN2, BAP1, BARD1, BMPR1A, BRCA1, BRIP1, CDH1, CDK4, CDKN2A, CHEK2, DICER1, EPCAM, GREM1, HOXB13, MITF, MLH1, MRE11A, MSH2, MSH3, MSH6, MUTYH, NBN, NF1, NTHL1, PALB2, PMS2, POLD1, POLE, POT1, PTEN, RAD50, RAD51C, RAD51D, SMAD4, SMARCA4, STK11, and TP53 gene. No mutations were found in the other 39 genes analyzed. We reviewed the autosomal dominant inheritance of these genes. Zainab cannot pass on a mutation in any of these genes to her children based on this test result. Mutations in these genes do not skip generations. We reviewed the autosomal dominant inheritance of these genes. Zainab cannot pass on a mutation in any of these genes to her children based on this test result. Mutations in these genes do not skip generations.      A copy of the test report can be found in the Laboratory tab, dated 4/24/23, and named \"Next generation sequencing\".     Zainab had a few other questions regarding her BRCA2 mutation, next steps in care, implications for other family members, and current research. All of her questions were answered to her satisfaction.    If Zainab has additional questions, I " encouraged her to contact me directly via Visualnest.     Time spent on video: 18 minutes    Froilan Cuevas MS, [facility]  Licensed, Certified Genetic Counselor       Virtual Visit Details    Type of service:  Video Visit     Originating Location (pt. Location): Home  Distant Location (provider location):  Off-site  Platform used for Video Visit: PeterWell

## 2023-05-26 NOTE — PROGRESS NOTES
After analyzing once again this patient's history and reviewing her preoperative pictures, I have decided that she would benefit better of periareolar approach encompassing bilateral nipple areolar complexes for her skin sparing mastectomy.    This patient does have history of nicotine use and she quit about a month ago.  I feel that this exposure to nicotine for so many years is producing a thin skin.  Therefore, I feel that the periareolar technique will be better and it will preserve a more robust skin compared to the Wise pattern originally offered.  Furthermore, I believe that with the periareolar technique there will be less tension in the closure of the mastectomy.    Since there will be more skin with the periareolar technique, I will also bring a tissue expander of 450 cc to have available in top of the 350 cc already ordered.  This tissue expanders are full height extra projection.    In the future, for second stage breast reconstruction, she is already scheduled for a BENIGNO flap with my partner Dr. Aaron.    Basil Aguilar MD , FACS   Diplomate American Board of Plastic Surgery  Diplomate American Board of Surgery  Adj. Assistant Professor of Surgery  Division of Plastic & Reconstructive Surgery   [facility]  Office: [phone]   5/26/2023 at 2:54 PM

## 2023-05-30 ENCOUNTER — OFFICE VISIT (OUTPATIENT)
Dept: PLASTIC SURGERY | Facility: AMBULATORY SURGERY CENTER | Age: 43
End: 2023-05-30
Payer: COMMERCIAL

## 2023-05-30 DIAGNOSIS — Z17.0 MALIGNANT NEOPLASM OF CENTRAL PORTION OF LEFT BREAST IN FEMALE, ESTROGEN RECEPTOR POSITIVE (H): Primary | ICD-10-CM

## 2023-05-30 DIAGNOSIS — C50.112 MALIGNANT NEOPLASM OF CENTRAL PORTION OF LEFT BREAST IN FEMALE, ESTROGEN RECEPTOR POSITIVE (H): Primary | ICD-10-CM

## 2023-05-30 PROCEDURE — 99024 POSTOP FOLLOW-UP VISIT: CPT | Performed by: PLASTIC SURGERY

## 2023-05-30 RX ORDER — MUPIROCIN 20 MG/G
OINTMENT TOPICAL 3 TIMES DAILY
Status: ON HOLD | COMMUNITY
End: 2023-05-31

## 2023-05-30 NOTE — LETTER
[facility] PLASTIC SURGERY CLINIC [place] [address], [address]  [place] [place] [facility] [address]  [phone]          May 30, 2023    RE:  Zainab Bolton                                                                                                                                                       [address] [address]            To whom it may concern:    Zainab Bolton is under my professional care for a surgical procedure on 05/31/2023. She will need 4 weeks off to recover. The patient will be able to return to work earlier if cleared.     When the patient returns to work, the following restrictions apply until 07/12/2023:  A) Bend: Occasionally (1-3 hours)  B) Squat: Occasionally (1-3 hours)  C) Walk/Stand: Occasionally (1-3 hours)  D) Reach Above Shoulders: Occasionally (1-3 hours)  E) Lift, carry, push, and pull no more than:  0-10 lbs / Light Duty       Sincerely,        Basil Aguilar MD

## 2023-05-30 NOTE — LETTER
5/30/2023         RE: Zainab Bolton  [address] [address]        Dear Colleague,    Thank you for referring your patient, Zainab Bolton, to the [facility] PLASTIC SURGERY CLINIC [place]. Please see a copy of my visit note below.    Zainab is a 42 years old lady with history of left breast cancer.  She comes today for preoperative markings.  She is scheduled for bilateral skin sparing mastectomies with immediate first stage breast reconstruction with tissue expanders in the prepectoral space.  The mastectomy design is via periareolar approach.    Markings were performed.  This is how they look like:                Plan: Continue with prophylactic mupirocin ointment on each nostril as well as Hibiclens cleaning of her breasts for the next 24 hours.  Patient is ready for surgery tomorrow in the afternoon.  All questions has been answered.     Basil Aguilar MD , FACS   Diplomate American Board of Plastic Surgery  Diplomate American Board of Surgery  Adj. Assistant Professor of Surgery  Division of Plastic & Reconstructive Surgery   [facility]  Office: [phone]   6/1/2023 at 9:34 AM             Again, thank you for allowing me to participate in the care of your patient.        Sincerely,        Basil Aguilar MD

## 2023-05-31 ENCOUNTER — HOSPITAL ENCOUNTER (OUTPATIENT)
Facility: HOSPITAL | Age: 43
Discharge: HOME | End: 2023-05-31
Attending: SPECIALIST | Admitting: SPECIALIST
Payer: COMMERCIAL

## 2023-05-31 ENCOUNTER — ANESTHESIA EVENT (OUTPATIENT)
Dept: SURGERY | Facility: HOSPITAL | Age: 43
End: 2023-05-31
Payer: COMMERCIAL

## 2023-05-31 ENCOUNTER — HOSPITAL ENCOUNTER (OUTPATIENT)
Dept: NUCLEAR MEDICINE | Facility: HOSPITAL | Age: 43
Discharge: HOME | End: 2023-05-31
Attending: SPECIALIST | Admitting: SPECIALIST
Payer: COMMERCIAL

## 2023-05-31 ENCOUNTER — ANESTHESIA (OUTPATIENT)
Dept: SURGERY | Facility: HOSPITAL | Age: 43
End: 2023-05-31
Payer: COMMERCIAL

## 2023-05-31 VITALS
RESPIRATION RATE: 12 BRPM | DIASTOLIC BLOOD PRESSURE: 68 MMHG | SYSTOLIC BLOOD PRESSURE: 126 MMHG | WEIGHT: 136 LBS | OXYGEN SATURATION: 100 % | BODY MASS INDEX: 24.1 KG/M2 | HEART RATE: 61 BPM | TEMPERATURE: 97.9 F | HEIGHT: 63 IN

## 2023-05-31 DIAGNOSIS — C50.412 MALIGNANT NEOPLASM OF UPPER-OUTER QUADRANT OF LEFT BREAST IN FEMALE, ESTROGEN RECEPTOR POSITIVE (H): ICD-10-CM

## 2023-05-31 DIAGNOSIS — Z17.0 MALIGNANT NEOPLASM OF UPPER-OUTER QUADRANT OF LEFT BREAST IN FEMALE, ESTROGEN RECEPTOR POSITIVE (H): ICD-10-CM

## 2023-05-31 LAB — GLUCOSE BLDC GLUCOMTR-MCNC: 93 MG/DL (ref 70–99)

## 2023-05-31 PROCEDURE — 250N000009 HC RX 250: Performed by: PLASTIC SURGERY

## 2023-05-31 PROCEDURE — 88307 TISSUE EXAM BY PATHOLOGIST: CPT | Mod: TC | Performed by: SPECIALIST

## 2023-05-31 PROCEDURE — 38792 RA TRACER ID OF SENTINL NODE: CPT

## 2023-05-31 PROCEDURE — 82962 GLUCOSE BLOOD TEST: CPT

## 2023-05-31 PROCEDURE — C1789 PROSTHESIS, BREAST, IMP: HCPCS | Performed by: SPECIALIST

## 2023-05-31 PROCEDURE — 38525 BIOPSY/REMOVAL LYMPH NODES: CPT | Mod: LT | Performed by: SPECIALIST

## 2023-05-31 PROCEDURE — 272N000001 HC OR GENERAL SUPPLY STERILE: Performed by: SPECIALIST

## 2023-05-31 PROCEDURE — 250N000011 HC RX IP 250 OP 636

## 2023-05-31 PROCEDURE — 250N000013 HC RX MED GY IP 250 OP 250 PS 637: Performed by: ANESTHESIOLOGY

## 2023-05-31 PROCEDURE — 250N000009 HC RX 250

## 2023-05-31 PROCEDURE — A9520 TC99 TILMANOCEPT DIAG 0.5MCI: HCPCS | Performed by: SPECIALIST

## 2023-05-31 PROCEDURE — 370N000017 HC ANESTHESIA TECHNICAL FEE, PER MIN: Performed by: SPECIALIST

## 2023-05-31 PROCEDURE — 258N000003 HC RX IP 258 OP 636: Performed by: ANESTHESIOLOGY

## 2023-05-31 PROCEDURE — 999N000141 HC STATISTIC PRE-PROCEDURE NURSING ASSESSMENT: Performed by: SPECIALIST

## 2023-05-31 PROCEDURE — 96372 THER/PROPH/DIAG INJ SC/IM: CPT | Performed by: PLASTIC SURGERY

## 2023-05-31 PROCEDURE — 360N000076 HC SURGERY LEVEL 3, PER MIN: Performed by: SPECIALIST

## 2023-05-31 PROCEDURE — 88363 XM ARCHIVE TISSUE MOLEC ANAL: CPT | Performed by: SPECIALIST

## 2023-05-31 PROCEDURE — 250N000012 HC RX MED GY IP 250 OP 636 PS 637: Performed by: ANESTHESIOLOGY

## 2023-05-31 PROCEDURE — 710N000012 HC RECOVERY PHASE 2, PER MINUTE: Performed by: SPECIALIST

## 2023-05-31 PROCEDURE — 250N000011 HC RX IP 250 OP 636: Performed by: ANESTHESIOLOGY

## 2023-05-31 PROCEDURE — 250N000009 HC RX 250: Performed by: ANESTHESIOLOGY

## 2023-05-31 PROCEDURE — 250N000013 HC RX MED GY IP 250 OP 250 PS 637: Performed by: SPECIALIST

## 2023-05-31 PROCEDURE — 37799 UNLISTED PX VASCULAR SURGERY: CPT | Performed by: PLASTIC SURGERY

## 2023-05-31 PROCEDURE — 19303 MAST SIMPLE COMPLETE: CPT | Mod: 50 | Performed by: SPECIALIST

## 2023-05-31 PROCEDURE — 250N000011 HC RX IP 250 OP 636: Performed by: PLASTIC SURGERY

## 2023-05-31 PROCEDURE — 88307 TISSUE EXAM BY PATHOLOGIST: CPT | Mod: 26 | Performed by: PATHOLOGY

## 2023-05-31 PROCEDURE — 19357 TISS XPNDR PLMT BRST RCNSTJ: CPT | Mod: 50 | Performed by: PLASTIC SURGERY

## 2023-05-31 PROCEDURE — 258N000001 HC RX 258: Performed by: PLASTIC SURGERY

## 2023-05-31 PROCEDURE — 258N000003 HC RX IP 258 OP 636

## 2023-05-31 PROCEDURE — 343N000001 HC RX 343: Performed by: SPECIALIST

## 2023-05-31 PROCEDURE — 710N000010 HC RECOVERY PHASE 1, LEVEL 2, PER MIN: Performed by: SPECIALIST

## 2023-05-31 DEVICE — NATRELLE TE SMOOTH 133S-FX-12-T (US)
Type: IMPLANTABLE DEVICE | Site: BREAST | Status: FUNCTIONAL
Brand: NATRELLE 133S TISSUE EXPANDERS

## 2023-05-31 RX ORDER — ACETAMINOPHEN 325 MG/1
975 TABLET ORAL ONCE
Status: COMPLETED | OUTPATIENT
Start: 2023-05-31 | End: 2023-05-31

## 2023-05-31 RX ORDER — CEFAZOLIN SODIUM/WATER 2 G/20 ML
2 SYRINGE (ML) INTRAVENOUS
Status: COMPLETED | OUTPATIENT
Start: 2023-05-31 | End: 2023-05-31

## 2023-05-31 RX ORDER — PROPOFOL 10 MG/ML
INJECTION, EMULSION INTRAVENOUS PRN
Status: DISCONTINUED | OUTPATIENT
Start: 2023-05-31 | End: 2023-05-31

## 2023-05-31 RX ORDER — SCOLOPAMINE TRANSDERMAL SYSTEM 1 MG/1
1 PATCH, EXTENDED RELEASE TRANSDERMAL ONCE
Status: DISCONTINUED | OUTPATIENT
Start: 2023-05-31 | End: 2023-05-31 | Stop reason: HOSPADM

## 2023-05-31 RX ORDER — LABETALOL HYDROCHLORIDE 5 MG/ML
10 INJECTION, SOLUTION INTRAVENOUS
Status: DISCONTINUED | OUTPATIENT
Start: 2023-05-31 | End: 2023-05-31 | Stop reason: HOSPADM

## 2023-05-31 RX ORDER — ENOXAPARIN SODIUM 100 MG/ML
40 INJECTION SUBCUTANEOUS
Status: COMPLETED | OUTPATIENT
Start: 2023-05-31 | End: 2023-05-31

## 2023-05-31 RX ORDER — ONDANSETRON 2 MG/ML
INJECTION INTRAMUSCULAR; INTRAVENOUS PRN
Status: DISCONTINUED | OUTPATIENT
Start: 2023-05-31 | End: 2023-05-31

## 2023-05-31 RX ORDER — ACETAMINOPHEN 325 MG/1
975 TABLET ORAL ONCE
Status: DISCONTINUED | OUTPATIENT
Start: 2023-05-31 | End: 2023-05-31

## 2023-05-31 RX ORDER — ONDANSETRON 2 MG/ML
4 INJECTION INTRAMUSCULAR; INTRAVENOUS EVERY 30 MIN PRN
Status: DISCONTINUED | OUTPATIENT
Start: 2023-05-31 | End: 2023-05-31 | Stop reason: HOSPADM

## 2023-05-31 RX ORDER — CEFAZOLIN SODIUM/WATER 2 G/20 ML
2 SYRINGE (ML) INTRAVENOUS
Status: DISCONTINUED | OUTPATIENT
Start: 2023-05-31 | End: 2023-05-31

## 2023-05-31 RX ORDER — MAGNESIUM HYDROXIDE 1200 MG/15ML
LIQUID ORAL PRN
Status: DISCONTINUED | OUTPATIENT
Start: 2023-05-31 | End: 2023-05-31 | Stop reason: HOSPADM

## 2023-05-31 RX ORDER — ONDANSETRON 4 MG/1
4 TABLET, ORALLY DISINTEGRATING ORAL EVERY 30 MIN PRN
Status: DISCONTINUED | OUTPATIENT
Start: 2023-05-31 | End: 2023-05-31 | Stop reason: HOSPADM

## 2023-05-31 RX ORDER — SODIUM CHLORIDE, SODIUM LACTATE, POTASSIUM CHLORIDE, CALCIUM CHLORIDE 600; 310; 30; 20 MG/100ML; MG/100ML; MG/100ML; MG/100ML
INJECTION, SOLUTION INTRAVENOUS CONTINUOUS
Status: DISCONTINUED | OUTPATIENT
Start: 2023-05-31 | End: 2023-05-31 | Stop reason: HOSPADM

## 2023-05-31 RX ORDER — PROPOFOL 10 MG/ML
INJECTION, EMULSION INTRAVENOUS CONTINUOUS PRN
Status: DISCONTINUED | OUTPATIENT
Start: 2023-05-31 | End: 2023-05-31

## 2023-05-31 RX ORDER — FENTANYL CITRATE 50 UG/ML
50 INJECTION, SOLUTION INTRAMUSCULAR; INTRAVENOUS EVERY 5 MIN PRN
Status: DISCONTINUED | OUTPATIENT
Start: 2023-05-31 | End: 2023-05-31 | Stop reason: HOSPADM

## 2023-05-31 RX ORDER — CEFAZOLIN SODIUM/WATER 2 G/20 ML
2 SYRINGE (ML) INTRAVENOUS SEE ADMIN INSTRUCTIONS
Status: DISCONTINUED | OUTPATIENT
Start: 2023-05-31 | End: 2023-05-31 | Stop reason: HOSPADM

## 2023-05-31 RX ORDER — KETAMINE HYDROCHLORIDE 10 MG/ML
INJECTION INTRAMUSCULAR; INTRAVENOUS PRN
Status: DISCONTINUED | OUTPATIENT
Start: 2023-05-31 | End: 2023-05-31

## 2023-05-31 RX ORDER — HALOPERIDOL 5 MG/ML
1 INJECTION INTRAMUSCULAR
Status: DISCONTINUED | OUTPATIENT
Start: 2023-05-31 | End: 2023-05-31 | Stop reason: HOSPADM

## 2023-05-31 RX ORDER — GINSENG 100 MG
CAPSULE ORAL PRN
Status: DISCONTINUED | OUTPATIENT
Start: 2023-05-31 | End: 2023-05-31 | Stop reason: HOSPADM

## 2023-05-31 RX ORDER — OXYCODONE HYDROCHLORIDE 5 MG/1
10 TABLET ORAL
Status: DISCONTINUED | OUTPATIENT
Start: 2023-05-31 | End: 2023-05-31 | Stop reason: HOSPADM

## 2023-05-31 RX ORDER — FENTANYL CITRATE 50 UG/ML
INJECTION, SOLUTION INTRAMUSCULAR; INTRAVENOUS PRN
Status: DISCONTINUED | OUTPATIENT
Start: 2023-05-31 | End: 2023-05-31

## 2023-05-31 RX ORDER — DEXAMETHASONE SODIUM PHOSPHATE 10 MG/ML
INJECTION, SOLUTION INTRAMUSCULAR; INTRAVENOUS PRN
Status: DISCONTINUED | OUTPATIENT
Start: 2023-05-31 | End: 2023-05-31

## 2023-05-31 RX ORDER — APREPITANT 40 MG/1
40 CAPSULE ORAL ONCE
Status: COMPLETED | OUTPATIENT
Start: 2023-05-31 | End: 2023-05-31

## 2023-05-31 RX ORDER — GLYCOPYRROLATE 0.2 MG/ML
INJECTION, SOLUTION INTRAMUSCULAR; INTRAVENOUS PRN
Status: DISCONTINUED | OUTPATIENT
Start: 2023-05-31 | End: 2023-05-31

## 2023-05-31 RX ORDER — CEFAZOLIN SODIUM/WATER 2 G/20 ML
2 SYRINGE (ML) INTRAVENOUS SEE ADMIN INSTRUCTIONS
Status: DISCONTINUED | OUTPATIENT
Start: 2023-05-31 | End: 2023-05-31

## 2023-05-31 RX ORDER — INDOCYANINE GREEN AND WATER 25 MG
KIT INJECTION PRN
Status: DISCONTINUED | OUTPATIENT
Start: 2023-05-31 | End: 2023-05-31

## 2023-05-31 RX ORDER — LIDOCAINE HYDROCHLORIDE 10 MG/ML
INJECTION, SOLUTION INFILTRATION; PERINEURAL PRN
Status: DISCONTINUED | OUTPATIENT
Start: 2023-05-31 | End: 2023-05-31

## 2023-05-31 RX ORDER — MAGNESIUM SULFATE 4 G/50ML
4 INJECTION INTRAVENOUS ONCE
Status: COMPLETED | OUTPATIENT
Start: 2023-05-31 | End: 2023-05-31

## 2023-05-31 RX ORDER — OXYCODONE HYDROCHLORIDE 5 MG/1
5 TABLET ORAL
Status: DISCONTINUED | OUTPATIENT
Start: 2023-05-31 | End: 2023-05-31 | Stop reason: HOSPADM

## 2023-05-31 RX ORDER — FENTANYL CITRATE 50 UG/ML
25 INJECTION, SOLUTION INTRAMUSCULAR; INTRAVENOUS EVERY 5 MIN PRN
Status: DISCONTINUED | OUTPATIENT
Start: 2023-05-31 | End: 2023-05-31 | Stop reason: HOSPADM

## 2023-05-31 RX ORDER — LIDOCAINE 40 MG/G
CREAM TOPICAL
Status: DISCONTINUED | OUTPATIENT
Start: 2023-05-31 | End: 2023-05-31 | Stop reason: HOSPADM

## 2023-05-31 RX ADMIN — PROPOFOL 170 MG: 10 INJECTION, EMULSION INTRAVENOUS at 12:02

## 2023-05-31 RX ADMIN — PROPOFOL 180 MCG/KG/MIN: 10 INJECTION, EMULSION INTRAVENOUS at 12:02

## 2023-05-31 RX ADMIN — DEXAMETHASONE SODIUM PHOSPHATE 10 MG: 10 INJECTION, SOLUTION INTRAMUSCULAR; INTRAVENOUS at 12:02

## 2023-05-31 RX ADMIN — Medication 2 G: at 15:54

## 2023-05-31 RX ADMIN — LIDOCAINE HYDROCHLORIDE 3 ML: 10 INJECTION, SOLUTION INFILTRATION; PERINEURAL at 12:02

## 2023-05-31 RX ADMIN — FENTANYL CITRATE 100 MCG: 50 INJECTION, SOLUTION INTRAMUSCULAR; INTRAVENOUS at 12:02

## 2023-05-31 RX ADMIN — KETAMINE HYDROCHLORIDE 50 MG: 10 INJECTION, SOLUTION INTRAMUSCULAR; INTRAVENOUS at 12:02

## 2023-05-31 RX ADMIN — SODIUM CHLORIDE, POTASSIUM CHLORIDE, SODIUM LACTATE AND CALCIUM CHLORIDE: 600; 310; 30; 20 INJECTION, SOLUTION INTRAVENOUS at 11:25

## 2023-05-31 RX ADMIN — ROCURONIUM BROMIDE 10 MG: 50 INJECTION, SOLUTION INTRAVENOUS at 15:27

## 2023-05-31 RX ADMIN — TILMANOCEPT 0.54 MILLICURIE: KIT at 11:20

## 2023-05-31 RX ADMIN — MAGNESIUM SULFATE HEPTAHYDRATE 4 G: 80 INJECTION, SOLUTION INTRAVENOUS at 11:26

## 2023-05-31 RX ADMIN — ACETAMINOPHEN 975 MG: 325 TABLET ORAL at 11:30

## 2023-05-31 RX ADMIN — ENOXAPARIN SODIUM 40 MG: 40 INJECTION SUBCUTANEOUS at 11:33

## 2023-05-31 RX ADMIN — ROCURONIUM BROMIDE 20 MG: 50 INJECTION, SOLUTION INTRAVENOUS at 12:27

## 2023-05-31 RX ADMIN — GLYCOPYRROLATE 0.2 MG: 0.2 INJECTION INTRAMUSCULAR; INTRAVENOUS at 12:02

## 2023-05-31 RX ADMIN — Medication 2 G: at 12:08

## 2023-05-31 RX ADMIN — TRANEXAMIC ACID 1 G: 1 INJECTION, SOLUTION INTRAVENOUS at 12:32

## 2023-05-31 RX ADMIN — ROCURONIUM BROMIDE 20 MG: 50 INJECTION, SOLUTION INTRAVENOUS at 14:19

## 2023-05-31 RX ADMIN — ONDANSETRON 4 MG: 2 INJECTION INTRAMUSCULAR; INTRAVENOUS at 16:09

## 2023-05-31 RX ADMIN — SUGAMMADEX 200 MG: 100 INJECTION, SOLUTION INTRAVENOUS at 16:26

## 2023-05-31 RX ADMIN — MIDAZOLAM 2 MG: 1 INJECTION INTRAMUSCULAR; INTRAVENOUS at 11:56

## 2023-05-31 RX ADMIN — PHENYLEPHRINE HYDROCHLORIDE 100 MCG: 10 INJECTION INTRAVENOUS at 12:19

## 2023-05-31 RX ADMIN — TRANEXAMIC ACID 1 G: 1 INJECTION, SOLUTION INTRAVENOUS at 15:36

## 2023-05-31 RX ADMIN — SODIUM CHLORIDE, POTASSIUM CHLORIDE, SODIUM LACTATE AND CALCIUM CHLORIDE: 600; 310; 30; 20 INJECTION, SOLUTION INTRAVENOUS at 14:46

## 2023-05-31 RX ADMIN — ROCURONIUM BROMIDE 50 MG: 50 INJECTION, SOLUTION INTRAVENOUS at 12:03

## 2023-05-31 RX ADMIN — ROCURONIUM BROMIDE 30 MG: 50 INJECTION, SOLUTION INTRAVENOUS at 12:51

## 2023-05-31 RX ADMIN — HYDROMORPHONE HYDROCHLORIDE 1 MG: 1 INJECTION, SOLUTION INTRAMUSCULAR; INTRAVENOUS; SUBCUTANEOUS at 12:52

## 2023-05-31 RX ADMIN — INDOCYANINE GREEN AND WATER 7.5 MG: KIT at 14:19

## 2023-05-31 RX ADMIN — SCOPALAMINE 1 PATCH: 1 PATCH, EXTENDED RELEASE TRANSDERMAL at 11:29

## 2023-05-31 RX ADMIN — FENTANYL CITRATE 50 MCG: 50 INJECTION, SOLUTION INTRAMUSCULAR; INTRAVENOUS at 15:43

## 2023-05-31 RX ADMIN — APREPITANT 40 MG: 40 CAPSULE ORAL at 11:31

## 2023-05-31 ASSESSMENT — ACTIVITIES OF DAILY LIVING (ADL)
ADLS_ACUITY_SCORE: 18
ADLS_ACUITY_SCORE: 18
ADLS_ACUITY_SCORE: 20
ADLS_ACUITY_SCORE: 18

## 2023-05-31 ASSESSMENT — LIFESTYLE VARIABLES: TOBACCO_USE: 1

## 2023-05-31 NOTE — ANESTHESIA CARE TRANSFER NOTE
Patient: Zainab Bolton    Procedure: Procedure(s):  BILATERAL MASTECTOMIES  LEFT SENTINEL LYMPH NODE BIOPSY  RECONSTRUCTION, BREAST, BILATERAL TISSUE EXPANDERS       Diagnosis: Malignant neoplasm of upper-outer quadrant of left breast in female, estrogen receptor positive (H) [C50.412, Z17.0]  Diagnosis Additional Information: No value filed.    Anesthesia Type:   General     Note:    Oropharynx: oropharynx clear of all foreign objects  Level of Consciousness: drowsy  Oxygen Supplementation: face mask  Level of Supplemental Oxygen (L/min / FiO2): 8  Independent Airway: airway patency satisfactory and stable  Dentition: dentition unchanged  Vital Signs Stable: post-procedure vital signs reviewed and stable  Report to RN Given: handoff report given  Patient transferred to: PACU    Handoff Report: Identifed the Patient, Identified the Reponsible Provider, Reviewed the pertinent medical history, Discussed the surgical course, Reviewed Intra-OP anesthesia mangement and issues during anesthesia, Set expectations for post-procedure period and Allowed opportunity for questions and acknowledgement of understanding      Vitals:  Vitals Value Taken Time   /59 05/31/23 1637   Temp 36.6  C (97.9  F) 05/31/23 1636   Pulse 61 05/31/23 1638   Resp 12 05/31/23 1638   SpO2 100 % 05/31/23 1638   Vitals shown include unvalidated device data.    Electronically Signed By: THOMAS Youssef CRNA  May 31, 2023  4:39 PM

## 2023-05-31 NOTE — ANESTHESIA POSTPROCEDURE EVALUATION
Patient: Zainab Bolton    Procedure: Procedure(s):  BILATERAL MASTECTOMIES  LEFT SENTINEL LYMPH NODE BIOPSY  RECONSTRUCTION, BREAST, BILATERAL TISSUE EXPANDERS       Anesthesia Type:  General    Note:     Postop Pain Control: Uneventful            Sign Out: Well controlled pain   PONV: No   Neuro/Psych: Uneventful            Sign Out: Acceptable/Baseline neuro status   Airway/Respiratory: Uneventful            Sign Out: Acceptable/Baseline resp. status   CV/Hemodynamics: Uneventful            Sign Out: Acceptable CV status; No obvious hypovolemia; No obvious fluid overload   Other NRE: NONE   DID A NON-ROUTINE EVENT OCCUR?            Last vitals:  Vitals Value Taken Time   /68 05/31/23 1745   Temp 36.6  C (97.9  F) 05/31/23 1636   Pulse 60 05/31/23 1749   Resp 12 05/31/23 1715   SpO2 100 % 05/31/23 1749   Vitals shown include unvalidated device data.    Electronically Signed By: Vanna Wolf MD  May 31, 2023  6:21 PM

## 2023-05-31 NOTE — ANESTHESIA PROCEDURE NOTES
Airway       Patient location during procedure: OR       Procedure Start/Stop Times: 5/31/2023 12:05 PM  Staff -        CRNA: Moe Nagy APRN CRNA       Other Anesthesia Staff: Adarsh Dumont       Performed By: SRNA  Consent for Airway        Urgency: elective  Indications and Patient Condition       Indications for airway management: shantel-procedural       Induction type:intravenous       Mask difficulty assessment: 1 - vent by mask    Final Airway Details       Final airway type: endotracheal airway       Successful airway: ETT - single  Endotracheal Airway Details        ETT size (mm): 7.0       Cuffed: yes       Successful intubation technique: direct laryngoscopy       DL Blade Type: Scherer 2       Grade View of Cords: 1       Adjucts: stylet       Position: Right       Measured from: gums/teeth       Secured at (cm): 22       Bite block used: None    Post intubation assessment        Placement verified by: capnometry, equal breath sounds and chest rise        Number of attempts at approach: 1       Number of other approaches attempted: 0       Secured with: silk tape       Ease of procedure: easy       Dentition: Intact and Unchanged       Dental guard used and removed. Dental Guard Type: Standard White.    Medication(s) Administered   Medication Administration Time: 5/31/2023 12:05 PM

## 2023-05-31 NOTE — OP NOTE
Operative Note    Name:  Zainab Bolton  PCP:  Jessi Concepcion  Procedure Date:  5/31/2023       Procedure:  Procedure(s):  BILATERAL MASTECTOMIES  LEFT SENTINEL LYMPH NODE BIOPSY  RECONSTRUCTION, BREAST, IMMEDIATE OR DELAYED, WITH PERMANENT BREAST IMPLANT     Pre-Procedure Diagnosis:  Malignant neoplasm of upper-outer quadrant of left breast in female, estrogen receptor positive (H) [C50.412, Z17.0]     Post-Procedure Diagnosis:    Same     Surgeon(s):  Basil Aguilar MD Ogren, Diane S, MD     Assistant: None      Anesthesia Type:  General       Findings:  Palpable mass in specimen. Somewhat suspicious SLN.    Operative Report:    The patient was properly identified and brought to the operating suite where she was placed in the supine position.  Gen. anesthesia was administered.  The patient was prepped draped in a sterile fashion.    An elliptical shaped incision encompassing the left nipple was made and skin flaps raised superiorly to the pectoralis major, medial to the sternum, inferior to the rectus sheath and laterally to the lateral chest wall.  The breast tissue was swept  from the chest wall using electrocautery.   Using the gamma probe the axilla was scanned and 1 sentinel lymph node(s)   was/(were) identified. One felt normal. The other felt firm. This was removed also. They were removed and sent for permanent sectioning.  They was nothing else palpable of concern int he axilla.  The wound was inspected for hemostasis.  The wound was packed open with a saline soaked gauze.  Attention was then turned the right where a mirror incision was made and skin flaps raised superior to the pectoralis nucleus, medially to the sternum, inferior to the rectus sheath and lateral to lateral chest wall.  The breast tissue was swept from the chest wall  using electrocautery.    The wound was inspected for hemostasis.  It was then packed open with a saline soaked gauze.  The procedure was turned over to Dr. Aguilar  for immediate reconstruction.    Estimated Blood Loss:   20cc    Specimens:    ID Type Source Tests Collected by Time Destination   1 : LEFT BREAST STITCH LATERAL Tissue Breast, Left SURGICAL PATHOLOGY EXAM Kylee Villasenor MD 5/31/2023  1:01 PM    2 : SENTINEL LYMPH NODE LEFT BREAST Tissue Lymph Node(s) [place], Breast, Left SURGICAL PATHOLOGY EXAM Kylee Villasenor MD 5/31/2023  1:02 PM    3 : RIGHT BREAST STITCH LATERAL Tissue Breast, Right SURGICAL PATHOLOGY EXAM Kylee Villasenor MD 5/31/2023  1:29 PM            Drains:   Urethral Catheter 05/31/23 Double-lumen;Latex 16 fr (Active)       Complications:    None    Kylee Villasenor MD     Date: 5/31/2023  Time: 1:45 PM  [place] Node Biopsy for Breast Cancer - Left  Operation performed with curative intent Yes   Tracer(s) used to identify sentinel nodes in the upfront surgery (non-neoadjuvant) setting Radioactive tracer   Tracer(s) used to identify sentinel nodes in the neoadjuvant setting N/A   All nodes (colored or non-colored) present at the end of a dye-filled lymphatic channel were removed N/A   All significantly radioactive nodes were removed Yes   All palpably suspicious nodes were removed Yes   Biopsy-proven positive nodes marked with clips prior to chemotherapy were identified and removed N/A

## 2023-05-31 NOTE — ANESTHESIA PREPROCEDURE EVALUATION
Anesthesia Pre-Procedure Evaluation    Patient: Zainab Bolton   MRN: [id number] : 1980        Procedure : Procedure(s):  BILATERAL MASTECTOMIES  LEFT SENTINEL LYMPH NODE BIOPSY  RECONSTRUCTION, BREAST, IMMEDIATE OR DELAYED, WITH PERMANENT BREAST IMPLANT          Past Medical History:   Diagnosis Date     Anxiety 2010     Anxiety state, unspecified     chronic anxiety     BRCA2 positive      Breast cancer (H) 2023     Depression      Essential hypertension 2023     Hypercholesteremia 2010     Obesity 2010     Other acne      Papanicolaou smear of cervix with low grade squamous intraepithelial lesion (LGSIL) (aka LSIL) 2015    LSIL/+ HR HPV     TOBACCO ABUSE-CONTINUOUS       Past Surgical History:   Procedure Laterality Date     BIOPSY       CONIZATION LEEP  2015    LÁZARO 2     CONIZATION LEEP N/A 2015    Procedure: CONIZATION LEEP;  Surgeon: Omayra Cunningham DO;  Location: [facility] OR     EYE SURGERY  2016     ZZC LIGATE FALLOPIAN TUBE  10/03/2003      Allergies   Allergen Reactions     No Known Drug Allergy       Social History     Tobacco Use     Smoking status: Former     Packs/day: 1.00     Years: 10.00     Pack years: 10.00     Types: Cigarettes     Quit date: 3/28/2023     Years since quittin.1     Smokeless tobacco: Never     Tobacco comments:     since age 12, but stopped with each baby   Vaping Use     Vaping status: Not on file   Substance Use Topics     Alcohol use: Yes     Comment: 1-2  every 2-3 months if any      Wt Readings from Last 1 Encounters:   23 64.4 kg (142 lb)        Anesthesia Evaluation   Pt has had prior anesthetic. Type: MAC.    No history of anesthetic complications       ROS/MED HX  ENT/Pulmonary:     (+) tobacco use, Past use,     Neurologic:  - neg neurologic ROS     Cardiovascular:     (+) Dyslipidemia hypertension----- (-) murmur   METS/Exercise Tolerance:     Hematologic:  - neg hematologic  ROS      Musculoskeletal:  - neg musculoskeletal ROS     GI/Hepatic:  - neg GI/hepatic ROS     Renal/Genitourinary:  - neg Renal ROS     Endo:  - neg endo ROS     Psychiatric/Substance Use:     (+) psychiatric history anxiety and depression     Infectious Disease:  - neg infectious disease ROS     Malignancy:   (+) Malignancy, History of Breast.Breast CA Active status post.        Other:            Physical Exam    Airway        Mallampati: II   TM distance: > 3 FB   Neck ROM: full   Mouth opening: > 3 cm    Respiratory Devices and Support         Dental       (+) Minor Abnormalities - some fillings, tiny chips      Cardiovascular          Rhythm and rate: regular and normal (-) no murmur    Pulmonary           breath sounds clear to auscultation           OUTSIDE LABS:  CBC:   Lab Results   Component Value Date    WBC 8.5 05/19/2023    WBC 9.2 03/21/2019    HGB 13.0 05/19/2023    HGB 14.1 03/21/2019    HCT 38.5 05/19/2023    HCT 43.4 03/21/2019     05/19/2023     03/21/2019     BMP:   Lab Results   Component Value Date     03/21/2019    POTASSIUM 3.4 03/21/2019    CHLORIDE 107 03/21/2019    CO2 29 03/21/2019    BUN 8 03/21/2019    CR 0.7 05/17/2023    CR 0.74 03/21/2019    GLC 78 03/21/2019    GLC 83 05/08/2015     COAGS: No results found for: PTT, INR, FIBR  POC:   Lab Results   Component Value Date    BGM 76 03/21/2019    HCG neg 07/16/2015     HEPATIC: No results found for: ALBUMIN, PROTTOTAL, ALT, AST, GGT, ALKPHOS, BILITOTAL, BILIDIRECT, NATASHA  OTHER:   Lab Results   Component Value Date    PH 7.5 (H) 03/05/2002    A1C 5.4 05/11/2016    SAM 8.4 (L) 03/21/2019    TSH 2.89 03/21/2019       Anesthesia Plan    ASA Status:  3   NPO Status:  NPO Appropriate    Anesthesia Type: General.     - Airway: ETT   Induction: Intravenous, Propofol.   Maintenance: TIVA.        Consents    Anesthesia Plan(s) and associated risks, benefits, and realistic alternatives discussed. Questions answered and  patient/representative(s) expressed understanding.     - Discussed: Risks, Benefits and Alternatives for BOTH SEDATION and the PROCEDURE were discussed     - Discussed with:  Patient         Postoperative Care    Pain management: IV analgesics, Oral pain medications, Multi-modal analgesia.   PONV prophylaxis: Ondansetron (or other 5HT-3), Dexamethasone or Solumedrol, Scopolamine patch, Aprepitant     Comments:    Other Comments: GETA.  TIVA.  Emend, decadron and zofran for PONV ppx.  Ketamine 50 mg w/ induction.  Mag 4 gm.            Spike Davis MD

## 2023-06-01 NOTE — PROGRESS NOTES
Zainab is a 42 years old lady with history of left breast cancer.  She comes today for preoperative markings.  She is scheduled for bilateral skin sparing mastectomies with immediate first stage breast reconstruction with tissue expanders in the prepectoral space.  The mastectomy design is via periareolar approach.    Markings were performed.  This is how they look like:                Plan: Continue with prophylactic mupirocin ointment on each nostril as well as Hibiclens cleaning of her breasts for the next 24 hours.  Patient is ready for surgery tomorrow in the afternoon.  All questions has been answered.     Basil Aguilar MD , FACS   Diplomate American Board of Plastic Surgery  Diplomate American Board of Surgery  Adj. Assistant Professor of Surgery  Division of Plastic & Reconstructive Surgery   [facility]  Office: [phone]   6/1/2023 at 9:34 AM

## 2023-06-02 ENCOUNTER — OFFICE VISIT (OUTPATIENT)
Dept: PLASTIC SURGERY | Facility: AMBULATORY SURGERY CENTER | Age: 43
End: 2023-06-02
Payer: COMMERCIAL

## 2023-06-02 DIAGNOSIS — Z98.890 STATUS POST BREAST RECONSTRUCTION: Primary | ICD-10-CM

## 2023-06-02 PROCEDURE — 99024 POSTOP FOLLOW-UP VISIT: CPT | Performed by: PLASTIC SURGERY

## 2023-06-02 NOTE — LETTER
6/2/2023         RE: Zainab Bolton  [address] [address] [id number]        Dear Colleague,    Thank you for referring your patient, Zainab Bolton, to the [facility] [place]. Please see a copy of my visit note below.    Zainab is a 42 years old lady status post bilateral skin sparing mastectomy with first stage breast reconstruction with tissue expander.  She is 2 days out from surgery.  She is doing well    At the physical exam, there is no evidence of hematoma or seroma.  Dressings are clean and dry.  There is no evidence of surgical site infection.  Bilateral Eulogio drains with serosanguineous fluid.    Plan: Patient may have showers, keep Tegaderms dressings in place, continue drain care, follow-up with me next Thursday for dressing removal.  Patient is doing well from plastic surgery standpoint.    Basil Aguilar MD , FACS   Diplomate American Board of Plastic Surgery  Diplomate American Board of Surgery  Adj. Assistant Professor of Surgery  Division of Plastic & Reconstructive Surgery   [facility]  Office: [phone]   6/2/2023 at 10:45 AM             Again, thank you for allowing me to participate in the care of your patient.        Sincerely,        Basil Aguilar MD

## 2023-06-02 NOTE — PROGRESS NOTES
Zainab is a 42 years old lady status post bilateral skin sparing mastectomy with first stage breast reconstruction with tissue expander.  She is 2 days out from surgery.  She is doing well    At the physical exam, there is no evidence of hematoma or seroma.  Dressings are clean and dry.  There is no evidence of surgical site infection.  Bilateral Eulogio drains with serosanguineous fluid.    Plan: Patient may have showers, keep Tegaderms dressings in place, continue drain care, follow-up with me next Thursday for dressing removal.  Patient is doing well from plastic surgery standpoint.    Basil Aguilar MD , FACS   Diplomate American Board of Plastic Surgery  Diplomate American Board of Surgery  Adj. Assistant Professor of Surgery  Division of Plastic & Reconstructive Surgery   [facility]  Office: [phone]   6/2/2023 at 10:45 AM

## 2023-06-02 NOTE — OP NOTE
Zainab Bolton     June 1, 2023        Preoperative diagnosis:   Left breast cancer, ductal carcinoma in situ (DCIS)     Postoperative diagnosis: same     Procedure:      1) Bilateral breast reconstruction, first stage, with immediate insertion of prepectoral tissue expanders utilizing a periareolar approach after a skin sparing mastectomy.     For the right breast a   450 cc Allergan full height-extra projection tissue expander was utilized.  Reference 133 S-FX-12-T and SN   [id number].  This tissue expander was filled with 200 cc.     For the left breast a  450 cc Allergan  full height-extra projection tissue expander was utilized.  Reference  133 S-FX-12-T  and SN   [id number]. This tissue expander was filled with 200 cc.    2) Bilateral breasts intraoperative perfusion assessment of mastectomy skin flaps using the SPY fluorescence imaging.     Surgeon: Basil Aguilar MD     Assistant: Alyssa Flores CSA (there was no resident available)     Anesthesia: General     IV fluids:   1700 cc crystalloids     Urine output:   250 cc     EBL:  5  cc     Findings:    Left breast cancer     Specimens:  None from my part     Drains: two # 15 Pashto Eulogio drains: 1 on the right breast and 1 on the left breast, for a total of 2 drains.     Disposition: patient tolerated procedure well, she was extubated and transferred to recovery room awake and in stable condition.        Indications:     Mrs. Bolton is a 42 years old lady with diagnosis of left breast DCIS.       She has been offered by Dr. Villasenor bilateral skin sparing mastectomies.     Due to the fact that her breasts are moderate in size and with tight skin, I have recommended skin sparing mastectomy via periareolar elliptical incision encompassing the nipple areolar complexes (NAC), followed by  first stage breast reconstruction with tissue expanders in the prepectoral space.   Approximately 3 to 6 months later she will undergo second stage breast reconstruction  with a BENIGNO flap.    Dr. Villasenor is in agreement with this approach.     Risks were explained to the patient and they include but are not limited to scarring, infection, asymmetry, loss of the tissue expanders, hematoma, seroma, complete and permanent loss of sensation of the skin of the reconstructed breasts, need for further surgeries in the future, marital and/or relationship problems.  Patient has acknowledged all these risks and signed informed consent agreeing to proceed.     Procedure:     Patient was identified in the preoperative holding area and preoperative IV antibiotics were given to her.  I then proceeded to draw the elliptical incision encompassing the nipple areolar complexes for her bilateral skin sparing mastectomies.  I also marked the breast meridian, midline, parasternal lines 2 cm lateral from the midline, the anterior axillary line and the inframammary fold.     This was the design:                     Patient was then brought to the operating room and was placed supine in the operating room table. After general anesthesia was obtained the chest and both breasts were prepped and draped in the sterile surgical fashion.     Dr. Villasenor began the operation on the left breast and once she completed this mastectomy, I was called to begin the reconstruction.  At that time, she began the mastectomy in the contralateral breast. Please see her operative report for further details.     I first proceeded to re prep this mastectomy site with chlorhexidine, then I inspected the breast boundaries including the parasternal border, the inframammary fold and the anterior axillary line border. The inspection revealed that inframammary fold and anterior axillary line needed to be repaired.  Utilizing 0 Vicryl running stitches, I repair the inframammary fold.  I also performed a repair of the anterior axillary line with 0 Vicryl interrupted stitches.  The parasternal line did not require any repair.     Irrigation  was then provided with antibiotic solution and I found that hemostasis was excellent.     The cavity was packed with antibiotic moist laparotomy pads.     We then moved to the contralateral breast and proceeded to re prep the site with chlorhexidine and check the breast boundaries.  The inspection revealed that parasternal line and anterior axillary line required repair.  Therefore, utilizing 0 Vicryl interrupted stitches, these 2 boundaries were repaired.  The inframammary fold was intact and did not require repair.     Irrigation was then provided with antibiotic solution and I found that hemostasis was excellent.     The cavity was packed with antibiotic moist laparotomy pads.    At this time I proceeded to interrogate both breast's skin mastectomy flaps with the SPY machine.     The ICG dye for the SPY machine was given intravenously by the CRNA and with a hand-held probe I proceeded to interrogate the mastectomy skin flaps with the SPY machine.  The percentage of perfusion was  greater than 25% on both mastectomy skin flaps, which was quite indicative of excellent perfusion of the flaps.       Irrigation was once again provided with antibiotic solution and I found that the hemostasis was excellent. I then irrigated the mastectomy cavities with chlorhexidine solution. I also proceeded to prep again the skin with chlorhexidine.     At this time the scrub tech, my surgical assistant and I changed to new gloves. The 450 mL tissue expanders were then placed on the prepectoral space of the bilateral mastectomy cavities and they were secured at the level of its 3 inferior tabs along the IMF and the superior tab into the anterior surface of the pectoralis major muscles with 2-0 Prolene interrupted stitches.    A #15 Nigerian Eulogio drain was also introduced on each mastectomy cavity and it was secured to the skin with 2-0 silk.    The mastectomy wounds were then closed in layers.  Breast capsule with 3-0 Vicryl pop-off  multiple interrupted stitches followed by  4-0 Monocryl running subcuticular stitches.      I then proceeded to localize the metallic valve on each tissue expander with the magnet and then proceeded to fill bilateral tissue expanders with sterile saline mixed with 0.6 cc of methylene blue in order to visualize fluid from the tissue expander in case of future expansion/deflation.  Each tissues expander was filled up with 200 cc of saline.     Final volume for the right breast tissue expander was 200 cc and final volume for the left breast tissue expander was 200 cc.     Bacitracin ointment and Xeroform strips were applied along the mastectomy incision, followed by folded 4 x 4 gauze and Tegaderm.    Bio disc was also applied around the insertion site of each Eulogio drain, and each insertion of site was also covered with Tegaderm.      Instrument count was reported by nursing personnel as correct.  Patient tolerated procedure well, she was then extubated and transferred to recovery room awake and in stable condition.  A sports bra was applied on her newly reconstructed breasts.    Basil Aguilar MD , FACS   Diplomate American Board of Plastic Surgery  Diplomate American Board of Surgery  Adj. Assistant Professor of Surgery  Division of Plastic & Reconstructive Surgery   [facility]  Office: [phone]   6/1/2023 at 9:29 PM

## 2023-06-05 ENCOUNTER — DOCUMENTATION ONLY (OUTPATIENT)
Dept: SURGERY | Facility: CLINIC | Age: 43
End: 2023-06-05
Payer: COMMERCIAL

## 2023-06-05 NOTE — PROGRESS NOTES
Per Dr. Villasenor's  request, order for Oncotype submitted through [facility] online portal.

## 2023-06-06 ENCOUNTER — PATIENT OUTREACH (OUTPATIENT)
Dept: ONCOLOGY | Facility: CLINIC | Age: 43
End: 2023-06-06
Payer: COMMERCIAL

## 2023-06-06 DIAGNOSIS — C50.412 MALIGNANT NEOPLASM OF UPPER-OUTER QUADRANT OF LEFT BREAST IN FEMALE, ESTROGEN RECEPTOR POSITIVE (H): ICD-10-CM

## 2023-06-06 DIAGNOSIS — Z17.0 MALIGNANT NEOPLASM OF UPPER-OUTER QUADRANT OF LEFT BREAST IN FEMALE, ESTROGEN RECEPTOR POSITIVE (H): ICD-10-CM

## 2023-06-06 RX ORDER — TAMOXIFEN CITRATE 20 MG/1
20 TABLET ORAL DAILY
Qty: 30 TABLET | Refills: 1 | Status: SHIPPED | OUTPATIENT
Start: 2023-06-06 | End: 2023-08-09

## 2023-06-06 NOTE — PROGRESS NOTES
New Patient Oncology Nurse Navigator Note     Referring provider: Dr. Kylee Villasenor     Referring Clinic/Organization: [facility]     Referred to (specialty:) Medical Oncology     Date Referral Received: June 6, 2023     Evaluation for:  Breast cancer     Clinical History (per Nurse review of records provided):      Patient had bilateral screening mammograms on 3/2/23 and in the upper outer aspect of the left breast posteriorly there is some possible architectural distortion on both views that needs additional mammographic imaging and possibly ultrasound for further evaluation.  No mammographic evidence for malignancy in the  right breast. Diagnostic imaging followed on 3/16 and on the additional tomographic images subtle distortion in the 2:00 position in the posterior depth persists. There appears to be a partially obscured mass with spiculations extending posterior to the glandular tissue. On left breast ultrasound, in the 2:00 position 7 cm from the nipple there was a hypoechoic mass with irregular margins measuring 18 x 14 x 11 mm. The left axilla was examined as well with no evidence of lymphadenopathy.     3/20/23 -   BREAST BIOPSY CONTAINING NEOPLASTIC LESION:  - BIOPSY TYPE: ULTRASOUND-GUIDED CORE BIOPSIES  - BIOPSY SITE: LEFT BREAST, 2:00, 7 CM FROM NIPPLE  - HISTOLOGIC TYPE: INVASIVE DUCTAL CARCINOMA  - HISTOLOGIC GRADE (LOUIE HISTOLOGIC SCORE):  - TUBULAR DIFFERENTIATION SCORE 1  - NUCLEAR PLEOMORPHISM SCORE 2  - MITOTIC RATE SCORE 1  - OVERALL GRADE 1 (TOTAL SCORE 4/9)  - DUCTAL CARCINOMA IN SITU (DCIS): FOCALLY PRESENT (NEGATIVE FOR EIC)  - MICROCALCIFICATIONS: PRESENT, ASSOCIATED WITH ATYPICAL DUCTAL HYPERPLASIA  - SMALL-VESSEL LYMPHATIC INVASION: NOT IDENTIFIED  - ADDITIONAL FINDINGS: ATYPICAL DUCTAL HYPERPLASIA (ADH)  - ADDITIONAL STUDIES:  - ESTROGEN RECEPTORS POSITIVE (90% OF TUMOR NUCLEI STAIN STRONGLY)  - PROGESTERONE RECEPTORS POSITIVE (70% OF TUMOR NUCLEI STAIN  STRONGLY)  - HER2/KENNEY RECEPTORS EQUIVOCAL (2+ MEMBRANE STAINING)  HER2 by FISH subsequently reported as negative.    Patient met with Dr. Villasenor on 3/28 and breast MRI and genetic counseling ordered.     3/29/23 - breast MRI  Mass in the left breast correlates with the patient's biopsy-proven invasive ductal carcinoma. No other suspicious findings are identified in either breast.    She met with Froilan Cuevas CGC on 4/12/23. She did proceed with testing.     5/31/23 -   A) LEFT BREAST, SIMPLE MASTECTOMY:  1. INVASIVE DUCTAL CARCINOMA:  a. GRADE: LOUIE GRADE I (of III)  b. SIZE: 23 x 21 x 18 MM  c. MARGINS: NEGATIVE; 1 MM ABOVE DEEP MARGIN; 18 MM FROM ANTERIOR MARGIN  2. DUCTAL CARCINOMA IN SITU; EXTENSIVE INTRADUCTAL COMPONENT NEGATIVE:  a. NUCLEAR rdGrdRrdArdDrdErd:rd rd3rd b. PATTERNS: SOLID AND CRIBRIFORM WITH FOCAL NECROSIS  c. SIZE: 9 MM; 10% OF TUMOR  d. MARGINS: NEGATIVE; 6 MM ABOVE DEEP MARGIN  3. BACKGROUND BREAST SHOWS PROLIFERATIVE FIBROCYSTIC CHANGES WITH STROMAL FIBROSIS, DUCT ECTASIA, COLUMNAR CELL CHANGES AND USUAL DUCTAL HYPERPLASIA, WITHOUT ATYPIA  4. PRIOR BIOPSY SITE PRESENT  PATHOLOGIC STAGE: pT2 pN0 (sn)    Dr. Villasenor has ordered an Oncotype and anticipated report date is approximately 6/22/23.   Oncotype subsequently reported as RS = 17.    Records Location: See Bookmarked material    Patient resides in [place], [place]    6/7 10:18 - Telephoned and left voice message for Avis requesting call back to assist in scheduling medical oncology consult.     6/7 10:27  - Avis returned call. Writer received referral, reviewed for appropriate plan, and call transferred to New Patient Scheduling for completion.

## 2023-06-08 ENCOUNTER — OFFICE VISIT (OUTPATIENT)
Dept: PLASTIC SURGERY | Facility: AMBULATORY SURGERY CENTER | Age: 43
End: 2023-06-08
Payer: COMMERCIAL

## 2023-06-08 DIAGNOSIS — Z98.890 STATUS POST BILATERAL BREAST RECONSTRUCTION: Primary | ICD-10-CM

## 2023-06-08 PROCEDURE — 99024 POSTOP FOLLOW-UP VISIT: CPT | Performed by: PLASTIC SURGERY

## 2023-06-08 NOTE — LETTER
6/8/2023         RE: Zainab Bolton  [address] [facility] [address]        Dear Colleague,    Thank you for referring your patient, Zainab Bolton, to the [facility] SUR[facility]ERY [facility] [place]. Please see a copy of my visit note below.    Zainab is a 42 years old lady with history of left breast cancer, DCIS, status post skin sparing mastectomies with first stage reconstruction with tissue expanders, 450 cc full height extra projection.  She is 8 days out from surgery.  She is feeling well.    At the physical exam, Tegaderm dressing from the mastectomy incisions were removed.  Incisions are healing well.  No sign of infection or wound dehiscence.  There is no evidence of skin necrosis.  No signs of hematoma or seroma.  Eulogio drain with serosanguineous drainage, more than 30 cc a day.    Plan: Continue drain care, continue with regular showers, apply antibiotic ointment over the incisions, return to see me in 1 week for possible drain removal.  Patient is doing well from plastic surgery standpoint.    Basil Aguilar MD , FACS   Diplomate American Board of Plastic Surgery  Diplomate American Board of Surgery  Adj. Assistant Professor of Surgery  Division of Plastic & Reconstructive Surgery   [facility]  Office: [phone]   6/8/2023 at 2:29 PM         Again, thank you for allowing me to participate in the care of your patient.        Sincerely,        Basil Aguilar MD

## 2023-06-08 NOTE — PROGRESS NOTES
Zainab is a 42 years old lady with history of left breast cancer, DCIS, status post skin sparing mastectomies with first stage reconstruction with tissue expanders, 450 cc full height extra projection.  She is 8 days out from surgery.  She is feeling well.    At the physical exam, Tegaderm dressing from the mastectomy incisions were removed.  Incisions are healing well.  No sign of infection or wound dehiscence.  There is no evidence of skin necrosis.  No signs of hematoma or seroma.  Eulogio drain with serosanguineous drainage, more than 30 cc a day.    Plan: Continue drain care, continue with regular showers, apply antibiotic ointment over the incisions, return to see me in 1 week for possible drain removal.  Patient is doing well from plastic surgery standpoint.    Basil Aguilar MD , FACS   Diplomate American Board of Plastic Surgery  Diplomate American Board of Surgery  Adj. Assistant Professor of Surgery  Division of Plastic & Reconstructive Surgery   [facility]  Office: [phone]   6/8/2023 at 2:29 PM

## 2023-06-14 NOTE — PATIENT INSTRUCTIONS
Assessing Cancer Risk  Cancer is a common diagnosis which impacts many families.  Individuals may develop cancer due to environmental factors (such as exposures and lifestyle), aging, genetic predisposition, or a combination of these factors.      Only about 5-10% of cancers are thought to be due to an inherited cancer susceptibility gene.    These families often have:  Several people with the same or related types of cancer  Cancers diagnosed at a young age (before age 50)  Individuals with more than one primary cancer  Multiple generations of the family affected with cancer    Comprehensive Breast and Gynecologic Cancer Panel  We each inherit two copies of every gene in our bodies: one from our mother, and one from our father. Each gene has a specific job to do.  When a gene has a mistake or  mutation  in it, it does not work like it should.     Some people may be candidates for genetic testing of more than one gene.  For these families, genetic testing using a cancer panel may be offered. These panels will test different genes at once known to increase the risk for breast, ovarian, uterine, and/or other cancers.    This handout will review common hereditary breast and gynecologic cancer syndromes. The genes that will be discussed in this handout are: LAURA, BRCA1, BRCA2, BRIP1, CDH1, CHEK2, MLH1, MSH2, MSH6, PMS2, EPCAM, PTEN, PALB2, RAD51C, RAD51D, and TP53.    The purpose of this handout is to serve as a brief summary of the breast and gynecologic cancer risk genes that have published clinical management guidelines for individuals who are found to carry a mutation. Inheriting a mutation does not mean a person will develop cancer, but it does significantly increase their risk above the general population risk.     ______________________________________________________________________________    Hereditary Breast and Ovarian Cancer Syndrome (BRCA1 and BRCA2)  A single mutation in one of the copies of BRCA1 or  BRCA2 increases the risk for breast and ovarian cancer, among others.  The risk for pancreatic cancer and melanoma may also be slightly increased in some families.  The chart below shows the chance that someone with a BRCA mutation would develop cancer in his or her lifetime1,2,3,4.       Lifetime Cancer Risks    General Population BRCA1  BRCA2   Breast  12% >60% >60%   Ovarian  1-2% 39-58% 13-29%   Prostate 12% 7-26% 19-61%   Male Breast 0.1% 0.2-1.2% 1.8-7.1%   Pancreas 1-2% Up to 5% 5-10%     A person s ethnic background is also important to consider, as individuals of Ashkenazi Baptist ancestry have a higher chance of having a BRCA gene mutation.  There are three BRCA mutations that occur more frequently in this population.      Duffy Syndrome (MLH1, MSH2, MSH6, PMS2, and EPCAM)  Currently five genes are known to cause Duffy Syndrome: MLH1, MSH2, MSH6, PMS2, and EPCAM.  A single mutation in one of the Duffy Syndrome genes increases the risk for colon, endometrial, ovarian, and stomach cancers.  Other cancers that occur less commonly in Duffy Syndrome include urinary tract, skin, and brain cancers.  The chart below shows the chance that a person with Duffy syndrome would develop cancer in his or her lifetime5.      Lifetime Cancer Risks    General Population Duffy Syndrome   Colon 5% 10-61%   Endometrial 3% 13-57%   Ovarian 1-2% 1-38%   Stomach <1% 1-9%   *Cancer risk varies depending on Duffy syndrome gene found      Cowden Syndrome (PTEN)  Cowden syndrome is a hereditary condition that increases the risk for breast, thyroid, endometrial, colon, and kidney cancer.  Cowden syndrome is caused by a mutation in the PTEN gene.  A single mutation in one of the copies of PTEN causes Cowden syndrome and increases cancer risk.  The chart below shows the chance that someone with a PTEN mutation would develop cancer in their lifetime6,7.  Other benign features seen in some individuals with Cowden syndrome include benign  skin lesions (facial papules, keratoses, lipomas), learning disability, autism, thyroid nodules, colon polyps, and larger head size.     Lifetime Cancer Risks    General Population Cowden   Breast 12% 40-60%*   Thyroid 1% Up to 38%   Renal 1-2% Up to 35%   Endometrial 3% Up to 28%   Colon 5% Up to 9%   Melanoma 2-3% Up to 6%   *Emerging data suggests the risk for breast cancer could be greater than 60%               Li-Fraumeni Syndrome (TP53)  Li-Fraumeni Syndrome (LFS) is a cancer predisposition syndrome caused by a mutation in the TP53 gene. A single mutation in one of the copies of TP53 increases the risk for multiple cancers. Individuals with LFS are at an increased risk for developing cancer at a young age. The lifetime risk for development of a LFS-associated cancer is 50% by age 30 and 90% by age 60.   Core Cancers: Sarcomas, Breast, Brain, Lung, Leukemias/Lymphomas, Adrenocortical carcinomas  Other Cancers: Gastrointestinal, Thyroid, Skin, Genitourinary       Hereditary Diffuse Gastric Cancer (CDH1)  Currently, one gene is known to cause hereditary diffuse gastric cancer (HDGC): CDH1.  Individuals with HDGC are at increased risk for diffuse gastric cancer and lobular breast cancer. Of people diagnosed with HDGC, 30-50% have a mutation in the CDH1 gene.  This suggests there are likely other genes that may cause HDGC that have not been identified yet.      Lifetime Cancer Risks    General Population HDGC   Diffuse Gastric  <1% ~80%   Breast 12% 41-60%       Additional Genes    LAURA  LAURA is a moderate-risk breast cancer gene. Women who have a mutation in LAURA can have between a 2-4 fold increased risk for breast cancer compared to the general population8. LAURA mutations have also been associated with increased risk for pancreatic cancer between 5-10%9. Individuals who inherit two LAURA mutations have a condition called ataxia-telangiectasia (AT).  This rare autosomal recessive condition affects the nervous system  and immune system, and is associated with progressive cerebellar ataxia beginning in childhood. Individuals with ataxia-telangiectasia often have a weakened immune system and have an increased risk for childhood cancers.    PALB2  Mutations in PALB2 have been shown to increase the risk of breast cancer up to 41-60% in some families; where individuals fall within this risk range is dependent upon family vyxltdt23. PALB2 mutations have also been associated with increased risk for pancreatic cancer between 5-10%.  Individuals who inherit two PALB2 mutations--one from their mother and one from their father--have a condition called Fanconi Anemia.  This rare autosomal recessive condition is associated with short stature, developmental delay, bone marrow failure, and increased risk for childhood cancers.    CHEK2   CHEK2 is a moderate-risk breast cancer gene.  Women who have a mutation in CHEK2 have around a 2-4 fold increased risk for breast cancer compared to the general population, and this risk may be higher depending upon family history.11,12,13 The risk of colon cancer may be twice as high as the general population risk of colon cancer of 5%. Mutations in CHEK2 have also been shown to increase the risk of other cancers, including prostate, however these cancer risks are currently not well understood.    BRIP1, RAD51C and RAD51D  Mutations in RAD51C and RAD51D have been shown to increase the risk of ovarian cancer and breast cancer 14,. Mutations in BRIP1 have been shown to increase the risk of ovarian cancer and possibly female breast cancer 15 .       Lifetime Cancer Risk    General Population        BRIP1   RAD51C  RAD51D   Breast 12% Not well defined 20-40% 20-40%   Ovarian 1-2% 5-15% 10-15% 10-20%     ______________________________________________________________  Inheritance  All of the cancer syndromes reviewed above are inherited in an autosomal dominant pattern.  This means that if a parent has a mutation,  each of their children will have a 50% chance of inheriting that same mutation. Therefore, each child --male or female-- would have a 50% chance of being at increased risk for developing cancer.    Image obtained from Genetics Home Reference, 2013     Mutations in some genes can occur de kyle, which means that a person s mutation occurred for the first time in them and was not inherited from a parent.  Now that they have the mutation, however, it can be passed on to future generations.    Genetic Testing  Genetic testing involves a blood test and will look for any harmful mutations that are associated with increased cancer risk.  If possible, it is recommended that the person(s) who has had cancer be tested before other family members.  That person will give us the most useful information about whether or not a specific gene is associated with the cancer in the family.    Results  There are three possible results of genetic testing:  Positive--a harmful mutation was identified in one or more of the genes  Negative--no mutations were identified in any of the genes tested  Variant of unknown significance--a variation in one of the genes was identified, but it is unclear how this impacts cancer risk in the family    Advantages and Disadvantages   There are advantages and disadvantages to genetic testing.    Advantages  May clarify your cancer risk  Can help you make medical decisions  May explain the cancers in your family  May give useful information to your family members (if you share your results)    Disadvantages  Possible negative emotional impact of learning about inherited cancer risk  Uncertainty in interpreting a negative test result in some situations  Possible genetic discrimination concerns (see below)    Genetic Information Nondiscrimination Act (ANGY)  The Genetic Information Nondiscrimination Act of 2008 (ANGY) is a federal law that protects individuals from health insurance or employment discrimination  based on a genetic test result alone (with some exceptions, including employers with fewer than 15 employees, and ).  Although rare, ANGY  does not cover discrimination protections in terms of life insurance, long term care, or disability insurances.  Visit the National Human [employer] Research Wild Horse website to learn more.    Reducing Cancer Risk  All of the genes described in this handout have nationally recognized cancer screening guidelines that would be recommended for individuals who test positive.  In addition to increased cancer screening, surgeries may be offered or recommended to reduce cancer risk.  Recommendations are based upon an individual s genetic test result as well as their personal and family history of cancer.    Questions to Think About Regarding Genetic Testing:  What effect will the test result have on me and my relationship with my family members if I have an inherited gene mutation?  If I don t have a gene mutation?  Should I share my test results, and how will my family react to this news, which may also affect them?  Are my children ready to learn new information that may one day affect their own health?    Hereditary Cancer Resources    FORCE: Facing Our Risk of Cancer Empowered facingourrisk.org   Bright Pink bebrightpink.org   Li-Fraumeni Syndrome Association lfsassociation.org   PTEN World PTENworld.com   No stomach for cancer, Inc. nostomachforcancer.org   Stomach cancer relief network Scrnet.org   Collaborative Group of the Americas on Inherited Colorectal Cancer (CGA) cgaicc.com    Cancer Care cancercare.org   American Cancer Society (ACS) cancer.org   National Cancer Wild Horse (NCI) cancer.gov     Please call us if you have any questions or concerns.   Cancer Risk Management Program [phone] ([phone])  Froilan Cuevas, MS [facility]  [phone]  Allyn Duran, MS, [facility] [phone]  Dorita Avila, MS, [facility]  [phone]  Lulu Chandra, MS, [facility]  [phone]  Ivy Gipson,  MS, [facility]  [phone]  Antoinette Oliver, MS, [facility] [phone]  Za Ballard, MS, [facility] [phone]    References  Anatoly Lange PDP, Ting S, Scottie CARRASCO, Ravi JE, Monse JL, Lynda N, Venus H, Matteo O, Olivia A, Pasini B, Radiniru P, Manradha S, Bola DM, Aragon N, New E, Jorge H, Rojas E, Jostin J, Gronsujey J, Aby B, Tulinius H, Thorlacius S, Eerola H, Nevanlinna H, Adonay K, Roxann OP. Average risks of breast and ovarian cancer associated with BRCA1 or BRCA2 mutations detected in case series unselected for family history: a combined analysis of 222 studies. Am J Hum Marlene. 2003;72:1117-30.  Vasiliy N, Jeanette M, Dell G.  BRCA1 and BRCA2 Hereditary Breast and Ovarian Cancer. Gene Reviews online. 2013.  Cl YC, Keyla S, Jose Luis G, Forrest S. Breast cancer risk among male BRCA1 and BRCA2 mutation carriers. J Natl Cancer Inst. 2007;99:1811-4.  Chase GARCIA, Dusty I, Blake J, Galdino E, Amara ER, Theo F. Risk of breast cancer in male BRCA2 carriers. J Med Marlene. 2010;47:710-1.  National Comprehensive Cancer Network. Clinical practice guidelines in oncology, colorectal cancer screening. Available online (registration required). 2015.  Syed MH, Ronnie J, Sergio J, Rhina GRIFFIN, Selena MS, Eng C. Lifetime cancer risks in individuals with germline PTEN mutations. Clin Cancer Res. 2012;18:400-7.  Alexander R. Cowden Syndrome: A Critical Review of the Clinical Literature. J Marlene . 2009:18:13-27.  Nae MARSHALL, Iron ARANDA, Masha S, Brynn P, Sedrick T, Paty M, Benny B, Iban H, Moses R, Kacey K, Kayla L, Chase GARCIA, Bola ARANDA, Ander DF, Matthew MR, The Breast Cancer Susceptibility Collaboration (UK) & Shorty ROSALES. LAURA mutations that cause ataxia-telangiectasia are breast cancer susceptibility alleles. Nature Genetics. 2006;38:873-875  Brent N , Abby Y, Sury J, Milka L, Adrien SAUCEDA , Arianna ML, Jillian S, Rhonda AG, Maxwell S, Jay ML, Radha J , Kendall R, Mynor GUAJARDO, Fadumo  JR, Hira VE, Home M, Vochengstein B, Remigio N, Brain RH, Momo KW, and Caleb AP. LAURA mutations in patients with hereditary pancreatic cancer. Cancer Discover. 2012;2:41-46  Kelvin STILES., et al. Breast-Cancer Risk in Families with Mutations in PALB2. NEJM. 2014; 371(6):497-506.  CHEK2 Breast Cancer Case-Control Consortium. CHEK2*1100delC and susceptibility to breast cancer: A collaborative analysis involving 10,860 breast cancer cases and 9,065 controls from 10 studies. Am J Hum Marlene, 74 (2004), pp. 8815-7474  Kisha T, Aishwarya S, Lorrie K, et al. Spectrum of Mutations in BRCA1, BRCA2, CHEK2, and TP53 in Families at High Risk of Breast Cancer. YANETH. 2006;295(12):8766-8042.   Quoc C, Jhoana D, Joy MARSHALL, et al. Risk of breast cancer in women with a CHEK2 mutation with and without a family history of breast cancer. J Clin Oncol. 2011;29:1701-6734.  Song H, Sadiqs E, Ramus SJ, et al. Contribution of germline mutations in the RAD51B, RAD51C, and RAD51D genes to ovarian cancer in the population. J Clin Oncol. 2015;33(26):1648-2058. Doi:10.1200/JCO.2015.61.2408.  Merlin T, Heather DF, Tomer P, et al. Mutations in BRIP1 confer high risk of ovarian cancer. Jazmin Marlene. 2011;43(11):1480-0379. doi:10.1038/ng.955.

## 2023-06-15 DIAGNOSIS — F41.1 GAD (GENERALIZED ANXIETY DISORDER): ICD-10-CM

## 2023-06-15 NOTE — TELEPHONE ENCOUNTER
Routing refill request to provider for review/approval because:  Provider to determine refill     Heriberto Chow RN

## 2023-06-16 ENCOUNTER — TELEPHONE (OUTPATIENT)
Dept: SURGERY | Facility: CLINIC | Age: 43
End: 2023-06-16

## 2023-06-16 ENCOUNTER — OFFICE VISIT (OUTPATIENT)
Dept: PLASTIC SURGERY | Facility: AMBULATORY SURGERY CENTER | Age: 43
End: 2023-06-16
Payer: COMMERCIAL

## 2023-06-16 DIAGNOSIS — Z98.890 STATUS POST BREAST RECONSTRUCTION: Primary | ICD-10-CM

## 2023-06-16 LAB
PATH REPORT.ADDENDUM SPEC: ABNORMAL
PATH REPORT.COMMENTS IMP SPEC: ABNORMAL
PATH REPORT.COMMENTS IMP SPEC: ABNORMAL
PATH REPORT.COMMENTS IMP SPEC: YES
PATH REPORT.FINAL DX SPEC: ABNORMAL
PATH REPORT.GROSS SPEC: ABNORMAL
PATH REPORT.MICROSCOPIC SPEC OTHER STN: ABNORMAL
PATH REPORT.RELEVANT HX SPEC: ABNORMAL
PATHOLOGY SYNOPTIC REPORT: ABNORMAL
PHOTO IMAGE: ABNORMAL
SPECIMEN STATUS: NORMAL

## 2023-06-16 PROCEDURE — 99024 POSTOP FOLLOW-UP VISIT: CPT | Performed by: PLASTIC SURGERY

## 2023-06-16 RX ORDER — HYDROXYZINE HYDROCHLORIDE 10 MG/1
TABLET, FILM COATED ORAL
Qty: 90 TABLET | Refills: 0 | Status: SHIPPED | OUTPATIENT
Start: 2023-06-16 | End: 2023-07-06

## 2023-06-16 NOTE — LETTER
6/16/2023         RE: Zainab Bolton  [address] [address]        Dear Colleague,    Thank you for referring your patient, Zainab Bolton, to the [facility] [place]. Please see a copy of my visit note below.    Avis is 42 is a lady status post first stage breast reconstruction with tissue expanders.  She has 450 full height extra projection expanders filled with 200 cc each.    Patient still reports output of approximately 50 ml a day from her Eulogio drains bilaterally.    At the physical exam, no evidence of infection or hematoma or seroma.  Mastectomy wounds are slowly healing.    Plan: Continue antibiotic ointment over the incision,: Continue Eulogio drain for another week, follow-up with me in 1 week for possible initiation of expansion.    Basil Aguilar MD , FACS   Diplomate American Board of Plastic Surgery  Diplomate American Board of Surgery  Adj. Assistant Professor of Surgery  Division of Plastic & Reconstructive Surgery   [facility]  Office: [phone]   6/16/2023 at 9:01 AM         Again, thank you for allowing me to participate in the care of your patient.        Sincerely,        Basil Aguilar MD

## 2023-06-16 NOTE — NURSING NOTE
Patient here today for S/P RECONSTRUCTION, BREAST, BILATERAL TISSUE EXPANDERS on 05/31/2023.     The patient is doing well but reports 25mL of drainage almost 2x per day.     Keiry Garza RN on 6/16/2023 at 8:35 AM

## 2023-06-16 NOTE — TELEPHONE ENCOUNTER
I phoned Avis and after identifying her using name and , I reviewed that I had her Oncotype result. She said that she had rec'd notification but could not open or see the result. I explained that her score was 17 and that it was good news. She responded that she was hoping for 15 or less. I explained that with the benefit of a hormone blocker her risk of distant recurrence at 9 years is only 5% as well as less than 1% benefit from chemotherapy. She was very excited to hear the score and explanation. I confirmed that she has an appt to see Dr. ALVES next week and he will explain the results as well and answer any questions she has. She did say that she is BRCA2 positive so any good results helps to take some of the anxiety away. I asked if there is anything else I could do for her and she denied. I wished her a good weekend. Jackson RN, OCN, CBCN.

## 2023-06-19 NOTE — PATIENT INSTRUCTIONS
6/19/2023    Dear Relative:  The purpose of this letter is to inform you that your relative recently underwent genetic counseling and genetic testing due to the family history of cancer.  The testing done through [facility] at [facility] identified a mutation in the BRCA2 gene.  Specifically, the variant is called c.[id number]>C (p.[id number]).   Many of the genes we are born with impact our risk of certain diseases, such as cancer.  When one of these genes is not working properly due to a mistake (known as a  mutation  or  variant ), this may lead to an increased risk of developing cancer.  Individuals who have this mutation in the BRCA2 gene have a diagnosis of hereditary breast and ovarian cancer syndrome.    Mutations in the BRCA2 gene are associated with a lifetime risk of breast cancer greater than 60%. They also increase the lifetime risk for ovarian cancer up to 13-29%.  Additional cancer risks include prostate, pancreatic, male breast, and melanoma.  If individuals are found to have a mutation in the BRCA2 gene, we would recommend increased cancer screening at younger ages.  Risk reduction surgeries are also available options that can prevent the development of certain cancers.  In rare situations in which both parents have a mutation in the BRCA2 gene, their children each have a 25% risk for Fanconi anemia. Fanconi anemia is a rare condition with onset in childhood. Fanconi anemia often results in physical abnormalities, growth delays, bone marrow failure, and increased risk for cancer.  In individuals of childbearing age with BRCA2 mutations, genetic counseling and genetic testing may be advised for their partners.  I understand that this may be surprising, unexpected, and even scary news. You may be at risk for having the same BRCA2 variant found in your family.  Individuals who carry a harmful variant in the BRCA2 gene have a 50% chance of passing this variant on to each of  their children.   Scheduling a genetic counseling appointment does not mean you have to undergo genetic testing. The decision to pursue such testing is a very personal one that is discussed in more detail during the appointment. The role of genetic counseling is to provide valuable information to individuals who are impacted by genetic information such as this.    If you are interested in scheduling a genetic counseling appointment at [facility], please call [phone] to schedule an appointment. You can also find a genetic counselor close to you or at another health system at [facility]  Sincerely,   Froilan Cuevas MS, [facility]  Licensed, Certified Genetic Counselor

## 2023-06-20 ENCOUNTER — TELEPHONE (OUTPATIENT)
Dept: PLASTIC SURGERY | Facility: CLINIC | Age: 43
End: 2023-06-20
Payer: COMMERCIAL

## 2023-06-20 NOTE — TELEPHONE ENCOUNTER
Spoke with patient regarding scheduling surgery with Dr. Aaron.    Avis is undecided about proceeding with the bilateral BENIGNO or implants. She has read some things online and someone she knows in the medical field told her not to do the BENIGNO, so she isn't sure.    She wanted to know who does implant surgery so writer let her know that Dr. Aguilar might, and Dr. Aaron does do them.     She sees Dr. Aguilar this Friday and Dr. Aaron on 7/11. We are holding 9/25 for the BENIGNO should she decide to proceed that route.     Informed Dr. Aguilar and Dr. Aaron of the plan at this point. Patient has direct number for writer.    __    Ludy Louis, Senior Perioperative Coordinator, on 6/20/2023 at 4:15 PM  P: [phone]

## 2023-06-20 NOTE — TELEPHONE ENCOUNTER
----- Message from MARIA E Aaron MD sent at 5/9/2023  5:23 PM CDT -----  Regarding: Update  Ilan Hairston,    Basil, thanks for the consulT! Plan is Stage 1 Bilateral NNSM and expanders by you at , then Stage 2 BENIGNO flaps in Fall.    K, orders placed. Plan for Sept. Time frame    Will need CTA, Pre-op, PAC    Thanks    Jd

## 2023-06-23 ENCOUNTER — OFFICE VISIT (OUTPATIENT)
Dept: PLASTIC SURGERY | Facility: AMBULATORY SURGERY CENTER | Age: 43
End: 2023-06-23
Payer: COMMERCIAL

## 2023-06-23 ENCOUNTER — ONCOLOGY VISIT (OUTPATIENT)
Dept: ONCOLOGY | Facility: HOSPITAL | Age: 43
End: 2023-06-23
Attending: SPECIALIST
Payer: COMMERCIAL

## 2023-06-23 VITALS
WEIGHT: 138.3 LBS | HEART RATE: 65 BPM | HEIGHT: 64 IN | SYSTOLIC BLOOD PRESSURE: 113 MMHG | RESPIRATION RATE: 16 BRPM | DIASTOLIC BLOOD PRESSURE: 57 MMHG | OXYGEN SATURATION: 98 % | BODY MASS INDEX: 23.61 KG/M2 | TEMPERATURE: 99.2 F

## 2023-06-23 DIAGNOSIS — Z98.890 STATUS POST BILATERAL BREAST RECONSTRUCTION: Primary | ICD-10-CM

## 2023-06-23 DIAGNOSIS — C50.412 MALIGNANT NEOPLASM OF UPPER-OUTER QUADRANT OF LEFT BREAST IN FEMALE, ESTROGEN RECEPTOR POSITIVE (H): Primary | ICD-10-CM

## 2023-06-23 DIAGNOSIS — C50.112 MALIGNANT NEOPLASM OF CENTRAL PORTION OF LEFT BREAST IN FEMALE, ESTROGEN RECEPTOR POSITIVE (H): ICD-10-CM

## 2023-06-23 DIAGNOSIS — Z17.0 MALIGNANT NEOPLASM OF UPPER-OUTER QUADRANT OF LEFT BREAST IN FEMALE, ESTROGEN RECEPTOR POSITIVE (H): Primary | ICD-10-CM

## 2023-06-23 DIAGNOSIS — Z17.0 MALIGNANT NEOPLASM OF CENTRAL PORTION OF LEFT BREAST IN FEMALE, ESTROGEN RECEPTOR POSITIVE (H): ICD-10-CM

## 2023-06-23 PROCEDURE — 99215 OFFICE O/P EST HI 40 MIN: CPT | Performed by: INTERNAL MEDICINE

## 2023-06-23 PROCEDURE — 99024 POSTOP FOLLOW-UP VISIT: CPT | Performed by: PLASTIC SURGERY

## 2023-06-23 PROCEDURE — G0463 HOSPITAL OUTPT CLINIC VISIT: HCPCS | Performed by: INTERNAL MEDICINE

## 2023-06-23 ASSESSMENT — PAIN SCALES - GENERAL: PAINLEVEL: MILD PAIN (2)

## 2023-06-23 NOTE — PROGRESS NOTES
"Oncology Rooming Note    June 23, 2023 1:05 PM   Zainab Bolton is a 42 year old female who presents for:    Chief Complaint   Patient presents with     Oncology Clinic Visit     Malignant neoplasm of central portion of left breast in female, estrogen receptor positive (H)     Initial Vitals: /57   Pulse 65   Temp 99.2  F (37.3  C)   Resp 16   Ht 1.626 m (5' 4\")   Wt 62.7 kg (138 lb 4.8 oz)   LMP 05/19/2023 (Approximate)   SpO2 98%   BMI 23.74 kg/m   Estimated body mass index is 23.74 kg/m  as calculated from the following:    Height as of this encounter: 1.626 m (5' 4\").    Weight as of this encounter: 62.7 kg (138 lb 4.8 oz). Body surface area is 1.68 meters squared.  Mild Pain (2) Comment: Data Unavailable   Patient's last menstrual period was 05/19/2023 (approximate).  Allergies reviewed: Yes  Medications reviewed: Yes    Medications: Medication refills not needed today.  Pharmacy name entered into [facility]: [id number] - [place], [place] - [address]    Clinical concerns: None       Margarita Peterson LPN            "

## 2023-06-23 NOTE — PROGRESS NOTES
Avis is 42 is a lady status post first stage breast reconstruction with tissue expanders.  She has 450 full height extra projection expanders filled with 200 cc each.  She is 3 weeks out from surgery.      Patient has now developed a small wound dehiscence on the right mastectomy wound, upper third.  There are no sign of infection like purulence.  There is no exposure of the tissue expander underneath.    Left mastectomy wound is healing well.    There are no evidence of hematoma or seroma bilaterally.  However, her output from bilateral Eulogio drains is still greater than 30 cc a day.            Under sterile conditions, I have debrided the right mastectomy wound and reapproximated the edges with 4-0 Prolene stitches horizontal mattress interrupted.    I have expanded with 50 cc the right breast tissue expander.  I have expanded with 100 cc the left breast tissue expander.    Current volumes for the right tissue expander 250 ml  Current volume from the left tissue expander 300 ml.    I also had discussion with her about autologous reconstruction.  I explained to her that there is no risk of rejection because the surrounding tissue.  I also discussed that with implant-based reconstruction there is chances of explantation due to capsular contracture or leakage from the implant.  She will have consultation with my partner Dr. Aaron.    Plan: Continue to apply antibiotic lotion over the incisions, continue to apply antibiotic ointment along the insertion site of the drains and follow-up with me in 1 week for drain removal bilaterally as well as a new session of expansion.    Basil Aguilar MD , FACS   Diplomate American Board of Plastic Surgery  Diplomate American Board of Surgery  Adj. Assistant Professor of Surgery  Division of Plastic & Reconstructive Surgery   [facility]  Office: [phone]   6/23/2023 at 12:26 PM

## 2023-06-23 NOTE — Clinical Note
"    6/23/2023         RE: Zainab Bolton  [address] [address]        Dear Colleague,    Thank you for referring your patient, Zainab Bolton, to the [facility]. Please see a copy of my visit note below.    Oncology Rooming Note    June 23, 2023 1:05 PM   Zainab Bolton is a 42 year old female who presents for:    Chief Complaint   Patient presents with     Oncology Clinic Visit     Malignant neoplasm of central portion of left breast in female, estrogen receptor positive (H)     Initial Vitals: /57   Pulse 65   Temp 99.2  F (37.3  C)   Resp 16   Ht 1.626 m (5' 4\")   Wt 62.7 kg (138 lb 4.8 oz)   LMP 05/19/2023 (Approximate)   SpO2 98%   BMI 23.74 kg/m   Estimated body mass index is 23.74 kg/m  as calculated from the following:    Height as of this encounter: 1.626 m (5' 4\").    Weight as of this encounter: 62.7 kg (138 lb 4.8 oz). Body surface area is 1.68 meters squared.  Mild Pain (2) Comment: Data Unavailable   Patient's last menstrual period was 05/19/2023 (approximate).  Allergies reviewed: Yes  Medications reviewed: Yes    Medications: Medication refills not needed today.  Pharmacy name entered into [facility]: [id number] - [place], [place] - [address]    Clinical concerns: None       Margarita Peterson LPN                Again, thank you for allowing me to participate in the care of your patient.        Sincerely,        Candida Espinosa MD  "

## 2023-06-23 NOTE — LETTER
6/23/2023         RE: Zainab Bolton  [address] [address]        Dear Colleague,    Thank you for referring your patient, Zainab Bolton, to the [facility] SUR[facility] [place]. Please see a copy of my visit note below.    Avis weldon 42 is a lady status post first stage breast reconstruction with tissue expanders.  She has 450 full height extra projection expanders filled with 200 cc each.  She is 3 weeks out from surgery.      Patient has now developed a small wound dehiscence on the right mastectomy wound, upper third.  There are no sign of infection like purulence.  There is no exposure of the tissue expander underneath.    Left mastectomy wound is healing well.    There are no evidence of hematoma or seroma bilaterally.  However, her output from bilateral Eulogio drains is still greater than 30 cc a day.            Under sterile conditions, I have debrided the right mastectomy wound and reapproximated the edges with 4-0 Prolene stitches horizontal mattress interrupted.    I have expanded with 50 cc the right breast tissue expander.  I have expanded with 100 cc the left breast tissue expander.    Current volumes for the right tissue expander 250 ml  Current volume from the left tissue expander 300 ml.    I also had discussion with her about autologous reconstruction.  I explained to her that there is no risk of rejection because the surrounding tissue.  I also discussed that with implant-based reconstruction there is chances of explantation due to capsular contracture or leakage from the implant.  She will have consultation with my partner Dr. Aaron.    Plan: Continue to apply antibiotic lotion over the incisions, continue to apply antibiotic ointment along the insertion site of the drains and follow-up with me in 1 week for drain removal bilaterally as well as a new session of expansion.    Basil Aguilar MD , FACS   Diplomate American Board of Plastic  Surgery  Diplomate American Board of Surgery  Adj. Assistant Professor of Surgery  Division of Plastic & Reconstructive Surgery   [facility]  Office: [phone]   6/23/2023 at 12:26 PM         Again, thank you for allowing me to participate in the care of your patient.        Sincerely,        Basil Aguilar MD

## 2023-06-27 ENCOUNTER — OFFICE VISIT (OUTPATIENT)
Dept: PLASTIC SURGERY | Facility: AMBULATORY SURGERY CENTER | Age: 43
End: 2023-06-27
Payer: COMMERCIAL

## 2023-06-27 DIAGNOSIS — Z98.890 STATUS POST BREAST RECONSTRUCTION: Primary | ICD-10-CM

## 2023-06-27 PROCEDURE — 99024 POSTOP FOLLOW-UP VISIT: CPT | Performed by: PLASTIC SURGERY

## 2023-06-27 RX ORDER — IBUPROFEN 400 MG/1
400 TABLET, FILM COATED ORAL PRN
COMMUNITY

## 2023-06-27 RX ORDER — MULTIVIT WITH MINERALS/LUTEIN
1000 TABLET ORAL EVERY EVENING
COMMUNITY

## 2023-06-27 RX ORDER — VITAMIN B COMPLEX
25 TABLET ORAL EVERY EVENING
COMMUNITY

## 2023-06-27 NOTE — LETTER
6/27/2023         RE: Zainab Bolton  [address] [facility] [address]        Dear Colleague,    Thank you for referring your patient, Zainab Bolton, to the [facility] SUR[facility]ERY [facility] [place]. Please see a copy of my visit note below.    Avis is 42 is a lady status post first stage breast reconstruction with tissue expanders.  She has 450 full height extra projection expanders filled with the following volumes: Right breast 250 mL and left breast 300 mL.  She is 4 weeks out from surgery.      Patient reports having some leakage from the insertion site of the drain on her right breast.    At the physical exam, all incisions are healing well.  No sign of infection.  Bilateral Eulogio drains in place draining serosanguineous fluid.    Plan: Continue regular showers, continue to apply antibiotic ointment along the incisions and around the drain insertion site and follow-up with me this Friday for drain removal and continue expansion.    Basil Aguilar MD , FACS   Diplomate American Board of Plastic Surgery  Diplomate American Board of Surgery  Adj. Assistant Professor of Surgery  Division of Plastic & Reconstructive Surgery   [facility]  Office: [phone]   6/27/2023 at 4:15 PM         Again, thank you for allowing me to participate in the care of your patient.        Sincerely,        Basil Aguilar MD

## 2023-06-27 NOTE — PROGRESS NOTES
Avis is 42 is a lady status post first stage breast reconstruction with tissue expanders.  She has 450 full height extra projection expanders filled with the following volumes: Right breast 250 mL and left breast 300 mL.  She is 4 weeks out from surgery.      Patient reports having some leakage from the insertion site of the drain on her right breast.    At the physical exam, all incisions are healing well.  No sign of infection.  Bilateral Euloigo drains in place draining serosanguineous fluid.    Plan: Continue regular showers, continue to apply antibiotic ointment along the incisions and around the drain insertion site and follow-up with me this Friday for drain removal and continue expansion.    Basil Aguilar MD , FACS   Diplomate American Board of Plastic Surgery  Diplomate American Board of Surgery  Adj. Assistant Professor of Surgery  Division of Plastic & Reconstructive Surgery   [facility]  Office: [phone]   6/27/2023 at 4:15 PM

## 2023-06-30 ENCOUNTER — OFFICE VISIT (OUTPATIENT)
Dept: PLASTIC SURGERY | Facility: AMBULATORY SURGERY CENTER | Age: 43
End: 2023-06-30
Payer: COMMERCIAL

## 2023-06-30 DIAGNOSIS — Z98.890 STATUS POST BILATERAL BREAST RECONSTRUCTION: Primary | ICD-10-CM

## 2023-06-30 PROCEDURE — 99024 POSTOP FOLLOW-UP VISIT: CPT | Performed by: PLASTIC SURGERY

## 2023-06-30 RX ORDER — ACETAMINOPHEN 500 MG
1000 TABLET ORAL EVERY 6 HOURS PRN
COMMUNITY
End: 2023-08-29

## 2023-06-30 NOTE — LETTER
6/30/2023         RE: Zainab Bolton  [address] [address]        Dear Colleague,    Thank you for referring your patient, Zainab Bolton, to the [facility] PLAST[facility] SURGERY [facility]IN[facility] [place]. Please see a copy of my visit note below.    Avis weldon 42 is a lady status post first stage breast reconstruction with tissue expanders.  She has 450 full height extra projection expanders filled with the following volumes: Right breast 250 mL and left breast 300 mL.  She is 4 1/2 weeks out from surgery.      At the physical exam, all incisions are healing well.  No sign of infection.  Bilateral Eulogio drains in place draining serosanguineous fluid.    Under sterile conditions I have expanded with 50 cc of sterile saline both expanders.    Current volumes for the right tissue expander 300 ml.  Current volume from the left tissue expander 350 ml.    Bilateral Eulogio drain has been removed due to low output.    Plan: Follow-up with me next week for reevaluation.    Basil Aguilar MD , FACS   Diplomate American Board of Plastic Surgery  Diplomate American Board of Surgery  Adj. Assistant Professor of Surgery  Division of Plastic & Reconstructive Surgery   [facility]  Office: [phone]   6/30/2023 at 9:23 AM             Again, thank you for allowing me to participate in the care of your patient.        Sincerely,        Basil Aguilar MD

## 2023-06-30 NOTE — LETTER
[facility] PLASTIC SURGERY CLINIC [place] [address]  [place] [address]  [phone]          June 30, 2023    RE:  Zainab Bolton                                                                                                                                                       [address] [address]            To whom it may concern:    Zainab Bolton is under my professional care for a surgical procedure on 05/31/2023. She  is unable to return to work until 07/12/2023. Upon her return she will have no restrictions.         Sincerely,        Basil Aguilar MD

## 2023-06-30 NOTE — PROGRESS NOTES
Avis is 42 is a lady status post first stage breast reconstruction with tissue expanders.  She has 450 full height extra projection expanders filled with the following volumes: Right breast 250 mL and left breast 300 mL.  She is 4 1/2 weeks out from surgery.      At the physical exam, all incisions are healing well.  No sign of infection.  Bilateral Eulogio drains in place draining serosanguineous fluid.    Under sterile conditions I have expanded with 50 cc of sterile saline both expanders.    Current volumes for the right tissue expander 300 ml.  Current volume from the left tissue expander 350 ml.    Bilateral Eulogio drain has been removed due to low output.    Plan: Follow-up with me next week for reevaluation.    Basil Aguilar MD , FACS   Diplomate American Board of Plastic Surgery  Diplomate American Board of Surgery  Adj. Assistant Professor of Surgery  Division of Plastic & Reconstructive Surgery   [facility]  Office: [phone]   6/30/2023 at 9:23 AM

## 2023-07-06 ENCOUNTER — MYC REFILL (OUTPATIENT)
Dept: FAMILY MEDICINE | Facility: CLINIC | Age: 43
End: 2023-07-06
Payer: COMMERCIAL

## 2023-07-06 DIAGNOSIS — F41.1 GAD (GENERALIZED ANXIETY DISORDER): ICD-10-CM

## 2023-07-07 ENCOUNTER — OFFICE VISIT (OUTPATIENT)
Dept: PLASTIC SURGERY | Facility: AMBULATORY SURGERY CENTER | Age: 43
End: 2023-07-07
Payer: COMMERCIAL

## 2023-07-07 DIAGNOSIS — T81.31XD POSTOPERATIVE WOUND DEHISCENCE, SUBSEQUENT ENCOUNTER: ICD-10-CM

## 2023-07-07 DIAGNOSIS — Z98.890 STATUS POST BILATERAL BREAST RECONSTRUCTION: Primary | ICD-10-CM

## 2023-07-07 PROCEDURE — 99024 POSTOP FOLLOW-UP VISIT: CPT | Performed by: PLASTIC SURGERY

## 2023-07-07 RX ORDER — SULFAMETHOXAZOLE/TRIMETHOPRIM 800-160 MG
1 TABLET ORAL 2 TIMES DAILY
Qty: 10 TABLET | Refills: 0 | Status: SHIPPED | OUTPATIENT
Start: 2023-07-07 | End: 2023-07-12

## 2023-07-07 NOTE — TELEPHONE ENCOUNTER
"Routing refill request to provider for review/approval because:  PCP to determine        Requested Prescriptions   Pending Prescriptions Disp Refills    hydrOXYzine (ATARAX) 10 MG tablet 90 tablet 0     Sig: Take 1 tablet (10 mg) by mouth 3 times daily as needed for anxiety       Antihistamines Protocol Passed - 7/6/2023  6:31 PM        Passed - Recent (12 mo) or future (30 days) visit within the authorizing provider's specialty     Patient has had an office visit with the authorizing provider or a provider within the authorizing providers department within the previous 12 mos or has a future within next 30 days. See \"Patient Info\" tab in inbasket, or \"Choose Columns\" in Meds & Orders section of the refill encounter.              Passed - Patient is age 3 or older     Apply age and/or weight-based dosing for peds patients age 3 and older.    Forward request to provider for patients under the age of 3.          Passed - Medication is active on med list                 Heidi Jason RN 07/07/23 9:07 AM    "

## 2023-07-07 NOTE — PROGRESS NOTES
Avis is 42 is a lady status post first stage breast reconstruction with tissue expanders.  She has 450 full height extra projection expanders filled with the following volumes: Right breast 300 mL and left breast 350 mL.  She is 5 weeks out from surgery.    She did experience a small wound dehiscence on the right mastectomy.  She returns today for reevaluation.    At the physical exam the previously applied stitches on the right mastectomy wound dehiscence, upper third, has reopened.  There are no sign of infection.  Under sterile conditions, I have reapplies 3-0 Prolene horizontal mattress stitches x2 and simple interrupted stitches x2.      Plan:  I will send her home with prophylactic Bactrim for 5 days and I will see her again next Wednesday to make sure that she is doing well with no sign of infection.    Basil Aguilar MD , FACS   Diplomate American Board of Plastic Surgery  Diplomate American Board of Surgery  Adj. Assistant Professor of Surgery  Division of Plastic & Reconstructive Surgery   [facility]  Office: [phone]   7/7/2023 at 9:05 AM

## 2023-07-07 NOTE — LETTER
7/7/2023         RE: Zainab Bolton  [address] [address] [address]        Dear Colleague,    Thank you for referring your patient, Zainab Bolton, to the [facility] PLASTIC SURGERY CLIN[facility] [place]. Please see a copy of my visit note below.    Avis wledon 42 is a lady status post first stage breast reconstruction with tissue expanders.  She has 450 full height extra projection expanders filled with the following volumes: Right breast 300 mL and left breast 350 mL.  She is 5 weeks out from surgery.    She did experience a small wound dehiscence on the right mastectomy.  She returns today for reevaluation.    At the physical exam the previously applied stitches on the right mastectomy wound dehiscence, upper third, has reopened.  There are no sign of infection.  Under sterile conditions, I have reapplies 3-0 Prolene horizontal mattress stitches x2 and simple interrupted stitches x2.      Plan:  I will send her home with prophylactic Bactrim for 5 days and I will see her again next Wednesday to make sure that she is doing well with no sign of infection.    Basil Aguilar MD , FACS   Diplomate American Board of Plastic Surgery  Diplomate American Board of Surgery  Adj. Assistant Professor of Surgery  Division of Plastic & Reconstructive Surgery   [facility]  Office: [phone]   7/7/2023 at 9:05 AM         Again, thank you for allowing me to participate in the care of your patient.        Sincerely,        Basil Aguilar MD

## 2023-07-10 RX ORDER — HYDROXYZINE HYDROCHLORIDE 10 MG/1
10 TABLET, FILM COATED ORAL 3 TIMES DAILY PRN
Qty: 90 TABLET | Refills: 0 | Status: SHIPPED | OUTPATIENT
Start: 2023-07-10 | End: 2023-09-12

## 2023-07-11 ENCOUNTER — OFFICE VISIT (OUTPATIENT)
Dept: PLASTIC SURGERY | Facility: CLINIC | Age: 43
End: 2023-07-11
Attending: PLASTIC SURGERY
Payer: COMMERCIAL

## 2023-07-11 ENCOUNTER — INFUSION THERAPY VISIT (OUTPATIENT)
Dept: INFUSION THERAPY | Facility: HOSPITAL | Age: 43
End: 2023-07-11
Attending: INTERNAL MEDICINE
Payer: COMMERCIAL

## 2023-07-11 VITALS
OXYGEN SATURATION: 96 % | HEART RATE: 79 BPM | WEIGHT: 142 LBS | SYSTOLIC BLOOD PRESSURE: 108 MMHG | BODY MASS INDEX: 24.37 KG/M2 | DIASTOLIC BLOOD PRESSURE: 70 MMHG | TEMPERATURE: 99.2 F

## 2023-07-11 DIAGNOSIS — Z17.0 MALIGNANT NEOPLASM OF CENTRAL PORTION OF LEFT BREAST IN FEMALE, ESTROGEN RECEPTOR POSITIVE (H): ICD-10-CM

## 2023-07-11 DIAGNOSIS — Z98.890 STATUS POST BREAST RECONSTRUCTION: Primary | ICD-10-CM

## 2023-07-11 DIAGNOSIS — C50.112 MALIGNANT NEOPLASM OF CENTRAL PORTION OF LEFT BREAST IN FEMALE, ESTROGEN RECEPTOR POSITIVE (H): Primary | ICD-10-CM

## 2023-07-11 DIAGNOSIS — C50.112 MALIGNANT NEOPLASM OF CENTRAL PORTION OF LEFT BREAST IN FEMALE, ESTROGEN RECEPTOR POSITIVE (H): ICD-10-CM

## 2023-07-11 DIAGNOSIS — Z17.0 MALIGNANT NEOPLASM OF CENTRAL PORTION OF LEFT BREAST IN FEMALE, ESTROGEN RECEPTOR POSITIVE (H): Primary | ICD-10-CM

## 2023-07-11 PROCEDURE — 99024 POSTOP FOLLOW-UP VISIT: CPT | Performed by: PLASTIC SURGERY

## 2023-07-11 PROCEDURE — 96402 CHEMO HORMON ANTINEOPL SQ/IM: CPT

## 2023-07-11 PROCEDURE — 250N000011 HC RX IP 250 OP 636: Mod: JZ | Performed by: INTERNAL MEDICINE

## 2023-07-11 PROCEDURE — G0463 HOSPITAL OUTPT CLINIC VISIT: HCPCS | Performed by: PLASTIC SURGERY

## 2023-07-11 RX ADMIN — GOSERELIN ACETATE 3.6 MG: 3.6 IMPLANT SUBCUTANEOUS at 12:04

## 2023-07-11 ASSESSMENT — PAIN SCALES - GENERAL: PAINLEVEL: NO PAIN (0)

## 2023-07-11 NOTE — PROGRESS NOTES
Infusion Nursing Note:  Zainab FRAZIER Hoang presents today for Zoladex.    Patient seen by provider today: No   present during visit today: Not Applicable.    Note: Medication explained to pt and written information given.      Intravenous Access:  No Intravenous access/labs at this visit.    Treatment Conditions:  Not Applicable.      Post Infusion Assessment:  Patient tolerated injection without incident.   Given Sub Q into RLQ of abd.    Discharge Plan:   Patient and/or family verbalized understanding of discharge instructions and all questions answered.  Copy of AVS reviewed with patient and/or family.  Patient will return 8/8/23  for next appointment.  Patient discharged in stable condition accompanied by: self.  Departure Mode: Ambulatory.      Salome Moran RN

## 2023-07-11 NOTE — NURSING NOTE
"Oncology Rooming Note    July 11, 2023 10:05 AM   Zainab Bolton is a 42 year old female who presents for:    Chief Complaint   Patient presents with     Oncology Clinic Visit     Malignant neoplasm of left breast      Initial Vitals: /70 (BP Location: Right arm, Patient Position: Sitting, Cuff Size: Adult Regular)   Pulse 79   Temp 99.2  F (37.3  C) (Oral)   Wt 64.4 kg (142 lb)   LMP 05/19/2023 (Approximate)   SpO2 96%   BMI 24.37 kg/m   Estimated body mass index is 24.37 kg/m  as calculated from the following:    Height as of 6/23/23: 1.626 m (5' 4\").    Weight as of this encounter: 64.4 kg (142 lb). Body surface area is 1.71 meters squared.  No Pain (0) Comment: Data Unavailable   Patient's last menstrual period was 05/19/2023 (approximate).  Allergies reviewed: Yes  Medications reviewed: Yes    Medications: Medication refills not needed today.  Pharmacy name entered into [facility]: [id number] - [place] [id number]    Clinical concerns: pt presents today for f/u visit.      Noreen Tijerina, EMT  7/11/2023              "

## 2023-07-11 NOTE — LETTER
7/11/2023       RE: Zainab Bolton  [address] [address]      Dear Colleague,    Thank you for referring your patient, Zainab Bolton, to the [facility] [place] [facility]. Please see a copy of my visit note below.    PRESENTING COMPLAINT:  Preop visit for upcoming bilateral breast reconstruction in September.    HISTORY OF PRESENTING COMPLAINT:  Ms. Bolton is 42 years old.  She underwent bilateral nipple non-sparing mastectomy through a vertical incision along with immediate expander-based reconstruction by Dr. Aguilar for left sided breast cancer on 06/01/2023.    She underwent the first stage of mastectomy and reconstruction with expanders.  She has had some wound healing issues on the right side, but no evidence for infection and is doing well.  She is now eager to proceed with the second stage in September.  She is here for a preoperative visit.  No change otherwise in her history and physical exam.    ASSESSMENT AND PLAN:  Based on the above findings, a diagnosis of left breast cancer, underwent bilateral mastectomy and expander-based reconstruction, now ready for stage II free BENIGNO flap reconstructions.  I went over the planned procedure with the patient in detail.  All risks, benefits, and alternatives of the procedure including pain, infection, bleeding, scarring, asymmetry, seromas, hematomas, wound breakdown, wound dehiscence, loss of flaps, abdominal wall healing issues, abdominal wall weakness, bulges, hernias, numbness of the chest, DVT, PE and abdomen, DVT, PE, MI, CVA, pneumonia, renal failure and death were discussed.  She understood them all and wants to proceed.  She got her CTA.  She will get her preoperative clearance.  All of her questions were answered.  She was happy with the visit.  All exam and discussion done in presence of my nurse, Bren Gandara.    Total time spent in the encounter today, including chart review, the visit itself and  post-visit paperwork was 30 minutes.      MARIA E Aaron MD

## 2023-07-11 NOTE — PROGRESS NOTES
PRESENTING COMPLAINT:  Preop visit for upcoming bilateral breast reconstruction in September.    HISTORY OF PRESENTING COMPLAINT:  Ms. Bolton is 42 years old.  She underwent bilateral nipple non-sparing mastectomy through a vertical incision along with immediate expander-based reconstruction by Dr. Aguilar for left sided breast cancer on 06/01/2023.    She underwent the first stage of mastectomy and reconstruction with expanders.  She has had some wound healing issues on the right side, but no evidence for infection and is doing well.  She is now eager to proceed with the second stage in September.  She is here for a preoperative visit.  No change otherwise in her history and physical exam.    ASSESSMENT AND PLAN:  Based on the above findings, a diagnosis of left breast cancer, underwent bilateral mastectomy and expander-based reconstruction, now ready for stage II free BENIGNO flap reconstructions.  I went over the planned procedure with the patient in detail.  All risks, benefits, and alternatives of the procedure including pain, infection, bleeding, scarring, asymmetry, seromas, hematomas, wound breakdown, wound dehiscence, loss of flaps, abdominal wall healing issues, abdominal wall weakness, bulges, hernias, numbness of the chest, DVT, PE and abdomen, DVT, PE, MI, CVA, pneumonia, renal failure and death were discussed.  She understood them all and wants to proceed.  She got her CTA.  She will get her preoperative clearance.  All of her questions were answered.  She was happy with the visit.  All exam and discussion done in presence of my nurse, Bren Gandara.    Total time spent in the encounter today, including chart review, the visit itself and post-visit paperwork was 30 minutes.

## 2023-07-12 ENCOUNTER — OFFICE VISIT (OUTPATIENT)
Dept: PLASTIC SURGERY | Facility: AMBULATORY SURGERY CENTER | Age: 43
End: 2023-07-12
Payer: COMMERCIAL

## 2023-07-12 DIAGNOSIS — Z98.890 STATUS POST BILATERAL BREAST RECONSTRUCTION: Primary | ICD-10-CM

## 2023-07-12 PROCEDURE — 99024 POSTOP FOLLOW-UP VISIT: CPT | Performed by: PLASTIC SURGERY

## 2023-07-12 NOTE — LETTER
7/12/2023         RE: Zainab Bolton  [address] [address]        Dear Colleague,    Thank you for referring your patient, Zainab Bolton, to the [facility]LASTIC SURGERY [facility]INIC [place]. Please see a copy of my visit note below.    Zainab is a 42 years old lady status post first stage breast reconstruction with tissue expanders. She has 450 full height extra projection expanders filled with the following volumes: Right breast 300 mL and left breast 350 mL.  She is 6 weeks out from surgery.  She did experience some wound dehiscence on the right mastectomy wound that was treated with application of new stitches, using 3-0 Prolene.  Patient was also treated with prophylactic p.o. antibiotic.  Overall she is doing excellent.    At the physical exam, mastectomy scar on the left breast is completely healed.  Mastectomy scar on the right breast is healing.  No sign of infection or further dehiscence.  No evidence of hematoma or seroma.    Plan: Patient already saw my partner Dr. Aaron and she is scheduled for second stage breast reconstruction with at the DIP flap this coming September.  I will see her again in 2 weeks for possible stitch removal, later on I will continue to expand her tissue expanders.    Basil Aguilar MD , FACS   Diplomate American Board of Plastic Surgery  Diplomate American Board of Surgery  Adj. Assistant Professor of Surgery  Division of Plastic & Reconstructive Surgery   [facility]  Office: [phone]   7/12/2023 at 11:16 AM             Again, thank you for allowing me to participate in the care of your patient.        Sincerely,        Basil Aguilar MD

## 2023-07-12 NOTE — NURSING NOTE
Patient here today S/P RECONSTRUCTION, BREAST, BILATERAL TISSUE EXPANDERS on 05/31/2023.       The patient is doing well overall. Reports improvement since last visit.     Keiry Garza RN on 7/12/2023 at 10:57 AM

## 2023-07-12 NOTE — PROGRESS NOTES
Zainab is a 42 years old lady status post first stage breast reconstruction with tissue expanders. She has 450 full height extra projection expanders filled with the following volumes: Right breast 300 mL and left breast 350 mL.  She is 6 weeks out from surgery.  She did experience some wound dehiscence on the right mastectomy wound that was treated with application of new stitches, using 3-0 Prolene.  Patient was also treated with prophylactic p.o. antibiotic.  Overall she is doing excellent.    At the physical exam, mastectomy scar on the left breast is completely healed.  Mastectomy scar on the right breast is healing.  No sign of infection or further dehiscence.  No evidence of hematoma or seroma.    Plan: Patient already saw my partner Dr. Aaron and she is scheduled for second stage breast reconstruction with at the DIP flap this coming September.  I will see her again in 2 weeks for possible stitch removal, later on I will continue to expand her tissue expanders.    Basil Aguilar MD , FACS   Diplomate American Board of Plastic Surgery  Diplomate American Board of Surgery  Adj. Assistant Professor of Surgery  Division of Plastic & Reconstructive Surgery   [facility]  Office: [phone]   7/12/2023 at 11:16 AM

## 2023-07-16 ENCOUNTER — DOCUMENTATION ONLY (OUTPATIENT)
Dept: PSYCHOLOGY | Facility: CLINIC | Age: 43
End: 2023-07-16
Payer: COMMERCIAL

## 2023-07-16 DIAGNOSIS — F41.0 GENERALIZED ANXIETY DISORDER WITH PANIC ATTACKS: Primary | ICD-10-CM

## 2023-07-16 DIAGNOSIS — F41.1 GENERALIZED ANXIETY DISORDER WITH PANIC ATTACKS: Primary | ICD-10-CM

## 2023-07-16 PROCEDURE — 99207 PR NO BILLABLE SERVICE THIS VISIT: CPT

## 2023-07-16 NOTE — PROGRESS NOTES
Discharge Summary  Multiple Sessions    Client Name: Zainab Bolton MRN#: [id number] YOB: 1980      Intake / Discharge Date:    Intake: 3/22/23   Discharge: 7/16/23      DSM5 Diagnoses: (Sustained by DSM5 Criteria Listed Above)  Diagnoses: 300.02 (F41.1) Generalized Anxiety Disorder with panic attacks  Psychosocial & Contextual Factors: stress within important relationship, health concerns related to recent breast cancer diagnosis, stress with financial situation  [id number]: 26          Presenting Concern:   Pt presented with GEOVANY symptoms and a history of panic attacks. Pt reported she was seeking therapy to gain skills to manage her anxiety symptoms as well as process the recent diagnosis.      Reason for Discharge:  Client did not return      Disposition at Time of Last Encounter:   Comments:   Pt appeared to be using skills discussed during first appointments. Pt appeared to have ongoing anxiety concerns. Pt denied any SI, SIB, or HI at the last appointment.     Risk Management:   Client has had a history of suicidal ideation: when large stress has been present and has had instrusive thoughts related to Passive SI  Recommended that patient call 911 or go to the local ED should there be a change in any of these risk factors.      Referred To:  Pt was not referred to other services due to not returning to other appointments. It is recommended that pt resume outpatient therapy services in the future.        Daksha Daugherty   7/16/2023

## 2023-07-17 NOTE — TELEPHONE ENCOUNTER
Left voicemail for patient regarding scheduling surgery with Dr. Aaron on 9/25.    Provided contact number to discuss.    P: [phone]    __    Ludy Louis, Senior Perioperative Coordinator, on 7/17/2023 at 2:28 PM

## 2023-07-24 NOTE — PROGRESS NOTES
[facility] Hematology and Oncology Consult Note    Patient: Zainab Bolton  MRN: [id number]  Date of Service: 06/23/2023      Reason for Visit    Chief Complaint   Patient presents with    Oncology Clinic Visit     Malignant neoplasm of central portion of left breast in female, estrogen receptor positive (H)         Assessment/Plan    Problem List Items Addressed This Visit          Other    Malignant neoplasm of central portion of left breast in female, estrogen receptor positive (H)    Relevant Orders    Infusion Appointment Request     Other Visit Diagnoses       Malignant neoplasm of upper-outer quadrant of left breast in female, estrogen receptor positive (H)    -  Primary          Early stage, hormone receptor positive and HER2 negative left breast cancer status post bilateral mastectomy and left sentinel lymph node biopsy  BRCA2 carrier  Premenopausal  Oncotype Dx score 17  Reviewed her mammogram, ultrasound, biopsy and surgical path reports in detail along with Oncotype DX score reports.  Incidentally found mass in the left breast.  Low-grade invasive ductal carcinoma which is strongly ER and MA positive.  HER2 negative by FISH.  BRCA2 carrier on genetic testing.  Status post bilateral mastectomy.  Pathologic staging pT2, pN 0, clinically M0.  I reviewed the diagnosis of early-stage breast cancer and its prognosis and treatment options in detail today.  I reviewed the Oncotype DX score and its implications.    I explained to her that in premenopausal women (age less than 50) with an Oncotype score more than 16 but less than 25, adjuvant chemotherapy could be considered as it has shown some benefit in terms of DFS.  However the speculation is that that benefit comes from ovarian suppression effect of the chemotherapy.  We discussed various chemotherapy options and the potential side effects.  She is not keen on pursuing any chemotherapy.  If she does not choose chemotherapy then definitely she will  need ovarian suppression therapy on top of tamoxifen.  She is a BRCA2 carrier and is thinking about prophylactic bilateral oophorectomy in the near future so we will have to go on OST with tamoxifen until then and then we can switch to an AI.    He has already been started on tamoxifen.  So I recommended starting monthly Zoladex until she undergoes oophorectomy.  Reviewed potential side effects and complications associated with Zoladex.  We will start from next month.  Since she underwent mastectomy and margins are at least 1 mm or more, radiation oncology has not been consulted.    She is agreeable to the plan.  We also discussed about risk of other potential cancers stemming from BRCA2 mutation.      I will see her back in 3 months with repeat labs.      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      History of presenting illness:  Zainab Bolton is a 42-year-old female with a new diagnosis of left breast cancer status post bilateral mastectomy who is here to discuss adjuvant therapy for the same.    Screening mammogram done in March 2023 picked up an abnormality in the left breast.  Diagnostic mammogram with ultrasound showed an irregular hypoechoic mass in the left breast measuring 18 x 14 x 11 mm without any evidence of any left axillary adenopathy.  Biopsy of this mass came back showing low-grade invasive ductal carcinoma which  was strongly ER positive, MA positive and HER2 equivocal by IHC and negative by FISH.  She also underwent MRI which again showed enhancement at the site of previously noted mass.  It was measuring 22 x 17 x 17 mm.  No adenopathy noted.  She had a niece with breast cancer who was tested positive for BRCA2 mutation.  So she underwent genetic testing and was found to be a carrier for BRCA2 mutation.  She went for bilateral mastectomy and left sentinel lymph node biopsy.  Surgical path shows a 2.3 x 2.1 x 1.8 cm invasive ductal carcinoma of the left breast.  Grade 1.  All the margins negative.  Posterior margin was the closest but was at least 1 mm.  She also had a background of DCIS.  1 sentinel lymph node was removed which was negative for malignancy.  Final pathologic staging was pT2, PN 0.  She has been started on tamoxifen.  Oncotype DX came back at a score of 17.  She is here to discuss adjuvant therapy.  Recovering well from surgery and plan is to go for breast reconstruction in the near future.  She has tissue expanders in place.    Her history significant for anxiety.  Former smoker but quit in March.  No significant alcohol use.      Past History    Past Medical History:   Diagnosis Date    Anxiety 08/16/2010    Anxiety state, unspecified 1997    chronic anxiety    BRCA2 positive     Breast cancer (H) 05/19/2023    Depression     Essential hypertension 03/03/2023    Hypercholesteremia 08/16/2010    Obesity 08/16/2010    Other acne     Papanicolaou smear of cervix with low grade squamous intraepithelial lesion (LGSIL) (aka LSIL) 05/05/2015    LSIL/+ HR HPV    TOBACCO ABUSE-CONTINUOUS     Family History   Problem Relation Age of Onset    Cancer Father         liver    Other Cancer Father         Stomach and liver cancer diagnosis    Arthritis Maternal Grandmother     Hypertension Maternal Grandmother     Diabetes Maternal Grandmother     Thyroid Disease Maternal Grandmother     Cancer Maternal Grandfather          leukemia    Heart Disease Maternal Aunt         MI approx age 46      Past Surgical History:   Procedure Laterality Date    BIOPSY      BIOPSY NODE SENTINEL Left 2023    Procedure: LEFT SENTINEL LYMPH NODE BIOPSY;  Surgeon: Kylee Villasenor MD;  Location: [facility] OR    CONIZATION LEEP  2015    LÁZARO 2    CONIZATION LEEP N/A 2015    Procedure: CONIZATION LEEP;  Surgeon: Omayra Cunningham DO;  Location: [facility] OR    EYE SURGERY  2016    MASTECTOMY SIMPLE Bilateral 2023    Procedure: BILATERAL MASTECTOMIES;  Surgeon: Kylee Villasenor MD;  Location: [facility] OR    RECONSTRUCT BREAST, IMPLANT PROSTHESIS, COMBINED Bilateral 2023    Procedure: RECONSTRUCTION, BREAST, BILATERAL TISSUE EXPANDERS;  Surgeon: Basil Aguilar MD;  Location: [facility] OR    ZZC LIGATE FALLOPIAN TUBE  10/03/2003    Social History     Socioeconomic History    Marital status:      Spouse name: Not on file    Number of children: 3    Years of education: 12    Highest education level: Not on file   Occupational History    Occupation: Stay at home Mother   Tobacco Use    Smoking status: Former     Packs/day: 1.00     Years: 10.00     Pack years: 10.00     Types: Cigarettes     Quit date: 3/28/2023     Years since quittin.3    Smokeless tobacco: Never    Tobacco comments:     since age 12, but stopped with each baby   Vaping Use    Vaping Use: Not on file   Substance and Sexual Activity    Alcohol use: Yes     Comment: 1-2  every 2-3 months if any    Drug use: Yes     Types: Marijuana     Comment: daily    Sexual activity: Yes     Partners: Male     Birth control/protection: Female Surgical   Other Topics Concern     Service No    Blood Transfusions No    Caffeine Concern Yes     Comment: 3 cups every 2 weeks, 2 pops per week     Occupational Exposure No    Hobby Hazards No    Sleep Concern No    Stress Concern Yes    Weight Concern Yes    Special Diet No    Back Care No    Exercise No     Bike Helmet No    Seat Belt Yes    Self-Exams No    Parent/sibling w/ CABG, MI or angioplasty before 65F 55M? No   Social History Narrative    Not on file     Social Determinants of Health     Financial Resource Strain: Not on file   Food Insecurity: Not on file   Transportation Needs: Not on file   Physical Activity: Not on file   Stress: Not on file   Social Connections: Not on file   Intimate Partner Violence: Not on file   Housing Stability: Not on file        Allergies    No Known Allergies    Review of Systems    Pertinent items are noted in HPI.      Physical Exam        7/11/2023    12:16 PM   Oncology Vitals   Pain Score 0 (None)       General: alert and cooperative  HEENT: Head: Normal, normocephalic, atraumatic.  Eye: Normal external eye, conjunctiva, lids cornea, LOPEZ.  Chest: Clear to auscultation bilaterally  Cardiac: S1, S2 normal, regular rate and rhythm  Extremities: atraumatic, no peripheral edema  Breast: Status post bilateral mastectomy, tissue expanders in place  CNS: Alert and oriented x3, neurologic exam grossly normal.  Lymphatics: No bilateral cervical, axillary, supraclavicular or inguinal adenopathy noted      Lab Results    No results found for this or any previous visit (from the past 168 hour(s)).    Imaging Results    No results found.    A total of 60 minutes was spent today on this visit including face to face conversation with the patient, EMR review (labs, imaging studies, pathology reports and outside records), counseling and care co-ordination and documentation.    Signed by: Candida Espinosa MD

## 2023-07-28 ENCOUNTER — OFFICE VISIT (OUTPATIENT)
Dept: PLASTIC SURGERY | Facility: AMBULATORY SURGERY CENTER | Age: 43
End: 2023-07-28
Payer: COMMERCIAL

## 2023-07-28 DIAGNOSIS — Z98.890 STATUS POST BREAST RECONSTRUCTION: Primary | ICD-10-CM

## 2023-07-28 DIAGNOSIS — T85.79XD INFECTION OF BREAST IMPLANT, SUBSEQUENT ENCOUNTER: ICD-10-CM

## 2023-07-28 PROCEDURE — 99024 POSTOP FOLLOW-UP VISIT: CPT | Performed by: PLASTIC SURGERY

## 2023-07-28 NOTE — NURSING NOTE
Patient here today S/P RECONSTRUCTION, BREAST, BILATERAL TISSUE EXPANDERS on 05/31/2023.    Doing well overall.    Keiry Garza RN on 7/28/2023 at 8:50 AM

## 2023-07-28 NOTE — PROGRESS NOTES
Zainab is a 43 years old lady status post first stage breast reconstruction with tissue expanders.  She is essentially 2-month out from surgery.  Patient did experience a small wound dehiscence on the right mastectomy incision that was effectively treated with Prolene stitches.    She returns today for reevaluation.    All wounds are well-healed.  Stitches placed on the right mastectomy incision has been removed.  No evidence of infection or hematoma or seroma.    Plan: Follow-up with me in 3 weeks and at that time I will continue with expansion.    Basil Aguilar MD , FACS   Diplomate American Board of Plastic Surgery  Diplomate American Board of Surgery  Adj. Assistant Professor of Surgery  Division of Plastic & Reconstructive Surgery   [facility]  Office: [phone]   7/28/2023 at 9:16 AM

## 2023-07-28 NOTE — LETTER
7/28/2023         RE: Zainab Bolton  [address] [address] [facility] [address]        Dear Colleague,    Thank you for referring your patient, Zainab Bolton, to the [facility]LAST[facility] [place]. Please see a copy of my visit note below.    Zainab is a 43 years old lady status post first stage breast reconstruction with tissue expanders.  She is essentially 2-month out from surgery.  Patient did experience a small wound dehiscence on the right mastectomy incision that was effectively treated with Prolene stitches.    She returns today for reevaluation.    All wounds are well-healed.  Stitches placed on the right mastectomy incision has been removed.  No evidence of infection or hematoma or seroma.    Plan: Follow-up with me in 3 weeks and at that time I will continue with expansion.    Basil Aguilar MD , FACS   Diplomate American Board of Plastic Surgery  Diplomate American Board of Surgery  Adj. Assistant Professor of Surgery  Division of Plastic & Reconstructive Surgery   [facility]  Office: [phone]   7/28/2023 at 9:16 AM         Again, thank you for allowing me to participate in the care of your patient.        Sincerely,        Basil Aguilar MD

## 2023-08-07 NOTE — TELEPHONE ENCOUNTER
Left voicemail for patient regarding scheduling surgery with Dr. Aaron on 9/25.    Will need to schedule CT scan, consult, and PAC appointment.     Provided direct contact number to discuss.    P: [phone]    __    Ludy Louis, Senior Perioperative Coordinator, on 8/7/2023 at 3:25 PM

## 2023-08-08 ENCOUNTER — INFUSION THERAPY VISIT (OUTPATIENT)
Dept: INFUSION THERAPY | Facility: HOSPITAL | Age: 43
End: 2023-08-08
Attending: INTERNAL MEDICINE
Payer: COMMERCIAL

## 2023-08-08 VITALS
HEART RATE: 69 BPM | SYSTOLIC BLOOD PRESSURE: 123 MMHG | RESPIRATION RATE: 16 BRPM | DIASTOLIC BLOOD PRESSURE: 79 MMHG | OXYGEN SATURATION: 99 %

## 2023-08-08 DIAGNOSIS — Z17.0 MALIGNANT NEOPLASM OF CENTRAL PORTION OF LEFT BREAST IN FEMALE, ESTROGEN RECEPTOR POSITIVE (H): Primary | ICD-10-CM

## 2023-08-08 DIAGNOSIS — C50.112 MALIGNANT NEOPLASM OF CENTRAL PORTION OF LEFT BREAST IN FEMALE, ESTROGEN RECEPTOR POSITIVE (H): Primary | ICD-10-CM

## 2023-08-08 PROCEDURE — 96402 CHEMO HORMON ANTINEOPL SQ/IM: CPT

## 2023-08-08 PROCEDURE — 250N000011 HC RX IP 250 OP 636: Mod: JZ | Performed by: INTERNAL MEDICINE

## 2023-08-08 RX ADMIN — GOSERELIN ACETATE 3.6 MG: 3.6 IMPLANT SUBCUTANEOUS at 13:58

## 2023-08-08 NOTE — PROGRESS NOTES
Pt here for 2nd zoladex which was given L abdomen without incident. Pt denies new complaint and d.c ambulatory to lobby alone and is aware of treatment plan.

## 2023-08-09 DIAGNOSIS — Z17.0 MALIGNANT NEOPLASM OF UPPER-OUTER QUADRANT OF LEFT BREAST IN FEMALE, ESTROGEN RECEPTOR POSITIVE (H): ICD-10-CM

## 2023-08-09 DIAGNOSIS — C50.412 MALIGNANT NEOPLASM OF UPPER-OUTER QUADRANT OF LEFT BREAST IN FEMALE, ESTROGEN RECEPTOR POSITIVE (H): ICD-10-CM

## 2023-08-09 RX ORDER — TAMOXIFEN CITRATE 20 MG/1
20 TABLET ORAL DAILY
Qty: 30 TABLET | Refills: 0 | Status: SHIPPED | OUTPATIENT
Start: 2023-08-09 | End: 2023-09-13

## 2023-08-12 ENCOUNTER — APPOINTMENT (OUTPATIENT)
Dept: CT IMAGING | Facility: HOSPITAL | Age: 43
DRG: 872 | End: 2023-08-12
Attending: EMERGENCY MEDICINE
Payer: COMMERCIAL

## 2023-08-12 ENCOUNTER — HOSPITAL ENCOUNTER (INPATIENT)
Facility: HOSPITAL | Age: 43
LOS: 7 days | Discharge: HOME | DRG: 872 | End: 2023-08-19
Attending: EMERGENCY MEDICINE | Admitting: INTERNAL MEDICINE
Payer: COMMERCIAL

## 2023-08-12 DIAGNOSIS — N61.0 CELLULITIS OF RIGHT BREAST: ICD-10-CM

## 2023-08-12 DIAGNOSIS — E87.6 HYPOKALEMIA: ICD-10-CM

## 2023-08-12 DIAGNOSIS — R91.1 PULMONARY NODULE: ICD-10-CM

## 2023-08-12 DIAGNOSIS — D72.829 LEUKOCYTOSIS, UNSPECIFIED TYPE: ICD-10-CM

## 2023-08-12 LAB
ALBUMIN SERPL BCG-MCNC: 4.1 G/DL (ref 3.5–5.2)
ALP SERPL-CCNC: 58 U/L (ref 35–104)
ALT SERPL W P-5'-P-CCNC: 11 U/L (ref 0–50)
ANION GAP SERPL CALCULATED.3IONS-SCNC: 11 MMOL/L (ref 7–15)
AST SERPL W P-5'-P-CCNC: 18 U/L (ref 0–45)
BASE EXCESS BLDV CALC-SCNC: 2.7 MMOL/L
BASOPHILS # BLD AUTO: 0.1 10E3/UL (ref 0–0.2)
BASOPHILS NFR BLD AUTO: 0 %
BILIRUB SERPL-MCNC: 1.4 MG/DL
BUN SERPL-MCNC: 15.6 MG/DL (ref 6–20)
CALCIUM SERPL-MCNC: 9.2 MG/DL (ref 8.6–10)
CHLORIDE SERPL-SCNC: 103 MMOL/L (ref 98–107)
CREAT SERPL-MCNC: 0.93 MG/DL (ref 0.51–0.95)
DEPRECATED HCO3 PLAS-SCNC: 27 MMOL/L (ref 22–29)
EOSINOPHIL # BLD AUTO: 0.2 10E3/UL (ref 0–0.7)
EOSINOPHIL NFR BLD AUTO: 1 %
ERYTHROCYTE [DISTWIDTH] IN BLOOD BY AUTOMATED COUNT: 13.2 % (ref 10–15)
GFR SERPL CREATININE-BSD FRML MDRD: 77 ML/MIN/1.73M2
GLUCOSE SERPL-MCNC: 103 MG/DL (ref 70–99)
HCO3 BLDV-SCNC: 27 MMOL/L (ref 24–30)
HCT VFR BLD AUTO: 38.7 % (ref 35–47)
HGB BLD-MCNC: 12.8 G/DL (ref 11.7–15.7)
IMM GRANULOCYTES # BLD: 0.2 10E3/UL
IMM GRANULOCYTES NFR BLD: 1 %
LACTATE SERPL-SCNC: 0.8 MMOL/L (ref 0.7–2)
LYMPHOCYTES # BLD AUTO: 2 10E3/UL (ref 0.8–5.3)
LYMPHOCYTES NFR BLD AUTO: 8 %
MCH RBC QN AUTO: 29.6 PG (ref 26.5–33)
MCHC RBC AUTO-ENTMCNC: 33.1 G/DL (ref 31.5–36.5)
MCV RBC AUTO: 89 FL (ref 78–100)
MONOCYTES # BLD AUTO: 1 10E3/UL (ref 0–1.3)
MONOCYTES NFR BLD AUTO: 4 %
NEUTROPHILS # BLD AUTO: 21.1 10E3/UL (ref 1.6–8.3)
NEUTROPHILS NFR BLD AUTO: 86 %
NRBC # BLD AUTO: 0 10E3/UL
NRBC BLD AUTO-RTO: 0 /100
OXYHGB MFR BLDV: >98.5 % (ref 70–75)
PCO2 BLDV: 41 MM HG (ref 35–50)
PH BLDV: 7.43 [PH] (ref 7.35–7.45)
PLATELET # BLD AUTO: 262 10E3/UL (ref 150–450)
PO2 BLDV: 162 MM HG (ref 25–47)
POTASSIUM SERPL-SCNC: 3.1 MMOL/L (ref 3.4–5.3)
PROT SERPL-MCNC: 7 G/DL (ref 6.4–8.3)
RBC # BLD AUTO: 4.33 10E6/UL (ref 3.8–5.2)
SAO2 % BLDV: 100 % (ref 70–75)
SODIUM SERPL-SCNC: 141 MMOL/L (ref 136–145)
WBC # BLD AUTO: 24.4 10E3/UL (ref 4–11)

## 2023-08-12 PROCEDURE — 71260 CT THORAX DX C+: CPT

## 2023-08-12 PROCEDURE — 82805 BLOOD GASES W/O2 SATURATION: CPT | Performed by: EMERGENCY MEDICINE

## 2023-08-12 PROCEDURE — 99285 EMERGENCY DEPT VISIT HI MDM: CPT | Mod: 25

## 2023-08-12 PROCEDURE — 80053 COMPREHEN METABOLIC PANEL: CPT | Performed by: EMERGENCY MEDICINE

## 2023-08-12 PROCEDURE — 250N000013 HC RX MED GY IP 250 OP 250 PS 637: Performed by: EMERGENCY MEDICINE

## 2023-08-12 PROCEDURE — 120N000001 HC R&B MED SURG/OB

## 2023-08-12 PROCEDURE — 250N000011 HC RX IP 250 OP 636: Mod: JZ | Performed by: EMERGENCY MEDICINE

## 2023-08-12 PROCEDURE — 36415 COLL VENOUS BLD VENIPUNCTURE: CPT | Performed by: EMERGENCY MEDICINE

## 2023-08-12 PROCEDURE — 87040 BLOOD CULTURE FOR BACTERIA: CPT | Performed by: EMERGENCY MEDICINE

## 2023-08-12 PROCEDURE — 258N000003 HC RX IP 258 OP 636: Performed by: INTERNAL MEDICINE

## 2023-08-12 PROCEDURE — 83605 ASSAY OF LACTIC ACID: CPT | Performed by: EMERGENCY MEDICINE

## 2023-08-12 PROCEDURE — 96365 THER/PROPH/DIAG IV INF INIT: CPT | Mod: 59

## 2023-08-12 PROCEDURE — 96375 TX/PRO/DX INJ NEW DRUG ADDON: CPT

## 2023-08-12 PROCEDURE — 85025 COMPLETE CBC W/AUTO DIFF WBC: CPT | Performed by: EMERGENCY MEDICINE

## 2023-08-12 PROCEDURE — 99223 1ST HOSP IP/OBS HIGH 75: CPT | Mod: AI | Performed by: INTERNAL MEDICINE

## 2023-08-12 RX ORDER — VANCOMYCIN HYDROCHLORIDE 1 G/200ML
1000 INJECTION, SOLUTION INTRAVENOUS ONCE
Status: COMPLETED | OUTPATIENT
Start: 2023-08-12 | End: 2023-08-12

## 2023-08-12 RX ORDER — LIDOCAINE 40 MG/G
CREAM TOPICAL
Status: DISCONTINUED | OUTPATIENT
Start: 2023-08-12 | End: 2023-08-19 | Stop reason: HOSPADM

## 2023-08-12 RX ORDER — PIPERACILLIN SODIUM, TAZOBACTAM SODIUM 3; .375 G/15ML; G/15ML
3.38 INJECTION, POWDER, LYOPHILIZED, FOR SOLUTION INTRAVENOUS EVERY 8 HOURS
Status: DISCONTINUED | OUTPATIENT
Start: 2023-08-13 | End: 2023-08-19

## 2023-08-12 RX ORDER — ACETAMINOPHEN 325 MG/1
975 TABLET ORAL EVERY 4 HOURS PRN
Status: DISCONTINUED | OUTPATIENT
Start: 2023-08-12 | End: 2023-08-19 | Stop reason: HOSPADM

## 2023-08-12 RX ORDER — HYDROMORPHONE HCL IN WATER/PF 6 MG/30 ML
0.2 PATIENT CONTROLLED ANALGESIA SYRINGE INTRAVENOUS
Status: DISCONTINUED | OUTPATIENT
Start: 2023-08-12 | End: 2023-08-19 | Stop reason: HOSPADM

## 2023-08-12 RX ORDER — TAMOXIFEN CITRATE 20 MG/1
20 TABLET ORAL DAILY
Status: DISCONTINUED | OUTPATIENT
Start: 2023-08-13 | End: 2023-08-19 | Stop reason: HOSPADM

## 2023-08-12 RX ORDER — IOPAMIDOL 755 MG/ML
100 INJECTION, SOLUTION INTRAVASCULAR ONCE
Status: COMPLETED | OUTPATIENT
Start: 2023-08-12 | End: 2023-08-12

## 2023-08-12 RX ORDER — VITAMIN B COMPLEX
25 TABLET ORAL DAILY
Status: DISCONTINUED | OUTPATIENT
Start: 2023-08-13 | End: 2023-08-19 | Stop reason: HOSPADM

## 2023-08-12 RX ORDER — ACETAMINOPHEN 650 MG/1
650 SUPPOSITORY RECTAL EVERY 6 HOURS PRN
Status: DISCONTINUED | OUTPATIENT
Start: 2023-08-12 | End: 2023-08-19 | Stop reason: HOSPADM

## 2023-08-12 RX ORDER — ACETAMINOPHEN 500 MG
500-1000 TABLET ORAL EVERY 6 HOURS PRN
Status: DISCONTINUED | OUTPATIENT
Start: 2023-08-12 | End: 2023-08-12

## 2023-08-12 RX ORDER — MULTIVIT WITH MINERALS/LUTEIN
1000 TABLET ORAL DAILY
Status: DISCONTINUED | OUTPATIENT
Start: 2023-08-13 | End: 2023-08-19 | Stop reason: HOSPADM

## 2023-08-12 RX ORDER — HYDROXYZINE HYDROCHLORIDE 10 MG/1
10 TABLET, FILM COATED ORAL 3 TIMES DAILY PRN
Status: DISCONTINUED | OUTPATIENT
Start: 2023-08-12 | End: 2023-08-19 | Stop reason: HOSPADM

## 2023-08-12 RX ORDER — NALOXONE HYDROCHLORIDE 0.4 MG/ML
0.4 INJECTION, SOLUTION INTRAMUSCULAR; INTRAVENOUS; SUBCUTANEOUS
Status: DISCONTINUED | OUTPATIENT
Start: 2023-08-12 | End: 2023-08-19 | Stop reason: HOSPADM

## 2023-08-12 RX ORDER — ACETAMINOPHEN 325 MG/1
650 TABLET ORAL EVERY 6 HOURS PRN
Status: DISCONTINUED | OUTPATIENT
Start: 2023-08-12 | End: 2023-08-19 | Stop reason: HOSPADM

## 2023-08-12 RX ORDER — PIPERACILLIN SODIUM, TAZOBACTAM SODIUM 3; .375 G/15ML; G/15ML
3.38 INJECTION, POWDER, LYOPHILIZED, FOR SOLUTION INTRAVENOUS ONCE
Status: COMPLETED | OUTPATIENT
Start: 2023-08-12 | End: 2023-08-12

## 2023-08-12 RX ORDER — IBUPROFEN 600 MG/1
600 TABLET, FILM COATED ORAL 3 TIMES DAILY PRN
Status: DISCONTINUED | OUTPATIENT
Start: 2023-08-12 | End: 2023-08-19

## 2023-08-12 RX ORDER — ZINC SULFATE 50(220)MG
220 CAPSULE ORAL DAILY
Status: DISCONTINUED | OUTPATIENT
Start: 2023-08-13 | End: 2023-08-19 | Stop reason: HOSPADM

## 2023-08-12 RX ORDER — POTASSIUM CHLORIDE 1500 MG/1
40 TABLET, EXTENDED RELEASE ORAL ONCE
Status: COMPLETED | OUTPATIENT
Start: 2023-08-12 | End: 2023-08-12

## 2023-08-12 RX ORDER — NALOXONE HYDROCHLORIDE 0.4 MG/ML
0.2 INJECTION, SOLUTION INTRAMUSCULAR; INTRAVENOUS; SUBCUTANEOUS
Status: DISCONTINUED | OUTPATIENT
Start: 2023-08-12 | End: 2023-08-19 | Stop reason: HOSPADM

## 2023-08-12 RX ORDER — SODIUM CHLORIDE 9 MG/ML
INJECTION, SOLUTION INTRAVENOUS CONTINUOUS
Status: DISCONTINUED | OUTPATIENT
Start: 2023-08-12 | End: 2023-08-14

## 2023-08-12 RX ORDER — IBUPROFEN 400 MG/1
400 TABLET, FILM COATED ORAL EVERY 6 HOURS PRN
Status: DISCONTINUED | OUTPATIENT
Start: 2023-08-12 | End: 2023-08-19 | Stop reason: HOSPADM

## 2023-08-12 RX ORDER — ONDANSETRON 2 MG/ML
4 INJECTION INTRAMUSCULAR; INTRAVENOUS EVERY 6 HOURS PRN
Status: DISCONTINUED | OUTPATIENT
Start: 2023-08-12 | End: 2023-08-19 | Stop reason: HOSPADM

## 2023-08-12 RX ORDER — ONDANSETRON 4 MG/1
4 TABLET, ORALLY DISINTEGRATING ORAL EVERY 6 HOURS PRN
Status: DISCONTINUED | OUTPATIENT
Start: 2023-08-12 | End: 2023-08-19 | Stop reason: HOSPADM

## 2023-08-12 RX ADMIN — VANCOMYCIN HYDROCHLORIDE 1000 MG: 1 INJECTION, SOLUTION INTRAVENOUS at 19:49

## 2023-08-12 RX ADMIN — ACETAMINOPHEN 975 MG: 325 TABLET ORAL at 19:42

## 2023-08-12 RX ADMIN — PIPERACILLIN AND TAZOBACTAM 3.38 G: 3; .375 INJECTION, POWDER, FOR SOLUTION INTRAVENOUS at 19:15

## 2023-08-12 RX ADMIN — SODIUM CHLORIDE: 9 INJECTION, SOLUTION INTRAVENOUS at 21:57

## 2023-08-12 RX ADMIN — POTASSIUM CHLORIDE 40 MEQ: 1500 TABLET, EXTENDED RELEASE ORAL at 20:12

## 2023-08-12 RX ADMIN — IOPAMIDOL 67 ML: 755 INJECTION, SOLUTION INTRAVENOUS at 19:59

## 2023-08-12 ASSESSMENT — ACTIVITIES OF DAILY LIVING (ADL)
ADLS_ACUITY_SCORE: 35

## 2023-08-12 NOTE — PHARMACY-VANCOMYCIN DOSING SERVICE
Pharmacy Vancomycin Initial Note  Date of Service 2023  Patient's  1980  43 year old, female    Indication: Sepsis    Current estimated CrCl = Estimated Creatinine Clearance: 101.4 mL/min (based on SCr of 0.7 mg/dL).    Creatinine for last 3 days  No results found for requested labs within last 3 days.    Recent Vancomycin Level(s) for last 3 days  No results found for requested labs within last 3 days.      Vancomycin IV Administrations (past 72 hours)        No vancomycin orders with administrations in past 72 hours.                    Nephrotoxins and other renal medications (From now, onward)      Start     Dose/Rate Route Frequency Ordered Stop    23 1900  piperacillin-tazobactam (ZOSYN) 3.375 g vial to attach to  mL bag         3.375 g  over 30 Minutes Intravenous ONCE 23 1844      23 1900  vancomycin (VANCOCIN) 1000 mg in dextrose 5% 200 mL PREMIX         1,000 mg  200 mL/hr over 1 Hours Intravenous ONCE 23 1854              Contrast Orders - past 72 hours (72h ago, onward)      None                  Plan:  Start vancomycin  1000 mg IV once.   Please consult pharmacy again if you would like to continue inpatient.   KOFI NUNES [facility]

## 2023-08-12 NOTE — ED NOTES
Expected Patient Referral to ED  4:58 PM    Referring Clinic/Provider:  Plastic surgery    Reason for referral/Clinical facts:  42 yo F, history of left breast cancer s/p BL mastectomy ([place]) with first stage reconstruction with tissue expanders on hormone treatment (no chemotherapy) who developed fever (101) and chills yesterday associated with right breast pain, swelling and erythema consistent with cellulitis.     Patient will need infectious work-up, antibiotics, CT chest with IV contrast to rule out abscess, ID consult and admission. Atypical 10 weeks post-op.     Plastics cell phone: [phone]    Recommendations provided:  Send to ED for further evaluation    Caller was informed that this institution does possess the capabilities and/or resources to provide for patient and should be transferred to our facility.    Discussed that if direct admit is sought and any hurdles are encountered, this ED would be happy to see the patient and evaluate.    Informed caller that recommendations provided are recommendations based only on the facts provided and that they responsible to accept or reject the advice, or to seek a formal in person consultation as needed and that this ED will see/treat patient should they arrive.      Naa Sotomayor MD  [facility] EMERGENCY DEPARTMENT  [address] [place] [id number]  [phone]       Naa Sotomayor MD  08/12/23 1509

## 2023-08-12 NOTE — ED TRIAGE NOTES
PT reports chills, fever, breast pain, and breast swelling since yesterday. Pt has a breast expander placed May 31st.  Pt's right breast is red, warm to touch, and swollen.      Triage Assessment       Row Name 08/12/23 6472       Triage Assessment (Adult)    Airway WDL WDL       Respiratory WDL    Respiratory WDL WDL       Skin Circulation/Temperature WDL    Skin Circulation/Temperature WDL WDL       Cardiac WDL    Cardiac WDL WDL       Peripheral/Neurovascular WDL    Peripheral Neurovascular WDL WDL       Cognitive/Neuro/Behavioral WDL    Cognitive/Neuro/Behavioral WDL WDL

## 2023-08-13 LAB
ANION GAP SERPL CALCULATED.3IONS-SCNC: 9 MMOL/L (ref 7–15)
BUN SERPL-MCNC: 9.7 MG/DL (ref 6–20)
CALCIUM SERPL-MCNC: 8.4 MG/DL (ref 8.6–10)
CHLORIDE SERPL-SCNC: 108 MMOL/L (ref 98–107)
CREAT SERPL-MCNC: 0.66 MG/DL (ref 0.51–0.95)
DEPRECATED HCO3 PLAS-SCNC: 27 MMOL/L (ref 22–29)
ERYTHROCYTE [DISTWIDTH] IN BLOOD BY AUTOMATED COUNT: 13.5 % (ref 10–15)
GFR SERPL CREATININE-BSD FRML MDRD: >90 ML/MIN/1.73M2
GLUCOSE SERPL-MCNC: 96 MG/DL (ref 70–99)
HCT VFR BLD AUTO: 37.3 % (ref 35–47)
HGB BLD-MCNC: 12.1 G/DL (ref 11.7–15.7)
MCH RBC QN AUTO: 29.6 PG (ref 26.5–33)
MCHC RBC AUTO-ENTMCNC: 32.4 G/DL (ref 31.5–36.5)
MCV RBC AUTO: 91 FL (ref 78–100)
PLATELET # BLD AUTO: 266 10E3/UL (ref 150–450)
POTASSIUM SERPL-SCNC: 3.4 MMOL/L (ref 3.4–5.3)
RBC # BLD AUTO: 4.09 10E6/UL (ref 3.8–5.2)
SODIUM SERPL-SCNC: 144 MMOL/L (ref 136–145)
WBC # BLD AUTO: 20.8 10E3/UL (ref 4–11)

## 2023-08-13 PROCEDURE — 36415 COLL VENOUS BLD VENIPUNCTURE: CPT | Performed by: INTERNAL MEDICINE

## 2023-08-13 PROCEDURE — 258N000003 HC RX IP 258 OP 636: Performed by: INTERNAL MEDICINE

## 2023-08-13 PROCEDURE — 82310 ASSAY OF CALCIUM: CPT | Performed by: INTERNAL MEDICINE

## 2023-08-13 PROCEDURE — 85014 HEMATOCRIT: CPT | Performed by: INTERNAL MEDICINE

## 2023-08-13 PROCEDURE — 99254 IP/OBS CNSLTJ NEW/EST MOD 60: CPT | Performed by: INTERNAL MEDICINE

## 2023-08-13 PROCEDURE — 99024 POSTOP FOLLOW-UP VISIT: CPT | Performed by: PLASTIC SURGERY

## 2023-08-13 PROCEDURE — 250N000013 HC RX MED GY IP 250 OP 250 PS 637: Performed by: INTERNAL MEDICINE

## 2023-08-13 PROCEDURE — 250N000011 HC RX IP 250 OP 636: Mod: JZ | Performed by: INTERNAL MEDICINE

## 2023-08-13 PROCEDURE — 120N000001 HC R&B MED SURG/OB

## 2023-08-13 PROCEDURE — 99233 SBSQ HOSP IP/OBS HIGH 50: CPT | Performed by: HOSPITALIST

## 2023-08-13 PROCEDURE — 250N000013 HC RX MED GY IP 250 OP 250 PS 637: Performed by: EMERGENCY MEDICINE

## 2023-08-13 PROCEDURE — 250N000013 HC RX MED GY IP 250 OP 250 PS 637: Performed by: HOSPITALIST

## 2023-08-13 RX ORDER — CALCIUM CARBONATE 500(1250)
500 TABLET ORAL DAILY
Status: DISCONTINUED | OUTPATIENT
Start: 2023-08-13 | End: 2023-08-19 | Stop reason: HOSPADM

## 2023-08-13 RX ORDER — CALCIUM CARBONATE 500(1250)
500 TABLET ORAL 2 TIMES DAILY WITH MEALS
Status: DISCONTINUED | OUTPATIENT
Start: 2023-08-13 | End: 2023-08-13

## 2023-08-13 RX ADMIN — SODIUM CHLORIDE: 9 INJECTION, SOLUTION INTRAVENOUS at 17:04

## 2023-08-13 RX ADMIN — ONDANSETRON 4 MG: 2 INJECTION INTRAMUSCULAR; INTRAVENOUS at 09:32

## 2023-08-13 RX ADMIN — HYDROXYZINE HYDROCHLORIDE 10 MG: 10 TABLET ORAL at 20:25

## 2023-08-13 RX ADMIN — PIPERACILLIN AND TAZOBACTAM 3.38 G: 3; .375 INJECTION, POWDER, FOR SOLUTION INTRAVENOUS at 08:19

## 2023-08-13 RX ADMIN — HYDROMORPHONE HYDROCHLORIDE 1 MG: 2 TABLET ORAL at 17:12

## 2023-08-13 RX ADMIN — Medication 1000 MG: at 08:19

## 2023-08-13 RX ADMIN — SERTRALINE HYDROCHLORIDE 100 MG: 50 TABLET ORAL at 08:20

## 2023-08-13 RX ADMIN — HYDROXYZINE HYDROCHLORIDE 10 MG: 10 TABLET ORAL at 13:35

## 2023-08-13 RX ADMIN — CALCIUM 500 MG: 500 TABLET ORAL at 11:41

## 2023-08-13 RX ADMIN — ACETAMINOPHEN 650 MG: 325 TABLET ORAL at 20:25

## 2023-08-13 RX ADMIN — TAMOXIFEN CITRATE 20 MG: 20 TABLET ORAL at 16:54

## 2023-08-13 RX ADMIN — SODIUM CHLORIDE: 9 INJECTION, SOLUTION INTRAVENOUS at 06:56

## 2023-08-13 RX ADMIN — Medication 1 MG: at 20:25

## 2023-08-13 RX ADMIN — HYDROXYZINE HYDROCHLORIDE 10 MG: 10 TABLET ORAL at 01:24

## 2023-08-13 RX ADMIN — ACETAMINOPHEN 975 MG: 325 TABLET ORAL at 13:35

## 2023-08-13 RX ADMIN — ZINC SULFATE 220 MG (50 MG) CAPSULE 220 MG: CAPSULE at 08:20

## 2023-08-13 RX ADMIN — Medication 1 MG: at 01:24

## 2023-08-13 RX ADMIN — Medication 25 MCG: at 08:20

## 2023-08-13 RX ADMIN — HYDROMORPHONE HYDROCHLORIDE 1 MG: 2 TABLET ORAL at 01:24

## 2023-08-13 RX ADMIN — VANCOMYCIN HYDROCHLORIDE 750 MG: 5 INJECTION, POWDER, LYOPHILIZED, FOR SOLUTION INTRAVENOUS at 06:19

## 2023-08-13 RX ADMIN — PIPERACILLIN AND TAZOBACTAM 3.38 G: 3; .375 INJECTION, POWDER, FOR SOLUTION INTRAVENOUS at 16:53

## 2023-08-13 RX ADMIN — PIPERACILLIN AND TAZOBACTAM 3.38 G: 3; .375 INJECTION, POWDER, FOR SOLUTION INTRAVENOUS at 01:08

## 2023-08-13 RX ADMIN — VANCOMYCIN HYDROCHLORIDE 750 MG: 5 INJECTION, POWDER, LYOPHILIZED, FOR SOLUTION INTRAVENOUS at 19:03

## 2023-08-13 ASSESSMENT — ACTIVITIES OF DAILY LIVING (ADL)
ADLS_ACUITY_SCORE: 18
WALKING_OR_CLIMBING_STAIRS_DIFFICULTY: NO
DRESSING/BATHING_DIFFICULTY: NO
ADLS_ACUITY_SCORE: 18
ADLS_ACUITY_SCORE: 18
CHANGE_IN_FUNCTIONAL_STATUS_SINCE_ONSET_OF_CURRENT_ILLNESS/INJURY: NO
DIFFICULTY_EATING/SWALLOWING: NO
ADLS_ACUITY_SCORE: 35
ADLS_ACUITY_SCORE: 18
TOILETING_ISSUES: NO
ADLS_ACUITY_SCORE: 18
FALL_HISTORY_WITHIN_LAST_SIX_MONTHS: NO
ADLS_ACUITY_SCORE: 18
DIFFICULTY_COMMUNICATING: NO
ADLS_ACUITY_SCORE: 18
WEAR_GLASSES_OR_BLIND: NO
HEARING_DIFFICULTY_OR_DEAF: NO
DOING_ERRANDS_INDEPENDENTLY_DIFFICULTY: NO
CONCENTRATING,_REMEMBERING_OR_MAKING_DECISIONS_DIFFICULTY: NO

## 2023-08-13 NOTE — PLAN OF CARE
Goal Outcome Evaluation:         Pt alert and oriented x4, VSS on room air. Denies pain and nausea. Moves independently. Pt informed NPO at midnight incase a procedure needed tomorrow. R breast red, swollen and warm to touch. NS cont running at 100ml/hr.

## 2023-08-13 NOTE — PHARMACY-VANCOMYCIN DOSING SERVICE
"Pharmacy Vancomycin Initial Note  Date of Service 2023  Patient's  1980  43 year old, female    Indication: Sepsis and Skin and Soft Tissue Infection    Current estimated CrCl = Estimated Creatinine Clearance: 68.4 mL/min (based on SCr of 0.93 mg/dL).    Creatinine for last 3 days  2023:  7:03 PM Creatinine 0.93 mg/dL    Recent Vancomycin Level(s) for last 3 days  No results found for requested labs within last 3 days.      Vancomycin IV Administrations (past 72 hours)                     vancomycin (VANCOCIN) 1000 mg in dextrose 5% 200 mL PREMIX (mg) 1,000 mg New Bag 23                    Nephrotoxins and other renal medications (From now, onward)      Start     Dose/Rate Route Frequency Ordered Stop    23 0700  vancomycin (VANCOCIN) 750 mg in 0.9% NaCl 250 mL intermittent infusion         750 mg  over 60 Minutes Intravenous EVERY 12 HOURS 23 0100  piperacillin-tazobactam (ZOSYN) 3.375 g vial to attach to  mL bag        Note to Pharmacy: For [facility], [facility] and [facility]: For Zosyn-naive patients, use the \"Zosyn initial dose + extended infusion\" order panel.    3.375 g  over 240 Minutes Intravenous EVERY 8 HOURS 23  ibuprofen (ADVIL/MOTRIN) tablet 400 mg        Note to Pharmacy: PTA Sig:Take 400 mg by mouth every 6 hours as needed for moderate pain      400 mg Oral EVERY 6 HOURS PRN 23 190  ibuprofen (ADVIL/MOTRIN) tablet 600 mg         600 mg Oral 3 TIMES DAILY PRN 23 190              Contrast Orders - past 72 hours (72h ago, onward)      Start     Dose/Rate Route Frequency Stop    23  iopamidol (ISOVUE-370) solution 100 mL         100 mL Intravenous ONCE 23            Insight[employer] Prediction of Planned Initial Vancomycin Regimen  Loading dose: 1000 mg IV  Regimen: 750 mg IV every 12 hours.  Start time: 07:49 on 2023  Exposure target: AUC24 (range)400-600 mg/L.hr "   AUC24,ss: 468 mg/L.hr  Probability of AUC24 > 400: 67 %  Ctrough,ss: 14.9 mg/L  Probability of Ctrough,ss > 20: 25 %  Probability of nephrotoxicity (Lodise JAIDA 2009): 10 %          Plan:  Start vancomycin  750 mg IV q12h.   Vancomycin monitoring method: AUC  Vancomycin therapeutic monitoring goal: 400-600 mg*h/L  Pharmacy will check vancomycin levels as appropriate in 1-3 Days.    Serum creatinine levels will be ordered daily for the first week of therapy and at least twice weekly for subsequent weeks.      Wendy Perdue RPH

## 2023-08-13 NOTE — CONSULTS
Chief complaint:  Right breast cellulitis    History of present illness:  This is a 43 year old lady who presents with right breast cellulitis for the last 48 hours.    This patient does have history of left breast cancer, status post bilateral skin sparing mastectomies with immediate prepectoral first stage breast reconstruction with tissue expanders (Allergan 450 cc full height extra projection).  Surgery took place on May 31, 2023 (10 weeks ago).  Patient currently has 300 cc on her right breast tissue expander and 350 cc on her left breast tissue expander.    Patient reports having fever of 101 as well as chills, right breast erythema and right mastalgia 48 hours ago.    Patient contact me 24 hours ago and I have instructed her to go to the emergency department where her initial CBC showed a white blood cell count of 24.4 with left shift.  CT scan of the chest a small fluid collection affecting both tissue expanders with fat stranding and skin thickening, suggesting cellulitis.  No abscess visualized.    Patient has been admitted to attempt right breast tissue expander salvage with IV antibiotics.    Past medical history:  Past Medical History:   Diagnosis Date    Anxiety 08/16/2010    Anxiety state, unspecified 1997    chronic anxiety    BRCA2 positive     Breast cancer (H) 05/19/2023    Depression     Essential hypertension 03/03/2023    Hypercholesteremia 08/16/2010    Obesity 08/16/2010    Other acne     Papanicolaou smear of cervix with low grade squamous intraepithelial lesion (LGSIL) (aka LSIL) 05/05/2015    LSIL/+ HR HPV    TOBACCO ABUSE-CONTINUOUS        Past surgical history:  Bilateral skin sparing mastectomies, bilateral first stage breast reconstruction with tissue expander, tubal ligation    Allergies:  No known drug allergies    Medications:    Current Facility-Administered Medications:     acetaminophen (TYLENOL) tablet 650 mg, 650 mg, Oral, Q6H PRN **OR** acetaminophen (TYLENOL) Suppository 650  mg, 650 mg, Rectal, Q6H PRN, Corona Stanton MD    acetaminophen (TYLENOL) tablet 975 mg, 975 mg, Oral, Q4H PRN, Afsaneh Loera MD, 975 mg at 08/12/23 1942    calcium carbonate 500 mg (elemental) (OSCAL) tablet 500 mg, 500 mg, Oral, Daily, Woo Canela MD    HYDROmorphone (DILAUDID) half-tab 1 mg, 1 mg, Oral, Q4H PRN, Corona Stanton MD, 1 mg at 08/13/23 0124    HYDROmorphone (DILAUDID) injection 0.2 mg, 0.2 mg, Intravenous, Q2H PRN, Corona Stanton MD    hydrOXYzine (ATARAX) tablet 10 mg, 10 mg, Oral, TID PRN, Corona Stanton MD, 10 mg at 08/13/23 0124    ibuprofen (ADVIL/MOTRIN) tablet 400 mg, 400 mg, Oral, Q6H PRN, Corona Stanton MD    ibuprofen (ADVIL/MOTRIN) tablet 600 mg, 600 mg, Oral, TID PRN, Afsaneh Loera MD    lidocaine (LMX4) cream, , Topical, Q1H PRN, Corona Stanton MD    lidocaine 1 % 0.1-1 mL, 0.1-1 mL, Other, Q1H PRN, Corona Stanton MD    melatonin tablet 1 mg, 1 mg, Oral, At Bedtime PRN, Corona Stanton MD, 1 mg at 08/13/23 0124    naloxone (NARCAN) injection 0.2 mg, 0.2 mg, Intravenous, Q2 Min PRN **OR** naloxone (NARCAN) injection 0.4 mg, 0.4 mg, Intravenous, Q2 Min PRN **OR** naloxone (NARCAN) injection 0.2 mg, 0.2 mg, Intramuscular, Q2 Min PRN **OR** naloxone (NARCAN) injection 0.4 mg, 0.4 mg, Intramuscular, Q2 Min PRN, Corona Stanton MD    ondansetron (ZOFRAN ODT) ODT tab 4 mg, 4 mg, Oral, Q6H PRN **OR** ondansetron (ZOFRAN) injection 4 mg, 4 mg, Intravenous, Q6H PRN, Corona Stanton MD, 4 mg at 08/13/23 0932    piperacillin-tazobactam (ZOSYN) 3.375 g vial to attach to  mL bag, 3.375 g, Intravenous, Q8H, Corona Stanton MD, 3.375 g at 08/13/23 0819    sertraline (ZOLOFT) tablet 100 mg, 100 mg, Oral, Daily, Corona Stanton MD, 100 mg at 08/13/23 0820    sodium chloride (PF) 0.9% PF flush 3 mL, 3 mL, Intracatheter, q1 min prn, Afsaneh Loera MD    sodium chloride (PF) 0.9% PF flush 3 mL, 3 mL, Intracatheter, Q8H, Afsaneh Loera MD, 3 mL at 08/12/23  "1915    sodium chloride (PF) 0.9% PF flush 3 mL, 3 mL, Intracatheter, Q8H, Corona Stanton MD, 3 mL at 08/12/23 2301    sodium chloride (PF) 0.9% PF flush 3 mL, 3 mL, Intracatheter, q1 min prn, Corona Stanton MD    sodium chloride 0.9% infusion, , Intravenous, Continuous, Corona Stanton MD, Last Rate: 100 mL/hr at 08/13/23 0656, New Bag at 08/13/23 0656    tamoxifen (NOLVADEX) tablet 20 mg, 20 mg, Oral, Daily, Corona Stanton MD    vancomycin (VANCOCIN) 750 mg in 0.9% NaCl 250 mL intermittent infusion, 750 mg, Intravenous, Q12H, Corona Stanton MD, 750 mg at 08/13/23 0619    vitamin C (ASCORBIC ACID) tablet 1,000 mg, 1,000 mg, Oral, Daily, Corona Stanton MD, 1,000 mg at 08/13/23 0819    Vitamin D3 (CHOLECALCIFEROL) tablet 25 mcg, 25 mcg, Oral, Daily, Corona Stanton MD, 25 mcg at 08/13/23 0820    zinc sulfate (ZINCATE) capsule 220 mg, 220 mg, Oral, Daily, Corona Stanton MD, 220 mg at 08/13/23 0820    Family history:  Father with history of stomach cancer    Social History:  Denies tobacco, denies alcohol    Review of systems:  General ROS: No complaints or constitutional symptoms  Skin: No complaints or symptoms   Hematologic/Lymphatic: No symptoms or complaints  Psychiatric: No symptoms or complaints  Endocrine: No excessive fatigue, no hypermetabolic symptoms reported  Respiratory ROS: No cough, shortness of breath, or wheezing  Cardiovascular ROS: No chest pain or dyspnea on exertion  Breast ROS: Right breast cellulitis.  Left breast is unremarkable.  Gastrointestinal ROS: No abdominal pain, nausea, diarrhea, or constipation  Musculoskeletal ROS: No recent injuries reported  Neurological ROS: No focal neurologic defects reported.      Physical exam:  /80 (BP Location: Left arm)   Pulse 76   Temp 98.3  F (36.8  C) (Oral)   Resp 18   Ht 1.6 m (5' 3\")   Wt 63 kg (139 lb)   LMP 06/22/2023   SpO2 99%   BMI 24.62 kg/m    General: Alert, cooperative, appears stated age   Skin: Skin " color, texture, turgor normal, no rashes or lesions   Lymphatic: No obvious adenopathy, no swelling   Eyes: No scleral icterus, pupils equal  HENT: No traumatic injury to the head or face, no gross abnormalities  Lungs: Normal respiratory effort, breath sounds equal bilaterally  Heart: Regular rate and rhythm  Breasts: Right breast with erythema and tenderness upon palpation suspicious for right breast cellulitis.  Mastectomy incision is well-healed.  Baker 1 capsular contracture.  This right breast looks slightly larger than the left one. Left breast with well-healed mastectomy incision.  No evidence of erythema or surgical site infection.  Baker 1 capsular contracture.  There are no evidence of bilateral breast seromas or hematomas.  Abdomen: Soft, non-distended and non-tender to palpation  Neurologic: Grossly intact          ASSESSMENT:    This is a 43 year old lady with right breast cellulitis status post bilateral skin sparing mastectomies with first stage prepectoral breast reconstruction utilizing tissue expanders.  She is 10 weeks out from surgery.       PLAN:      Observation for the next 48 hours in order to determine if the tissue expander on the right breast can be salvaged with IV antibiotics.  I would like to see resolution of her erythema as well as normalization on her WBC and no more fevers.    I have explained to the patient that in the scenario that I do not see clinical improvement or if she experience worsening of her symptoms, then I will have to explant her right breast implant.  Patient informed me that she understands.    I will also consult infectious disease for recommendations about IV antibiotics.    I have discontinued her n.p.o. status and the general surgery consult that was placed upon admission.    I will reevaluate her in 24 hours.      Basil Aguilar MD, FACS   Diplomate American Board of Plastic Surgery  Diplomate American Board of Surgery  [facility]  Division of Plastic & Reconstructive Surgery  Mobile: [phone]  Office: [phone]   8/13/2023 at 10:00 AM

## 2023-08-13 NOTE — ED PROVIDER NOTES
EMERGENCY DEPARTMENT ENCOUNTER      NAME: Zainab Bolton  AGE: 43 year old female  YOB: 1980  MRN: [id number]  EVALUATION DATE & TIME: 8/12/2023  6:48 PM    PCP: Jessi Concepcion    ED PROVIDER: Afsaneh Loera MD    Chief Complaint   Patient presents with    Chills    Fever    Breast Problem         FINAL IMPRESSION:  1. Cellulitis of right breast    2. Hypokalemia    3. Pulmonary nodule    4. Leukocytosis, unspecified type          ED COURSE & MEDICAL DECISION MAKING:    Pertinent Labs & Imaging studies reviewed. (See chart for details)  43 year old female with history of breast cancer status post mastectomy, tissue expanders, depression/anxiety and HLD who presents to the Emergency Department for evaluation of fever and chills since yesterday with nausea and now right breast erythema and swelling today.  Exam reveals signs of cellulitis.  Differential includes abscess, sepsis, bacteremia.    Patient placed on monitor, IV established and blood obtained.  Covered with vancomycin and Zosyn.  CBC, CMP, lactate, VBG notable for WBC of 24.4, potassium 3.1 replaced orally.  CT of the breast reveals small fluid collection surrounding the bilateral breast tissue expanders which could be sterile or superinfected.  Associated fat stranding of the subcu tissue may reflect cellulitis.  Incidental pulmonary nodule.  Admitted to medicine for further management.      ED Course as of 08/12/23 2151   Sat Aug 12, 2023   1902 Introduced to the patient, obtained history of present illness, and performed initial physical exam at this time.      1914 WBC(!): 24.4   1953 Potassium(!): 3.1   2017 Discussed the case with Dr. Stanton, the hospitalist, who agrees to take in the patient.        Medical Decision Making    History:  Supplemental history from: Documented in chart, if applicable, Family Member/Significant Other, and Other: Referral from plastic surgery  External Record(s) reviewed: Documented in chart, if  applicable.    Work Up:  Chart documentation includes differential considered and any EKGs or imaging independently interpreted by provider, see MDM  In additional to work up documented, I considered the following work up: see MDM    External consultation:  Discussion of management with another provider: Hospitalist    Complicating factors:  Care impacted by chronic illness: Cancer/Chemotherapy, Hyperlipidemia, and Mental Health  Care affected by social determinants of health: Access to Medical Care referred to ED    Disposition considerations: Admit.        At the conclusion of the encounter I discussed the results of all of the tests and the disposition. The questions were answered. The patient or family acknowledged understanding and was agreeable with the care plan.    MEDICATIONS GIVEN IN THE EMERGENCY:  Medications   sodium chloride (PF) 0.9% PF flush 3 mL (has no administration in time range)   sodium chloride (PF) 0.9% PF flush 3 mL (3 mLs Intracatheter $Given 8/12/23 1915)   acetaminophen (TYLENOL) tablet 975 mg (975 mg Oral $Given 8/12/23 1942)   ibuprofen (ADVIL/MOTRIN) tablet 600 mg (has no administration in time range)   lidocaine 1 % 0.1-1 mL (has no administration in time range)   lidocaine (LMX4) cream (has no administration in time range)   sodium chloride (PF) 0.9% PF flush 3 mL (has no administration in time range)   sodium chloride (PF) 0.9% PF flush 3 mL (has no administration in time range)   melatonin tablet 1 mg (has no administration in time range)   sodium chloride 0.9% infusion (has no administration in time range)   acetaminophen (TYLENOL) tablet 650 mg (has no administration in time range)     Or   acetaminophen (TYLENOL) Suppository 650 mg (has no administration in time range)   HYDROmorphone (DILAUDID) half-tab 1 mg (has no administration in time range)   HYDROmorphone (DILAUDID) injection 0.2 mg (has no administration in time range)   ondansetron (ZOFRAN ODT) ODT tab 4 mg (has no  administration in time range)     Or   ondansetron (ZOFRAN) injection 4 mg (has no administration in time range)   naloxone (NARCAN) injection 0.2 mg (has no administration in time range)     Or   naloxone (NARCAN) injection 0.4 mg (has no administration in time range)     Or   naloxone (NARCAN) injection 0.2 mg (has no administration in time range)     Or   naloxone (NARCAN) injection 0.4 mg (has no administration in time range)   ibuprofen (ADVIL/MOTRIN) tablet 400 mg (has no administration in time range)   sertraline (ZOLOFT) tablet 100 mg (has no administration in time range)   tamoxifen (NOLVADEX) tablet 20 mg (has no administration in time range)   vitamin C (ASCORBIC ACID) tablet 1,000 mg (has no administration in time range)   Vitamin D3 (CHOLECALCIFEROL) tablet 25 mcg (has no administration in time range)   hydrOXYzine (ATARAX) tablet 10 mg (has no administration in time range)   piperacillin-tazobactam (ZOSYN) 3.375 g vial to attach to  mL bag (has no administration in time range)   zinc sulfate (ZINCATE) capsule 220 mg (has no administration in time range)   vancomycin (VANCOCIN) 750 mg in 0.9% NaCl 250 mL intermittent infusion (has no administration in time range)   piperacillin-tazobactam (ZOSYN) 3.375 g vial to attach to  mL bag (0 g Intravenous Stopped 8/12/23 1949)   vancomycin (VANCOCIN) 1000 mg in dextrose 5% 200 mL PREMIX (1,000 mg Intravenous $New Bag 8/12/23 1949)   iopamidol (ISOVUE-370) solution 100 mL (67 mLs Intravenous $Given 8/12/23 1959)   potassium chloride ER (KLOR-CON M) CR tablet 40 mEq (40 mEq Oral $Given 8/12/23 2012)       NEW PRESCRIPTIONS STARTED AT TODAY'S ER VISIT  Current Discharge Medication List             =================================================================    HPI    Patient information was obtained from: Patient    Use of Intrepreter: N/A       Zainab L Mutkelsikristan is a 43 year old female with pertinent medical history of breast cancer,  hypercholesterolemia, malignant neoplasm of left breast, GEOVANY, depression who presents with swelling and erythema to breast with associated fever.    Patient has a history of left breast cancer s\p BL mastectomy with first stage reconstruction with tissue expanders on hormone treatment, no chemotherapy. Per referral from plastic surgery, the patient's symptoms are atypical at 10 weeks post-op, so they will need infectious work-up, antibiotics, CT chest with IV contrast to rule out abscess, ID consult and admission.     Patient explains that yesterday ~10:30 AM she started having chills and nausea, and recorded a fever of 101 F. Today the patient noted redness, pain and swelling to the right breast. She explains that her last surgery was ~10 weeks ago. She denies any noted drainage from the breast. Has been taking Tylenol and ibuprofen, last took ibuprofen at 4PM. Denies any fevers now.     Patient explains she has a history of breast cancer to the left side, but has gotten stitching to her right side. Notes that her right breast has 300 cc and her left has 350 cc, but currently her right breast appears larger than her left.       PAST MEDICAL HISTORY:  Past Medical History:   Diagnosis Date    Anxiety 08/16/2010    Anxiety state, unspecified 1997    chronic anxiety    BRCA2 positive     Breast cancer (H) 05/19/2023    Depression     Essential hypertension 03/03/2023    Hypercholesteremia 08/16/2010    Obesity 08/16/2010    Other acne     Papanicolaou smear of cervix with low grade squamous intraepithelial lesion (LGSIL) (aka LSIL) 05/05/2015    LSIL/+ HR HPV    TOBACCO ABUSE-CONTINUOUS        PAST SURGICAL HISTORY:  Past Surgical History:   Procedure Laterality Date    BIOPSY      BIOPSY NODE SENTINEL Left 5/31/2023    Procedure: LEFT SENTINEL LYMPH NODE BIOPSY;  Surgeon: Kylee Villasenor MD;  Location: [facility] OR    CONIZATION LEEP  07/16/2015    LÁZARO 2    CONIZATION LEEP N/A 07/16/2015    Procedure: CONIZATION  INOCENTE;  Surgeon: Omayra Cunningham DO;  Location:  OR    EYE SURGERY  1/5/2016    MASTECTOMY SIMPLE Bilateral 5/31/2023    Procedure: BILATERAL MASTECTOMIES;  Surgeon: Kylee Villasenor MD;  Location: [facility] OR    RECONSTRUCT BREAST, IMPLANT PROSTHESIS, COMBINED Bilateral 5/31/2023    Procedure: RECONSTRUCTION, BREAST, BILATERAL TISSUE EXPANDERS;  Surgeon: Basil Aguilar MD;  Location: [facility] OR    Z LIGATE FALLOPIAN TUBE  10/03/2003       CURRENT MEDICATIONS:    Prior to Admission Medications   Prescriptions Last Dose Informant Patient Reported? Taking?   Vitamin D3 (CHOLECALCIFEROL) 25 mcg (1000 units) tablet 8/11/2023 at am  Yes Yes   Sig: Take 25 mcg by mouth daily   Zinc Sulfate (ZINC 15 PO) 8/11/2023 at am  Yes Yes   Sig: Take 1 capsule by mouth daily   acetaminophen (TYLENOL) 500 MG tablet Past Month at prn  Yes Yes   Sig: Take 500-1,000 mg by mouth every 6 hours as needed for mild pain   hydrOXYzine (ATARAX) 10 MG tablet 8/12/2023 at am  No Yes   Sig: Take 1 tablet (10 mg) by mouth 3 times daily as needed for anxiety   ibuprofen (ADVIL/MOTRIN) 400 MG tablet Past Month at prn  Yes Yes   Sig: Take 400 mg by mouth every 6 hours as needed for moderate pain   sertraline (ZOLOFT) 100 MG tablet 8/12/2023 at am  No Yes   Sig: Take 1 tablet (100 mg) by mouth daily   tamoxifen (NOLVADEX) 20 MG tablet 8/12/2023 at 1700  No Yes   Sig: Take 1 tablet (20 mg) by mouth daily   vitamin C (ASCORBIC ACID) 1000 MG TABS 8/11/2023 at am  Yes Yes   Sig: Take 1,000 mg by mouth daily      Facility-Administered Medications: None       ALLERGIES:  No Known Allergies    FAMILY HISTORY:  Family History   Problem Relation Age of Onset    Cancer Father         liver    Other Cancer Father         Stomach and liver cancer diagnosis    Arthritis Maternal Grandmother     Hypertension Maternal Grandmother     Diabetes Maternal Grandmother     Thyroid Disease Maternal Grandmother     Cancer Maternal Grandfather          "leukemia    Heart Disease Maternal Aunt         MI approx age 46       SOCIAL HISTORY:  Social History     Tobacco Use    Smoking status: Former     Packs/day: 1.00     Years: 10.00     Pack years: 10.00     Types: Cigarettes     Quit date: 3/28/2023     Years since quittin.3    Smokeless tobacco: Never    Tobacco comments:     since age 12, but stopped with each baby   Substance Use Topics    Alcohol use: Yes     Comment: 1-2  every 2-3 months if any    Drug use: Yes     Types: Marijuana     Comment: daily        VITALS:  Patient Vitals for the past 24 hrs:   BP Temp Temp src Pulse Resp SpO2 Height Weight   23 107/59 98.4  F (36.9  C) Oral 74 16 95 % -- --   23 113/72 -- -- 69 -- 97 % -- --   23 92/61 -- -- -- -- -- -- --   23 92/61 -- -- 70 -- 95 % 1.6 m (5' 3\") 63 kg (139 lb)   23 194 113/73 -- -- 73 -- 99 % -- --   23 1930 101/67 -- -- 72 -- 98 % -- --   23 1915 110/70 -- -- 71 -- 97 % -- --   23 1909 127/80 -- -- 77 -- 99 % -- --   23 1845 131/80 98.8  F (37.1  C) Oral 84 16 98 % -- 63.3 kg (139 lb 9.6 oz)       PHYSICAL EXAM    General Appearance: Well-appearing, well-nourished, no acute distress  Chest:  No tenderness or deformity, no crepitus Global erythema with slight induration to right breast. Palpated the area, there is no obvious local areas of induration or fluctuance.   Cardio:  Regular rate and rhythm  Pulm:  No respiratory distress  Extremities: Normal gait  Neuro:  Alert and oriented ×3     RADIOLOGY/LABS:  Reviewed all pertinent imaging. Please see official radiology report. All pertinent labs reviewed and interpreted.    Results for orders placed or performed during the hospital encounter of 23   CT Chest w Contrast    Impression    IMPRESSION:  1.  Small fluid collections surrounding bilateral breast tissue expanders, which could be sterile or superinfected. Surrounding inflammatory fat stranding overlying " cutaneous thickening may reflect an associated cellulitis versus resolving postsurgical   change.  2.  Indeterminate 3.5 mm nodule along the right minor fissure, favoring a benign fissural lymph node. Attention on follow-up.       Comprehensive metabolic panel   Result Value Ref Range    Sodium 141 136 - 145 mmol/L    Potassium 3.1 (L) 3.4 - 5.3 mmol/L    Chloride 103 98 - 107 mmol/L    Carbon Dioxide (CO2) 27 22 - 29 mmol/L    Anion Gap 11 7 - 15 mmol/L    Urea Nitrogen 15.6 6.0 - 20.0 mg/dL    Creatinine 0.93 0.51 - 0.95 mg/dL    Calcium 9.2 8.6 - 10.0 mg/dL    Glucose 103 (H) 70 - 99 mg/dL    Alkaline Phosphatase 58 35 - 104 U/L    AST 18 0 - 45 U/L    ALT 11 0 - 50 U/L    Protein Total 7.0 6.4 - 8.3 g/dL    Albumin 4.1 3.5 - 5.2 g/dL    Bilirubin Total 1.4 (H) <=1.2 mg/dL    GFR Estimate 77 >60 mL/min/1.73m2   Lactic acid whole blood   Result Value Ref Range    Lactic Acid 0.8 0.7 - 2.0 mmol/L   Blood gas venous   Result Value Ref Range    pH Venous 7.43 7.35 - 7.45    pCO2 Venous 41 35 - 50 mm Hg    pO2 Venous 162 (H) 25 - 47 mm Hg    Bicarbonate Venous 27 24 - 30 mmol/L    Base Excess/Deficit (+/-) 2.7   mmol/L    Oxyhemoglobin Venous >98.5 (H) 70.0 - 75.0 %    O2 Sat, Venous 100.0 (H) 70.0 - 75.0 %   CBC with platelets and differential   Result Value Ref Range    WBC Count 24.4 (H) 4.0 - 11.0 10e3/uL    RBC Count 4.33 3.80 - 5.20 10e6/uL    Hemoglobin 12.8 11.7 - 15.7 g/dL    Hematocrit 38.7 35.0 - 47.0 %    MCV 89 78 - 100 fL    MCH 29.6 26.5 - 33.0 pg    MCHC 33.1 31.5 - 36.5 g/dL    RDW 13.2 10.0 - 15.0 %    Platelet Count 262 150 - 450 10e3/uL    % Neutrophils 86 %    % Lymphocytes 8 %    % Monocytes 4 %    % Eosinophils 1 %    % Basophils 0 %    % Immature Granulocytes 1 %    NRBCs per 100 WBC 0 <1 /100    Absolute Neutrophils 21.1 (H) 1.6 - 8.3 10e3/uL    Absolute Lymphocytes 2.0 0.8 - 5.3 10e3/uL    Absolute Monocytes 1.0 0.0 - 1.3 10e3/uL    Absolute Eosinophils 0.2 0.0 - 0.7 10e3/uL    Absolute  Basophils 0.1 0.0 - 0.2 10e3/uL    Absolute Immature Granulocytes 0.2 <=0.4 10e3/uL    Absolute NRBCs 0.0 10e3/uL       The creation of this record is based on the scribe s observations of the work being performed by Afsaneh Loera MD and the provider s statements to them. It was created on her behalf by Renu Berg, a trained medical scribe. This document has been checked and approved by the attending provider.    Afsaneh Loera MD  Emergency Medicine  [facility] EMERGENCY DEPARTMENT  [address] [place] [id number]  [phone]  Dept: [phone]     Afsaneh Loera MD  08/12/23 5538

## 2023-08-13 NOTE — CONSULTS
Consultation - Infectious Disease  [facility]  Zainab Bolton,  1980, MRN [id number]    Admitting Dx: Hypokalemia [E87.6]  Cellulitis of right breast [N61.0]    PCP: Jessi Concepcion, [phone]       ASSESSMENT   43-year-old woman with history of left breast cancer, status post bilateral mastectomy.  Presenting with right mastitis.    Left-sided breast cancer.  Status postmastectomy with expander placement bilaterally on .  Course complicated by some dehiscence over the right breast surgical site.  Required additional stitching.  Received short course of Bactrim a month ago.  All stitches removed about 3 weeks ago  Right-sided mastitis.  Patient developing fevers, chills, vomiting.  Increased swelling and redness on the right side.  White count 24,000 on admission.  Blood cultures pending.  No draining lesions.  CT scan with bilateral fluid collections, without obvious signs of abscess formation.    Principal Problem:    Cellulitis of right breast       PLAN   -Continue vancomycin and Zosyn  -Trend WBC count  -Check CRP  -Follow-up on blood cultures      Thank you for this consult. Will follow.    Jarred Fleming MD  [facility]  Direct messaging: [employer] Paging  On-Call ID provider: [phone], option: [phone]      ===========================================      Chief Complaint   Cellulitis of right breast       HPI     We have been requested by Corona Stanton MD to evaluate Zainab Bolton for the above.    History obtained by patient    Zainab Bolton is a 43 year old woman with a history of left-sided breast cancer.  She is status post bilateral mastectomy with expander placement on 2023.  Her postop course went well except for some dehiscence on the right side.  She required additional stitching and a short course of antibiotics as prophylaxis a month ago.  2 days ago she developed fevers and chills and sweats, along with nausea and malaise.   Yesterday she noticed redness and swelling in her right breast.  She was instructed to come to the ER.  Her white count was elevated.  No fevers since admission.  Of note, her expanders were filled greater on the left than the right, however visually she says her right breast looks larger than the left now.          Review of Systems   Ten systems reviewed and negative except for what is noted in the HPI         Medical History  Past Medical History:   Diagnosis Date    Anxiety 08/16/2010    Anxiety state, unspecified 1997    chronic anxiety    BRCA2 positive     Breast cancer (H) 05/19/2023    Depression     Essential hypertension 03/03/2023    Hypercholesteremia 08/16/2010    Obesity 08/16/2010    Other acne     Papanicolaou smear of cervix with low grade squamous intraepithelial lesion (LGSIL) (aka LSIL) 05/05/2015    LSIL/+ HR HPV    TOBACCO ABUSE-CONTINUOUS     Surgical History  She  has a past surgical history that includes LIGATE FALLOPIAN TUBE (10/03/2003); Conization leep (07/16/2015); Conization leep (N/A, 07/16/2015); biopsy; Eye surgery (1/5/2016); Mastectomy simple (Bilateral, 5/31/2023); Biopsy node sentinel (Left, 5/31/2023); and Reconstruct breast, implant prosthesis, combined (Bilateral, 5/31/2023).     Social History  Reviewed, and she  reports that she quit smoking about 4 months ago. Her smoking use included cigarettes. She has a 10.00 pack-year smoking history. She has never used smokeless tobacco. She reports current alcohol use. She reports current drug use. Drug: Marijuana.  Social History     Social History Narrative    Not on file     Family History  family history includes Arthritis in her maternal grandmother; Cancer in her father and maternal grandfather; Diabetes in her maternal grandmother; Heart Disease in her maternal aunt; Hypertension in her maternal grandmother; Other Cancer in her father; Thyroid Disease in her maternal grandmother.  family history reviewed and is not pertinent  "to the presenting problem.            Allergies   No Known Allergies      Antibiotics   Zosyn 8/12-  Vancomycin 8/12-    Previous:  None      Physical Exam     Temp:  [98.3  F (36.8  C)-98.8  F (37.1  C)] 98.3  F (36.8  C)  Pulse:  [69-84] 76  Resp:  [16-18] 18  BP: ()/(59-80) 125/80  SpO2:  [95 %-99 %] 99 %    /80 (BP Location: Left arm)   Pulse 76   Temp 98.3  F (36.8  C) (Oral)   Resp 18   Ht 1.6 m (5' 3\")   Wt 63 kg (139 lb)   LMP 06/22/2023   SpO2 99%   BMI 24.62 kg/m      GENERAL:  well-developed, well-nourished, sitting in bed in no acute distress.   HENT:  Head is normocephalic, atraumatic. Oropharynx is moist without exudates or ulcers.  EYES:  Eyes have anicteric sclerae without conjunctival injection or stigmata of endocarditis.   NECK:  Supple.  LUNGS:  Clear to auscultation.  CARDIOVASCULAR:  Regular rate and rhythm with no murmurs, gallops or rubs.  ABDOMEN:  Normal bowel sounds, soft, nontender. No appreciable hepatosplenomegaly  EXT: Extremities warm and without edema.  SKIN: Erythema and swelling over the right breast, no wound dehiscence.  Firm on the lateral edge, with warmth.  No stigmata of endocarditis.  NEUROLOGIC:  Grossly nonfocal.      Cultures   8/12 blood cultures x2: No growth to date      Laboratory results     Recent Labs   Lab 08/13/23  0639 08/12/23  1903   WBC 20.8* 24.4*   HGB 12.1 12.8    262       Recent Labs   Lab 08/13/23  0639 08/12/23  1903    141   CO2 27 27   BUN 9.7 15.6   ALBUMIN  --  4.1   ALKPHOS  --  58   ALT  --  11   AST  --  18       No results for input(s): CRP, SED in the last 168 hours.        Imaging   Radiology results reviewed    CT Chest w Contrast    Result Date: 8/12/2023  EXAM: CT CHEST W CONTRAST LOCATION: [facility] DATE: 8/12/2023 INDICATION: Breast infection, status post mastectomy with tissue expander placement. COMPARISON: CT abdomen/pelvis 05/17/2023. TECHNIQUE: CT chest with IV contrast. " Multiplanar reformats were obtained. Dose reduction techniques were used. CONTRAST: 100 mL Isovue-370. FINDINGS: LUNGS AND PLEURA: Trachea and large airways are patent. No focal airspace consolidation. Indeterminate 3 mm nodule along the minor fissure, axial image 170, favoring a benign fissural lymph node, though warranting continued follow-up. Mild dependent atelectasis. No pneumothorax. No pleural effusion.  MEDIASTINUM/AXILLAE: Residual thymic tissue within the anterior mediastinum. No mediastinal/hilar lymphadenopathy. Thoracic esophagus is unremarkable.  No axillary lymphadenopathy. Postsurgical changes of bilateral mastectomies with placement of tissue expansion devices. Small fluid collection surrounding both of the tissue expanders, with overlying cutaneous thickening and adjacent fat stranding. These could reflect sterile fluid collections, though superinfection with overlying cellulitis is not excluded. No thoracic aortic aneurysm. Heart is normal in size. No pericardial effusion. CORONARY ARTERY CALCIFICATION: None. UPPER ABDOMEN: No suspicious abnormality. Small cleft within the posterior spleen, axial image 149, similar to prior. MUSCULOSKELETAL: No suspicious abnormality. OTHER: No additionally suspicious abnormality.     IMPRESSION: 1.  Small fluid collections surrounding bilateral breast tissue expanders, which could be sterile or superinfected. Surrounding inflammatory fat stranding overlying cutaneous thickening may reflect an associated cellulitis versus resolving postsurgical change. 2.  Indeterminate 3.5 mm nodule along the right minor fissure, favoring a benign fissural lymph node. Attention on follow-up.       Data reviewed today: I reviewed all medications, new labs and imaging results over the last 24 hours. I personally reviewed the chest CT image(s) showing bilateral expanders without abscess .  The patient's care was discussed with the Patient.

## 2023-08-13 NOTE — PLAN OF CARE
Problem: Pain Acute  Goal: Optimal Pain Control and Function  Outcome: Progressing  Intervention: Prevent or Manage Pain  Recent Flowsheet Documentation  Taken 8/13/2023 0215 by Brenden Walters RN  Medication Review/Management: medications reviewed     Problem: Infection  Goal: Absence of Infection Signs and Symptoms  Outcome: Progressing     Problem: Plan of Care - These are the overarching goals to be used throughout the patient stay.    Goal: Absence of Hospital-Acquired Illness or Injury  Intervention: Identify and Manage Fall Risk  Recent Flowsheet Documentation  Taken 8/13/2023 0215 by Brenden Walters RN  Safety Promotion/Fall Prevention:   mobility aid in reach   nonskid shoes/slippers when out of bed   assistive device/personal items within reach   increased rounding and observation   clutter free environment maintained   safety round/check completed  Intervention: Prevent Skin Injury  Recent Flowsheet Documentation  Taken 8/13/2023 0215 by Brenden Walters RN  Body Position: position changed independently  Goal: Readiness for Transition of Care  Recent Flowsheet Documentation  Taken 8/13/2023 0600 by Brenden Walters RN  Transportation Anticipated: family or friend will provide  Intervention: Mutually Develop Transition Plan  Recent Flowsheet Documentation  Taken 8/13/2023 0600 by Brenden Walters RN  Transportation Anticipated: family or friend will provide  Patient/Family Anticipated Services at Transition: none  Patient/Family Anticipates Transition to: home with family  Taken 8/13/2023 0200 by Brenden Walters RN  Equipment Currently Used at Home: none   Goal Outcome Evaluation:  Pt alert and oriented x4. PRN dilaudid given for pain. Also PRN hydroxyzine given for anxiety and melatonin 1 mg for sleep. Getting IV abx zosyn and vancomycin. Up SBA to the bathroom. SCDs in place. NPO after midnight for a possible today.

## 2023-08-13 NOTE — ED NOTES
"[facility] ED Handoff Report    ED Chief Complaint: pain in right breast    ED Diagnosis:  (N61.0) Cellulitis of right breast  Comment:   Plan:     (E87.6) Hypokalemia  Comment:   Plan:        PMH:    Past Medical History:   Diagnosis Date    Anxiety 08/16/2010    Anxiety state, unspecified 1997    chronic anxiety    BRCA2 positive     Breast cancer (H) 05/19/2023    Depression     Essential hypertension 03/03/2023    Hypercholesteremia 08/16/2010    Obesity 08/16/2010    Other acne     Papanicolaou smear of cervix with low grade squamous intraepithelial lesion (LGSIL) (aka LSIL) 05/05/2015    LSIL/+ HR HPV    TOBACCO ABUSE-CONTINUOUS         Code Status:  No Order     Falls Risk: Yes Band: Applied    Current Living Situation/Residence: lives in a house     Elimination Status: Continent: No     Activity Level: Independent    Patients Preferred Language:  English     Needed: No    Vital Signs:  /72   Pulse 69   Temp 98.8  F (37.1  C) (Oral)   Resp 16   Ht 1.6 m (5' 3\")   Wt 63 kg (139 lb)   LMP 06/22/2023   SpO2 97%   BMI 24.62 kg/m       Cardiac Rhythm:     Pain Score: 5/10    Is the Patient Confused:  No    Last Food or Drink: 08/12/23 at AM    Focused Assessment:  pt has hx of left breast cancer. Pt has had double mastectomy. Pt is alert ands oriented x 4. Independent at bl.     Tests Performed: Done: Labs and Imaging    Treatments Provided:  see charting    Family Dynamics/Concerns: No    Family Updated On Visitor Policy: Yes    Plan of Care Communicated to Family: Yes    Who Was Updated about Plan of Care:     Belongings Checklist Done and Signed by Patient: No    Medications sent with patient: n/a     Covid: asymptomatic , n/a    Additional Information: n/a    RN: Lindsey Padilla RN   8/12/2023 8:24 PM       "

## 2023-08-13 NOTE — PHARMACY-ADMISSION MEDICATION HISTORY
Pharmacist Admission Medication History    Admission medication history is complete. The information provided in this note is only as accurate as the sources available at the time of the update.    Medication reconciliation/reorder completed by provider prior to medication history? No    Information Source(s): Patient and CareEverywhere/SureScripts via in-person    Pertinent Information:     Changes made to PTA medication list:  Added: None  Deleted: None  Changed: None      Allergies reviewed with patient and updates made in EHR: yes    Medication History Completed By: KOFI NUNES RPH 8/12/2023 7:49 PM    Prior to Admission medications    Medication Sig Last Dose Taking? Auth Provider Long Term End Date   acetaminophen (TYLENOL) 500 MG tablet Take 500-1,000 mg by mouth every 6 hours as needed for mild pain Past Month at prn Yes Reported, Patient     hydrOXYzine (ATARAX) 10 MG tablet Take 1 tablet (10 mg) by mouth 3 times daily as needed for anxiety 8/12/2023 at am Yes Jessi Concepcion MD     ibuprofen (ADVIL/MOTRIN) 400 MG tablet Take 400 mg by mouth every 6 hours as needed for moderate pain Past Month at prn Yes Reported, Patient     sertraline (ZOLOFT) 100 MG tablet Take 1 tablet (100 mg) by mouth daily 8/12/2023 at am Yes Jessi Concepcion MD Yes    tamoxifen (NOLVADEX) 20 MG tablet Take 1 tablet (20 mg) by mouth daily 8/12/2023 at 1700 Yes Candida Espinosa MD Yes    vitamin C (ASCORBIC ACID) 1000 MG TABS Take 1,000 mg by mouth daily 8/11/2023 at am Yes Reported, Patient     Vitamin D3 (CHOLECALCIFEROL) 25 mcg (1000 units) tablet Take 25 mcg by mouth daily 8/11/2023 at am Yes Reported, Patient     Zinc Sulfate (ZINC 15 PO) Take 1 capsule by mouth daily 8/11/2023 at am Yes Reported, Patient

## 2023-08-13 NOTE — PROGRESS NOTES
"[facility] Hospitalist Progress Note  [facility]  Admission date: 8/12/2023    Summary:    43F with breast CA 8 weeks s/p b/l mastectomy for breast CA with tissue expander a/w R breast cellulitis      Assessment/Plan    #Cellulitis of right breast 8 weeks s/p mastectomy with underlying tissue expander  #Sepsis  -zosyn/vanco for now.  Narrow based on culture results.  -fluid collections surrounding both expanders, possibly infected on R.  TBD if simple cellulitis or if surrounding fluid collection/tissue expander is infected.   -plastic surgery consult.      #breast cancer postmastectomy, continue home meds - tamoxifen    #Pulmonary nodule noted on chest imaging - 2. Indeterminate 3.5 mm nodule along the right minor fissure, favoring a benign fissural lymph node.  follow-up.     #depression/anxiety - zoloft     Checklist:  Code Status: Full Code    Diet: NPO for Medical/Clinical Reasons Except for: Meds     DVT px:  Pneumatic Compression Devices    Disposition and Discharge Planning  Auto-populated from discharge tab:     Expected Discharge Date: 08/14/2023      Destination: home with family           System Identified Risk Variables  Auto-populated based on system request - if needs to be addressed in treatment plan they will be addressed above:  \"  Clinically Significant Risk Factors Present on Admission        # Hypokalemia: Lowest K = 3.1 mmol/L in last 2 days, will replace as needed                         \"    Interval Events/Subjective/Notable results:    Pain and fever starting Friday  Reviewed imaging with Ms. Bolton.  BMP, CBC reviewed and WBC down.        Objective    Vital signs in last 24 hours  Temp:  [98.3  F (36.8  C)-98.8  F (37.1  C)] 98.3  F (36.8  C)  Pulse:  [69-84] 72  Resp:  [16-18] 18  BP: ()/(59-80) 91/60  SpO2:  [95 %-99 %] 99 % O2 Device: None (Room air)    Weight:   139 lbs 0 oz  Body mass index is 24.62 kg/m .    Intake/Output last 3 shifts  No intake/output data " recorded.    Physical Exam  General:  Alert, cooperative, no distress    Neurologic:  oriented, facial symmetry preserved, fluent speech.   Psych: calm  HEENT:  Anicteric, MMM  CV: RRR no MRG, normal S1 and S2, no edema  Lungs:   Easyrespirations  Skin: no rashes or jaundice noted on exposed skin.  R breast cellulitis.  Leroy Catheter: Not present  Lines: None          Medical Decision Making               Woo Canela MD, [facility]  Internal Medicine Hospitalist

## 2023-08-13 NOTE — H&P
Admission History and Physical   Zainab Bolton,    1980,   [id number]    [facility]   Cellulitis of right breast [N61.0]    PCP: Jessi Concepcion,    Code status:  No Order       Extended Emergency Contact Information  Primary Emergency Contact: Shalini Hawley   [place]  Mobile Phone: [phone]  Relation: Sister       Principal Problem:    Cellulitis of right breast       ASSESSMENT AND PLAN:  Cellulitis of right breast, recent mastectomy and expander placement  History of breast cancer postmastectomy, continue home meds  Chills and fever due to above infection  Pulmonary nodule noted on chest imaging  Electrolyte abnormalities supplement per protocol  Leukocytosis due to above infection we will follow blood culture  Depression anxiety continue home meds      Plan  Admit to cardiac monitoring  Begin IV antibiotics  Surgical team informed by ED, they will evaluate in the a.m.  N.p.o. at midnight in case procedure is needed  Follow-up blood culture results  Antiemetics and analgesia started    Full code    DVT PPX:  Mechanical    Barriers to discharge    Anticipated Discharge date inpatient          Chief Complaint:  Fever and chills today    HPI:  Zainab Bolton is a 43 year old old female presenting to the ED for evaluation of fever and chills noticed recently.  Patient has a history of post bilateral mastectomy for breast cancer and she is 8 weeks out from surgery and placement of tissue expander.  She is also noticed some erythema on the right breast with some pain and swelling.  No headaches no neck stiffness, no chest pain no cough, no diarrhea or abdominal pain.  Urinary symptoms.  CT shows possible breast cellulitis.  ED spoke to surgical team on-call who recommend admission and IV antibiotics and evaluation in the a.m.      Medical History  Past Medical History:   Diagnosis Date    Anxiety 2010    Anxiety state, unspecified     chronic anxiety    BRCA2  positive     Breast cancer (H) 2023    Depression     Essential hypertension 2023    Hypercholesteremia 2010    Obesity 2010    Other acne     Papanicolaou smear of cervix with low grade squamous intraepithelial lesion (LGSIL) (aka LSIL) 2015    LSIL/+ HR HPV    TOBACCO ABUSE-CONTINUOUS           Surgical History  She  has a past surgical history that includes LIGATE FALLOPIAN TUBE (10/03/2003); Conization leep (2015); Conization leep (N/A, 2015); biopsy; Eye surgery (2016); Mastectomy simple (Bilateral, 2023); Biopsy node sentinel (Left, 2023); and Reconstruct breast, implant prosthesis, combined (Bilateral, 2023).      SOCIAL HISTORY:  Social History     Socioeconomic History    Marital status:      Spouse name: Not on file    Number of children: 3    Years of education: 12    Highest education level: Not on file   Occupational History    Occupation: Stay at home Mother   Tobacco Use    Smoking status: Former     Packs/day: 1.00     Years: 10.00     Pack years: 10.00     Types: Cigarettes     Quit date: 3/28/2023     Years since quittin.3    Smokeless tobacco: Never    Tobacco comments:     since age 12, but stopped with each baby   Vaping Use    Vaping Use: Not on file   Substance and Sexual Activity    Alcohol use: Yes     Comment: 1-2  every 2-3 months if any    Drug use: Yes     Types: Marijuana     Comment: daily    Sexual activity: Yes     Partners: Male     Birth control/protection: Female Surgical   Other Topics Concern     Service No    Blood Transfusions No    Caffeine Concern Yes     Comment: 3 cups every 2 weeks, 2 pops per week     Occupational Exposure No    Hobby Hazards No    Sleep Concern No    Stress Concern Yes    Weight Concern Yes    Special Diet No    Back Care No    Exercise No    Bike Helmet No    Seat Belt Yes    Self-Exams No    Parent/sibling w/ CABG, MI or angioplasty before 65F 55M? No   Social History  "Narrative    Not on file     Social Determinants of Health     Financial Resource Strain: Not on file   Food Insecurity: Not on file   Transportation Needs: Not on file   Physical Activity: Not on file   Stress: Not on file   Social Connections: Not on file   Intimate Partner Violence: Not on file   Housing Stability: Not on file       FAMILY HISTORY:  Family History   Problem Relation Age of Onset    Cancer Father         liver    Other Cancer Father         Stomach and liver cancer diagnosis    Arthritis Maternal Grandmother     Hypertension Maternal Grandmother     Diabetes Maternal Grandmother     Thyroid Disease Maternal Grandmother     Cancer Maternal Grandfather         leukemia    Heart Disease Maternal Aunt         MI approx age 46         ALLERGIES:  No Known Allergies    MEDICATIONS: See pharmacy note        ROS:  12 point review of systems reviewed and is negative except as stated above      PHYSICAL EXAM:  /72   Pulse 69   Temp 98.8  F (37.1  C) (Oral)   Resp 16   Ht 1.6 m (5' 3\")   Wt 63 kg (139 lb)   LMP 06/22/2023   SpO2 97%   BMI 24.62 kg/m    Examined with chaperone in the room  General: Alert and oriented x 3. Not in obvious distress.  HEENT: Pupils equal and reactive,ENT WNL   Neck- supple, No JVP elevation, lymphadenopathy or thyromegaly. Trachea-central.  Chest: Clear to auscultation bilaterally.  Heart: S1S2 regular. No M/R/G.  Abdomen: Soft. NT, ND. No organomegaly. Bowel sounds- active.  Back: No spine tenderness. No CVA tenderness.  Extremities: No leg swelling. Peripheral pulses 2+ bilaterally.  Breast exam deferred, please see ED exam findings    DIAGNOSTIC DATA: Case discussed extensively ED physician        Advanced Care Planning       Corona Stanton MD     "

## 2023-08-13 NOTE — PLAN OF CARE
"  Problem: Plan of Care - These are the overarching goals to be used throughout the patient stay.    Goal: Optimal Comfort and Wellbeing  Outcome: Progressing  Intervention: Monitor Pain and Promote Comfort  Recent Flowsheet Documentation  Taken 8/13/2023 0841 by Jovana Williamson, RN  Pain Management Interventions: emotional support     Problem: Pain Acute  Goal: Optimal Pain Control and Function  Outcome: Progressing  Intervention: Develop Pain Management Plan  Recent Flowsheet Documentation  Taken 8/13/2023 0841 by Jovana Williamson, RN  Pain Management Interventions: emotional support     Pt reports minimal pain 3-4/10 tylenol which was effective    Problem: Infection  Goal: Absence of Infection Signs and Symptoms  Outcome: Progressing     Right breast is red and slightly warm.     /80 (BP Location: Left arm)   Pulse 76   Temp 98.3  F (36.8  C) (Oral)   Resp 18   Ht 1.6 m (5' 3\")   Wt 63 kg (139 lb)   LMP 06/22/2023   SpO2 99%   BMI 24.62 kg/m       Pt remains afebrile.   "

## 2023-08-14 LAB
BASOPHILS # BLD AUTO: 0 10E3/UL (ref 0–0.2)
BASOPHILS NFR BLD AUTO: 0 %
CRP SERPL-MCNC: 72.4 MG/L
EOSINOPHIL # BLD AUTO: 0.3 10E3/UL (ref 0–0.7)
EOSINOPHIL NFR BLD AUTO: 4 %
ERYTHROCYTE [DISTWIDTH] IN BLOOD BY AUTOMATED COUNT: 13.5 % (ref 10–15)
HCT VFR BLD AUTO: 32.8 % (ref 35–47)
HGB BLD-MCNC: 10.8 G/DL (ref 11.7–15.7)
HOLD SPECIMEN: NORMAL
IMM GRANULOCYTES # BLD: 0 10E3/UL
IMM GRANULOCYTES NFR BLD: 0 %
LYMPHOCYTES # BLD AUTO: 1.8 10E3/UL (ref 0.8–5.3)
LYMPHOCYTES NFR BLD AUTO: 22 %
MCH RBC QN AUTO: 29.8 PG (ref 26.5–33)
MCHC RBC AUTO-ENTMCNC: 32.9 G/DL (ref 31.5–36.5)
MCV RBC AUTO: 90 FL (ref 78–100)
MONOCYTES # BLD AUTO: 0.5 10E3/UL (ref 0–1.3)
MONOCYTES NFR BLD AUTO: 6 %
MRSA DNA SPEC QL NAA+PROBE: NEGATIVE
NEUTROPHILS # BLD AUTO: 5.7 10E3/UL (ref 1.6–8.3)
NEUTROPHILS NFR BLD AUTO: 68 %
NRBC # BLD AUTO: 0 10E3/UL
NRBC BLD AUTO-RTO: 0 /100
PLATELET # BLD AUTO: 220 10E3/UL (ref 150–450)
RBC # BLD AUTO: 3.63 10E6/UL (ref 3.8–5.2)
SA TARGET DNA: NEGATIVE
VANCOMYCIN SERPL-MCNC: 7.4 UG/ML
WBC # BLD AUTO: 8.4 10E3/UL (ref 4–11)

## 2023-08-14 PROCEDURE — 36415 COLL VENOUS BLD VENIPUNCTURE: CPT | Performed by: INTERNAL MEDICINE

## 2023-08-14 PROCEDURE — 250N000011 HC RX IP 250 OP 636: Performed by: HOSPITALIST

## 2023-08-14 PROCEDURE — 250N000011 HC RX IP 250 OP 636: Mod: JZ | Performed by: INTERNAL MEDICINE

## 2023-08-14 PROCEDURE — 250N000013 HC RX MED GY IP 250 OP 250 PS 637: Performed by: INTERNAL MEDICINE

## 2023-08-14 PROCEDURE — 258N000003 HC RX IP 258 OP 636: Performed by: INTERNAL MEDICINE

## 2023-08-14 PROCEDURE — 85014 HEMATOCRIT: CPT | Performed by: INTERNAL MEDICINE

## 2023-08-14 PROCEDURE — 258N000003 HC RX IP 258 OP 636: Performed by: HOSPITALIST

## 2023-08-14 PROCEDURE — 80202 ASSAY OF VANCOMYCIN: CPT | Performed by: HOSPITALIST

## 2023-08-14 PROCEDURE — 99232 SBSQ HOSP IP/OBS MODERATE 35: CPT | Performed by: INTERNAL MEDICINE

## 2023-08-14 PROCEDURE — 36415 COLL VENOUS BLD VENIPUNCTURE: CPT | Performed by: HOSPITALIST

## 2023-08-14 PROCEDURE — 250N000013 HC RX MED GY IP 250 OP 250 PS 637: Performed by: EMERGENCY MEDICINE

## 2023-08-14 PROCEDURE — 99233 SBSQ HOSP IP/OBS HIGH 50: CPT | Performed by: HOSPITALIST

## 2023-08-14 PROCEDURE — 87641 MR-STAPH DNA AMP PROBE: CPT | Performed by: HOSPITALIST

## 2023-08-14 PROCEDURE — 86140 C-REACTIVE PROTEIN: CPT | Performed by: INTERNAL MEDICINE

## 2023-08-14 PROCEDURE — 250N000013 HC RX MED GY IP 250 OP 250 PS 637: Performed by: HOSPITALIST

## 2023-08-14 PROCEDURE — 999N000248 HC STATISTIC IV INSERT WITH US BY RN

## 2023-08-14 PROCEDURE — 120N000001 HC R&B MED SURG/OB

## 2023-08-14 RX ORDER — CEFAZOLIN SODIUM 1 G/50ML
1250 SOLUTION INTRAVENOUS EVERY 12 HOURS
Status: DISCONTINUED | OUTPATIENT
Start: 2023-08-14 | End: 2023-08-15

## 2023-08-14 RX ADMIN — TAMOXIFEN CITRATE 20 MG: 20 TABLET ORAL at 17:18

## 2023-08-14 RX ADMIN — Medication 25 MCG: at 10:19

## 2023-08-14 RX ADMIN — HYDROXYZINE HYDROCHLORIDE 10 MG: 10 TABLET ORAL at 17:18

## 2023-08-14 RX ADMIN — CALCIUM 500 MG: 500 TABLET ORAL at 10:19

## 2023-08-14 RX ADMIN — HYDROXYZINE HYDROCHLORIDE 10 MG: 10 TABLET ORAL at 10:20

## 2023-08-14 RX ADMIN — ACETAMINOPHEN 975 MG: 325 TABLET ORAL at 10:19

## 2023-08-14 RX ADMIN — VANCOMYCIN HYDROCHLORIDE 1250 MG: 5 INJECTION, POWDER, LYOPHILIZED, FOR SOLUTION INTRAVENOUS at 18:32

## 2023-08-14 RX ADMIN — PIPERACILLIN AND TAZOBACTAM 3.38 G: 3; .375 INJECTION, POWDER, FOR SOLUTION INTRAVENOUS at 00:26

## 2023-08-14 RX ADMIN — SERTRALINE HYDROCHLORIDE 100 MG: 50 TABLET ORAL at 10:19

## 2023-08-14 RX ADMIN — ZINC SULFATE 220 MG (50 MG) CAPSULE 220 MG: CAPSULE at 10:19

## 2023-08-14 RX ADMIN — Medication 1 MG: at 23:46

## 2023-08-14 RX ADMIN — PIPERACILLIN AND TAZOBACTAM 3.38 G: 3; .375 INJECTION, POWDER, FOR SOLUTION INTRAVENOUS at 21:46

## 2023-08-14 RX ADMIN — HYDROXYZINE HYDROCHLORIDE 10 MG: 10 TABLET ORAL at 23:46

## 2023-08-14 RX ADMIN — HYDROMORPHONE HYDROCHLORIDE 0.2 MG: 0.2 INJECTION, SOLUTION INTRAMUSCULAR; INTRAVENOUS; SUBCUTANEOUS at 23:46

## 2023-08-14 RX ADMIN — PIPERACILLIN AND TAZOBACTAM 3.38 G: 3; .375 INJECTION, POWDER, FOR SOLUTION INTRAVENOUS at 13:23

## 2023-08-14 RX ADMIN — Medication 1000 MG: at 10:19

## 2023-08-14 RX ADMIN — VANCOMYCIN HYDROCHLORIDE 750 MG: 5 INJECTION, POWDER, LYOPHILIZED, FOR SOLUTION INTRAVENOUS at 06:02

## 2023-08-14 ASSESSMENT — ACTIVITIES OF DAILY LIVING (ADL)
ADLS_ACUITY_SCORE: 18

## 2023-08-14 NOTE — PLAN OF CARE
"  Problem: Plan of Care - These are the overarching goals to be used throughout the patient stay.    Goal: Optimal Comfort and Wellbeing  Outcome: Progressing  Intervention: Monitor Pain and Promote Comfort  Recent Flowsheet Documentation  Taken 8/14/2023 1019 by Jovana Williamson, RN  Pain Management Interventions: medication (see MAR)     Problem: Pain Acute  Goal: Optimal Pain Control and Function  Outcome: Progressing  Intervention: Develop Pain Management Plan  Recent Flowsheet Documentation  Taken 8/14/2023 1019 by Jovana Williamson, RN  Pain Management Interventions: medication (see MAR)     Pt reports pain is much improved, tylenol and hydroxyzine effective for pain management.    /65 (BP Location: Left arm)   Pulse 68   Temp 98.5  F (36.9  C) (Oral)   Resp 18   Ht 1.6 m (5' 3\")   Wt 63 kg (139 lb)   LMP 06/22/2023   SpO2 98%   BMI 24.62 kg/m       IV access lost during shift, MD updated and zosyn timing updated.  "

## 2023-08-14 NOTE — PROGRESS NOTES
"INFECTIOUS DISEASE FOLLOW UP NOTE      ASSESSMENT:  43-year-old woman with history of left breast cancer, status post bilateral mastectomy.  Presenting with right mastitis.     Left-sided breast cancer.  Status postmastectomy with expander placement bilaterally on 5/31.  Course complicated by some dehiscence over the right breast surgical site.  Required additional stitching.  Received short course of Bactrim a month ago.  All stitches removed about 3 weeks ago  Right-sided mastitis.  Patient developing fevers, chills, vomiting.  Increased swelling and redness on the right side.  White count 24,000 on admission.  Blood cultures pending.  No draining lesions.  CT scan with bilateral fluid collections, without obvious signs of abscess formation. Now clinically improved and WBC normalized. Without positive culture, cannot be certain, but clinically presented with beta-hemolytic strep infection (flu-like symptoms preceding cellulitis, apparent non-purulent infection).     Discussed that there is chance that tissue expander is infected. As of now it is difficult to rule in or out. If continued improvement, can treat with antibiotics and tissue expander retention, monitor closely for return of symptoms off antibiotics. Open to discussion with other care team members.     PLAN:  Pip/tazo and vancomycin  MRSA screen ordered  If micro testing unrevealing, likely step down to cefdinir and doxycycline to complete antibiotic course. See above.     Aníbal Powers MD  [facility]  [phone] [facility]    ______________________________________________________________________    SUBJECTIVE / INTERVAL HISTORY: feels better. Temps ok. Less pain. Reviewed course with her. Tolerating antibiotics.      ROS: All other systems negative except as listed above.        OBJECTIVE:  /65 (BP Location: Left arm)   Pulse 68   Temp 98.5  F (36.9  C) (Oral)   Resp 18   Ht 1.6 m (5' 3\")   Wt 63 kg " (139 lb)   LMP 2023   SpO2 98%   BMI 24.62 kg/m         Vital Signs  Temp: 98.5  F (36.9  C)  Temp src: Oral  Resp: 18  Pulse: 68  Pulse Rate Source: Monitor  BP: 123/65  BP Location: Left arm    Temp (24hrs), Av.4  F (36.9  C), Min:98.1  F (36.7  C), Max:98.6  F (37  C)      GEN: No acute distress.    RESPIRATORY:  Normal breathing pattern.   CARDIOVASCULAR:  Regular rate and rhythm.   ABDOMEN:  deferred  EXTREMITIES: No edema.  SKIN/HAIR/NAILS:  No rashes. Photos reviewed   IV: peripheral        Antibiotics:  Pip/tazo -  Vancomycin -    Pertinent labs:    Recent Labs   Lab 23  0720 23  0639 23   WBC 8.4 20.8* 24.4*   HGB 10.8* 12.1 12.8   HCT 32.8* 37.3 38.7    266 262        Recent Labs   Lab 23  0639 23    141   CO2 27 27   BUN 9.7 15.6      No results found for: CRP   [unfilled]    Lab Results   Component Value Date    ALT 11 2023    AST 18 2023    ALKPHOS 58 2023         MICROBIOLOGY DATA:  [unfilled]    RADIOLOGY:  CT Chest w Contrast    Result Date: 2023  EXAM: CT CHEST W CONTRAST LOCATION: [facility] DATE: 2023 INDICATION: Breast infection, status post mastectomy with tissue expander placement. COMPARISON: CT abdomen/pelvis 2023. TECHNIQUE: CT chest with IV contrast. Multiplanar reformats were obtained. Dose reduction techniques were used. CONTRAST: 100 mL Isovue-370. FINDINGS: LUNGS AND PLEURA: Trachea and large airways are patent. No focal airspace consolidation. Indeterminate 3 mm nodule along the minor fissure, axial image 170, favoring a benign fissural lymph node, though warranting continued follow-up. Mild dependent atelectasis. No pneumothorax. No pleural effusion.  MEDIASTINUM/AXILLAE: Residual thymic tissue within the anterior mediastinum. No mediastinal/hilar lymphadenopathy. Thoracic esophagus is unremarkable.  No axillary lymphadenopathy. Postsurgical changes  of bilateral mastectomies with placement of tissue expansion devices. Small fluid collection surrounding both of the tissue expanders, with overlying cutaneous thickening and adjacent fat stranding. These could reflect sterile fluid collections, though superinfection with overlying cellulitis is not excluded. No thoracic aortic aneurysm. Heart is normal in size. No pericardial effusion. CORONARY ARTERY CALCIFICATION: None. UPPER ABDOMEN: No suspicious abnormality. Small cleft within the posterior spleen, axial image 149, similar to prior. MUSCULOSKELETAL: No suspicious abnormality. OTHER: No additionally suspicious abnormality.     IMPRESSION: 1.  Small fluid collections surrounding bilateral breast tissue expanders, which could be sterile or superinfected. Surrounding inflammatory fat stranding overlying cutaneous thickening may reflect an associated cellulitis versus resolving postsurgical change. 2.  Indeterminate 3.5 mm nodule along the right minor fissure, favoring a benign fissural lymph node. Attention on follow-up.      Medical Decision Making

## 2023-08-14 NOTE — PLAN OF CARE
"  Problem: Plan of Care - These are the overarching goals to be used throughout the patient stay.    Goal: Plan of Care Review  Description: The Plan of Care Review/Shift note should be completed every shift.  The Outcome Evaluation is a brief statement about your assessment that the patient is improving, declining, or no change.  This information will be displayed automatically on your shift note.  Outcome: Progressing  Goal: Patient-Specific Goal (Individualized)  Description: You can add care plan individualizations to a care plan. Examples of Individualization might be:  \"Parent requests to be called daily at 9am for status\", \"I have a hard time hearing out of my right ear\", or \"Do not touch me to wake me up as it startles me\".  Outcome: Progressing  Goal: Absence of Hospital-Acquired Illness or Injury  Outcome: Progressing  Intervention: Identify and Manage Fall Risk  Recent Flowsheet Documentation  Taken 8/13/2023 1945 by Destiny Will RN  Safety Promotion/Fall Prevention: mobility aid in reach  Intervention: Prevent Skin Injury  Recent Flowsheet Documentation  Taken 8/13/2023 1945 by Destiny Will RN  Body Position: position changed independently  Intervention: Prevent and Manage VTE (Venous Thromboembolism) Risk  Recent Flowsheet Documentation  Taken 8/13/2023 1945 by Destiny Will RN  VTE Prevention/Management: SCDs (sequential compression devices) on  Goal: Optimal Comfort and Wellbeing  Outcome: Progressing  Intervention: Monitor Pain and Promote Comfort  Recent Flowsheet Documentation  Taken 8/13/2023 2025 by Destiny Will RN  Pain Management Interventions:   emotional support   medication (see MAR)  Goal: Readiness for Transition of Care  Outcome: Progressing   Goal Outcome Evaluation:                        "

## 2023-08-14 NOTE — PROGRESS NOTES
No events overnight, patient feeling better.    At the physical exam, the erythema on the right breast has significantly decreased.  Also, the mastalgia is almost gone.  The breast edema however, still persists.    Her WBC is now normal with no shift.    Plan: I noticed improvement in the last 24 hours on her physical exam with normalization of her WBC.  These are positive signs.  I recommend continue IV antibiotic for the next 48 hours to ensure that she can be discharged home with p.o. antibiotics and that in fact I will be able to salvage this tissue expander.    I will follow-up tomorrow for reevaluation.    Basil Aguilar MD , FACS   Diplomate American Board of Plastic Surgery  Diplomate American Board of Surgery  Adj. Assistant Professor of Surgery  Division of Plastic & Reconstructive Surgery   [facility]  Office: [phone]   8/14/2023 at 2:57 PM

## 2023-08-14 NOTE — PROGRESS NOTES
"[facility] Hospitalist Progress Note  [facility]  Admission date: 8/12/2023    Summary:    43F with breast CA 8 weeks s/p b/l mastectomy for breast CA with tissue expander a/w R breast cellulitis      Assessment/Plan    #Cellulitis of right breast 8 weeks s/p mastectomy with underlying tissue expander  #Sepsis  -Improving WBC, cellulitis.  -zosyn/vanco for now.   I think unlikely to get any culture results unless surgery/aspiration etc.   Will send off MRSA swab see if colonized.  -fluid collections surrounding both expanders, possibly infected on R.  TBD if simple cellulitis or if surrounding fluid collection/tissue expander is infected.   -Appreciate ID input and plastic surgery    #breast cancer postmastectomy, continue home meds - tamoxifen    #Pulmonary nodule noted on chest imaging - 2. Indeterminate 3.5 mm nodule along the right minor fissure, favoring a benign fissural lymph node.  follow-up.     #depression/anxiety - zoloft     Checklist:  Code Status: Full Code    Diet: Regular Diet Adult     DVT px:  Pneumatic Compression Devices    Disposition and Discharge Planning  Auto-populated from discharge tab:     Expected Discharge Date: 08/15/2023      Destination: home with family  Discharge Comments: ID following- IV abx  plastics following         System Identified Risk Variables  Auto-populated based on system request - if needs to be addressed in treatment plan they will be addressed above:  \"  Clinically Significant Risk Factors        # Hypokalemia: Lowest K = 3.1 mmol/L in last 2 days, will replace as needed   # Hypocalcemia: Lowest Ca = 8.4 mg/dL in last 2 days, will monitor and replace as appropriate                         \"    Interval Events/Subjective/Notable results:    Pain and fever starting Friday  Reviewed imaging with MsCristobal Pitakelsikristan.  BMP, CBC reviewed and WBC down.  CRP elevated, but no baseline for trend        Objective    Vital signs in last 24 hours  Temp:  [98.1  F (36.7 "  C)-98.6  F (37  C)] 98.5  F (36.9  C)  Pulse:  [62-71] 68  Resp:  [18-20] 18  BP: (104-123)/(63-67) 123/65  SpO2:  [96 %-98 %] 98 % O2 Device: None (Room air)    Weight:   139 lbs 0 oz  Body mass index is 24.62 kg/m .    Intake/Output last 3 shifts  I/O last 3 completed shifts:  In: 360 [P.O.:360]  Out: -     Physical Exam  General:  Alert, cooperative, no distress    Neurologic:  oriented, facial symmetry preserved, fluent speech.   Psych: calm  HEENT:  Anicteric, MMM  CV: RRR no MRG, normal S1 and S2, no edema  Lungs:   Easyrespirations  Skin: no rashes or jaundice noted on exposed skin.  R breast cellulitis improving  Leroy Catheter: Not present  Lines: None          Medical Decision Making               Woo Canela MD, [facility]  Internal Medicine Hospitalist

## 2023-08-14 NOTE — PHARMACY-VANCOMYCIN DOSING SERVICE
"Pharmacy Vancomycin Note  Date of Service 2023  Patient's  1980   43 year old, female    Indication: Sepsis  Day of Therapy: 2  Current vancomycin regimen:  750 mg IV q12h  Current vancomycin monitoring method: AUC  Current vancomycin therapeutic monitoring goal: 400-600 mg*h/L    InsightRX Prediction of Current Vancomycin Regimen  Loading dose: N/A  Regimen: 750 mg IV every 12 hours.  Start time: 18:02 on 2023  Exposure target: AUC24 (range)400-600 mg/L.hr   AUC24,ss: 271 mg/L.hr  Probability of AUC24 > 400: 3 %  Ctrough,ss: 6.7 mg/L  Probability of Ctrough,ss > 20: 0 %  Probability of nephrotoxicity (Lodise JAIDA ): 4 %    Current estimated CrCl = Estimated Creatinine Clearance: 96.3 mL/min (based on SCr of 0.66 mg/dL).    Creatinine for last 3 days  2023:  7:03 PM Creatinine 0.93 mg/dL  2023:  6:39 AM Creatinine 0.66 mg/dL    Recent Vancomycin Levels (past 3 days)  2023: 12:01 PM Vancomycin 7.4 ug/mL    Vancomycin IV Administrations (past 72 hours)                     vancomycin (VANCOCIN) 750 mg in 0.9% NaCl 250 mL intermittent infusion (mg) 750 mg Given 23 0602     750 mg Given 23 1903     750 mg Given  0619    vancomycin (VANCOCIN) 1000 mg in dextrose 5% 200 mL PREMIX (mg) 1,000 mg New Bag 23 1949                    Nephrotoxins and other renal medications (From now, onward)      Start     Dose/Rate Route Frequency Ordered Stop    23 1800  vancomycin (VANCOCIN) 750 mg in 0.9% NaCl 250 mL intermittent infusion         750 mg  over 60 Minutes Intravenous EVERY 8 HOURS 23 1504      23 0100  piperacillin-tazobactam (ZOSYN) 3.375 g vial to attach to  mL bag        Note to Pharmacy: For [facility], [facility] and [facility]: For Zosyn-naive patients, use the \"Zosyn initial dose + extended infusion\" order panel.    3.375 g  over 240 Minutes Intravenous EVERY 8 HOURS 23 2131      082130  ibuprofen (ADVIL/MOTRIN) tablet 400 mg        Note to " Pharmacy: [facility]:Take 400 mg by mouth every 6 hours as needed for moderate pain      400 mg Oral EVERY 6 HOURS PRN 08/12/23 2130 08/12/23 1908  ibuprofen (ADVIL/MOTRIN) tablet 600 mg         600 mg Oral 3 TIMES DAILY PRN 08/12/23 1908                 Contrast Orders - past 72 hours (72h ago, onward)      Start     Dose/Rate Route Frequency Stop    08/12/23 2000  iopamidol (ISOVUE-370) solution 100 mL         100 mL Intravenous ONCE 08/12/23 1959            Interpretation of levels and current regimen:  Vancomycin level is reflective of subtherapeutic level and AUC less than 400    Renal Function: Improving    InsightRX Prediction of Planned New Vancomycin Regimen  Regimen: 1250 mg IV every 12 hours.  Start time: 18:02 on 08/14/2023  Exposure target: AUC24 (range)400-600 mg/L.hr   AUC24,ss: 451 mg/L.hr  Probability of AUC24 > 400: 72 %  Ctrough,ss: 11.2 mg/L  Probability of Ctrough,ss > 20: 5 %  Probability of nephrotoxicity (Lodise JAIDA 2009): 7 %    Plan:  Increase Dose to 1250mg Q12H  to increase probability of AUC>400  Vancomycin monitoring method: AUC  Vancomycin therapeutic monitoring goal: 400-600 mg*h/L  Pharmacy will check vancomycin levels as appropriate in  1-2 days .  Serum creatinine levels will be ordered daily for the first week of therapy and at least twice weekly for subsequent weeks.    Vanna Cervantes, [facility]

## 2023-08-15 LAB
CREAT SERPL-MCNC: 0.76 MG/DL (ref 0.51–0.95)
GFR SERPL CREATININE-BSD FRML MDRD: >90 ML/MIN/1.73M2

## 2023-08-15 PROCEDURE — 250N000013 HC RX MED GY IP 250 OP 250 PS 637: Performed by: INTERNAL MEDICINE

## 2023-08-15 PROCEDURE — 250N000013 HC RX MED GY IP 250 OP 250 PS 637: Performed by: HOSPITALIST

## 2023-08-15 PROCEDURE — 36415 COLL VENOUS BLD VENIPUNCTURE: CPT | Performed by: INTERNAL MEDICINE

## 2023-08-15 PROCEDURE — 99232 SBSQ HOSP IP/OBS MODERATE 35: CPT | Performed by: INTERNAL MEDICINE

## 2023-08-15 PROCEDURE — 250N000011 HC RX IP 250 OP 636: Mod: JZ | Performed by: INTERNAL MEDICINE

## 2023-08-15 PROCEDURE — 250N000011 HC RX IP 250 OP 636: Performed by: HOSPITALIST

## 2023-08-15 PROCEDURE — 82565 ASSAY OF CREATININE: CPT | Performed by: INTERNAL MEDICINE

## 2023-08-15 PROCEDURE — 258N000003 HC RX IP 258 OP 636: Performed by: HOSPITALIST

## 2023-08-15 PROCEDURE — 120N000001 HC R&B MED SURG/OB

## 2023-08-15 PROCEDURE — 250N000011 HC RX IP 250 OP 636: Mod: JZ | Performed by: EMERGENCY MEDICINE

## 2023-08-15 PROCEDURE — 99232 SBSQ HOSP IP/OBS MODERATE 35: CPT | Performed by: EMERGENCY MEDICINE

## 2023-08-15 RX ORDER — ENOXAPARIN SODIUM 100 MG/ML
40 INJECTION SUBCUTANEOUS EVERY 24 HOURS
Status: DISCONTINUED | OUTPATIENT
Start: 2023-08-15 | End: 2023-08-19 | Stop reason: HOSPADM

## 2023-08-15 RX ADMIN — Medication 1000 MG: at 08:13

## 2023-08-15 RX ADMIN — PIPERACILLIN AND TAZOBACTAM 3.38 G: 3; .375 INJECTION, POWDER, FOR SOLUTION INTRAVENOUS at 20:15

## 2023-08-15 RX ADMIN — Medication 1 MG: at 21:31

## 2023-08-15 RX ADMIN — Medication 25 MCG: at 08:13

## 2023-08-15 RX ADMIN — ACETAMINOPHEN 650 MG: 325 TABLET ORAL at 21:31

## 2023-08-15 RX ADMIN — TAMOXIFEN CITRATE 20 MG: 20 TABLET ORAL at 15:52

## 2023-08-15 RX ADMIN — CALCIUM 500 MG: 500 TABLET ORAL at 08:17

## 2023-08-15 RX ADMIN — ZINC SULFATE 220 MG (50 MG) CAPSULE 220 MG: CAPSULE at 08:13

## 2023-08-15 RX ADMIN — HYDROXYZINE HYDROCHLORIDE 10 MG: 10 TABLET ORAL at 21:31

## 2023-08-15 RX ADMIN — PIPERACILLIN AND TAZOBACTAM 3.38 G: 3; .375 INJECTION, POWDER, FOR SOLUTION INTRAVENOUS at 12:22

## 2023-08-15 RX ADMIN — VANCOMYCIN HYDROCHLORIDE 1250 MG: 5 INJECTION, POWDER, LYOPHILIZED, FOR SOLUTION INTRAVENOUS at 06:59

## 2023-08-15 RX ADMIN — SERTRALINE HYDROCHLORIDE 100 MG: 50 TABLET ORAL at 08:13

## 2023-08-15 RX ADMIN — PIPERACILLIN AND TAZOBACTAM 3.38 G: 3; .375 INJECTION, POWDER, FOR SOLUTION INTRAVENOUS at 05:43

## 2023-08-15 RX ADMIN — ENOXAPARIN SODIUM 40 MG: 40 INJECTION SUBCUTANEOUS at 12:24

## 2023-08-15 ASSESSMENT — ACTIVITIES OF DAILY LIVING (ADL)
ADLS_ACUITY_SCORE: 18

## 2023-08-15 NOTE — PROGRESS NOTES
No events overnight, patient is feeling better.  Denies fevers or chills.    At the physical exam, patient still with some right breast mastalgia.  The erythema has improved although has not completely disappeared.  Right breast is larger than the left breast.    Plan: Patient has improved with her current IV antibiotics.  However, she still persists with some right breast mastalgia.    I believe that this pain could be related to the pressure of the fluid collection around the right breast tissue expander.  Obviously there is going to be some pain secondary to the inflammation and cellulitis that the patient was presenting with.    Her tissue expander is still in jeopardy.  However, I still would like to attempt salvage it since she is scheduled for permanent breast reconstruction with a BENIGNO flap in 4 weeks.  She is almost there.    Therefore, I am going to consult interventional radiology for drainage of the fluid collection of the right breast mastectomy cavity.  I will instruct them to send sample of fluid for culture and Gram stain.  Furthermore, I will instruct them not to apply any drain since this will increase chances of infection.  I would like this fluid to be removed and then I will apply compression garment to prevent fluid buildup.    If the patient, still complains of pain after drainage of this fluid collection, with persistent erythema and positive cultures on the fluid, then she will require explantation.    Patient will need to remain in-house so that interventional radiology can perform the above described procedure and I need to wait clinical response to see whether or not I will have to remove the implant during this admission.    Basil Aguilar MD , FACS   Diplomate American Board of Plastic Surgery  Diplomate American Board of Surgery  Adj. Assistant Professor of Surgery  Division of Plastic & Reconstructive Surgery   [facility]  Office: [phone]   8/15/2023 at  6:50 PM

## 2023-08-15 NOTE — PROGRESS NOTES
"INFECTIOUS DISEASE FOLLOW UP NOTE      ASSESSMENT:  43-year-old woman with history of left breast cancer, status post bilateral mastectomy.  Presenting with right mastitis.     Left-sided breast cancer.  Status postmastectomy with expander placement bilaterally on 5/31.  Course complicated by some dehiscence over the right breast surgical site.  Required additional stitching.  Received short course of Bactrim a month ago.  All stitches removed about 3 weeks ago.  Right-sided mastitis.  Patient developing fevers, chills, vomiting.  Increased swelling and redness on the right side.  White count 24,000 on admission.  Blood cultures negative to date.  No draining lesions.  CT scan with bilateral fluid collections, without obvious signs of abscess formation. Now clinically improved and WBC normalized. Without positive culture, cannot be certain, but clinically presented with beta-hemolytic strep infection (flu-like symptoms preceding cellulitis, apparent non-purulent infection). MRSA screen of nares negative, so MRSA infection is not likely.    Discussed that there is chance that tissue expander is infected. As of now it is difficult to rule in or out. If continued improvement, can treat with antibiotics and tissue expander retention, monitor closely for return of symptoms off antibiotics.     PLAN:  Continue Pip/tazo, can stop vancomycin  If continued improvement, likely plan cefdinir PO at discharge.     Aníbal Powers MD  [facility]  [phone] [facility] p[facility]    ______________________________________________________________________    SUBJECTIVE / INTERVAL HISTORY: feels better overall. Pain is improved, down to 3/10. Swelling persists.     ROS: All other systems negative except as listed above.        OBJECTIVE:  /77 (BP Location: Right arm)   Pulse 59   Temp 98.6  F (37  C) (Oral)   Resp 14   Ht 1.6 m (5' 3\")   Wt 63 kg (139 lb)   LMP 06/22/2023   SpO2 96%   BMI " 24.62 kg/m         Vital Signs  Temp: 98.5  F (36.9  C)  Temp src: Oral  Resp: 18  Pulse: 68  Pulse Rate Source: Monitor  BP: 123/65  BP Location: Left arm    Temp (24hrs), Av.4  F (36.9  C), Min:98.1  F (36.7  C), Max:98.6  F (37  C)      GEN: No acute distress.    RESPIRATORY:  Normal breathing pattern.   CARDIOVASCULAR:  Regular rate and rhythm.   ABDOMEN:  deferred  EXTREMITIES: No edema.  SKIN/HAIR/NAILS:  No rashes. Photos reviewed   IV: peripheral        Antibiotics:  Pip/tazo -  Vancomycin -    Pertinent labs:    Recent Labs   Lab 23  0720 23  0639 23  1903   WBC 8.4 20.8* 24.4*   HGB 10.8* 12.1 12.8   HCT 32.8* 37.3 38.7    266 262        Recent Labs   Lab 23  0639 23    141   CO2 27 27   BUN 9.7 15.6         Lab Results   Component Value Date    ALT 11 2023    AST 18 2023    ALKPHOS 58 2023         MICROBIOLOGY DATA:   blood cultures negative   MRSA/SA screen nares negative    RADIOLOGY:  CT Chest w Contrast    Result Date: 2023  EXAM: CT CHEST W CONTRAST LOCATION: [facility] DATE: 2023 INDICATION: Breast infection, status post mastectomy with tissue expander placement. COMPARISON: CT abdomen/pelvis 2023. TECHNIQUE: CT chest with IV contrast. Multiplanar reformats were obtained. Dose reduction techniques were used. CONTRAST: 100 mL Isovue-370. FINDINGS: LUNGS AND PLEURA: Trachea and large airways are patent. No focal airspace consolidation. Indeterminate 3 mm nodule along the minor fissure, axial image 170, favoring a benign fissural lymph node, though warranting continued follow-up. Mild dependent atelectasis. No pneumothorax. No pleural effusion.  MEDIASTINUM/AXILLAE: Residual thymic tissue within the anterior mediastinum. No mediastinal/hilar lymphadenopathy. Thoracic esophagus is unremarkable.  No axillary lymphadenopathy. Postsurgical changes of bilateral mastectomies with  placement of tissue expansion devices. Small fluid collection surrounding both of the tissue expanders, with overlying cutaneous thickening and adjacent fat stranding. These could reflect sterile fluid collections, though superinfection with overlying cellulitis is not excluded. No thoracic aortic aneurysm. Heart is normal in size. No pericardial effusion. CORONARY ARTERY CALCIFICATION: None. UPPER ABDOMEN: No suspicious abnormality. Small cleft within the posterior spleen, axial image 149, similar to prior. MUSCULOSKELETAL: No suspicious abnormality. OTHER: No additionally suspicious abnormality.     IMPRESSION: 1.  Small fluid collections surrounding bilateral breast tissue expanders, which could be sterile or superinfected. Surrounding inflammatory fat stranding overlying cutaneous thickening may reflect an associated cellulitis versus resolving postsurgical change. 2.  Indeterminate 3.5 mm nodule along the right minor fissure, favoring a benign fissural lymph node. Attention on follow-up.      Medical Decision Making

## 2023-08-15 NOTE — PROGRESS NOTES
Daily Progress Note    Assessment/Plan:  43-year-old female with breast cancer 8 weeks status post bilateral mastectomy with tissue expander admitted with right breast cellulitis.    Right breast cellulitis status postmastectomy with underlying tissue expander.  Sepsis on admission.  White count has normalized and clinically she is much improved.  Continues on IV Zosyn and vancomycin.  There are fluid collections surrounding both expanders, possibly infected on the right.  ID and plastics following.  Per the latter, continue 48 hours of antibiotics from yesterday before considering home with oral antibiotics.  Breast cancer: Continue home tamoxifen  Pulmonary nodule noted on chest imaging: Indeterminant 3.5 mm nodule along the right minor fissure favoring a benign fissural lymph node.  Will need outpatient follow-up with her oncologist.  Depression/anxiety: Continue home Zoloft  DVT prophylaxis: We will escalate to pharmacologic prophylaxis given her cancer history and relative immobility.    Code status:Full Code        Barriers to Discharge: IV antibiotics    Disposition: Anticipate discharge in 1 to 2 days    Subjective:  Avis is new to me today, chart reviewed and discussed with staff.  Overall she feels much improved.  No fevers or chills, no significant pain.        Current Medications Reviewed via EHR List    Objective:  Vital signs in last 24 hours:  [unfilled]  .prog  Weight:   @THISENCWEIGHTS(1)@  Weight change:   Body mass index is 24.62 kg/m .    Intake/Output last 3 shifts:  I/O last 3 completed shifts:  In: 840 [P.O.:840]  Out: -   Intake/Output this shift:  I/O this shift:  In: 220 [P.O.:220]  Out: -     Physical Exam:  General: No apparent distress  CV: Rate rate rhythm  Lungs: Clear to auscultation          Imaging:  Personally Reviewed.  CT Chest w Contrast    Result Date: 8/12/2023  EXAM: CT CHEST W CONTRAST LOCATION: [facility] DATE: 8/12/2023 INDICATION: Breast  infection, status post mastectomy with tissue expander placement. COMPARISON: CT abdomen/pelvis 05/17/2023. TECHNIQUE: CT chest with IV contrast. Multiplanar reformats were obtained. Dose reduction techniques were used. CONTRAST: 100 mL Isovue-370. FINDINGS: LUNGS AND PLEURA: Trachea and large airways are patent. No focal airspace consolidation. Indeterminate 3 mm nodule along the minor fissure, axial image 170, favoring a benign fissural lymph node, though warranting continued follow-up. Mild dependent atelectasis. No pneumothorax. No pleural effusion.  MEDIASTINUM/AXILLAE: Residual thymic tissue within the anterior mediastinum. No mediastinal/hilar lymphadenopathy. Thoracic esophagus is unremarkable.  No axillary lymphadenopathy. Postsurgical changes of bilateral mastectomies with placement of tissue expansion devices. Small fluid collection surrounding both of the tissue expanders, with overlying cutaneous thickening and adjacent fat stranding. These could reflect sterile fluid collections, though superinfection with overlying cellulitis is not excluded. No thoracic aortic aneurysm. Heart is normal in size. No pericardial effusion. CORONARY ARTERY CALCIFICATION: None. UPPER ABDOMEN: No suspicious abnormality. Small cleft within the posterior spleen, axial image 149, similar to prior. MUSCULOSKELETAL: No suspicious abnormality. OTHER: No additionally suspicious abnormality.     IMPRESSION: 1.  Small fluid collections surrounding bilateral breast tissue expanders, which could be sterile or superinfected. Surrounding inflammatory fat stranding overlying cutaneous thickening may reflect an associated cellulitis versus resolving postsurgical change. 2.  Indeterminate 3.5 mm nodule along the right minor fissure, favoring a benign fissural lymph node. Attention on follow-up.       Lab Results:  Personally Reviewed.   Fingerstick Blood Glucose: @[id number](POCGLUFGR:10)@    Last Hbg A1C: No results found for: HGBA1C    No results found for: INR, PROTIME  Recent Results (from the past 24 hour(s))   Vancomycin level    Collection Time: 08/14/23 12:01 PM   Result Value Ref Range    Vancomycin 7.4   ug/mL   MRSA MSSA PCR, Nasal Swab    Collection Time: 08/14/23 12:21 PM    Specimen: Nose; Swab   Result Value Ref Range    MRSA Target DNA Negative Negative    SA Target DNA Negative    Creatinine    Collection Time: 08/15/23  7:41 AM   Result Value Ref Range    Creatinine 0.76 0.51 - 0.95 mg/dL    GFR Estimate >90 >60 mL/min/1.73m2           Advanced Care Planning    Medical Decision Making       35 MINUTES SPENT BY ME on the date of service doing chart review, history, exam, documentation & further activities per the note.      Javier Westbrook MD  Date: 8/15/2023  Time: 10:28 AM  [facility]  Family [facility]

## 2023-08-15 NOTE — PLAN OF CARE
Problem: Plan of Care - These are the overarching goals to be used throughout the patient stay.    Goal: Plan of Care Review  Description: The Plan of Care Review/Shift note should be completed every shift.  The Outcome Evaluation is a brief statement about your assessment that the patient is improving, declining, or no change.  This information will be displayed automatically on your shift note.  Outcome: Progressing  Flowsheets (Taken 8/15/2023 0988)  Plan of Care Reviewed With: patient   Goal Outcome Evaluation:      Plan of Care Reviewed With: patient  Pt alert and oriented x 4, ambulating on the unit independently. Dull discomfort on right breast, not requiring pain medication. VSS on room air. Will continue on IV Antibiotic for right breast infection post mastotomy.

## 2023-08-15 NOTE — PLAN OF CARE
Problem: Pain Acute  Goal: Optimal Pain Control and Function  Outcome: Progressing   Goal Outcome Evaluation:       Pt is A/O X4, complained of mild pain in her right breast and mid sternum pain, resolved with dilaudid, pt stated pain got better , vitals are stable, continuing to monitor lower chest pain which pt says feels better at this time.

## 2023-08-15 NOTE — PROGRESS NOTES
"Pt reports very mild pain midline towards bottom of sternum No pain scale reported  No request for pain medications.  Pt reported no change to quality of pain with palpation and reports that she believes it is due to deep breathing when she was anxious.  No discoloration.  Pt did not request it to be reported and states she will continue to monitor  Pt encouraged to discuss with physician during rounding in the AM. Dr. Canela notified via [employer] messaging as \"FYI\"  Anastasiya Molina RN   "

## 2023-08-15 NOTE — PLAN OF CARE
"  Problem: Plan of Care - These are the overarching goals to be used throughout the patient stay.    Goal: Optimal Comfort and Wellbeing  Outcome: Progressing     Problem: Pain Acute  Goal: Optimal Pain Control and Function  Outcome: Progressing  Intervention: Prevent or Manage Pain  Recent Flowsheet Documentation  Taken 8/14/2023 2245 by Anastasiya Molina RN  Medication Review/Management: medications reviewed     Problem: Infection  Goal: Absence of Infection Signs and Symptoms  Outcome: Progressing     Problem: Plan of Care - These are the overarching goals to be used throughout the patient stay.    Goal: Absence of Hospital-Acquired Illness or Injury  Intervention: Identify and Manage Fall Risk  Recent Flowsheet Documentation  Taken 8/14/2023 2245 by Anastasiya Molina RN  Safety Promotion/Fall Prevention: safety round/check completed  Intervention: Prevent Skin Injury  Recent Flowsheet Documentation  Taken 8/14/2023 2245 by Anastasiya Molina RN  Body Position: position changed independently  Taken 8/14/2023 1900 by Anastasiya Molina RN  Body Position: position changed independently  Intervention: Prevent and Manage VTE (Venous Thromboembolism) Risk  Recent Flowsheet Documentation  Taken 8/14/2023 2245 by Anastasiya Molina RN  VTE Prevention/Management: (pt ambulates frequently)   SCDs (sequential compression devices) off   patient refused intervention  Taken 8/14/2023 1514 by Anastasiya Molina RN  VTE Prevention/Management: (pt ambulates frequently)   SCDs (sequential compression devices) off   patient refused intervention   Goal Outcome Evaluation:         /78 (BP Location: Right arm)   Pulse 68   Temp 98.7  F (37.1  C) (Oral)   Resp 18   Ht 1.6 m (5' 3\")   Wt 63 kg (139 lb)   LMP 06/22/2023   SpO2 98%   BMI 24.62 kg/m    A&O x4 Independent in her room and ambulates well around the unit / walked frequently during PM shift.   Tolerating IV ABX well  Denies pain  Given PRN hydroxyzine for mild " anxiety with pt reporting it effective.  Sig other visited during this shift.  Pt reports to feel very comfortable and positive about her treatment plan and current results as R breast swelling / redness has greatly decreased over the last 24 hours.  Pt is in bed appearing comfortable upon shift change. Anastasiya Molina RN

## 2023-08-16 ENCOUNTER — APPOINTMENT (OUTPATIENT)
Dept: ULTRASOUND IMAGING | Facility: HOSPITAL | Age: 43
DRG: 872 | End: 2023-08-16
Attending: RADIOLOGY
Payer: COMMERCIAL

## 2023-08-16 LAB
CRP SERPL-MCNC: 18 MG/L
WBC # BLD AUTO: 6.2 10E3/UL (ref 4–11)

## 2023-08-16 PROCEDURE — 87205 SMEAR GRAM STAIN: CPT | Performed by: RADIOLOGY

## 2023-08-16 PROCEDURE — 85048 AUTOMATED LEUKOCYTE COUNT: CPT | Performed by: INTERNAL MEDICINE

## 2023-08-16 PROCEDURE — 250N000013 HC RX MED GY IP 250 OP 250 PS 637: Performed by: INTERNAL MEDICINE

## 2023-08-16 PROCEDURE — 250N000011 HC RX IP 250 OP 636: Performed by: EMERGENCY MEDICINE

## 2023-08-16 PROCEDURE — 99232 SBSQ HOSP IP/OBS MODERATE 35: CPT | Performed by: EMERGENCY MEDICINE

## 2023-08-16 PROCEDURE — 87077 CULTURE AEROBIC IDENTIFY: CPT | Performed by: RADIOLOGY

## 2023-08-16 PROCEDURE — 120N000001 HC R&B MED SURG/OB

## 2023-08-16 PROCEDURE — 36415 COLL VENOUS BLD VENIPUNCTURE: CPT | Performed by: INTERNAL MEDICINE

## 2023-08-16 PROCEDURE — 86140 C-REACTIVE PROTEIN: CPT | Performed by: INTERNAL MEDICINE

## 2023-08-16 PROCEDURE — 250N000011 HC RX IP 250 OP 636: Mod: JZ | Performed by: INTERNAL MEDICINE

## 2023-08-16 PROCEDURE — 272N000710 US ASPIRATION OF SEROMA/HEMATOMA/ABSCESS/CYST

## 2023-08-16 PROCEDURE — 99232 SBSQ HOSP IP/OBS MODERATE 35: CPT | Performed by: INTERNAL MEDICINE

## 2023-08-16 PROCEDURE — 250N000013 HC RX MED GY IP 250 OP 250 PS 637: Performed by: HOSPITALIST

## 2023-08-16 PROCEDURE — 87075 CULTR BACTERIA EXCEPT BLOOD: CPT | Performed by: RADIOLOGY

## 2023-08-16 RX ADMIN — PIPERACILLIN AND TAZOBACTAM 3.38 G: 3; .375 INJECTION, POWDER, FOR SOLUTION INTRAVENOUS at 04:07

## 2023-08-16 RX ADMIN — ENOXAPARIN SODIUM 40 MG: 40 INJECTION SUBCUTANEOUS at 18:04

## 2023-08-16 RX ADMIN — PIPERACILLIN AND TAZOBACTAM 3.38 G: 3; .375 INJECTION, POWDER, FOR SOLUTION INTRAVENOUS at 13:35

## 2023-08-16 RX ADMIN — HYDROXYZINE HYDROCHLORIDE 10 MG: 10 TABLET ORAL at 09:04

## 2023-08-16 RX ADMIN — TAMOXIFEN CITRATE 20 MG: 20 TABLET ORAL at 16:38

## 2023-08-16 RX ADMIN — ZINC SULFATE 220 MG (50 MG) CAPSULE 220 MG: CAPSULE at 09:04

## 2023-08-16 RX ADMIN — PIPERACILLIN AND TAZOBACTAM 3.38 G: 3; .375 INJECTION, POWDER, FOR SOLUTION INTRAVENOUS at 20:09

## 2023-08-16 RX ADMIN — Medication 25 MCG: at 09:04

## 2023-08-16 RX ADMIN — CALCIUM 500 MG: 500 TABLET ORAL at 09:43

## 2023-08-16 RX ADMIN — SERTRALINE HYDROCHLORIDE 100 MG: 50 TABLET ORAL at 09:04

## 2023-08-16 RX ADMIN — ACETAMINOPHEN 650 MG: 325 TABLET ORAL at 09:04

## 2023-08-16 RX ADMIN — IBUPROFEN 400 MG: 400 TABLET, FILM COATED ORAL at 16:43

## 2023-08-16 RX ADMIN — DEXTROSE AND SODIUM CHLORIDE: 5; 450 INJECTION, SOLUTION INTRAVENOUS at 09:42

## 2023-08-16 RX ADMIN — HYDROXYZINE HYDROCHLORIDE 10 MG: 10 TABLET ORAL at 16:43

## 2023-08-16 RX ADMIN — Medication 1000 MG: at 09:04

## 2023-08-16 ASSESSMENT — ACTIVITIES OF DAILY LIVING (ADL)
ADLS_ACUITY_SCORE: 18

## 2023-08-16 NOTE — PROGRESS NOTES
Daily Progress Note    Assessment/Plan:  43-year-old female with breast cancer 8 weeks status post bilateral mastectomy with tissue expander admitted with right breast cellulitis.     Right breast cellulitis status postmastectomy with underlying tissue expander.  Sepsis on admission.  White count has normalized and clinically she is much improved.  Started on IV Zosyn and vancomycin.  Vancomycin discontinued yesterday.  There are fluid collections surrounding both expanders, possibly infected on the right.  ID and plastics following.  IR now consulted for drainage of the fluid collection of the right breast mastectomy cavity with cultures and Gram stain.  If she still has pain after drainage with persistent erythema and positive cultures on the fluid then she will require explantation.  Breast cancer: Continue home tamoxifen  Pulmonary nodule noted on chest imaging: Indeterminant 3.5 mm nodule along the right minor fissure favoring a benign fissural lymph node.  Will need outpatient follow-up with her oncologist.  Depression/anxiety: Continue home Zoloft  DVT prophylaxis: We will escalate to pharmacologic prophylaxis given her cancer history and relative immobility.     Code status:Full Code        Barriers to Discharge: IV antibiotics, culture results,    Disposition: Anticipate multiday stay    Subjective:  Avis overall feels much improved with less redness and pain but still has discomfort on the right.  This has necessitated the need for aspiration today.  She has had no further fevers or chills, no nausea or vomiting.  No chest pain or shortness of breath.        Current Medications Reviewed via EHR List    Objective:  Vital signs in last 24 hours:  [unfilled]  .prog  Weight:   @THISENCWEIGHTS(1)@  Weight change:   Body mass index is 24.62 kg/m .    Intake/Output last 3 shifts:  I/O last 3 completed shifts:  In: 900 [P.O.:900]  Out: -   Intake/Output this shift:  No intake/output data recorded.    Physical  Exam:  General: No apparent distress  CV: Regular rate and rhythm  Lungs: Clear to auscultation  Abdomen:        Imaging:  Personally Reviewed.  CT Chest w Contrast    Result Date: 8/12/2023  EXAM: CT CHEST W CONTRAST LOCATION: [facility] DATE: 8/12/2023 INDICATION: Breast infection, status post mastectomy with tissue expander placement. COMPARISON: CT abdomen/pelvis 05/17/2023. TECHNIQUE: CT chest with IV contrast. Multiplanar reformats were obtained. Dose reduction techniques were used. CONTRAST: 100 mL Isovue-370. FINDINGS: LUNGS AND PLEURA: Trachea and large airways are patent. No focal airspace consolidation. Indeterminate 3 mm nodule along the minor fissure, axial image 170, favoring a benign fissural lymph node, though warranting continued follow-up. Mild dependent atelectasis. No pneumothorax. No pleural effusion.  MEDIASTINUM/AXILLAE: Residual thymic tissue within the anterior mediastinum. No mediastinal/hilar lymphadenopathy. Thoracic esophagus is unremarkable.  No axillary lymphadenopathy. Postsurgical changes of bilateral mastectomies with placement of tissue expansion devices. Small fluid collection surrounding both of the tissue expanders, with overlying cutaneous thickening and adjacent fat stranding. These could reflect sterile fluid collections, though superinfection with overlying cellulitis is not excluded. No thoracic aortic aneurysm. Heart is normal in size. No pericardial effusion. CORONARY ARTERY CALCIFICATION: None. UPPER ABDOMEN: No suspicious abnormality. Small cleft within the posterior spleen, axial image 149, similar to prior. MUSCULOSKELETAL: No suspicious abnormality. OTHER: No additionally suspicious abnormality.     IMPRESSION: 1.  Small fluid collections surrounding bilateral breast tissue expanders, which could be sterile or superinfected. Surrounding inflammatory fat stranding overlying cutaneous thickening may reflect an associated cellulitis versus  resolving postsurgical change. 2.  Indeterminate 3.5 mm nodule along the right minor fissure, favoring a benign fissural lymph node. Attention on follow-up.       Lab Results:  Personally Reviewed.   Fingerstick Blood Glucose: @[id number](POCGLUFGR:10)@    Last Hbg A1C: No results found for: HGBA1C   No results found for: INR, PROTIME  Recent Results (from the past 24 hour(s))   WBC count    Collection Time: 08/16/23  7:15 AM   Result Value Ref Range    WBC Count 6.2 4.0 - 11.0 10e3/uL   CRP inflammation    Collection Time: 08/16/23  7:15 AM   Result Value Ref Range    CRP Inflammation 18.00 (H) <5.00 mg/L           Advanced Care Planning    Medical Decision Making       35 MINUTES SPENT BY ME on the date of service doing chart review, history, exam, documentation & further activities per the note.      Javier Westbrook MD  Date: 8/16/2023  Time: 8:17 AM  [facility]  Family Medicine

## 2023-08-16 NOTE — PROCEDURES
POST PROCEDURE NOTE    Post procedure Diagnosis: Right mastitis and shantel-implant effusion.    Technical Procedure: Ultrasound guided aspiration right breast shantel-implant fluid.    Findings: 70 ml yellow minimally turbid fluid     Physician: Devan Martinez MD    EBL:  Minimal    Specimens Removed:  70 ml yellow minimally turbid fluid  sent for culture and Gram stain.    Complications:  None.

## 2023-08-16 NOTE — CONSULTS
BRIEF IR CONSULT NOTE    43-year-old woman admitted with right sided mastitis who is status post mastectomies with first stage tissue expander placement several months ago. Ultrasound guided percutaneous aspiration of the right periimplant fluid present at 08/12/2023 CT is requested and will be performed today.

## 2023-08-16 NOTE — PROGRESS NOTES
"INFECTIOUS DISEASE FOLLOW UP NOTE      ASSESSMENT:  43-year-old woman with history of left breast cancer, status post bilateral mastectomy.  Presenting with right mastitis.     Left-sided breast cancer.  Status postmastectomy with expander placement bilaterally on 5/31.  Course complicated by some dehiscence over the right breast surgical site.  Required additional stitching.  Received short course of Bactrim a month ago.  All stitches removed about 3 weeks ago.  Right-sided mastitis.  Patient developing fevers, chills, vomiting.  Increased swelling and redness on the right side.  White count 24,000 on admission, normalized with antibitoics.  Blood cultures negative to date.  No draining lesions.  CT scan with bilateral fluid collections, without obvious signs of abscess formation. Now clinically improved and WBC normalized. Without positive culture, cannot be certain, but clinically presented with beta-hemolytic strep infection (flu-like symptoms preceding cellulitis, apparent non-purulent infection). MRSA screen of nares negative, so MRSA infection is not likely.    70cc yellow minimally turbid fluid aspirated from right breast shantel-implant fluid 8/16. Await testing to determine next steps.     PLAN:  Continue Pip/tazo, off vancomycin  Follow cultures from fluid 8/16.     Aníbal Powers MD  [facility]  [phone] [facility]    ______________________________________________________________________    SUBJECTIVE / INTERVAL HISTORY: tired today. Anxious about procedure. I saw her shortly prior to this. Tolerating antibiotics.  Pain is mild at this point, much improved.     70cc yellow minimally turbid fluid removed.     ROS: All other systems negative except as listed above.        OBJECTIVE:  /89 (BP Location: Right arm)   Pulse 53   Temp 97.6  F (36.4  C) (Oral)   Resp 18   Ht 1.6 m (5' 3\")   Wt 64.1 kg (141 lb 4.8 oz)   LMP 06/22/2023   SpO2 99%   BMI 25.03 " kg/m         Vital Signs  Temp: 98.5  F (36.9  C)  Temp src: Oral  Resp: 18  Pulse: 68  Pulse Rate Source: Monitor  BP: 123/65  BP Location: Left arm    Temp (24hrs), Av.4  F (36.9  C), Min:98.1  F (36.7  C), Max:98.6  F (37  C)      GEN: No acute distress.    RESPIRATORY:  Normal breathing pattern.   CARDIOVASCULAR:  Regular rate and rhythm.   ABDOMEN:  deferred  EXTREMITIES: No edema.  SKIN/HAIR/NAILS:  No rashes. Photos reviewed   IV: peripheral        Antibiotics:  Pip/tazo -  Vancomycin 8/12-15    Pertinent labs:    Recent Labs   Lab 23  0715 23  0720 23  0639 23  1903   WBC 6.2 8.4 20.8* 24.4*   HGB  --  10.8* 12.1 12.8   HCT  --  32.8* 37.3 38.7   PLT  --  220 266 262        Recent Labs   Lab 23  0639 23  1903    141   CO2 27 27   BUN 9.7 15.6         Lab Results   Component Value Date    ALT 11 2023    AST 18 2023    ALKPHOS 58 2023     CRP 72-->18    MICROBIOLOGY DATA:   blood cultures negative   MRSA/SA screen nares negative    RADIOLOGY:  US Drainage Seroma/Hematoma Abscess/Cyst    Result Date: 2023  EXAM: 1. PUNCTURE AND ASPIRATION RIGHT BREAST KENNY-IMPLANT FLUID COLLECTION 2. ULTRASOUND GUIDANCE LOCATION: [facility] DATE: 2023 INDICATION: Right breast periimplant fluid collection in pt s p mastectomy and tissue expander placement PROCEDURE: Informed consent obtained. Time out performed. The site was prepped and draped in sterile fashion. 10 mL of 1% lidocaine was infused into the local soft tissues. Under direct ultrasound guidance, a 5 [employer] Tidal Powereh catheter was placed into the fluid surrounding the right breast implant/tissue expander and 70 ml of yellow minimally turbid fluid was aspirated. This was sent for cultures.     IMPRESSION: 1.  Status post ultrasound-guided puncture and aspiration of fluid surrounding the right breast implant/tissue expander. Reference CPT Code: 00896, 89351    CT  Chest w Contrast    Result Date: 8/12/2023  EXAM: CT CHEST W CONTRAST LOCATION: [facility] DATE: 8/12/2023 INDICATION: Breast infection, status post mastectomy with tissue expander placement. COMPARISON: CT abdomen/pelvis 05/17/2023. TECHNIQUE: CT chest with IV contrast. Multiplanar reformats were obtained. Dose reduction techniques were used. CONTRAST: 100 mL Isovue-370. FINDINGS: LUNGS AND PLEURA: Trachea and large airways are patent. No focal airspace consolidation. Indeterminate 3 mm nodule along the minor fissure, axial image 170, favoring a benign fissural lymph node, though warranting continued follow-up. Mild dependent atelectasis. No pneumothorax. No pleural effusion.  MEDIASTINUM/AXILLAE: Residual thymic tissue within the anterior mediastinum. No mediastinal/hilar lymphadenopathy. Thoracic esophagus is unremarkable.  No axillary lymphadenopathy. Postsurgical changes of bilateral mastectomies with placement of tissue expansion devices. Small fluid collection surrounding both of the tissue expanders, with overlying cutaneous thickening and adjacent fat stranding. These could reflect sterile fluid collections, though superinfection with overlying cellulitis is not excluded. No thoracic aortic aneurysm. Heart is normal in size. No pericardial effusion. CORONARY ARTERY CALCIFICATION: None. UPPER ABDOMEN: No suspicious abnormality. Small cleft within the posterior spleen, axial image 149, similar to prior. MUSCULOSKELETAL: No suspicious abnormality. OTHER: No additionally suspicious abnormality.     IMPRESSION: 1.  Small fluid collections surrounding bilateral breast tissue expanders, which could be sterile or superinfected. Surrounding inflammatory fat stranding overlying cutaneous thickening may reflect an associated cellulitis versus resolving postsurgical change. 2.  Indeterminate 3.5 mm nodule along the right minor fissure, favoring a benign fissural lymph node. Attention on  follow-up.

## 2023-08-16 NOTE — PLAN OF CARE
"  Problem: Plan of Care - These are the overarching goals to be used throughout the patient stay.    Goal: Plan of Care Review  Description: The Plan of Care Review/Shift note should be completed every shift.  The Outcome Evaluation is a brief statement about your assessment that the patient is improving, declining, or no change.  This information will be displayed automatically on your shift note.  Outcome: Progressing  Goal: Patient-Specific Goal (Individualized)  Description: You can add care plan individualizations to a care plan. Examples of Individualization might be:  \"Parent requests to be called daily at 9am for status\", \"I have a hard time hearing out of my right ear\", or \"Do not touch me to wake me up as it startles me\".  Outcome: Progressing  Goal: Absence of Hospital-Acquired Illness or Injury  Outcome: Progressing  Intervention: Identify and Manage Fall Risk  Recent Flowsheet Documentation  Taken 8/15/2023 1930 by Destiny Will RN  Safety Promotion/Fall Prevention: mobility aid in reach  Intervention: Prevent Skin Injury  Recent Flowsheet Documentation  Taken 8/15/2023 1930 by Destiny Will RN  Body Position: position changed independently  Intervention: Prevent and Manage VTE (Venous Thromboembolism) Risk  Recent Flowsheet Documentation  Taken 8/15/2023 1930 by Destiny Will RN  VTE Prevention/Management: SCDs (sequential compression devices) on  Goal: Optimal Comfort and Wellbeing  Outcome: Progressing  Goal: Readiness for Transition of Care  Outcome: Progressing   Goal Outcome Evaluation:                        "

## 2023-08-16 NOTE — PROGRESS NOTES
No events overnight.  Patient is resting comfortably in bed.  She underwent drainage of periprostatic fluid collection on her right breast.  According to the report from the radiology department, approximately 70 cc of serous fluid was drained and cultures and Gram stain has been sent to the lab.    Patient is feeling better, less right breast mastalgia.    At the physical exam, the erythema is improving as well as the mastalgia.    Repeat white blood cell count continues to be normal.    Plan: I will wait for another 24 to 48 hours for the results of the cultures and Gram stain obtained today by interventional radiology.  And then I will decide if the implant can be salvage or it will require explantation.  Continue with IV antibiotics for now.    Basil Aguilar MD , FACS   Diplomate American Board of Plastic Surgery  Diplomate American Board of Surgery  Adj. Assistant Professor of Surgery  Division of Plastic & Reconstructive Surgery   [facility]  Office: [phone]   8/16/2023 at 6:05 PM

## 2023-08-17 LAB
BACTERIA BLD CULT: NO GROWTH
BACTERIA BLD CULT: NO GROWTH

## 2023-08-17 PROCEDURE — 250N000013 HC RX MED GY IP 250 OP 250 PS 637: Performed by: HOSPITALIST

## 2023-08-17 PROCEDURE — 250N000013 HC RX MED GY IP 250 OP 250 PS 637: Performed by: INTERNAL MEDICINE

## 2023-08-17 PROCEDURE — 99232 SBSQ HOSP IP/OBS MODERATE 35: CPT | Performed by: INTERNAL MEDICINE

## 2023-08-17 PROCEDURE — 250N000011 HC RX IP 250 OP 636: Mod: JZ | Performed by: INTERNAL MEDICINE

## 2023-08-17 PROCEDURE — 250N000011 HC RX IP 250 OP 636: Mod: JZ | Performed by: EMERGENCY MEDICINE

## 2023-08-17 PROCEDURE — 120N000001 HC R&B MED SURG/OB

## 2023-08-17 RX ADMIN — CALCIUM 500 MG: 500 TABLET ORAL at 09:05

## 2023-08-17 RX ADMIN — Medication 25 MCG: at 09:05

## 2023-08-17 RX ADMIN — TAMOXIFEN CITRATE 20 MG: 20 TABLET ORAL at 18:15

## 2023-08-17 RX ADMIN — SERTRALINE HYDROCHLORIDE 100 MG: 50 TABLET ORAL at 09:05

## 2023-08-17 RX ADMIN — PIPERACILLIN AND TAZOBACTAM 3.38 G: 3; .375 INJECTION, POWDER, FOR SOLUTION INTRAVENOUS at 05:16

## 2023-08-17 RX ADMIN — ZINC SULFATE 220 MG (50 MG) CAPSULE 220 MG: CAPSULE at 09:06

## 2023-08-17 RX ADMIN — PIPERACILLIN AND TAZOBACTAM 3.38 G: 3; .375 INJECTION, POWDER, FOR SOLUTION INTRAVENOUS at 13:25

## 2023-08-17 RX ADMIN — ENOXAPARIN SODIUM 40 MG: 40 INJECTION SUBCUTANEOUS at 18:16

## 2023-08-17 RX ADMIN — PIPERACILLIN AND TAZOBACTAM 3.38 G: 3; .375 INJECTION, POWDER, FOR SOLUTION INTRAVENOUS at 21:45

## 2023-08-17 RX ADMIN — ACETAMINOPHEN 650 MG: 325 TABLET ORAL at 09:04

## 2023-08-17 RX ADMIN — Medication 1000 MG: at 09:06

## 2023-08-17 RX ADMIN — HYDROXYZINE HYDROCHLORIDE 10 MG: 10 TABLET ORAL at 09:04

## 2023-08-17 ASSESSMENT — ACTIVITIES OF DAILY LIVING (ADL)
ADLS_ACUITY_SCORE: 18

## 2023-08-17 NOTE — PLAN OF CARE
Problem: Pain Acute  Goal: Optimal Pain Control and Function  Outcome: Progressing  Intervention: Prevent or Manage Pain  Recent Flowsheet Documentation  Taken 8/16/2023 1633 by Noemi Dias RN  Medication Review/Management: medications reviewed  Taken 8/16/2023 0900 by Noemi Dias, RN  Medication Review/Management: medications reviewed     Problem: Infection  Goal: Absence of Infection Signs and Symptoms  Outcome: Progressing   Goal Outcome Evaluation:         Pt is AXO and very cooperative with her cares. Pt was NPO for a procedure and Pt is back to her regular diet. Pt's pain was managed with prn medications and were effective. Pt's significant other visited at bedside today and was very attentive to Pt.

## 2023-08-17 NOTE — PROGRESS NOTES
Care Management Follow Up    Length of Stay (days): 5    Expected Discharge Date: 08/17/2023     Concerns to be Addressed:     discharge planning  Patient plan of care discussed at interdisciplinary rounds: Yes    Anticipated Discharge Disposition:  home no needs vs [facility]   Anticipated Discharge Services:  TBD  Anticipated Discharge DME:  n/a    Education Provided on the Discharge Plan:  yes  Patient/Family in Agreement with the Plan:  yes    Referrals Placed by CM/SW:  n/a  Private pay costs discussed: Not applicable    Additional Information:  No needs anticipated from CM at discharge per pt; independent at baseline. Following for ID recommendations.   9:22 AM    JOHNSON Montano  8/17/2023

## 2023-08-17 NOTE — PROGRESS NOTES
"INFECTIOUS DISEASE FOLLOW UP NOTE      ASSESSMENT:  43-year-old woman with history of left breast cancer, status post bilateral mastectomy.  Presenting with right mastitis.     Left-sided breast cancer.  Status postmastectomy with expander placement bilaterally on .  Course complicated by some dehiscence over the right breast surgical site.  Required additional stitching.  Received short course of Bactrim a month ago.  All stitches removed about 3 weeks ago.  Right-sided mastitis.  Patient developing fevers, chills, vomiting.  Increased swelling and redness on the right side.  White count 24,000 on admission, normalized with antibitoics.  Blood cultures negative.  CT scan with bilateral fluid collections. Now clinically improved and WBC normalized. 70cc of fluid aspirated , WBC on gram stain, culture with serratia so far.     Goal has been to maintain expanders until late September. Await susceptibilities to see if there is oral antibiotic option to try to suppress infection until that time.     PLAN:  Continue Pip/tazo  Follow cultures from fluid .     Aníbal Powers MD  [facility]  [phone] [facility] paging    ______________________________________________________________________    SUBJECTIVE / INTERVAL HISTORY: feels better, less pain. Parents in room. Discussed results, plan.     70cc yellow minimally turbid fluid removed. Culture in progress at time of my visit.     ROS: All other systems negative except as listed above.        OBJECTIVE:  BP (!) 141/82 (BP Location: Right arm)   Pulse 54   Temp 98.7  F (37.1  C) (Oral)   Resp 18   Ht 1.6 m (5' 3\")   Wt 64.1 kg (141 lb 4.8 oz)   LMP 2023   SpO2 99%   BMI 25.03 kg/m         Vital Signs  Temp: 98.5  F (36.9  C)  Temp src: Oral  Resp: 18  Pulse: 68  Pulse Rate Source: Monitor  BP: 123/65  BP Location: Left arm    Temp (24hrs), Av.4  F (36.9  C), Min:98.1  F (36.7  C), Max:98.6  F (37  C)      GEN: " No acute distress.    RESPIRATORY:  Normal breathing pattern.   CARDIOVASCULAR:  Regular rate and rhythm.   ABDOMEN:  deferred  EXTREMITIES: No edema.  SKIN/HAIR/NAILS:  No rashes. Photos reviewed   IV: peripheral        Antibiotics:  Pip/tazo 8/12-  Vancomycin 8/12-15    Pertinent labs:    Recent Labs   Lab 08/16/23  0715 08/14/23  0720 08/13/23  0639 08/12/23  1903   WBC 6.2 8.4 20.8* 24.4*   HGB  --  10.8* 12.1 12.8   HCT  --  32.8* 37.3 38.7   PLT  --  220 266 262        Recent Labs   Lab 08/13/23  0639 08/12/23  1903    141   CO2 27 27   BUN 9.7 15.6         Lab Results   Component Value Date    ALT 11 08/12/2023    AST 18 08/12/2023    ALKPHOS 58 08/12/2023     CRP 72-->18    MICROBIOLOGY DATA:  8/12 blood cultures negative  6/14 MRSA/SA screen nares negative  8/16 fluid gram stain 4+ WBC, culture with serratia    RADIOLOGY:  US Drainage Seroma/Hematoma Abscess/Cyst    Result Date: 8/16/2023  EXAM: 1. PUNCTURE AND ASPIRATION RIGHT BREAST KENNY-IMPLANT FLUID COLLECTION 2. ULTRASOUND GUIDANCE LOCATION: [facility] DATE: 8/16/2023 INDICATION: Right breast periimplant fluid collection in pt s p mastectomy and tissue expander placement PROCEDURE: Informed consent obtained. Time out performed. The site was prepped and draped in sterile fashion. 10 mL of 1% lidocaine was infused into the local soft tissues. Under direct ultrasound guidance, a 5 Burkinan BoxCasteh catheter was placed into the fluid surrounding the right breast implant/tissue expander and 70 ml of yellow minimally turbid fluid was aspirated. This was sent for cultures.     IMPRESSION: 1.  Status post ultrasound-guided puncture and aspiration of fluid surrounding the right breast implant/tissue expander. Reference CPT Code: 62659, 37358    CT Chest w Contrast    Result Date: 8/12/2023  EXAM: CT CHEST W CONTRAST LOCATION: [facility] DATE: 8/12/2023 INDICATION: Breast infection, status post mastectomy with  tissue expander placement. COMPARISON: CT abdomen/pelvis 05/17/2023. TECHNIQUE: CT chest with IV contrast. Multiplanar reformats were obtained. Dose reduction techniques were used. CONTRAST: 100 mL Isovue-370. FINDINGS: LUNGS AND PLEURA: Trachea and large airways are patent. No focal airspace consolidation. Indeterminate 3 mm nodule along the minor fissure, axial image 170, favoring a benign fissural lymph node, though warranting continued follow-up. Mild dependent atelectasis. No pneumothorax. No pleural effusion.  MEDIASTINUM/AXILLAE: Residual thymic tissue within the anterior mediastinum. No mediastinal/hilar lymphadenopathy. Thoracic esophagus is unremarkable.  No axillary lymphadenopathy. Postsurgical changes of bilateral mastectomies with placement of tissue expansion devices. Small fluid collection surrounding both of the tissue expanders, with overlying cutaneous thickening and adjacent fat stranding. These could reflect sterile fluid collections, though superinfection with overlying cellulitis is not excluded. No thoracic aortic aneurysm. Heart is normal in size. No pericardial effusion. CORONARY ARTERY CALCIFICATION: None. UPPER ABDOMEN: No suspicious abnormality. Small cleft within the posterior spleen, axial image 149, similar to prior. MUSCULOSKELETAL: No suspicious abnormality. OTHER: No additionally suspicious abnormality.     IMPRESSION: 1.  Small fluid collections surrounding bilateral breast tissue expanders, which could be sterile or superinfected. Surrounding inflammatory fat stranding overlying cutaneous thickening may reflect an associated cellulitis versus resolving postsurgical change. 2.  Indeterminate 3.5 mm nodule along the right minor fissure, favoring a benign fissural lymph node. Attention on follow-up.

## 2023-08-17 NOTE — PLAN OF CARE
Problem: Skin or Soft Tissue Infection  Goal: Absence of Infection Signs and Symptoms  Outcome: Progressing   Goal Outcome Evaluation:       Patient is pleasant and cooperative. Patient said redness discoloration to right breast is improving. Denies pain. Continues to receive iv Zosyn. Remains afebrile

## 2023-08-17 NOTE — PROGRESS NOTES
"[facility]    Medicine Progress Note - Hospitalist Service    Date of Admission:  8/12/2023    Assessment & Plan   43-year-old female with breast cancer 8 weeks status post bilateral mastectomy with tissue expander admitted with right breast cellulitis.     Right breast cellulitis status postmastectomy with underlying tissue expander.  Sepsis on admission.  White count has normalized and clinically she is much improved.  Started on IV Zosyn and vancomycin.  Vancomycin discontinued as per ID.  There are fluid collections surrounding both expanders, possibly infected on the right.  ID and plastics following.  IR consulted for drainage of the fluid collection of the right breast mastectomy cavity with cultures and Gram stain (8/16).  If she still has pain after drainage with persistent erythema and positive cultures on the fluid then she will require explantation. So far cultures neg. Defer to plastics surgeon and ID for abx plan.  Breast cancer: Continue home tamoxifen  Pulmonary nodule noted on chest imaging: Indeterminant 3.5 mm nodule along the right minor fissure favoring a benign fissural lymph node.  Will need outpatient follow-up with her oncologist.  Depression/anxiety: Continue home Zoloft  DVT prophylaxis: We will escalate to pharmacologic prophylaxis given her cancer history and relative immobility.           Diet: Combination Diet Regular Diet    DVT Prophylaxis: Enoxaparin (Lovenox) SQ  Leroy Catheter: Not present  Lines: None     Cardiac Monitoring: None  Code Status: Full Code      Clinically Significant Risk Factors                         # Overweight: Estimated body mass index is 25.03 kg/m  as calculated from the following:    Height as of this encounter: 1.6 m (5' 3\").    Weight as of this encounter: 64.1 kg (141 lb 4.8 oz).             Disposition Plan     Expected Discharge Date: 08/17/2023      Destination: home with family  Discharge Comments: ID following- IV " abx  plastics following  procedure 8/16          Wale Javed MD  Hospitalist Service  [facility]  Securely message with [employer] (more info)  Text page via [employer] Paging/Directory   ______________________________________________________________________    Interval History   Pain in control, no f/c, no n/v. No sob.     Physical Exam   Vital Signs: Temp: 97.9  F (36.6  C) Temp src: Oral BP: 138/82 Pulse: 54   Resp: 18 SpO2: 96 % O2 Device: None (Room air)    Weight: 141 lbs 4.8 oz    General.  Awake alert oriented not in acute distress.  HEENT.  Pupils equal round react to light, anicteric, EOM intact.  Neck supple no JVD.  CVS regular rhythm no murmur gallops.  Lungs.  Clear to auscultation bilateral no wheezing or rales.  Abdomen.  Soft nontender bowel sounds present.  B/l breast s/p surgery with mild erythema and redness  Extremities.  No edema no calf tenderness.  Neurological.  Awake and alert. No focal deficit.  Skin no rash. No pallor.  Psych. Normal mood.      Medical Decision Making       46 MINUTES SPENT BY ME on the date of service doing chart review, history, exam, documentation & further activities per the note.      Data         Imaging results reviewed over the past 24 hrs:   Recent Results (from the past 24 hour(s))   US Drainage Seroma/Hematoma Abscess/Cyst    Narrative    EXAM:  1. PUNCTURE AND ASPIRATION RIGHT BREAST KENNY-IMPLANT FLUID COLLECTION  2. ULTRASOUND GUIDANCE  LOCATION: [facility]  DATE: 8/16/2023    INDICATION: Right breast periimplant fluid collection in pt s p mastectomy and tissue expander placement    PROCEDURE: Informed consent obtained. Time out performed. The site was prepped and draped in sterile fashion. 10 mL of 1% lidocaine was infused into the local soft tissues. Under direct ultrasound guidance, a 5 Welsh Xcerioneh catheter was placed into the   fluid surrounding the right breast implant/tissue expander and 70 ml of yellow  minimally turbid fluid was aspirated. This was sent for cultures.       Impression    IMPRESSION:  1.  Status post ultrasound-guided puncture and aspiration of fluid surrounding the right breast implant/tissue expander.    Reference CPT Code: 00961, 25722

## 2023-08-18 LAB
ALBUMIN SERPL BCG-MCNC: 3.8 G/DL (ref 3.5–5.2)
ALP SERPL-CCNC: 53 U/L (ref 35–104)
ALT SERPL W P-5'-P-CCNC: 13 U/L (ref 0–50)
ANION GAP SERPL CALCULATED.3IONS-SCNC: 9 MMOL/L (ref 7–15)
AST SERPL W P-5'-P-CCNC: 16 U/L (ref 0–45)
BASOPHILS # BLD AUTO: 0.1 10E3/UL (ref 0–0.2)
BASOPHILS NFR BLD AUTO: 1 %
BILIRUB SERPL-MCNC: 0.3 MG/DL
BUN SERPL-MCNC: 8.8 MG/DL (ref 6–20)
CALCIUM SERPL-MCNC: 8.6 MG/DL (ref 8.6–10)
CHLORIDE SERPL-SCNC: 108 MMOL/L (ref 98–107)
CREAT SERPL-MCNC: 0.78 MG/DL (ref 0.51–0.95)
CRP SERPL-MCNC: 5.9 MG/L
DEPRECATED HCO3 PLAS-SCNC: 27 MMOL/L (ref 22–29)
EOSINOPHIL # BLD AUTO: 0.3 10E3/UL (ref 0–0.7)
EOSINOPHIL NFR BLD AUTO: 5 %
ERYTHROCYTE [DISTWIDTH] IN BLOOD BY AUTOMATED COUNT: 13 % (ref 10–15)
GFR SERPL CREATININE-BSD FRML MDRD: >90 ML/MIN/1.73M2
GLUCOSE SERPL-MCNC: 98 MG/DL (ref 70–99)
HCT VFR BLD AUTO: 36.2 % (ref 35–47)
HGB BLD-MCNC: 11.8 G/DL (ref 11.7–15.7)
IMM GRANULOCYTES # BLD: 0.1 10E3/UL
IMM GRANULOCYTES NFR BLD: 2 %
LYMPHOCYTES # BLD AUTO: 2 10E3/UL (ref 0.8–5.3)
LYMPHOCYTES NFR BLD AUTO: 29 %
MCH RBC QN AUTO: 29.2 PG (ref 26.5–33)
MCHC RBC AUTO-ENTMCNC: 32.6 G/DL (ref 31.5–36.5)
MCV RBC AUTO: 90 FL (ref 78–100)
MONOCYTES # BLD AUTO: 0.6 10E3/UL (ref 0–1.3)
MONOCYTES NFR BLD AUTO: 9 %
NEUTROPHILS # BLD AUTO: 3.9 10E3/UL (ref 1.6–8.3)
NEUTROPHILS NFR BLD AUTO: 54 %
NRBC # BLD AUTO: 0 10E3/UL
NRBC BLD AUTO-RTO: 0 /100
PLATELET # BLD AUTO: 301 10E3/UL (ref 150–450)
POTASSIUM SERPL-SCNC: 4 MMOL/L (ref 3.4–5.3)
PROT SERPL-MCNC: 6.4 G/DL (ref 6.4–8.3)
RBC # BLD AUTO: 4.04 10E6/UL (ref 3.8–5.2)
SODIUM SERPL-SCNC: 144 MMOL/L (ref 136–145)
WBC # BLD AUTO: 7 10E3/UL (ref 4–11)

## 2023-08-18 PROCEDURE — 250N000011 HC RX IP 250 OP 636: Mod: JZ | Performed by: EMERGENCY MEDICINE

## 2023-08-18 PROCEDURE — 250N000013 HC RX MED GY IP 250 OP 250 PS 637: Performed by: INTERNAL MEDICINE

## 2023-08-18 PROCEDURE — 99232 SBSQ HOSP IP/OBS MODERATE 35: CPT | Performed by: INTERNAL MEDICINE

## 2023-08-18 PROCEDURE — 120N000001 HC R&B MED SURG/OB

## 2023-08-18 PROCEDURE — 36415 COLL VENOUS BLD VENIPUNCTURE: CPT | Performed by: INTERNAL MEDICINE

## 2023-08-18 PROCEDURE — 80053 COMPREHEN METABOLIC PANEL: CPT | Performed by: INTERNAL MEDICINE

## 2023-08-18 PROCEDURE — 250N000011 HC RX IP 250 OP 636: Mod: JZ | Performed by: INTERNAL MEDICINE

## 2023-08-18 PROCEDURE — 86140 C-REACTIVE PROTEIN: CPT | Performed by: INTERNAL MEDICINE

## 2023-08-18 PROCEDURE — 85025 COMPLETE CBC W/AUTO DIFF WBC: CPT | Performed by: INTERNAL MEDICINE

## 2023-08-18 PROCEDURE — 250N000013 HC RX MED GY IP 250 OP 250 PS 637: Performed by: HOSPITALIST

## 2023-08-18 RX ADMIN — Medication 25 MCG: at 08:39

## 2023-08-18 RX ADMIN — CALCIUM 500 MG: 500 TABLET ORAL at 08:42

## 2023-08-18 RX ADMIN — PIPERACILLIN AND TAZOBACTAM 3.38 G: 3; .375 INJECTION, POWDER, FOR SOLUTION INTRAVENOUS at 05:50

## 2023-08-18 RX ADMIN — Medication 1000 MG: at 08:40

## 2023-08-18 RX ADMIN — ENOXAPARIN SODIUM 40 MG: 40 INJECTION SUBCUTANEOUS at 17:33

## 2023-08-18 RX ADMIN — TAMOXIFEN CITRATE 20 MG: 20 TABLET ORAL at 17:33

## 2023-08-18 RX ADMIN — SERTRALINE HYDROCHLORIDE 100 MG: 50 TABLET ORAL at 08:41

## 2023-08-18 RX ADMIN — IBUPROFEN 400 MG: 400 TABLET, FILM COATED ORAL at 17:41

## 2023-08-18 RX ADMIN — HYDROXYZINE HYDROCHLORIDE 10 MG: 10 TABLET ORAL at 17:41

## 2023-08-18 RX ADMIN — ZINC SULFATE 220 MG (50 MG) CAPSULE 220 MG: CAPSULE at 08:41

## 2023-08-18 RX ADMIN — PIPERACILLIN AND TAZOBACTAM 3.38 G: 3; .375 INJECTION, POWDER, FOR SOLUTION INTRAVENOUS at 22:10

## 2023-08-18 RX ADMIN — PIPERACILLIN AND TAZOBACTAM 3.38 G: 3; .375 INJECTION, POWDER, FOR SOLUTION INTRAVENOUS at 12:26

## 2023-08-18 ASSESSMENT — ACTIVITIES OF DAILY LIVING (ADL)
ADLS_ACUITY_SCORE: 18

## 2023-08-18 NOTE — PROGRESS NOTES
INFECTIOUS DISEASE FOLLOW UP NOTE      ASSESSMENT:  43-year-old woman with history of left breast cancer, status post bilateral mastectomy.  Presenting with right mastitis.     Left-sided breast cancer.  Status postmastectomy with expander placement bilaterally on 5/31.  Course complicated by some dehiscence over the right breast surgical site.  Required additional stitching.  Received short course of Bactrim a month ago.  All stitches removed about 3 weeks ago.  Right-sided mastitis.  Patient developing fevers, chills, vomiting.  Increased swelling and redness on the right side.  White count 24,000 on admission, normalized with antibitoics.  Blood cultures negative.  CT scan with bilateral fluid collections. Now clinically improved and WBC normalized. 70cc of fluid aspirated 8/16, WBC on gram stain, culture with serratia so far.     Goal has been to maintain expanders until late September. Await susceptibilities to see if there is oral antibiotic option to try to suppress infection until that time.     PLAN:  Continue Pip/tazo  Follow cultures from fluid 8/16. Hopefully we have susceptibilities by tomorrow -- plan oral antibiotic to continue until next surgery in September.   Will have one stage procedure -- removal of expanders and tissue flap rather than prostheses.   Would recommend IV antibiotics in the hospital at time of next surgery -- cefepime, then likely short duration oral antibiotic after discharge.     Aníbal Powers MD  [facility]  [phone] [facility]    ______________________________________________________________________    SUBJECTIVE / INTERVAL HISTORY: reviewed results with her -- culture with serratia. She was hoping to go home today, but is willing to stay until we have susceptibilities. Feels better physically.     Discussed with Dr. Aguilar after the visit.     ROS: All other systems negative except as listed above.        OBJECTIVE:  /79 (BP  "Location: Left arm)   Pulse 60   Temp 98.4  F (36.9  C) (Oral)   Resp 18   Ht 1.6 m (5' 3\")   Wt 64.1 kg (141 lb 4.8 oz)   LMP 2023   SpO2 100%   BMI 25.03 kg/m         Vital Signs  Temp: 98.5  F (36.9  C)  Temp src: Oral  Resp: 18  Pulse: 68  Pulse Rate Source: Monitor  BP: 123/65  BP Location: Left arm    Temp (24hrs), Av.4  F (36.9  C), Min:98.1  F (36.7  C), Max:98.6  F (37  C)      GEN: No acute distress.    RESPIRATORY:  Normal breathing pattern.   CARDIOVASCULAR:  Regular rate and rhythm.   ABDOMEN:  deferred  EXTREMITIES: No edema.  SKIN/HAIR/NAILS:  No rashes. Photos reviewed   IV: peripheral        Antibiotics:  Pip/tazo -  Vancomycin -15    Pertinent labs:    Recent Labs   Lab 23  0812 23  0715 23  0720 23  0639   WBC 7.0 6.2 8.4 20.8*   HGB 11.8  --  10.8* 12.1   HCT 36.2  --  32.8* 37.3     --  220 266        Recent Labs   Lab 23  0812 23  0639 23  1903    144 141   CO2 27 27 27   BUN 8.8 9.7 15.6         Lab Results   Component Value Date    ALT 13 2023    AST 16 2023    ALKPHOS 53 2023     CRP 72-->18    MICROBIOLOGY DATA:   blood cultures negative   MRSA/SA screen nares negative   fluid gram stain 4+ WBC, culture with serratia    RADIOLOGY:  US Drainage Seroma/Hematoma Abscess/Cyst    Result Date: 2023  EXAM: 1. PUNCTURE AND ASPIRATION RIGHT BREAST KENNY-IMPLANT FLUID COLLECTION 2. ULTRASOUND GUIDANCE LOCATION: [facility] DATE: 2023 INDICATION: Right breast periimplant fluid collection in pt s p mastectomy and tissue expander placement PROCEDURE: Informed consent obtained. Time out performed. The site was prepped and draped in sterile fashion. 10 mL of 1% lidocaine was infused into the local soft tissues. Under direct ultrasound guidance, a 5 Armenian [employer] catheter was placed into the fluid surrounding the right breast implant/tissue expander and 70 ml of " yellow minimally turbid fluid was aspirated. This was sent for cultures.     IMPRESSION: 1.  Status post ultrasound-guided puncture and aspiration of fluid surrounding the right breast implant/tissue expander. Reference CPT Code: 52627, 74933    CT Chest w Contrast    Result Date: 8/12/2023  EXAM: CT CHEST W CONTRAST LOCATION: [facility] DATE: 8/12/2023 INDICATION: Breast infection, status post mastectomy with tissue expander placement. COMPARISON: CT abdomen/pelvis 05/17/2023. TECHNIQUE: CT chest with IV contrast. Multiplanar reformats were obtained. Dose reduction techniques were used. CONTRAST: 100 mL Isovue-370. FINDINGS: LUNGS AND PLEURA: Trachea and large airways are patent. No focal airspace consolidation. Indeterminate 3 mm nodule along the minor fissure, axial image 170, favoring a benign fissural lymph node, though warranting continued follow-up. Mild dependent atelectasis. No pneumothorax. No pleural effusion.  MEDIASTINUM/AXILLAE: Residual thymic tissue within the anterior mediastinum. No mediastinal/hilar lymphadenopathy. Thoracic esophagus is unremarkable.  No axillary lymphadenopathy. Postsurgical changes of bilateral mastectomies with placement of tissue expansion devices. Small fluid collection surrounding both of the tissue expanders, with overlying cutaneous thickening and adjacent fat stranding. These could reflect sterile fluid collections, though superinfection with overlying cellulitis is not excluded. No thoracic aortic aneurysm. Heart is normal in size. No pericardial effusion. CORONARY ARTERY CALCIFICATION: None. UPPER ABDOMEN: No suspicious abnormality. Small cleft within the posterior spleen, axial image 149, similar to prior. MUSCULOSKELETAL: No suspicious abnormality. OTHER: No additionally suspicious abnormality.     IMPRESSION: 1.  Small fluid collections surrounding bilateral breast tissue expanders, which could be sterile or superinfected. Surrounding  inflammatory fat stranding overlying cutaneous thickening may reflect an associated cellulitis versus resolving postsurgical change. 2.  Indeterminate 3.5 mm nodule along the right minor fissure, favoring a benign fissural lymph node. Attention on follow-up.

## 2023-08-18 NOTE — PROGRESS NOTES
[facility]    Medicine Progress Note - Hospitalist Service    Date of Admission:  8/12/2023    Assessment & Plan   43-year-old female with history of mood disorder and recent diagnosis of breast cancer presents with right breast cellulitis 8 weeks postop bilateral mastectomy with tissue expanders.      Right breast cellulitis:  History left sided breast cancer and underwent bilateral mastectomy with tissue expanders 5/31/23. Had postop course complicated by wound dehiscence over the right breast surgical site that required additional stitching and received short course of Bactrim 1 month prior to presentation.  Stitches removed about 3 weeks ago.    Presented 8/12/2023 with sepsis and cellulitis right breast.   CT showed small fluid collections surrounding bilateral breast tissue expanders, which could be sterile or superinfected. Surrounding inflammatory fat stranding overlying cutaneous thickening may reflect an associated cellulitis versus resolving postsurgical change.  Patient underwent ultrasound-guided puncture and aspiration of fluid surrounding the right breast implant/tissue expander 8/16/23.   Aspirate cultures growing serratia marcescens   -- continue IV zosyn pending susceptibilities   -- anticipate discharge home tomorrow on oral ABX  -- Peripheral blood cultures remain no growth to date  -- Currently patient will have a 1 stage procedure with removal of expanders and tissue flap rather than prosthetic breast implants.  -- ID recommends IV cefepime in the hospital at time of next surgery to be followed by short duration oral antibiotic after discharge        History of left sided breast cancer s/p bilat mastectomy 5/31/23:   --Continue home tamoxifen        Mood disorder: Continue home Zoloft and as needed hydroxyzine        Incidental finding indeterminate 3.5 mm nodule along the right minor fissure favoring a benign fissural lymph node.    --Will need outpatient follow-up  "with oncology         Diet: Combination Diet Regular Diet    DVT Prophylaxis: Enoxaparin (Lovenox) SQ  Leroy Catheter: Not present  Lines: None     Cardiac Monitoring: None  Code Status: Full Code      Clinically Significant Risk Factors                         # Overweight: Estimated body mass index is 25.03 kg/m  as calculated from the following:    Height as of this encounter: 1.6 m (5' 3\").    Weight as of this encounter: 64.1 kg (141 lb 4.8 oz).             Disposition Plan      Expected Discharge Date: 08/19/2023      Destination: home with family  Discharge Comments: ID following- plan to transition to PO abx  plastics following  waiting on cultures          Semaj Murillo DO  Hospitalist Service  [facility]  Securely message with [employer] (more info)  Text page via [employer] Paging/Directory   ______________________________________________________________________    Interval History   NAD. Denies any complaints. Pain and erythema right breast improving    Physical Exam   Vital Signs: Temp: 98.4  F (36.9  C) Temp src: Oral BP: 135/79 Pulse: 60   Resp: 18 SpO2: 100 % O2 Device: None (Room air)    Weight: 141 lbs 4.8 oz  General: NAD  RESPIRATORY: Breathing nonlabored  CARDIOVASCULAR: No le edema bilat.   SKIN: mild erythema around right breast incisional site  NEUROLOGIC: Motor and sensory intact, speech clear, alert         Medical Decision Making       >40 MINUTES SPENT BY ME on the date of service doing chart review, history, exam, documentation & further activities per the note.      Data       "

## 2023-08-18 NOTE — PLAN OF CARE
Problem: Pain Acute  Goal: Optimal Pain Control and Function  Outcome: Progressing  Intervention: Prevent or Manage Pain  Recent Flowsheet Documentation  Taken 8/18/2023 0059 by Pete Felipe, RN  Medication Review/Management: medications reviewed  Taken 8/17/2023 2007 by Pete Felipe RN  Medication Review/Management: medications reviewed     Problem: Skin or Soft Tissue Infection  Goal: Absence of Infection Signs and Symptoms  Outcome: Progressing   Goal Outcome Evaluation:      Patient is talkative and cooperative. Patient remains pain free. On iv Zosyn. Vital signs are stable. Right breast discoloration has improved, slight erythema noted along the incision lines. Right breast is still swollen. Per patient, when she had breast tissue expanders placed, left had 350 ml fluid and right one 300 ml. Waiting for final cultures of right breast aspirate collected 8/16/23.

## 2023-08-18 NOTE — PROGRESS NOTES
No events overnight.  Patient feeling better.  Cultures are showing Serratia.    At the physical exam, the erythema on the right breast has greatly improved and there is no more mastalgia.  There is still persistent swelling on the right breast though.    Plan: Awaiting for susceptibilities and then transition to oral antibiotics and then discharge home.  She will follow-up with me on August 25.    I really appreciate recommendations from Dr. Powers (infectious disease) as well as medical management by hospitalist team.    Basil Aguilar MD , FACS   Diplomate American Board of Plastic Surgery  Diplomate American Board of Surgery  Adj. Assistant Professor of Surgery  Division of Plastic & Reconstructive Surgery   [facility]  Office: [phone]   8/18/2023 at 6:24 PM

## 2023-08-18 NOTE — PLAN OF CARE
Problem: Infection  Goal: Absence of Infection Signs and Symptoms  Outcome: Progressing   Goal Outcome Evaluation:         Pt's pain continues to be tolerable.Pain was managed with prn tylenol and was effective. Expanders site looking less red/pink. Pt's had family and friends visiting at bedside today.

## 2023-08-18 NOTE — PLAN OF CARE
Problem: Plan of Care - These are the overarching goals to be used throughout the patient stay.    Goal: Plan of Care Review  Description: The Plan of Care Review/Shift note should be completed every shift.  The Outcome Evaluation is a brief statement about your assessment that the patient is improving, declining, or no change.  This information will be displayed automatically on your shift note.  Outcome: Progressing  Flowsheets (Taken 8/18/2023 1208)  Overall Patient Progress: improving     Problem: Pain Acute  Goal: Optimal Pain Control and Function  Outcome: Progressing  Intervention: Prevent or Manage Pain  Recent Flowsheet Documentation  Taken 8/18/2023 0865 by Rowan Spence RN  Medication Review/Management: medications reviewed   Goal Outcome Evaluation:    Pt mariam pain and stated try to avoid taking too much pain meds. Pt stated she can now touch breast without pain, Pt still on antibiotic zosyn, Right breast still swollen. Patient is still waiting on culture.Pt culture came back with bacteria found, so patient is staying to continue her antibiotic treatment plan to go home on oral antibiotic.Zosyn currently running.

## 2023-08-19 ENCOUNTER — APPOINTMENT (OUTPATIENT)
Dept: ULTRASOUND IMAGING | Facility: HOSPITAL | Age: 43
DRG: 872 | End: 2023-08-19
Attending: INTERNAL MEDICINE
Payer: COMMERCIAL

## 2023-08-19 VITALS
SYSTOLIC BLOOD PRESSURE: 135 MMHG | HEIGHT: 63 IN | DIASTOLIC BLOOD PRESSURE: 67 MMHG | HEART RATE: 61 BPM | TEMPERATURE: 98.4 F | RESPIRATION RATE: 18 BRPM | OXYGEN SATURATION: 99 % | WEIGHT: 141.1 LBS | BODY MASS INDEX: 25 KG/M2

## 2023-08-19 LAB
BACTERIA ASPIRATE CULT: ABNORMAL
GRAM STAIN RESULT: ABNORMAL
GRAM STAIN RESULT: ABNORMAL

## 2023-08-19 PROCEDURE — 99239 HOSP IP/OBS DSCHRG MGMT >30: CPT | Performed by: INTERNAL MEDICINE

## 2023-08-19 PROCEDURE — 250N000013 HC RX MED GY IP 250 OP 250 PS 637: Performed by: INTERNAL MEDICINE

## 2023-08-19 PROCEDURE — 250N000011 HC RX IP 250 OP 636: Mod: JZ | Performed by: INTERNAL MEDICINE

## 2023-08-19 PROCEDURE — 250N000013 HC RX MED GY IP 250 OP 250 PS 637: Performed by: HOSPITALIST

## 2023-08-19 PROCEDURE — 99232 SBSQ HOSP IP/OBS MODERATE 35: CPT | Performed by: INTERNAL MEDICINE

## 2023-08-19 PROCEDURE — 93971 EXTREMITY STUDY: CPT | Mod: RT

## 2023-08-19 RX ORDER — SULFAMETHOXAZOLE/TRIMETHOPRIM 800-160 MG
1 TABLET ORAL 2 TIMES DAILY
Qty: 74 TABLET | Refills: 0 | Status: SHIPPED | OUTPATIENT
Start: 2023-08-19 | End: 2024-03-12

## 2023-08-19 RX ORDER — SULFAMETHOXAZOLE/TRIMETHOPRIM 800-160 MG
1 TABLET ORAL 2 TIMES DAILY
Qty: 74 TABLET | Refills: 0 | Status: SHIPPED | OUTPATIENT
Start: 2023-08-19 | End: 2023-08-19

## 2023-08-19 RX ORDER — SULFAMETHOXAZOLE/TRIMETHOPRIM 800-160 MG
1 TABLET ORAL 2 TIMES DAILY
Status: DISCONTINUED | OUTPATIENT
Start: 2023-08-19 | End: 2023-08-19 | Stop reason: HOSPADM

## 2023-08-19 RX ADMIN — CALCIUM 500 MG: 500 TABLET ORAL at 08:48

## 2023-08-19 RX ADMIN — Medication 25 MCG: at 08:48

## 2023-08-19 RX ADMIN — ZINC SULFATE 220 MG (50 MG) CAPSULE 220 MG: CAPSULE at 08:46

## 2023-08-19 RX ADMIN — PIPERACILLIN AND TAZOBACTAM 3.38 G: 3; .375 INJECTION, POWDER, FOR SOLUTION INTRAVENOUS at 04:52

## 2023-08-19 RX ADMIN — SERTRALINE HYDROCHLORIDE 100 MG: 50 TABLET ORAL at 08:47

## 2023-08-19 RX ADMIN — SULFAMETHOXAZOLE AND TRIMETHOPRIM 1 TABLET: 800; 160 TABLET ORAL at 12:34

## 2023-08-19 RX ADMIN — Medication 1000 MG: at 08:46

## 2023-08-19 ASSESSMENT — ACTIVITIES OF DAILY LIVING (ADL)
ADLS_ACUITY_SCORE: 18

## 2023-08-19 NOTE — PROGRESS NOTES
No needs anticipated from CM at discharge per pt; independent at baseline. Following for ID recommendations.   9:38 AM    JOHNSON Montano  8/19/2023

## 2023-08-19 NOTE — PROGRESS NOTES
No events overnight.  Patient feeling better.  She is being discharged today.  She will be receiving p.o. Bactrim for the next 4 weeks while waiting for autologous breast reconstruction.  I will follow-up with this patient this Friday, August 25.    Basil Aguilar MD , FACS   Diplomate American Board of Plastic Surgery  Diplomate American Board of Surgery  Adj. Assistant Professor of Surgery  Division of Plastic & Reconstructive Surgery   [facility]  Office: [phone]   8/19/2023 at 2:49 PM

## 2023-08-19 NOTE — PLAN OF CARE
Problem: Infection  Goal: Absence of Infection Signs and Symptoms  Outcome: Progressing   Goal Outcome Evaluation:       Patient alert/oriented, denies pain. Zosyn infusing. Vitals stable and discoloration noted along incision lines improved per patient. Independent in room.

## 2023-08-19 NOTE — PROGRESS NOTES
INFECTIOUS DISEASE FOLLOW UP NOTE      ASSESSMENT:  43-year-old woman with history of left breast cancer, status post bilateral mastectomy.  Presenting with right mastitis.     Left-sided breast cancer.  Status postmastectomy with expander placement bilaterally on 5/31.  Course complicated by some dehiscence over the right breast surgical site.  Required additional stitching.  Received short course of Bactrim a month ago.  All stitches removed about 3 weeks ago.  Right-sided mastitis.  Patient developing fevers, chills, vomiting.  Increased swelling and redness on the right side.  White count 24,000 on admission, normalized with antibitoics.  Blood cultures negative.  CT scan with bilateral fluid collections. Now clinically improved and WBC normalized. 70cc of fluid aspirated 8/16, WBC on gram stain, culture with serratia.   Superficial thrombophlebitis -- can use warm compress.     Goal has been to maintain expanders until late September.       PLAN:  Plan bactrim to continue until next surgery in September. Alternative is a quinolone.   Will have one stage procedure -- removal of expanders and tissue flap rather than prostheses.   Would recommend IV antibiotics in the hospital at time of next surgery -- cefepime, then likely short duration oral antibiotic after discharge.   I have ordered BMP and CBC with diff for every 2 weeks until surgery, first check 8/25/23.   Please prescribed bactrim DS PO BID for 37 days (until 9/25/23) at discharge.   Ok with discharge from ID standpoint. She can follow up with me as needed.     Aníbal Powers MD  [facility]  [phone] [facility] paging    ______________________________________________________________________    SUBJECTIVE / INTERVAL HISTORY: doing well. Ready for discharge. She has tolerated bactrim in the past. Swelling at previous IV site -- R forearm.     ROS: All other systems negative except as listed above.        OBJECTIVE:  BP  "135/67 (BP Location: Left arm)   Pulse 61   Temp 98.4  F (36.9  C) (Oral)   Resp 18   Ht 1.6 m (5' 3\")   Wt 64 kg (141 lb 1.6 oz)   LMP 2023   SpO2 99%   BMI 24.99 kg/m         Vital Signs  Temp: 98.5  F (36.9  C)  Temp src: Oral  Resp: 18  Pulse: 68  Pulse Rate Source: Monitor  BP: 123/65  BP Location: Left arm    Temp (24hrs), Av.4  F (36.9  C), Min:98.1  F (36.7  C), Max:98.6  F (37  C)      GEN: No acute distress.    RESPIRATORY:  Normal breathing pattern.   CARDIOVASCULAR:  Regular rate and rhythm.   ABDOMEN:  deferred  EXTREMITIES: No edema.  SKIN/HAIR/NAILS:  No rashes. Photos reviewed   IV: peripheral        Antibiotics:  Pip/tazo -  Vancomycin -15    Pertinent labs:    Recent Labs   Lab 23  0812 23  0715 23  0720 23  0639   WBC 7.0 6.2 8.4 20.8*   HGB 11.8  --  10.8* 12.1   HCT 36.2  --  32.8* 37.3     --  220 266        Recent Labs   Lab 23  0812 23  0639 23  1903    144 141   CO2 27 27 27   BUN 8.8 9.7 15.6         Lab Results   Component Value Date    ALT 13 2023    AST 16 2023    ALKPHOS 53 2023     CRP 72-->18    MICROBIOLOGY DATA:   blood cultures negative   MRSA/SA screen nares negative   fluid gram stain 4+ WBC, culture with serratia, S to howie, tmp/sulfa    RADIOLOGY:  US Upper Extremity Venous Duplex Right    Result Date: 2023  EXAM: US UPPER EXTREMITY VENOUS DUPLEX RIGHT LOCATION: [facility] DATE: 2023 INDICATION: asymmetric RUE swelling from previous IV site COMPARISON: None. TECHNIQUE: Venous Duplex ultrasound of the right upper extremity with (when possible) and without compression, augmentation, and duplex. Color flow and spectral Doppler with waveform analysis performed. FINDINGS: Ultrasound includes evaluation of the internal jugular vein, innominate vein, subclavian vein, axillary vein, and brachial vein. The superficial cephalic and basilic " veins were also evaluated where seen. RIGHT: No deep venous thrombosis. Occlusive and nonocclusive thrombus in the cephalic vein extending from the proximal forearm to the distal humerus (greater than 5 cm), which corresponds to the location of a prior IV site.     IMPRESSION: 1.  No deep venous thrombosis in the right upper extremity. 2.  Superficial thrombophlebitis involving the right cephalic vein from the proximal forearm to the distal humerus.    US Drainage Seroma/Hematoma Abscess/Cyst    Result Date: 8/16/2023  EXAM: 1. PUNCTURE AND ASPIRATION RIGHT BREAST KENNY-IMPLANT FLUID COLLECTION 2. ULTRASOUND GUIDANCE LOCATION: [facility] DATE: 8/16/2023 INDICATION: Right breast periimplant fluid collection in pt s p mastectomy and tissue expander placement PROCEDURE: Informed consent obtained. Time out performed. The site was prepped and draped in sterile fashion. 10 mL of 1% lidocaine was infused into the local soft tissues. Under direct ultrasound guidance, a 5 Georgian Yueh catheter was placed into the fluid surrounding the right breast implant/tissue expander and 70 ml of yellow minimally turbid fluid was aspirated. This was sent for cultures.     IMPRESSION: 1.  Status post ultrasound-guided puncture and aspiration of fluid surrounding the right breast implant/tissue expander. Reference CPT Code: 74374, 00152    CT Chest w Contrast    Result Date: 8/12/2023  EXAM: CT CHEST W CONTRAST LOCATION: [facility] DATE: 8/12/2023 INDICATION: Breast infection, status post mastectomy with tissue expander placement. COMPARISON: CT abdomen/pelvis 05/17/2023. TECHNIQUE: CT chest with IV contrast. Multiplanar reformats were obtained. Dose reduction techniques were used. CONTRAST: 100 mL Isovue-370. FINDINGS: LUNGS AND PLEURA: Trachea and large airways are patent. No focal airspace consolidation. Indeterminate 3 mm nodule along the minor fissure, axial image 170, favoring a benign  fissural lymph node, though warranting continued follow-up. Mild dependent atelectasis. No pneumothorax. No pleural effusion.  MEDIASTINUM/AXILLAE: Residual thymic tissue within the anterior mediastinum. No mediastinal/hilar lymphadenopathy. Thoracic esophagus is unremarkable.  No axillary lymphadenopathy. Postsurgical changes of bilateral mastectomies with placement of tissue expansion devices. Small fluid collection surrounding both of the tissue expanders, with overlying cutaneous thickening and adjacent fat stranding. These could reflect sterile fluid collections, though superinfection with overlying cellulitis is not excluded. No thoracic aortic aneurysm. Heart is normal in size. No pericardial effusion. CORONARY ARTERY CALCIFICATION: None. UPPER ABDOMEN: No suspicious abnormality. Small cleft within the posterior spleen, axial image 149, similar to prior. MUSCULOSKELETAL: No suspicious abnormality. OTHER: No additionally suspicious abnormality.     IMPRESSION: 1.  Small fluid collections surrounding bilateral breast tissue expanders, which could be sterile or superinfected. Surrounding inflammatory fat stranding overlying cutaneous thickening may reflect an associated cellulitis versus resolving postsurgical change. 2.  Indeterminate 3.5 mm nodule along the right minor fissure, favoring a benign fissural lymph node. Attention on follow-up.

## 2023-08-19 NOTE — DISCHARGE SUMMARY
[facility]  Hospitalist Discharge Summary      Date of Admission:  8/12/2023  Date of Discharge:  8/19/2023  Discharging Provider: Semaj Murillo DO  Discharge Service: Hospitalist Service        Follow-ups Needed After Discharge   Follow-up Appointments     Follow-up and recommended labs and tests       Follow up with primary care provider, Jessi Concepcion, within 7 days   for hospital follow- up. Re-assess RUE superficial thrombophlebitis.   Follow up with plastics as directed            Unresulted Labs Ordered in the Past 30 Days of this Admission       Date and Time Order Name Status Description    8/16/2023  3:42 PM Anaerobic Bacterial Culture Routine Preliminary         These results will be followed up by Hospitalist results pool    Discharge Disposition   Discharged to home  Condition at discharge: Stable      Hospital Course   43-year-old female with history of mood disorder and recent diagnosis of breast cancer presents with right breast cellulitis 8 weeks postop bilateral mastectomy with tissue expanders.        Right breast cellulitis:  History left sided breast cancer and underwent bilateral mastectomy with tissue expanders 5/31/23. Had postop course complicated by wound dehiscence over the right breast surgical site that required additional stitching and received short course of Bactrim 1 month prior to presentation.  Stitches removed about 3 weeks ago.    Presented 8/12/2023 with sepsis and cellulitis right breast.   CT showed small fluid collections surrounding bilateral breast tissue expanders, which could be sterile or superinfected. Surrounding inflammatory fat stranding overlying cutaneous thickening may reflect an associated cellulitis versus resolving postsurgical change.  Patient underwent ultrasound-guided puncture and aspiration of fluid surrounding the right breast implant/tissue expander 8/16/23.   Aspirate cultures growing serratia marcescens   -- Patient  improved on IV zosyn while inpatient.  -- Peripheral blood cultures remain no growth to date  -- Plan discharge home on Bactrim until time of next surgery plan for September.  Patient has been given Rx of Bactrim for the next 37 days to provide supply until 9/25/2023.  ID has ordered BMP and CBC every 2 weeks until surgery, first check 8/25/2023.  -- Currently patient will have a 1 stage procedure with removal of expanders and tissue flap rather than prosthetic breast implants.  -- ID recommends IV cefepime in the hospital at time of next surgery to be followed by short duration oral antibiotic after discharge       RUE superficial thrombophlebitis:  US obtained due to asymmetric RUE swelling at previous IV site  US showed no DVT but there is evidence of superficial thrombophlebitis involving the right cephalic vein from the proximal forearm to the distal humerus   -- Continue home ibuprofen as needed for pain and swelling  -- Recommend right upper extremity elevation with cold and warm compresses per patient comfort  -- Outpatient PCP follow-up       History of left sided breast cancer s/p bilat mastectomy 5/31/23:   --Continue home tamoxifen           Mood disorder: Continue home Zoloft and as needed hydroxyzine           Incidental finding indeterminate 3.5 mm nodule along the right minor fissure favoring a benign fissural lymph node.    --Will need outpatient follow-up with oncology           Consultations This Hospital Stay   PHARMACY TO DOSE VANCO  SURGERY GENERAL IP CONSULT  PHARMACY TO DOSE VANCO  PLASTIC SURGERY IP CONSULT  INFECTIOUS DISEASES IP CONSULT  INTERVENTIONAL RADIOLOGY ADULT/PEDS IP CONSULT    Code Status   Full Code    Time Spent on this Encounter   I, Semaj Murillo DO, personally saw the patient today and spent greater than 30 minutes discharging this patient.       Semaj Murillo DO  [place] [address]  [address] [facility] [address]  Phone:  [phone]  Fax: [phone]  ______________________________________________________________________    Physical Exam   Vital Signs: Temp: 98.4  F (36.9  C) Temp src: Oral BP: 135/67 Pulse: 61   Resp: 18 SpO2: 99 % O2 Device: None (Room air)    Weight: 141 lbs 1.6 oz    General: NAD  HEENT: Without congestion or inflammation  RESPIRATORY: Respirations nonlabored  CARDIOVASCULAR: No le edema bilat.  MUSCULOSKELETAL: RUE with swelling and erythema related to PIV site  NEUROLOGIC: No focal arm or leg weakness, speech is clear    Primary Care Physician   Jessi Concepcion    Discharge Orders      Basic metabolic panel  (Ca, Cl, CO2, Creat, Gluc, K, Na, BUN)     Reason for your hospital stay    Breast cellulitis     Follow-up and recommended labs and tests     Follow up with primary care provider, Jessi Concepcion, within 7 days for hospital follow- up. Re-assess RUE superficial thrombophlebitis.   Follow up with plastics as directed     Activity    Your activity upon discharge: activity as tolerated     Discharge Instructions    Recommend right upper extremity elevation, warm or cool compresses, and ibuprofen as needed for RUE superficial thrombophlebitis.     Diet    Follow this diet upon discharge: Orders Placed This Encounter      Combination Diet Regular Diet     CBC with platelets and differential     \    Discharge Medications   Current Discharge Medication List        START taking these medications    Details   sulfamethoxazole-trimethoprim (BACTRIM DS) 800-160 MG tablet Take 1 tablet by mouth 2 times daily  Qty: 74 tablet, Refills: 0    Associated Diagnoses: Cellulitis of right breast           CONTINUE these medications which have NOT CHANGED    Details   acetaminophen (TYLENOL) 500 MG tablet Take 500-1,000 mg by mouth every 6 hours as needed for mild pain      hydrOXYzine (ATARAX) 10 MG tablet Take 1 tablet (10 mg) by mouth 3 times daily as needed for anxiety  Qty: 90 tablet, Refills: 0    Associated  Diagnoses: GEOVANY (generalized anxiety disorder)      ibuprofen (ADVIL/MOTRIN) 400 MG tablet Take 400 mg by mouth every 6 hours as needed for moderate pain      sertraline (ZOLOFT) 100 MG tablet Take 1 tablet (100 mg) by mouth daily  Qty: 90 tablet, Refills: 1    Associated Diagnoses: GEOVANY (generalized anxiety disorder); Depression with anxiety      tamoxifen (NOLVADEX) 20 MG tablet Take 1 tablet (20 mg) by mouth daily  Qty: 30 tablet, Refills: 0    Associated Diagnoses: Malignant neoplasm of upper-outer quadrant of left breast in female, estrogen receptor positive (H)      vitamin C (ASCORBIC ACID) 1000 MG TABS Take 1,000 mg by mouth daily      Vitamin D3 (CHOLECALCIFEROL) 25 mcg (1000 units) tablet Take 25 mcg by mouth daily      Zinc Sulfate (ZINC 15 PO) Take 1 capsule by mouth daily           Allergies   No Known Allergies

## 2023-08-19 NOTE — PLAN OF CARE
Goal Outcome Evaluation:      Plan of Care Reviewed With: patient    Overall Patient Progress: improvingOverall Patient Progress: improving    Outcome Evaluation: Pt c/o low back pain. PRN Ibuprofen and Hydroxyzine given. IV abx given.    Up independently. In good spirits, ready for next steps.      Problem: Plan of Care - These are the overarching goals to be used throughout the patient stay.    Goal: Optimal Comfort and Wellbeing  Intervention: Monitor Pain and Promote Comfort  Recent Flowsheet Documentation  Taken 8/18/2023 1741 by Cesar Campa, RN  Pain Management Interventions:   medication (see MAR)   pain management plan reviewed with patient/caregiver   therapeutic presence

## 2023-08-21 ENCOUNTER — TELEPHONE (OUTPATIENT)
Dept: FAMILY MEDICINE | Facility: CLINIC | Age: 43
End: 2023-08-21
Payer: COMMERCIAL

## 2023-08-21 ENCOUNTER — MYC MEDICAL ADVICE (OUTPATIENT)
Dept: PLASTIC SURGERY | Facility: AMBULATORY SURGERY CENTER | Age: 43
End: 2023-08-21
Payer: COMMERCIAL

## 2023-08-21 NOTE — TELEPHONE ENCOUNTER
Patient was hospitalized on 08/12/2023 through 8/19/2023.     Note for work written and sent via [employer] with no restrictions. Patient was also encouraged to minimize lifting anything greater than 10lbs if possible.     Keiry Garza RN on 8/21/2023 at 4:22 PM

## 2023-08-21 NOTE — TELEPHONE ENCOUNTER
Reason for Call:  Appointment Request    Patient requesting this type of appt:  Hospital/ED Follow-Up     Requested provider:  any provider    Reason patient unable to be scheduled: Not within requested timeframe    When does patient want to be seen/preferred time: 3-7 days    Comments: Please call for hospital follow up     [facility] or would you prefer to receive a phone call?:   Patient would prefer a phone call   Okay to leave a detailed message?: Yes at Cell number on file:    Telephone Information:   Mobile [phone]       Call taken on 8/21/2023 at 10:36 AM by Hernan Young

## 2023-08-21 NOTE — LETTER
[facility] PLASTIC SURGERY [facility]INIC [place] [address] [facility], [address]  [facility] [address]  [phone]          August 21, 2023    RE:  Zainab Bolton                                                                                                                                                       [id number] [facility] [address]            To whom it may concern:    Zainab Bolton was hospitalized from 08/12/2023 through 08/19/2023. She  may return to work with no restrictions at this time. Please do allow frequent breaks as needed and try to minimize lifting anything greater than 10 pounds when possible.       Sincerely,    Basil Aguilar MD

## 2023-08-21 NOTE — TELEPHONE ENCOUNTER
Pt does not have PCP at [facility]  Currently the providers taking new patients are scheduling out to week of 9/18    Sending to Dr Huston, Dr Jones [facility] [place] to see if okay to use same day or approval required slot to get pt in sooner

## 2023-08-21 NOTE — TELEPHONE ENCOUNTER
Spoke to patient- she sees Dr Concepcion at [facility]  No availability at [facility] until week of 9/18 so will send to [facility] to follow up with patient to schedule hospital follow up

## 2023-08-22 ENCOUNTER — PATIENT OUTREACH (OUTPATIENT)
Dept: CARE COORDINATION | Facility: CLINIC | Age: 43
End: 2023-08-22
Payer: COMMERCIAL

## 2023-08-22 NOTE — PROGRESS NOTES
"Clinic Care Coordination Contact  [facility]: Post-Discharge Note  SITUATION                                                      Admission:    Admission Date: 08/12/23   Reason for Admission: Chills    Fever    Breast Problem  Discharge:   Discharge Date: 08/19/23  Discharge Diagnosis: Right breast cellulitis    BACKGROUND                                                      Per hospital discharge summary and inpatient provider notes:43-year-old female with history of mood disorder and recent diagnosis of breast cancer presents with right breast cellulitis 8 weeks postop bilateral mastectomy with tissue expanders.        Right breast cellulitis:  History left sided breast cancer and underwent bilateral mastectomy with tissue expanders 5/31/23. Had postop course complicated by wound dehiscence over the right breast surgical site that required additional stitching and received short course of Bactrim 1 month prior to presentation.  Stitches removed about 3 weeks ago.    Presented 8/12/2023 with sepsis and cellulitis right breast.   CT showed small fluid collections surrounding bilateral breast tissue expanders, which could be sterile or superinfected. Surrounding inflammatory fat stranding overlying cutaneous thickening may reflect an associated cellulitis versus resolving postsurgical change.  Patient underwent ultrasound-guided puncture and aspiration of fluid surrounding the right breast implant/tissue expander 8/16/23.       ASSESSMENT           Discharge Assessment  How are you doing now that you are home?: \" I am doing much better \"  How are your symptoms? (Red Flag symptoms escalate to triage hotline per guidelines): Improved  Do you feel your condition is stable enough to be safe at home until your provider visit?: Yes  Does the patient have their discharge instructions? : Yes  Does the patient have questions regarding their discharge instructions? : No  Were you started on any new medications or were " there changes to any of your previous medications? : Yes  Does the patient have all of their medications?: Yes  Do you have questions regarding any of your medications? : No  Do you have all of your needed medical supplies or equipment (DME)?  (i.e. oxygen tank, CPAP, cane, etc.): Yes  Discharge follow-up appointment scheduled within 14 calendar days? : Yes  Discharge Follow Up Appointment Date: 08/25/23  Discharge Follow Up Appointment Scheduled with?: Specialty Care Provider    Post-op ([facility] CTA Only)  If the patient had a surgery or procedure, do they have any questions for a nurse?: No             PLAN                                                      Outpatient Plan: Follow-up Appointments     Follow-up and recommended labs and tests       Follow up with primary care provider, Jessi Concepcion, within 7 days   for hospital follow- up. Re-assess RUE superficial thrombophlebitis.   Follow up with plastics as directed      Future Appointments   Date Time Provider Department Center   8/25/2023  8:00 AM [facility] LAB [facility]   8/25/2023  8:30 AM [facility]   8/29/2023  9:30 AM Jessi Concepcion MD [facility]   9/6/2023  2:00 PM [facility] CHEMO LAB DRAW [facility]   9/6/2023  2:30 PM Candida Espinosa MD [facility]   9/6/2023  3:00 PM [facility] CHEMO CHAIR [facility]   9/8/2023  8:00 AM [facility] LAB [facility]   9/22/2023  7:45 AM [facility] LAB [facility]   10/26/2023  3:30 PM Lisa Kearns PA-C [facility]         For any urgent concerns, please contact our 24 hour nurse triage line: [phone] ([phone])         David Haney

## 2023-08-22 NOTE — TELEPHONE ENCOUNTER
Call placed to Patient.  No answer.  Left message with call back number for Patient to return call.  Cuate Segal RN

## 2023-08-22 NOTE — TELEPHONE ENCOUNTER
Noted. Patient called back. RN scheduled appt for next week with Dr. Concepcion.  Heidi Jason, CONNOR on 8/22/2023 at 9:48 AM

## 2023-08-23 LAB — BACTERIA ASPIRATE CULT: NORMAL

## 2023-08-24 NOTE — TELEPHONE ENCOUNTER
RNCC: Bren Gandara    Surgery is scheduled with Dr. Jd Aaron & Dr. Andriy Santoyo  Date of surgery: 9/25   Surgery location: [facility]    H&P to be completed by: PAC    Pre-surgical appointments on 9/12:  PAC visit  Consult with Dr. Aaron    CTA completed 5/17    Post-op: 10/10 with Emani Serrano PA-C, [facility]    Surgery arrival time/start time provided to patient. PAC to provide NPO instructions as well as times.   5:30 AM arrival, 7:30 AM start time.     Confirmed with patient via [employer].    Patient questions/concerns routed to clinic: N/A    Surgery packet to be sent via US mail or via [employer] - pending patient preference  __    Ludy Louis, Senior Perioperative Coordinator, on 8/24/2023 at 2:49 PM  P: [phone]

## 2023-08-25 ENCOUNTER — ANESTHESIA EVENT (OUTPATIENT)
Dept: SURGERY | Facility: HOSPITAL | Age: 43
DRG: 909 | End: 2023-08-25
Payer: COMMERCIAL

## 2023-08-25 ENCOUNTER — ANESTHESIA (OUTPATIENT)
Dept: SURGERY | Facility: HOSPITAL | Age: 43
DRG: 909 | End: 2023-08-25
Payer: COMMERCIAL

## 2023-08-25 ENCOUNTER — HOSPITAL ENCOUNTER (OUTPATIENT)
Facility: HOSPITAL | Age: 43
Discharge: HOME | DRG: 909 | End: 2023-08-25
Attending: PLASTIC SURGERY | Admitting: PLASTIC SURGERY
Payer: COMMERCIAL

## 2023-08-25 ENCOUNTER — LAB (OUTPATIENT)
Dept: LAB | Facility: CLINIC | Age: 43
End: 2023-08-25
Payer: COMMERCIAL

## 2023-08-25 VITALS
WEIGHT: 138.9 LBS | BODY MASS INDEX: 24.61 KG/M2 | OXYGEN SATURATION: 97 % | TEMPERATURE: 98.1 F | HEART RATE: 84 BPM | DIASTOLIC BLOOD PRESSURE: 58 MMHG | RESPIRATION RATE: 20 BRPM | SYSTOLIC BLOOD PRESSURE: 108 MMHG

## 2023-08-25 DIAGNOSIS — N61.0 CELLULITIS OF RIGHT BREAST: ICD-10-CM

## 2023-08-25 LAB
ANION GAP SERPL CALCULATED.3IONS-SCNC: 11 MMOL/L (ref 7–15)
BASOPHILS # BLD AUTO: 0.1 10E3/UL (ref 0–0.2)
BASOPHILS NFR BLD AUTO: 1 %
BUN SERPL-MCNC: 17.9 MG/DL (ref 6–20)
CALCIUM SERPL-MCNC: 9 MG/DL (ref 8.6–10)
CHLORIDE SERPL-SCNC: 103 MMOL/L (ref 98–107)
CREAT SERPL-MCNC: 0.88 MG/DL (ref 0.51–0.95)
DEPRECATED HCO3 PLAS-SCNC: 26 MMOL/L (ref 22–29)
EOSINOPHIL # BLD AUTO: 0.5 10E3/UL (ref 0–0.7)
EOSINOPHIL NFR BLD AUTO: 7 %
ERYTHROCYTE [DISTWIDTH] IN BLOOD BY AUTOMATED COUNT: 12.9 % (ref 10–15)
GFR SERPL CREATININE-BSD FRML MDRD: 82 ML/MIN/1.73M2
GLUCOSE SERPL-MCNC: 87 MG/DL (ref 70–99)
GRAM STAIN RESULT: NORMAL
HCT VFR BLD AUTO: 37.4 % (ref 35–47)
HGB BLD-MCNC: 12.9 G/DL (ref 11.7–15.7)
IMM GRANULOCYTES # BLD: 0 10E3/UL
IMM GRANULOCYTES NFR BLD: 0 %
LYMPHOCYTES # BLD AUTO: 2.2 10E3/UL (ref 0.8–5.3)
LYMPHOCYTES NFR BLD AUTO: 29 %
MCH RBC QN AUTO: 30.5 PG (ref 26.5–33)
MCHC RBC AUTO-ENTMCNC: 34.5 G/DL (ref 31.5–36.5)
MCV RBC AUTO: 88 FL (ref 78–100)
MONOCYTES # BLD AUTO: 0.5 10E3/UL (ref 0–1.3)
MONOCYTES NFR BLD AUTO: 6 %
NEUTROPHILS # BLD AUTO: 4.3 10E3/UL (ref 1.6–8.3)
NEUTROPHILS NFR BLD AUTO: 57 %
PLATELET # BLD AUTO: 370 10E3/UL (ref 150–450)
POTASSIUM SERPL-SCNC: 4.3 MMOL/L (ref 3.4–5.3)
RBC # BLD AUTO: 4.23 10E6/UL (ref 3.8–5.2)
SODIUM SERPL-SCNC: 140 MMOL/L (ref 136–145)
WBC # BLD AUTO: 7.6 10E3/UL (ref 4–11)

## 2023-08-25 PROCEDURE — 250N000009 HC RX 250: Performed by: STUDENT IN AN ORGANIZED HEALTH CARE EDUCATION/TRAINING PROGRAM

## 2023-08-25 PROCEDURE — 250N000013 HC RX MED GY IP 250 OP 250 PS 637: Performed by: ANESTHESIOLOGY

## 2023-08-25 PROCEDURE — 999N000141 HC STATISTIC PRE-PROCEDURE NURSING ASSESSMENT: Performed by: PLASTIC SURGERY

## 2023-08-25 PROCEDURE — 360N000076 HC SURGERY LEVEL 3, PER MIN: Performed by: PLASTIC SURGERY

## 2023-08-25 PROCEDURE — 11971 RMVL TIS XPNDR WO INSJ IMPLT: CPT | Mod: 50 | Performed by: PLASTIC SURGERY

## 2023-08-25 PROCEDURE — 250N000011 HC RX IP 250 OP 636: Performed by: PLASTIC SURGERY

## 2023-08-25 PROCEDURE — 250N000009 HC RX 250: Performed by: PLASTIC SURGERY

## 2023-08-25 PROCEDURE — 258N000003 HC RX IP 258 OP 636: Performed by: ANESTHESIOLOGY

## 2023-08-25 PROCEDURE — 87077 CULTURE AEROBIC IDENTIFY: CPT | Performed by: PLASTIC SURGERY

## 2023-08-25 PROCEDURE — 370N000017 HC ANESTHESIA TECHNICAL FEE, PER MIN: Performed by: PLASTIC SURGERY

## 2023-08-25 PROCEDURE — 36415 COLL VENOUS BLD VENIPUNCTURE: CPT

## 2023-08-25 PROCEDURE — 250N000011 HC RX IP 250 OP 636: Performed by: STUDENT IN AN ORGANIZED HEALTH CARE EDUCATION/TRAINING PROGRAM

## 2023-08-25 PROCEDURE — 87070 CULTURE OTHR SPECIMN AEROBIC: CPT | Performed by: PLASTIC SURGERY

## 2023-08-25 PROCEDURE — 710N000012 HC RECOVERY PHASE 2, PER MINUTE: Performed by: PLASTIC SURGERY

## 2023-08-25 PROCEDURE — 250N000011 HC RX IP 250 OP 636: Performed by: ANESTHESIOLOGY

## 2023-08-25 PROCEDURE — 272N000001 HC OR GENERAL SUPPLY STERILE: Performed by: PLASTIC SURGERY

## 2023-08-25 PROCEDURE — 250N000025 HC SEVOFLURANE, PER MIN: Performed by: PLASTIC SURGERY

## 2023-08-25 PROCEDURE — 258N000003 HC RX IP 258 OP 636: Performed by: STUDENT IN AN ORGANIZED HEALTH CARE EDUCATION/TRAINING PROGRAM

## 2023-08-25 PROCEDURE — 87205 SMEAR GRAM STAIN: CPT | Performed by: PLASTIC SURGERY

## 2023-08-25 PROCEDURE — 80048 BASIC METABOLIC PNL TOTAL CA: CPT

## 2023-08-25 PROCEDURE — 87075 CULTR BACTERIA EXCEPT BLOOD: CPT | Performed by: PLASTIC SURGERY

## 2023-08-25 PROCEDURE — 85025 COMPLETE CBC W/AUTO DIFF WBC: CPT

## 2023-08-25 PROCEDURE — 710N000009 HC RECOVERY PHASE 1, LEVEL 1, PER MIN: Performed by: PLASTIC SURGERY

## 2023-08-25 RX ORDER — ONDANSETRON 2 MG/ML
4 INJECTION INTRAMUSCULAR; INTRAVENOUS EVERY 30 MIN PRN
Status: DISCONTINUED | OUTPATIENT
Start: 2023-08-25 | End: 2023-08-25 | Stop reason: HOSPADM

## 2023-08-25 RX ORDER — SODIUM CHLORIDE, SODIUM LACTATE, POTASSIUM CHLORIDE, CALCIUM CHLORIDE 600; 310; 30; 20 MG/100ML; MG/100ML; MG/100ML; MG/100ML
INJECTION, SOLUTION INTRAVENOUS CONTINUOUS
Status: DISCONTINUED | OUTPATIENT
Start: 2023-08-25 | End: 2023-08-25 | Stop reason: HOSPADM

## 2023-08-25 RX ORDER — TRANEXAMIC ACID 100 MG/ML
INJECTION, SOLUTION INTRAVENOUS PRN
Status: DISCONTINUED | OUTPATIENT
Start: 2023-08-25 | End: 2023-08-25

## 2023-08-25 RX ORDER — FENTANYL CITRATE 50 UG/ML
INJECTION, SOLUTION INTRAMUSCULAR; INTRAVENOUS PRN
Status: DISCONTINUED | OUTPATIENT
Start: 2023-08-25 | End: 2023-08-25

## 2023-08-25 RX ORDER — HYDROMORPHONE HYDROCHLORIDE 1 MG/ML
0.2 INJECTION, SOLUTION INTRAMUSCULAR; INTRAVENOUS; SUBCUTANEOUS EVERY 5 MIN PRN
Status: DISCONTINUED | OUTPATIENT
Start: 2023-08-25 | End: 2023-08-25 | Stop reason: HOSPADM

## 2023-08-25 RX ORDER — ONDANSETRON 4 MG/1
4 TABLET, ORALLY DISINTEGRATING ORAL EVERY 30 MIN PRN
Status: DISCONTINUED | OUTPATIENT
Start: 2023-08-25 | End: 2023-08-25 | Stop reason: HOSPADM

## 2023-08-25 RX ORDER — NEOMYCIN/BACITRACIN/POLYMYXINB 3.5-400-5K
OINTMENT (GRAM) TOPICAL PRN
Status: ON HOLD | COMMUNITY
End: 2023-09-27

## 2023-08-25 RX ORDER — OXYCODONE HYDROCHLORIDE 5 MG/1
5 TABLET ORAL
Status: COMPLETED | OUTPATIENT
Start: 2023-08-25 | End: 2023-08-25

## 2023-08-25 RX ORDER — OXYCODONE HYDROCHLORIDE 5 MG/1
10 TABLET ORAL
Status: DISCONTINUED | OUTPATIENT
Start: 2023-08-25 | End: 2023-08-25 | Stop reason: HOSPADM

## 2023-08-25 RX ORDER — CEFAZOLIN SODIUM/WATER 2 G/20 ML
2 SYRINGE (ML) INTRAVENOUS
Status: COMPLETED | OUTPATIENT
Start: 2023-08-25 | End: 2023-08-25

## 2023-08-25 RX ORDER — ACETAMINOPHEN 325 MG/1
975 TABLET ORAL ONCE
Status: COMPLETED | OUTPATIENT
Start: 2023-08-25 | End: 2023-08-25

## 2023-08-25 RX ORDER — ACETAMINOPHEN 160 MG
TABLET,DISINTEGRATING ORAL PRN
Status: DISCONTINUED | OUTPATIENT
Start: 2023-08-25 | End: 2023-08-25 | Stop reason: HOSPADM

## 2023-08-25 RX ORDER — GINSENG 100 MG
CAPSULE ORAL PRN
Status: DISCONTINUED | OUTPATIENT
Start: 2023-08-25 | End: 2023-08-25 | Stop reason: HOSPADM

## 2023-08-25 RX ORDER — FENTANYL CITRATE 50 UG/ML
25 INJECTION, SOLUTION INTRAMUSCULAR; INTRAVENOUS
Status: DISCONTINUED | OUTPATIENT
Start: 2023-08-25 | End: 2023-08-25 | Stop reason: HOSPADM

## 2023-08-25 RX ORDER — HYDROMORPHONE HYDROCHLORIDE 1 MG/ML
0.4 INJECTION, SOLUTION INTRAMUSCULAR; INTRAVENOUS; SUBCUTANEOUS EVERY 5 MIN PRN
Status: DISCONTINUED | OUTPATIENT
Start: 2023-08-25 | End: 2023-08-25 | Stop reason: HOSPADM

## 2023-08-25 RX ORDER — MEPERIDINE HYDROCHLORIDE 25 MG/ML
12.5 INJECTION INTRAMUSCULAR; INTRAVENOUS; SUBCUTANEOUS ONCE
Status: COMPLETED | OUTPATIENT
Start: 2023-08-25 | End: 2023-08-25

## 2023-08-25 RX ORDER — LIDOCAINE 40 MG/G
CREAM TOPICAL
Status: DISCONTINUED | OUTPATIENT
Start: 2023-08-25 | End: 2023-08-25 | Stop reason: HOSPADM

## 2023-08-25 RX ORDER — MAGNESIUM HYDROXIDE 1200 MG/15ML
LIQUID ORAL PRN
Status: DISCONTINUED | OUTPATIENT
Start: 2023-08-25 | End: 2023-08-25 | Stop reason: HOSPADM

## 2023-08-25 RX ORDER — PROPOFOL 10 MG/ML
INJECTION, EMULSION INTRAVENOUS PRN
Status: DISCONTINUED | OUTPATIENT
Start: 2023-08-25 | End: 2023-08-25

## 2023-08-25 RX ORDER — GLYCOPYRROLATE 0.2 MG/ML
INJECTION, SOLUTION INTRAMUSCULAR; INTRAVENOUS PRN
Status: DISCONTINUED | OUTPATIENT
Start: 2023-08-25 | End: 2023-08-25

## 2023-08-25 RX ORDER — DEXAMETHASONE SODIUM PHOSPHATE 10 MG/ML
INJECTION, SOLUTION INTRAMUSCULAR; INTRAVENOUS PRN
Status: DISCONTINUED | OUTPATIENT
Start: 2023-08-25 | End: 2023-08-25

## 2023-08-25 RX ORDER — ONDANSETRON 2 MG/ML
INJECTION INTRAMUSCULAR; INTRAVENOUS PRN
Status: DISCONTINUED | OUTPATIENT
Start: 2023-08-25 | End: 2023-08-25

## 2023-08-25 RX ORDER — FENTANYL CITRATE 50 UG/ML
25 INJECTION, SOLUTION INTRAMUSCULAR; INTRAVENOUS EVERY 5 MIN PRN
Status: DISCONTINUED | OUTPATIENT
Start: 2023-08-25 | End: 2023-08-25 | Stop reason: HOSPADM

## 2023-08-25 RX ORDER — FENTANYL CITRATE 50 UG/ML
50 INJECTION, SOLUTION INTRAMUSCULAR; INTRAVENOUS EVERY 5 MIN PRN
Status: DISCONTINUED | OUTPATIENT
Start: 2023-08-25 | End: 2023-08-25 | Stop reason: HOSPADM

## 2023-08-25 RX ORDER — LIDOCAINE HYDROCHLORIDE 20 MG/ML
INJECTION, SOLUTION INFILTRATION; PERINEURAL PRN
Status: DISCONTINUED | OUTPATIENT
Start: 2023-08-25 | End: 2023-08-25

## 2023-08-25 RX ORDER — TRANEXAMIC ACID 100 MG/ML
INJECTION, SOLUTION INTRAVENOUS
Status: COMPLETED
Start: 2023-08-25 | End: 2023-08-25

## 2023-08-25 RX ADMIN — Medication 2 G: at 16:34

## 2023-08-25 RX ADMIN — SUGAMMADEX 200 MG: 100 INJECTION, SOLUTION INTRAVENOUS at 17:55

## 2023-08-25 RX ADMIN — FENTANYL CITRATE 50 MCG: 50 INJECTION, SOLUTION INTRAMUSCULAR; INTRAVENOUS at 18:29

## 2023-08-25 RX ADMIN — DEXAMETHASONE SODIUM PHOSPHATE 10 MG: 10 INJECTION, SOLUTION INTRAMUSCULAR; INTRAVENOUS at 16:34

## 2023-08-25 RX ADMIN — TRANEXAMIC ACID 1 G: 1 INJECTION, SOLUTION INTRAVENOUS at 17:27

## 2023-08-25 RX ADMIN — GLYCOPYRROLATE 0.4 MG: 0.2 INJECTION INTRAMUSCULAR; INTRAVENOUS at 17:01

## 2023-08-25 RX ADMIN — PHENYLEPHRINE HYDROCHLORIDE 100 MCG: 10 INJECTION INTRAVENOUS at 17:42

## 2023-08-25 RX ADMIN — ROCURONIUM BROMIDE 20 MG: 50 INJECTION, SOLUTION INTRAVENOUS at 17:08

## 2023-08-25 RX ADMIN — OXYCODONE HYDROCHLORIDE 5 MG: 5 TABLET ORAL at 18:47

## 2023-08-25 RX ADMIN — SODIUM CHLORIDE, POTASSIUM CHLORIDE, SODIUM LACTATE AND CALCIUM CHLORIDE: 600; 310; 30; 20 INJECTION, SOLUTION INTRAVENOUS at 16:50

## 2023-08-25 RX ADMIN — LIDOCAINE HYDROCHLORIDE 100 ML: 20 INJECTION, SOLUTION INFILTRATION; PERINEURAL at 16:26

## 2023-08-25 RX ADMIN — SODIUM CHLORIDE, POTASSIUM CHLORIDE, SODIUM LACTATE AND CALCIUM CHLORIDE: 600; 310; 30; 20 INJECTION, SOLUTION INTRAVENOUS at 14:43

## 2023-08-25 RX ADMIN — PHENYLEPHRINE HYDROCHLORIDE 100 MCG: 10 INJECTION INTRAVENOUS at 17:28

## 2023-08-25 RX ADMIN — ACETAMINOPHEN 975 MG: 325 TABLET ORAL at 14:43

## 2023-08-25 RX ADMIN — HYDROMORPHONE HYDROCHLORIDE 1 MG: 1 INJECTION, SOLUTION INTRAMUSCULAR; INTRAVENOUS; SUBCUTANEOUS at 17:10

## 2023-08-25 RX ADMIN — FENTANYL CITRATE 50 MCG: 50 INJECTION, SOLUTION INTRAMUSCULAR; INTRAVENOUS at 18:23

## 2023-08-25 RX ADMIN — MEPERIDINE HYDROCHLORIDE 12.5 MG: 25 INJECTION, SOLUTION INTRAMUSCULAR; INTRAVENOUS; SUBCUTANEOUS at 18:12

## 2023-08-25 RX ADMIN — FENTANYL CITRATE 100 MCG: 50 INJECTION, SOLUTION INTRAMUSCULAR; INTRAVENOUS at 16:26

## 2023-08-25 RX ADMIN — ONDANSETRON 4 MG: 2 INJECTION INTRAMUSCULAR; INTRAVENOUS at 16:32

## 2023-08-25 RX ADMIN — HYDROMORPHONE HYDROCHLORIDE 0.4 MG: 1 INJECTION, SOLUTION INTRAMUSCULAR; INTRAVENOUS; SUBCUTANEOUS at 18:36

## 2023-08-25 RX ADMIN — ROCURONIUM BROMIDE 50 MG: 50 INJECTION, SOLUTION INTRAVENOUS at 16:27

## 2023-08-25 RX ADMIN — MIDAZOLAM 2 MG: 1 INJECTION INTRAMUSCULAR; INTRAVENOUS at 16:18

## 2023-08-25 RX ADMIN — PROPOFOL 200 MG: 10 INJECTION, EMULSION INTRAVENOUS at 16:26

## 2023-08-25 ASSESSMENT — ACTIVITIES OF DAILY LIVING (ADL)
ADLS_ACUITY_SCORE: 18

## 2023-08-25 NOTE — ANESTHESIA CARE TRANSFER NOTE
Patient: Zianab Bolton    Procedure: Procedure(s):  REMOVAL, TISSUE EXPANDER, RIGHT BREAST       Diagnosis: Infection of breast implant, subsequent encounter [T85.79XD]  Diagnosis Additional Information: No value filed.    Anesthesia Type:   General     Note:    Oropharynx: oropharynx clear of all foreign objects  Level of Consciousness: awake  Oxygen Supplementation: room air    Independent Airway: airway patency satisfactory and stable  Dentition: dentition unchanged  Vital Signs Stable: post-procedure vital signs reviewed and stable  Report to RN Given: handoff report given  Patient transferred to: PACU  Comments: Patient spontaneously breathing.  Tidal volumes > 400ml.  Following commands, suctioned and extubated with [place].  RA.  To PACU, VSS, SBAR report to RN per institutional handoff policy and procedure.  Transfer of care.  Handoff Report: Identifed the Patient, Identified the Reponsible Provider, Reviewed the pertinent medical history, Discussed the surgical course, Reviewed Intra-OP anesthesia mangement and issues during anesthesia, Set expectations for post-procedure period and Allowed opportunity for questions and acknowledgement of understanding  Vitals:  Vitals Value Taken Time   /71 08/25/23 1805   Temp 36.1  C (97  F) 08/25/23 1805   Pulse 96 08/25/23 1806   Resp 18 08/25/23 1806   SpO2 97 % 08/25/23 1806   Vitals shown include unvalidated device data.    Electronically Signed By: THOMAS KLINE CRNA  August 25, 2023  6:08 PM

## 2023-08-25 NOTE — ANESTHESIA PROCEDURE NOTES
Airway       Patient location during procedure: OR       Procedure Start/Stop Times: 8/25/2023 4:28 PM  Staff -        CRNA: Ej Núñez APRN CRNA       Performed By: CRNA  Consent for Airway        Urgency: elective  Indications and Patient Condition       Indications for airway management: shantel-procedural        Final Airway Details       Final airway type: endotracheal airway       Successful airway: ETT - single  Endotracheal Airway Details        ETT size (mm): 7.0       Cuffed: yes       Cuff volume (mL): 8       Successful intubation technique: direct laryngoscopy       DL Blade Type: MAC 3       Grade View of Cords: 1       Adjucts: stylet       Position: Right       Measured from: lips       Secured at (cm): 22       Bite block used: None    Post intubation assessment        Placement verified by: capnometry, equal breath sounds and chest rise        Number of attempts at approach: 1       Number of other approaches attempted: 0       Secured with: silk tape       Ease of procedure: easy       Dentition: Intact    Medication(s) Administered   Medication Administration Time: 8/25/2023 4:28 PM

## 2023-08-25 NOTE — OP NOTE
August 25, 2023    Zainab Bolton      Preoperative diagnosis: Infected bilateral breast tissue expanders     Postoperative diagnosis: same     Procedure: Explantation bilateral breast tissue expanders      Surgeon: Basil Aguilar MD.     Assistant: Alyssa Flores CSA (there was no plastic surgery resident available to assist).     Anesthesia: General     IV fluids: 500 mL    EBL: 10 mL     Findings: Abundant amount of seropurulent fluid on the right mastectomy cavity.  There was serous fluid in the left mastectomy cavity.     Specimen: Aerobes and anaerobes culture from bilateral mastectomy cavities     Drains: A #15 Tunisian Eulogio drain on each mastectomy cavity, for a total of 2.     Disposition: Patient tolerated procedure well, she was extubated and transferred to recovery room awake and in stable condition     Indications:    Zainab is a 43 years old lady status post first stage breast reconstruction with tissue expanders in the prepectoral space after bilateral skin sparing mastectomies.  She did experience cellulitis on the right breast about 3 weeks ago that was treated with IV antibiotics.  Patient did improve clinical course with this antibiotic.  However, in the last 48 hours she have noticed recurrence of the cellulitis on the right breast and she has developed a worsening on the seroma on the right mastectomy cavity.  Furthermore, she is now experiencing early signs of cellulitis on the left breast.  Due to these conditions, patient has been scheduled for bilateral breast tissue expander explantation.    Risks include but are not limited to scarring, infection, bleeding, seroma, hematoma, need for further surgeries.  Patient has signed informed consent.     Procedure:    Patient was identified in the preoperative holding area and preoperative IV antibiotics were given to her.  She was then brought to the operating room and was placed supine on the operating room table.  After general esthesia was  obtained, her chest was prepped and draped in a sterile surgical fashion.    We then proceeded to perform elliptical incisions on the old mastectomy scars bilaterally.  Incision was deepened with electrocautery.  Once we reach the capsule on each mastectomy cavity, abundant amount of seropurulent fluid was visualized on the right mastectomy cavity.  On the left mastectomy cavity there was moderate amount of serous fluid.  Culture for aerobes and anaerobes bacteria's were taken from each mastectomy cavity.    The tissue expanders were then retrieved from each mastectomy cavity and discarded.  Copious irrigation with antibiotic solution made of 1 g of Ancef mixed with 80 mg of gentamicin was provided on each mastectomy.  Then, debridement with Betadine scrub sponge was performed on each mastectomy cavity.  This was followed by irrigation with hydrogen peroxide.  Finally more irrigation with antibiotic solution was provided on each cavity.  I found that hemostasis was appropriate.    A #15 Hebrew Eulogio drain was introduced on each mastectomy cavity and each drain was secured to the skin with 2-0 silk.    Each mastectomy opening was closed in layers.  Deep layer at the level of the capsule with 3-0 Monocryl buried interrupted stitches followed by 4-0 STRATAFIX running subcuticular stitches.    Instrument count was reported by nursing personnel as correct.  Patient tolerated procedure well and she was extubated and transferred to recovery room awake and in stable condition.    Basil Augilar MD , FACS   Diplomate American Board of Plastic Surgery  Diplomate American Board of Surgery  Adj. Assistant Professor of Surgery  Division of Plastic & Reconstructive Surgery   [facility]  Office: [phone]   8/25/2023 at 6:31 PM

## 2023-08-25 NOTE — H&P
Chief complaint:  Infected right breast tissue expander.     History of present illness:  This is a 43 year old lady who presents with a persistent infection on her right breast tissue expander.       This patient does have history of left breast cancer, status post bilateral skin sparing mastectomies with immediate prepectoral first stage breast reconstruction with tissue expanders (Allergan 450 cc full height extra projection).  Surgery took place on May 31, 2023.  Patient currently has 300 cc on her right breast tissue expander and 350 cc on her left breast tissue expander.     Patient was admitted at [facility] about a week ago with infection affecting her right breast tissue expander.  She was treated with IV antibiotics.  She also underwent drainage of a fluid collection on the right breast tissue expander.  Cultures taken showed presence of Serratia.  Infectious disease was following the patient and managing the antibiotics.    Patient did improve with course of antibiotics and the plan was to treat her for the next 4 weeks with p.o. antibiotics due to the fact that she is scheduled for second stage bilateral breast reconstruction with a BENIGNO flap on September 25.     Patient contact me yesterday because of persistent swelling on her right breast as well as recurrent erythema along the mastectomy incision.  Patient is not progressing with her p.o. antibiotics.  Therefore, she will be taken to the operating room for right breast tissue expander explantation.       Past medical history:  Past Medical History        Past Medical History:   Diagnosis Date    Anxiety 08/16/2010    Anxiety state, unspecified 1997     chronic anxiety    BRCA2 positive      Breast cancer (H) 05/19/2023    Depression      Essential hypertension 03/03/2023    Hypercholesteremia 08/16/2010    Obesity 08/16/2010    Other acne      Papanicolaou smear of cervix with low grade squamous intraepithelial lesion (LGSIL) (aka LSIL)  05/05/2015     LSIL/+ HR HPV    TOBACCO ABUSE-CONTINUOUS              Past surgical history:  Bilateral skin sparing mastectomies, bilateral first stage breast reconstruction with tissue expander, tubal ligation     Allergies:  No known drug allergies     Family history:  Father with history of stomach cancer     Social History:  Denies tobacco, denies alcohol     Review of systems:  General ROS: No complaints or constitutional symptoms  Skin: No complaints or symptoms   Hematologic/Lymphatic: No symptoms or complaints  Psychiatric: No symptoms or complaints  Endocrine: No excessive fatigue, no hypermetabolic symptoms reported  Respiratory ROS: No cough, shortness of breath, or wheezing  Cardiovascular ROS: No chest pain or dyspnea on exertion  Breast ROS: Right breast cellulitis.  Left breast is unremarkable.  Gastrointestinal ROS: No abdominal pain, nausea, diarrhea, or constipation  Musculoskeletal ROS: No recent injuries reported  Neurological ROS: No focal neurologic defects reported.       Physical exam:  Pulse 65   Temp 98.3  F (36.8  C) (Oral)   Resp 18   Wt 63 kg (138 lb 14.4 oz)   LMP 06/22/2023   SpO2 96%   BMI 24.61 kg/m      General: Alert, cooperative, appears stated age   Skin: Skin color, texture, turgor normal, no rashes or lesions   Lymphatic: No obvious adenopathy, no swelling   Eyes: No scleral icterus, pupils equal  HENT: No traumatic injury to the head or face, no gross abnormalities  Lungs: Normal respiratory effort, breath sounds equal bilaterally  Heart: Regular rate and rhythm  Breasts: Right breast with erythema and tenderness.  There is persistent swelling on this right breast and patient has now developed a small papula on her mastectomy incision.  I am concerned that the persistent fluid buildup (seroma) might spontaneously drain through this papular lesion on her mastectomy scar and the tissue expander will be exposed.  The right breast looks significantly larger than the left  breast.  Left breast is presenting erythema along the mastectomy scar, as well as at the level of the inframammary fold and medial aspect of the breast.  This is concerning for early cellulitis.  Abdomen: Soft, non-distended and non-tender to palpation  Neurologic: Grossly intact         ASSESSMENT:     This is a 43 year old lady with persistent infection on her right breast tissue expander.    She has now developed early cellulitis on the left breast tissue expander.    Patient has failed conservative management with p.o. antibiotics in order to attempt salvage of this right breast tissue expander on the reynaldo of second stage breast reconstruction with a BENIGNO flap, 4 weeks from now.  Furthermore, she is now developing cellulitis on the left breast.  This suggests that the left breast tissue expander is also compromised despite antibiotics.       PLAN:      Patient will require bilateral breast tissue expander explantation with application of Eulogio drain.    Risks include but are not limited to: persistent infection, bleeding, seroma, hematoma, inability to undergo breast reconstruction for another year, need for further surgeries.    Patient has signed consent.    Basil Aguilar MD , FACS   Diplomate American Board of Plastic Surgery  Diplomate American Board of Surgery  Adj. Assistant Professor of Surgery  Division of Plastic & Reconstructive Surgery   [facility]  Office: [phone]   8/25/2023 at 4:14 PM

## 2023-08-25 NOTE — TELEPHONE ENCOUNTER
FUTURE VISIT INFORMATION      SURGERY INFORMATION:  Date: 9/25/23  Location: uu or  Surgeon:  MARIA E Aaron MD Suszynski, Thomas M, MD   Anesthesia Type:  general with block  Procedure: Bilateral breast reconstruction with free abdominal flaps, resensation, SPY     RECORDS REQUESTED FROM:       Primary Care Provider: Jessi Concepcion MD - [facility]

## 2023-08-25 NOTE — PHARMACY-ADMISSION MEDICATION HISTORY
Pharmacist Admission Medication History    Admission medication history is complete. The information provided in this note is only as accurate as the sources available at the time of the update.    Medication reconciliation/reorder completed by provider prior to medication history? No    Information Source(s): Patient and CareEverywhere/SureScripts via in-person    Pertinent Information: Started applying Neosporin for the past couple days    Changes made to PTA medication list:  Added: Neosporin, Calcium  Deleted: Zinc  Changed: None    Medication Affordability:  Not including over the counter (OTC) medications, was there a time in the past 3 months when you did not take your medications as prescribed because of cost?: No    Allergies reviewed with patient and updates made in EHR: yes    Medication History Completed By: Yanci Michele PharmD 8/25/2023 2:28 PM    Prior to Admission medications    Medication Sig Last Dose Taking? Auth Provider Long Term End Date   acetaminophen (TYLENOL) 500 MG tablet Take 1,000 mg by mouth every 6 hours as needed for mild pain Past Month at prn Yes Reported, Patient     calcium carbonate (OS-SAM) 1500 (600 Ca) MG tablet Take 600 mg by mouth every evening 8/24/2023 at PM Yes Unknown, Entered By History     Cyanocobalamin 500 MCG TBDP Take 2 Gum by mouth every evening 8/24/2023 at PM Yes Unknown, Entered By History     hydrOXYzine (ATARAX) 10 MG tablet Take 1 tablet (10 mg) by mouth 3 times daily as needed for anxiety 8/25/2023 at AM Yes Jessi Concepcion MD     ibuprofen (ADVIL/MOTRIN) 400 MG tablet Take 400 mg by mouth every 6 hours as needed for moderate pain Past Month at prn Yes Reported, Patient     neomycin-bacitracin-polymyxin (NEOSPORIN) 5-400-5000 ointment Apply topically daily To infection on breast 8/24/2023 at am Yes Unknown, Entered By History     sertraline (ZOLOFT) 100 MG tablet Take 1 tablet (100 mg) by mouth daily 8/25/2023 at AM Yes Jessi Concepcion MD Yes     sulfamethoxazole-trimethoprim (BACTRIM DS) 800-160 MG tablet Take 1 tablet by mouth 2 times daily 8/25/2023 at AM Yes Semaj Murillo DO     tamoxifen (NOLVADEX) 20 MG tablet Take 1 tablet (20 mg) by mouth daily  Patient taking differently: Take 20 mg by mouth every evening 8/24/2023 at PM Yes Candida Espinosa MD Yes    vitamin C (ASCORBIC ACID) 1000 MG TABS Take 1,000 mg by mouth every evening Also contains 20mg zinc 8/24/2023 at PM Yes Reported, Patient     Vitamin D3 (CHOLECALCIFEROL) 25 mcg (1000 units) tablet Take 25 mcg by mouth every evening 8/24/2023 at PM Yes Reported, Patient

## 2023-08-25 NOTE — ANESTHESIA PREPROCEDURE EVALUATION
Anesthesia Pre-Procedure Evaluation    Patient: Zainab Bolton   MRN: [id number] : 1980        Procedure : Procedure(s):  REMOVAL, TISSUE EXPANDER, RIGHT BREAST          Past Medical History:   Diagnosis Date    Anxiety 2010    Anxiety state, unspecified     chronic anxiety    BRCA2 positive     Breast cancer (H) 2023    Depression     Essential hypertension 2023    Hypercholesteremia 2010    Obesity 2010    Other acne     Papanicolaou smear of cervix with low grade squamous intraepithelial lesion (LGSIL) (aka LSIL) 2015    LSIL/+ HR HPV    TOBACCO ABUSE-CONTINUOUS       Past Surgical History:   Procedure Laterality Date    BIOPSY      BIOPSY NODE SENTINEL Left 2023    Procedure: LEFT SENTINEL LYMPH NODE BIOPSY;  Surgeon: Kylee Villasenor MD;  Location: [facility] [place] OR    CONIZATION LEEP  2015    LÁZARO 2    CONIZATION LEEP N/A 2015    Procedure: CONIZATION LEEP;  Surgeon: Omayra Cunningham DO;  Location:  OR    EYE SURGERY  2016    MASTECTOMY SIMPLE Bilateral 2023    Procedure: BILATERAL MASTECTOMIES;  Surgeon: Kylee Villasenor MD;  Location: [facility] [place] OR    RECONSTRUCT BREAST, IMPLANT PROSTHESIS, COMBINED Bilateral 2023    Procedure: RECONSTRUCTION, BREAST, BILATERAL TISSUE EXPANDERS;  Surgeon: Basil Aguilar MD;  Location: [facility] [place] OR    [facility] LIGATE FALLOPIAN TUBE  10/03/2003      No Known Allergies   Social History     Tobacco Use    Smoking status: Former     Packs/day: 1.00     Years: 10.00     Pack years: 10.00     Types: Cigarettes     Quit date: 3/28/2023     Years since quittin.4    Smokeless tobacco: Never    Tobacco comments:     since age 12, but stopped with each baby   Substance Use Topics    Alcohol use: Yes     Comment: 1-2  every 2-3 months if any      Wt Readings from Last 1 Encounters:   23 63 kg (138 lb 14.4 oz)        Anesthesia Evaluation            ROS/MED HX  ENT/Pulmonary:  - neg  pulmonary ROS     Neurologic:  - neg neurologic ROS     Cardiovascular:     (+)  hypertension- -   -  - -                                      METS/Exercise Tolerance:     Hematologic:  - neg hematologic  ROS     Musculoskeletal:  - neg musculoskeletal ROS     GI/Hepatic:  - neg GI/hepatic ROS     Renal/Genitourinary:  - neg Renal ROS     Endo:  - neg endo ROS     Psychiatric/Substance Use:  - neg psychiatric ROS     Infectious Disease: Comment: Breast implant infection      Malignancy:       Other:            Physical Exam    Airway  airway exam normal      Mallampati: I   TM distance: > 3 FB   Neck ROM: full   Mouth opening: > 3 cm    Respiratory Devices and Support         Dental       (+) Minor Abnormalities - some fillings, tiny chips      Cardiovascular   cardiovascular exam normal          Pulmonary   pulmonary exam normal                OUTSIDE LABS:  CBC:   Lab Results   Component Value Date    WBC 7.6 08/25/2023    WBC 7.0 08/18/2023    HGB 12.9 08/25/2023    HGB 11.8 08/18/2023    HCT 37.4 08/25/2023    HCT 36.2 08/18/2023     08/25/2023     08/18/2023     BMP:   Lab Results   Component Value Date     08/25/2023     08/18/2023    POTASSIUM 4.3 08/25/2023    POTASSIUM 4.0 08/18/2023    CHLORIDE 103 08/25/2023    CHLORIDE 108 (H) 08/18/2023    CO2 26 08/25/2023    CO2 27 08/18/2023    BUN 17.9 08/25/2023    BUN 8.8 08/18/2023    CR 0.88 08/25/2023    CR 0.78 08/18/2023    GLC 87 08/25/2023    GLC 98 08/18/2023     COAGS: No results found for: PTT, INR, FIBR  POC:   Lab Results   Component Value Date    BGM 76 03/21/2019    HCG neg 07/16/2015     HEPATIC:   Lab Results   Component Value Date    ALBUMIN 3.8 08/18/2023    PROTTOTAL 6.4 08/18/2023    ALT 13 08/18/2023    AST 16 08/18/2023    ALKPHOS 53 08/18/2023    BILITOTAL 0.3 08/18/2023     OTHER:   Lab Results   Component Value Date    PH 7.5 (H) 03/05/2002    LACT 0.8 08/12/2023    A1C 5.4 05/11/2016    SAM 9.0 08/25/2023    TSH  2.89 03/21/2019       Anesthesia Plan    ASA Status:  2    NPO Status:  NPO Appropriate    Anesthesia Type: General.     - Airway: LMA   Induction: Intravenous.   Maintenance: TIVA.        Consents    Anesthesia Plan(s) and associated risks, benefits, and realistic alternatives discussed. Questions answered and patient/representative(s) expressed understanding.     - Discussed:     - Discussed with:  Patient      - Extended Intubation/Ventilatory Support Discussed: No.      - Patient is DNR/DNI Status: No          Postoperative Care    Pain management: Multi-modal analgesia.   PONV prophylaxis: Ondansetron (or other 5HT-3), Dexamethasone or Solumedrol     Comments:    Other Comments: GA - LMA  TIVA with propofol             Vanna Wolf MD

## 2023-08-25 NOTE — OR NURSING
Contacted Dr Salazar, pt shivering despite warmer and normal temperature. Orders received for Demerol

## 2023-08-26 NOTE — ANESTHESIA POSTPROCEDURE EVALUATION
Patient: Zainab Bolton    Procedure: Procedure(s):  REMOVAL, TISSUE EXPANDER, BILATERAL BREAST       Anesthesia Type:  General    Note:  Disposition: Outpatient   Postop Pain Control: Uneventful            Sign Out: Well controlled pain   PONV: No   Neuro/Psych: Uneventful            Sign Out: Acceptable/Baseline neuro status   Airway/Respiratory: Uneventful            Sign Out: Acceptable/Baseline resp. status   CV/Hemodynamics: Uneventful            Sign Out: Acceptable CV status; No obvious hypovolemia; No obvious fluid overload   Other NRE: NONE   DID A NON-ROUTINE EVENT OCCUR? No           Last vitals:  Vitals Value Taken Time   /58 08/25/23 1846   Temp 36.1  C (97  F) 08/25/23 1805   Pulse 85 08/25/23 1846   Resp 9 08/25/23 1846   SpO2 98 % 08/25/23 1846   Vitals shown include unvalidated device data.    Electronically Signed By: Vanessa Salazar MD  August 25, 2023  10:03 PM

## 2023-08-28 LAB — BACTERIA TISS BX CULT: ABNORMAL

## 2023-08-29 ENCOUNTER — OFFICE VISIT (OUTPATIENT)
Dept: PLASTIC SURGERY | Facility: AMBULATORY SURGERY CENTER | Age: 43
End: 2023-08-29
Payer: COMMERCIAL

## 2023-08-29 ENCOUNTER — OFFICE VISIT (OUTPATIENT)
Dept: FAMILY MEDICINE | Facility: CLINIC | Age: 43
End: 2023-08-29
Payer: COMMERCIAL

## 2023-08-29 VITALS
SYSTOLIC BLOOD PRESSURE: 126 MMHG | RESPIRATION RATE: 20 BRPM | HEART RATE: 90 BPM | BODY MASS INDEX: 24.68 KG/M2 | WEIGHT: 139.3 LBS | OXYGEN SATURATION: 99 % | DIASTOLIC BLOOD PRESSURE: 88 MMHG | TEMPERATURE: 98.3 F

## 2023-08-29 DIAGNOSIS — Z15.01 BRCA2 POSITIVE: ICD-10-CM

## 2023-08-29 DIAGNOSIS — Z98.890 HISTORY OF BREAST RECONSTRUCTION: Primary | ICD-10-CM

## 2023-08-29 DIAGNOSIS — C50.112 MALIGNANT NEOPLASM OF CENTRAL PORTION OF LEFT BREAST IN FEMALE, ESTROGEN RECEPTOR POSITIVE (H): Primary | ICD-10-CM

## 2023-08-29 DIAGNOSIS — Z15.09 BRCA2 POSITIVE: ICD-10-CM

## 2023-08-29 DIAGNOSIS — Z17.0 MALIGNANT NEOPLASM OF CENTRAL PORTION OF LEFT BREAST IN FEMALE, ESTROGEN RECEPTOR POSITIVE (H): Primary | ICD-10-CM

## 2023-08-29 DIAGNOSIS — F41.1 GAD (GENERALIZED ANXIETY DISORDER): ICD-10-CM

## 2023-08-29 DIAGNOSIS — F33.41 MAJOR DEPRESSIVE DISORDER, RECURRENT EPISODE, IN PARTIAL REMISSION (H): ICD-10-CM

## 2023-08-29 PROCEDURE — 99495 TRANSJ CARE MGMT MOD F2F 14D: CPT | Performed by: FAMILY MEDICINE

## 2023-08-29 PROCEDURE — 99024 POSTOP FOLLOW-UP VISIT: CPT | Performed by: PLASTIC SURGERY

## 2023-08-29 RX ORDER — HYDROCODONE BITARTRATE AND ACETAMINOPHEN 5; 325 MG/1; MG/1
TABLET ORAL
Status: ON HOLD | COMMUNITY
Start: 2023-08-25 | End: 2023-09-27

## 2023-08-29 RX ORDER — BUSPIRONE HYDROCHLORIDE 10 MG/1
10 TABLET ORAL 2 TIMES DAILY
Qty: 60 TABLET | Refills: 1 | Status: SHIPPED | OUTPATIENT
Start: 2023-08-29 | End: 2024-01-03

## 2023-08-29 RX ORDER — ONDANSETRON 4 MG/1
TABLET, FILM COATED ORAL
COMMUNITY
Start: 2023-08-25

## 2023-08-29 NOTE — TELEPHONE ENCOUNTER
FUTURE VISIT INFORMATION        SURGERY INFORMATION:  Date: 9/25/23  Location: uu or  Surgeon:  MARIA E Aaron MD Suszynski, Thomas M, MD   Anesthesia Type:  general with block  Procedure: Bilateral breast reconstruction with free abdominal flaps, resensation, SPY      RECORDS REQUESTED FROM:         Primary Care Provider: Jessi Concepcion MD - [facility]

## 2023-08-29 NOTE — LETTER
8/29/2023         RE: Zainab Bolton  [id number] [address] [address]        Dear Colleague,    Thank you for referring your patient, Zainab Bolton, to the [facility] [place]. Please see a copy of my visit note below.    Avis is a 43 years old lady status post bilateral breast tissue expander explantation secondary to persistent infection.  She is 4 days out from surgery.  She is feeling better.    At the physical exam, all incisions are healing well, no evidence of surgical site infection, wound dehiscence or necrosis.  There is no evidence of seroma or hematoma.  Eulogio drains with serosanguineous drainage.    Culture from mastectomy site came back positive for Serratia.  This bacteria is sensitive to Bactrim which the patient is already taking.    Plan: Continue with antibiotics, may have regular showers, continue drain care, continue compression dressing and follow-up with me in 1 week for possible drain removal.    Basil Aguilar MD , FACS   Diplomate American Board of Plastic Surgery  Diplomate American Board of Surgery  Adj. Assistant Professor of Surgery  Division of Plastic & Reconstructive Surgery   [facility]  Office: [phone]   8/29/2023 at 4:23 PM       Again, thank you for allowing me to participate in the care of your patient.        Sincerely,        Basil Aguilar MD

## 2023-08-29 NOTE — PROGRESS NOTES
Avis is a 43 years old lady status post bilateral breast tissue expander explantation secondary to persistent infection.  She is 4 days out from surgery.  She is feeling better.    At the physical exam, all incisions are healing well, no evidence of surgical site infection, wound dehiscence or necrosis.  There is no evidence of seroma or hematoma.  Eulogio drains with serosanguineous drainage.    Culture from mastectomy site came back positive for Serratia.  This bacteria is sensitive to Bactrim which the patient is already taking.    Plan: Continue with antibiotics, may have regular showers, continue drain care, continue compression dressing and follow-up with me in 1 week for possible drain removal.    Basil Aguilar MD , FACS   Diplomate American Board of Plastic Surgery  Diplomate American Board of Surgery  Adj. Assistant Professor of Surgery  Division of Plastic & Reconstructive Surgery   [facility]  Office: [phone]   8/29/2023 at 4:23 PM

## 2023-08-29 NOTE — TELEPHONE ENCOUNTER
Confirmed all with patient via [employer].     Moved PAC visit and consult to 9/13 per request.    Sent packed via [employer].    __    Ludy Louis, Senior Perioperative Coordinator, on 8/29/2023 at 9:35 AM  P: [phone]

## 2023-08-29 NOTE — PATIENT INSTRUCTIONS
Start the buspar 10mg in the a.m. x one week, then increase to twice a day     Continue the zoloft     Continue the as needed hydroxyzine - ok to take 20mg if needed    I will contact Daksha Daugherty

## 2023-08-29 NOTE — PROGRESS NOTES
"  Assessment & Plan     Malignant neoplasm of central portion of left breast in female, estrogen receptor positive (H)  Expanders were removed   Has upcoming appointment with plastic surgeon to discuss next steps  No physical signs of remaining infection. On antibiotics.       GEOVANY (generalized anxiety disorder)  Feeling more anxious given recent set backs. Zoloft is helping but would like to start buspar now. Will taper up and restart counseling - I sent a message to her counselor   - busPIRone (BUSPAR) 10 MG tablet; Take 1 tablet (10 mg) by mouth 2 times daily    Major depressive disorder, recurrent episode, in partial remission (H)  Discussed. Continue zoloft. It is helping     BRCA2 positive       MED REC REQUIRED  Post Medication Reconciliation Status: discharge medications reconciled and changed, per note/orders      Jessi Concepcion MD  [facility] ARACELI Albarran is a 43 year old, presenting for the following health issues:  Hospital F/U      8/29/2023     9:20 AM   Additional Questions   Roomed by LUX Lucas   Accompanied by self       HPI         8/22/2023    10:56 AM   Post Discharge Outreach   Admission Date 8/12/2023   Reason for Admission Chills    Fever    Breast Problem   Discharge Date 8/19/2023   Discharge Diagnosis Right breast cellulitis   How are you doing now that you are home? \" I am doing much better \"   How are your symptoms? (Red Flag symptoms escalate to triage hotline per guidelines) Improved   Do you feel your condition is stable enough to be safe at home until your provider visit? Yes   Does the patient have their discharge instructions?  Yes   Does the patient have questions regarding their discharge instructions?  No   Were you started on any new medications or were there changes to any of your previous medications?  Yes   Does the patient have all of their medications? Yes   Do you have questions regarding any of your medications?  No   Do you have all of " your needed medical supplies or equipment (DME)?  (i.e. oxygen tank, CPAP, cane, etc.) Yes   Discharge follow-up appointment scheduled within 14 calendar days?  Yes   Discharge Follow Up Appointment Date 8/25/2023   Discharge Follow Up Appointment Scheduled with? Specialty Care Provider     Hospital Follow-up Visit:    Hospital/Nursing Home/IP Rehab Facility: [facility]  Date of Admission: 8/12/23  Date of Discharge: 8/19/23  Reason(s) for Admission: cellulitis of right breast     Expanders removed on 8/25/23   Ace wrap over area    Was your hospitalization related to COVID-19? No   Problems taking medications regularly:  None  Medication changes since discharge: antibiotic  Problems adhering to non-medication therapy:  None    Summary of hospitalization:  [facility] discharge summary reviewed  Diagnostic Tests/Treatments reviewed.  Follow up needed: surgeon and labs   Other Healthcare Providers Involved in Patient s Care:         Specialist appointment - see epic  and Surgical follow-up appointment - see epic  Update since discharge: improved.     Patient underwent ultrasound-guided puncture and aspiration of fluid surrounding the right breast implant/tissue expander 8/16/23.   Aspirate cultures growing serratia marcescens   -- Patient improved on IV zosyn while inpatient.  -- Peripheral blood cultures remain no growth to date  -- Plan discharge home on Bactrim until time of next surgery plan for September.  Patient has been given Rx of Bactrim for the next 37 days to provide supply until 9/25/2023.  ID has ordered BMP and CBC every 2 weeks until surgery, first check 8/25/2023.  -- Currently patient will have a 1 stage procedure with removal of expanders and tissue flap rather than prosthetic breast implants.  -- ID recommends IV cefepime in the hospital at time of next surgery to be followed by short duration oral antibiotic after discharge    JOSE GUADALUPE glover  thrombophlebitis:  US obtained due to asymmetric RUE swelling at previous IV site  US showed no DVT but there is evidence of superficial thrombophlebitis involving the right cephalic vein from the proximal forearm to the distal humerus   -- Continue home ibuprofen as needed for pain and swelling  -- Recommend right upper extremity elevation with cold and warm compresses per patient comfort  -- Outpatient PCP follow-up      Ovarian suppression - getting hot flashes  Abrupt    Has an appointment today with surgeon    Was referred for CBT and saw someone 3 times   Liked that person but got so busy that she let it go  Would like to resume   Daksha Daugherty was her counselor - will send her a message to see if she will see tricia Villalba - taking zoloft at 100m daily - it is helping for depression  Still anxious - didn't start the buspar  Taking hydroxyzine every 6 hours - 10mg  Had a panic attack at work   Really wants to be able to work       Review of Systems   Constitutional, HEENT, cardiovascular, pulmonary, gi and gu systems are negative, except as otherwise noted.      Objective    /88   Pulse 90   Temp 98.3  F (36.8  C) (Tympanic)   Resp 20   Wt 63.2 kg (139 lb 4.8 oz)   LMP 06/22/2023   SpO2 99%   BMI 24.68 kg/m    Body mass index is 24.68 kg/m .  Physical Exam   GENERAL: healthy, alert and no distress  PSYCH - Pt makes good eye contact, is clean and well-dressed. Oriented to person,place,and time. Cooperative. No speech abnormalities. No psychomotor agitation or retardation.  Thought process was normal and thought content was free of suicidal/homicidal ideation, obsessions, and delusions. Insight and judgement were good.

## 2023-08-30 LAB — BACTERIA TISS BX CULT: NO GROWTH

## 2023-09-01 ENCOUNTER — OFFICE VISIT (OUTPATIENT)
Dept: PLASTIC SURGERY | Facility: AMBULATORY SURGERY CENTER | Age: 43
End: 2023-09-01
Payer: COMMERCIAL

## 2023-09-01 DIAGNOSIS — Z98.890 STATUS POST BILATERAL BREAST RECONSTRUCTION: Primary | ICD-10-CM

## 2023-09-01 DIAGNOSIS — Z17.0 MALIGNANT NEOPLASM OF UPPER-OUTER QUADRANT OF LEFT BREAST IN FEMALE, ESTROGEN RECEPTOR POSITIVE (H): Primary | ICD-10-CM

## 2023-09-01 DIAGNOSIS — C50.412 MALIGNANT NEOPLASM OF UPPER-OUTER QUADRANT OF LEFT BREAST IN FEMALE, ESTROGEN RECEPTOR POSITIVE (H): Primary | ICD-10-CM

## 2023-09-01 LAB
BACTERIA TISS BX CULT: NORMAL
BACTERIA TISS BX CULT: NORMAL

## 2023-09-01 PROCEDURE — 99024 POSTOP FOLLOW-UP VISIT: CPT | Performed by: PLASTIC SURGERY

## 2023-09-01 NOTE — NURSING NOTE
Patient here today for S/P REMOVAL, TISSUE EXPANDER, BILATERAL BREAST on 08/25/2023    S/P RECONSTRUCTION, BREAST, BILATERAL TISSUE EXPANDERS on 05/31/2023.     The patient is doing well overall. Drainage has been around 10mL-15mL per day.     Keiry Garza RN on 9/1/2023 at 10:01 AM

## 2023-09-01 NOTE — PROGRESS NOTES
Zainab is a 42 years old lady status post bilateral breast tissue expander explantation.  She is 7 days out from surgery.  Output from bilateral Eulogio drains is less than 50 cc at the for more than 3 consecutive days.    At the physical exam, all incisions are healing well, no sign of infection or dehiscence.  Bilateral Eulogio drain has been removed.  No evidence of hematoma or seroma.    Plan: Continue to apply compression dressing for the next 7 days, finish antibiotics this Sunday and follow-up with me next Friday.    Basil Aguilar MD , FACS   Diplomate American Board of Plastic Surgery  Diplomate American Board of Surgery  Adj. Assistant Professor of Surgery  Division of Plastic & Reconstructive Surgery   [facility]  Office: [phone]   9/1/2023 at 10:21 AM

## 2023-09-01 NOTE — LETTER
9/1/2023         RE: Zainab Bolton  [address] [facility] [address]        Dear Colleague,    Thank you for referring your patient, Zainab Bolton, to the [facility]LAST[facility] SURGERY [facility] [place]. Please see a copy of my visit note below.    Zainab is a 42 years old lady status post bilateral breast tissue expander explantation.  She is 7 days out from surgery.  Output from bilateral Eulogio drains is less than 50 cc at the for more than 3 consecutive days.    At the physical exam, all incisions are healing well, no sign of infection or dehiscence.  Bilateral Eulogio drain has been removed.  No evidence of hematoma or seroma.    Plan: Continue to apply compression dressing for the next 7 days, finish antibiotics this Sunday and follow-up with me next Friday.    Basil Aguilar MD , FACS   Diplomate American Board of Plastic Surgery  Diplomate American Board of Surgery  Adj. Assistant Professor of Surgery  Division of Plastic & Reconstructive Surgery   [facility]  Office: [phone]   9/1/2023 at 10:21 AM     Again, thank you for allowing me to participate in the care of your patient.        Sincerely,        Basil Aguilar MD

## 2023-09-01 NOTE — LETTER
[facility] PLAST[facility] SURGERY CLINIC [address], [address]  [facility] [address]  [phone]          September 1, 2023    RE:  Zainab Bolton                                                                                                                                                       [address] [facility] [address]            To whom it may concern:    Zainab Bolton is under my professional care for a surgical procedure on 08/25/202. She is okay to return to work 09/05/2023. Please allow frequent breaks as needed.         Sincerely,        Basil Aguilar MD

## 2023-09-06 ENCOUNTER — INFUSION THERAPY VISIT (OUTPATIENT)
Dept: INFUSION THERAPY | Facility: HOSPITAL | Age: 43
End: 2023-09-06
Attending: INTERNAL MEDICINE
Payer: COMMERCIAL

## 2023-09-06 ENCOUNTER — ONCOLOGY VISIT (OUTPATIENT)
Dept: ONCOLOGY | Facility: HOSPITAL | Age: 43
End: 2023-09-06
Attending: INTERNAL MEDICINE
Payer: COMMERCIAL

## 2023-09-06 VITALS
TEMPERATURE: 98.6 F | SYSTOLIC BLOOD PRESSURE: 121 MMHG | DIASTOLIC BLOOD PRESSURE: 79 MMHG | HEART RATE: 61 BPM | BODY MASS INDEX: 25.19 KG/M2 | RESPIRATION RATE: 16 BRPM | WEIGHT: 142.2 LBS | OXYGEN SATURATION: 98 %

## 2023-09-06 DIAGNOSIS — Z17.0 MALIGNANT NEOPLASM OF UPPER-OUTER QUADRANT OF LEFT BREAST IN FEMALE, ESTROGEN RECEPTOR POSITIVE (H): ICD-10-CM

## 2023-09-06 DIAGNOSIS — C50.112 MALIGNANT NEOPLASM OF CENTRAL PORTION OF LEFT BREAST IN FEMALE, ESTROGEN RECEPTOR POSITIVE (H): Primary | ICD-10-CM

## 2023-09-06 DIAGNOSIS — Z17.0 MALIGNANT NEOPLASM OF CENTRAL PORTION OF LEFT BREAST IN FEMALE, ESTROGEN RECEPTOR POSITIVE (H): Primary | ICD-10-CM

## 2023-09-06 DIAGNOSIS — R91.8 PULMONARY NODULES: ICD-10-CM

## 2023-09-06 DIAGNOSIS — C50.412 MALIGNANT NEOPLASM OF UPPER-OUTER QUADRANT OF LEFT BREAST IN FEMALE, ESTROGEN RECEPTOR POSITIVE (H): ICD-10-CM

## 2023-09-06 LAB
ALBUMIN SERPL BCG-MCNC: 4.5 G/DL (ref 3.5–5.2)
ALP SERPL-CCNC: 80 U/L (ref 35–104)
ALT SERPL W P-5'-P-CCNC: 13 U/L (ref 0–50)
ANION GAP SERPL CALCULATED.3IONS-SCNC: 7 MMOL/L (ref 7–15)
AST SERPL W P-5'-P-CCNC: 21 U/L (ref 0–45)
BASOPHILS # BLD AUTO: 0 10E3/UL (ref 0–0.2)
BASOPHILS NFR BLD AUTO: 1 %
BILIRUB SERPL-MCNC: 0.2 MG/DL
BUN SERPL-MCNC: 12.1 MG/DL (ref 6–20)
CALCIUM SERPL-MCNC: 9.3 MG/DL (ref 8.6–10)
CHLORIDE SERPL-SCNC: 103 MMOL/L (ref 98–107)
CREAT SERPL-MCNC: 0.75 MG/DL (ref 0.51–0.95)
DEPRECATED HCO3 PLAS-SCNC: 31 MMOL/L (ref 22–29)
EGFRCR SERPLBLD CKD-EPI 2021: >90 ML/MIN/1.73M2
EOSINOPHIL # BLD AUTO: 0.4 10E3/UL (ref 0–0.7)
EOSINOPHIL NFR BLD AUTO: 6 %
ERYTHROCYTE [DISTWIDTH] IN BLOOD BY AUTOMATED COUNT: 12.7 % (ref 10–15)
GLUCOSE SERPL-MCNC: 90 MG/DL (ref 70–99)
HCT VFR BLD AUTO: 37.8 % (ref 35–47)
HGB BLD-MCNC: 12.2 G/DL (ref 11.7–15.7)
IMM GRANULOCYTES # BLD: 0 10E3/UL
IMM GRANULOCYTES NFR BLD: 0 %
LYMPHOCYTES # BLD AUTO: 2.6 10E3/UL (ref 0.8–5.3)
LYMPHOCYTES NFR BLD AUTO: 38 %
MCH RBC QN AUTO: 29.2 PG (ref 26.5–33)
MCHC RBC AUTO-ENTMCNC: 32.3 G/DL (ref 31.5–36.5)
MCV RBC AUTO: 90 FL (ref 78–100)
MONOCYTES # BLD AUTO: 0.4 10E3/UL (ref 0–1.3)
MONOCYTES NFR BLD AUTO: 6 %
NEUTROPHILS # BLD AUTO: 3.4 10E3/UL (ref 1.6–8.3)
NEUTROPHILS NFR BLD AUTO: 49 %
NRBC # BLD AUTO: 0 10E3/UL
NRBC BLD AUTO-RTO: 0 /100
PLATELET # BLD AUTO: 363 10E3/UL (ref 150–450)
POTASSIUM SERPL-SCNC: 3.9 MMOL/L (ref 3.4–5.3)
PROT SERPL-MCNC: 6.8 G/DL (ref 6.4–8.3)
RBC # BLD AUTO: 4.18 10E6/UL (ref 3.8–5.2)
SODIUM SERPL-SCNC: 141 MMOL/L (ref 136–145)
WBC # BLD AUTO: 6.9 10E3/UL (ref 4–11)

## 2023-09-06 PROCEDURE — 80053 COMPREHEN METABOLIC PANEL: CPT

## 2023-09-06 PROCEDURE — 99215 OFFICE O/P EST HI 40 MIN: CPT | Performed by: NURSE PRACTITIONER

## 2023-09-06 PROCEDURE — 85025 COMPLETE CBC W/AUTO DIFF WBC: CPT

## 2023-09-06 PROCEDURE — 36415 COLL VENOUS BLD VENIPUNCTURE: CPT

## 2023-09-06 PROCEDURE — 96402 CHEMO HORMON ANTINEOPL SQ/IM: CPT

## 2023-09-06 PROCEDURE — G0463 HOSPITAL OUTPT CLINIC VISIT: HCPCS | Mod: 25 | Performed by: NURSE PRACTITIONER

## 2023-09-06 PROCEDURE — 250N000011 HC RX IP 250 OP 636: Mod: JZ | Performed by: INTERNAL MEDICINE

## 2023-09-06 RX ADMIN — GOSERELIN ACETATE 3.6 MG: 3.6 IMPLANT SUBCUTANEOUS at 14:52

## 2023-09-06 ASSESSMENT — PAIN SCALES - GENERAL: PAINLEVEL: MILD PAIN (2)

## 2023-09-06 NOTE — PROGRESS NOTES
[facility] Hematology and Oncology Progress Note    Patient: Zainab Bolton  MRN: [id number]  Date of Service: 09/06/2023        Reason for Visit    Chief Complaint   Patient presents with    Oncology Clinic Visit     Malignant neoplasm of central portion of left breast in female, estrogen receptor positive (H)       Assessment and Plan    Cancer Staging   No matching staging information was found for the patient.    Breast cancer, ER/GA positive, Her2 negative, left side, oncotype 17, T2N0M0, BRCA positive: s/p bilateral mastectomy. She is currently on tamoxifen and Zoladex to make her menopausal. Once she gets oophorectomy, we will switch to AI per Dr. Espinosa's recommendation.  Overall she is tolerating things well so she will continue her current regimen.  Patient states she still think about an oophorectomy so I did give her some recommendations for OB/GYN's that could do that for Mckenna Asher.  She states that she may want to go to a subspecialist.  I did tell her we have 1 that comes here 2 or 3 Wednesdays a month and she will let us know if he wants to be set up with him.  We will see Dr. Espinosa in 3 months.    2.  Small pulmonary nodule: This was found incidentally when a Ct scan was done in the hospital.  They did recommend follow-up, so I will repeat in 3 months.  It is very small so I told her it is likely nothing to worry about but we should follow-up since she does have a history of smoking.  She is not a current smoker. She is quite anxious about this.     3. Recent persistent infection of right breast tissue expander: had to be removed. Completed antibiotics. Will continue to follow with Dr. Aguilar.     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 Espinosa    DIAGNOSIS: Breast cancer, left breast, found on screening mammogram.  Patient did have some mild breast pain for 9 months prior.  18 mm mammogram, 22 mm on MRI.  -Biopsy showed low-grade invasive ductal carcinoma.  ER positive, NH positive, HER2/doug negative by FISH.  -BRCA2 mutation testing done and is positive      TREATMENT:   -5/31/23: Patient had bilateral mastectomy with left sentinel lymph node biopsy.  Path shows 2.3 cm invasive ductal carcinoma of the left breast, grade 1.  Margins negative.  She had some background DCIS.  The lymph node was negative.  Patient had Oncotype done and result was 17.  -Tamoxifen, started roughly June 2023.   -Zoladex monthly injections started July 2023.     INTERIM HISTORY:   Is here today for follow-up visit.  She was last seen about 2 and half months ago by Dr. Espinosa.  She has continued on tamoxifen and her Zoladex injections.  Overall she states she is been doing really well with the medications and has not noticed significant side effects.  She still wants to get her ovaries removed but has not met with any surgeon so is looking for some recommendations.  She has been feeling frustrated with her reconstruction because it got infected and now everything has been delayed.  She had to be in the hospital for about a week getting IV antibiotics.  She states that she is finally starting to feel better.  She denies any new bone or back pain.  Denies any infectious complaints.    Review of  Systems    Pertinent items are noted in HPI.    Past History    Past Medical History:   Diagnosis Date    Anxiety 08/16/2010    Anxiety state, unspecified 1997    chronic anxiety    BRCA2 positive     Breast cancer (H) 05/19/2023    Depression     Essential hypertension 03/03/2023    Hypercholesteremia 08/16/2010    Obesity 08/16/2010    Other acne     Papanicolaou smear of cervix with low grade squamous intraepithelial lesion (LGSIL) (aka LSIL) 05/05/2015    LSIL/+ HR HPV    TOBACCO ABUSE-CONTINUOUS        PHYSICAL EXAM  /79   Pulse 61   Temp 98.6  F (37  C)   Resp 16   Wt 64.5 kg (142 lb 3.2 oz)   LMP 06/22/2023   SpO2 98%   BMI 25.19 kg/m      GENERAL: no acute distress. Cooperative in conversation. Here alone.   RESP: Regular respiratory rate. No expiratory wheezes   MUSCULOSKELETAL: no bilateral leg swelling  NEURO: non focal. Alert and oriented x3.   PSYCH: within normal limits. No depression or anxiety.  SKIN: exposed skin is dry intact.     Lab Results    Recent Results (from the past 168 hour(s))   CBC with platelets and differential   Result Value Ref Range    WBC Count 6.9 4.0 - 11.0 10e3/uL    RBC Count 4.18 3.80 - 5.20 10e6/uL    Hemoglobin 12.2 11.7 - 15.7 g/dL    Hematocrit 37.8 35.0 - 47.0 %    MCV 90 78 - 100 fL    MCH 29.2 26.5 - 33.0 pg    MCHC 32.3 31.5 - 36.5 g/dL    RDW 12.7 10.0 - 15.0 %    Platelet Count 363 150 - 450 10e3/uL    % Neutrophils 49 %    % Lymphocytes 38 %    % Monocytes 6 %    % Eosinophils 6 %    % Basophils 1 %    % Immature Granulocytes 0 %    NRBCs per 100 WBC 0 <1 /100    Absolute Neutrophils 3.4 1.6 - 8.3 10e3/uL    Absolute Lymphocytes 2.6 0.8 - 5.3 10e3/uL    Absolute Monocytes 0.4 0.0 - 1.3 10e3/uL    Absolute Eosinophils 0.4 0.0 - 0.7 10e3/uL    Absolute Basophils 0.0 0.0 - 0.2 10e3/uL    Absolute Immature Granulocytes 0.0 <=0.4 10e3/uL    Absolute NRBCs 0.0 10e3/uL       Imaging    US Upper Extremity Venous Duplex Right    Result Date: 8/19/2023  EXAM: US  UPPER EXTREMITY VENOUS DUPLEX RIGHT LOCATION: [facility] DATE: 8/19/2023 INDICATION: asymmetric RUE swelling from previous IV site COMPARISON: None. TECHNIQUE: Venous Duplex ultrasound of the right upper extremity with (when possible) and without compression, augmentation, and duplex. Color flow and spectral Doppler with waveform analysis performed. FINDINGS: Ultrasound includes evaluation of the internal jugular vein, innominate vein, subclavian vein, axillary vein, and brachial vein. The superficial cephalic and basilic veins were also evaluated where seen. RIGHT: No deep venous thrombosis. Occlusive and nonocclusive thrombus in the cephalic vein extending from the proximal forearm to the distal humerus (greater than 5 cm), which corresponds to the location of a prior IV site.     IMPRESSION: 1.  No deep venous thrombosis in the right upper extremity. 2.  Superficial thrombophlebitis involving the right cephalic vein from the proximal forearm to the distal humerus.    US Drainage Seroma/Hematoma Abscess/Cyst    Result Date: 8/16/2023  EXAM: 1. PUNCTURE AND ASPIRATION RIGHT BREAST KENNY-IMPLANT FLUID COLLECTION 2. ULTRASOUND GUIDANCE LOCATION: [facility] DATE: 8/16/2023 INDICATION: Right breast periimplant fluid collection in pt s p mastectomy and tissue expander placement PROCEDURE: Informed consent obtained. Time out performed. The site was prepped and draped in sterile fashion. 10 mL of 1% lidocaine was infused into the local soft tissues. Under direct ultrasound guidance, a 5 Kyrgyz Yueh catheter was placed into the fluid surrounding the right breast implant/tissue expander and 70 ml of yellow minimally turbid fluid was aspirated. This was sent for cultures.     IMPRESSION: 1.  Status post ultrasound-guided puncture and aspiration of fluid surrounding the right breast implant/tissue expander. Reference CPT Code: 35798, 43229    CT Chest w Contrast    Result Date:  8/12/2023  EXAM: CT CHEST W CONTRAST LOCATION: [facility] DATE: 8/12/2023 INDICATION: Breast infection, status post mastectomy with tissue expander placement. COMPARISON: CT abdomen/pelvis 05/17/2023. TECHNIQUE: CT chest with IV contrast. Multiplanar reformats were obtained. Dose reduction techniques were used. CONTRAST: 100 mL Isovue-370. FINDINGS: LUNGS AND PLEURA: Trachea and large airways are patent. No focal airspace consolidation. Indeterminate 3 mm nodule along the minor fissure, axial image 170, favoring a benign fissural lymph node, though warranting continued follow-up. Mild dependent atelectasis. No pneumothorax. No pleural effusion.  MEDIASTINUM/AXILLAE: Residual thymic tissue within the anterior mediastinum. No mediastinal/hilar lymphadenopathy. Thoracic esophagus is unremarkable.  No axillary lymphadenopathy. Postsurgical changes of bilateral mastectomies with placement of tissue expansion devices. Small fluid collection surrounding both of the tissue expanders, with overlying cutaneous thickening and adjacent fat stranding. These could reflect sterile fluid collections, though superinfection with overlying cellulitis is not excluded. No thoracic aortic aneurysm. Heart is normal in size. No pericardial effusion. CORONARY ARTERY CALCIFICATION: None. UPPER ABDOMEN: No suspicious abnormality. Small cleft within the posterior spleen, axial image 149, similar to prior. MUSCULOSKELETAL: No suspicious abnormality. OTHER: No additionally suspicious abnormality.     IMPRESSION: 1.  Small fluid collections surrounding bilateral breast tissue expanders, which could be sterile or superinfected. Surrounding inflammatory fat stranding overlying cutaneous thickening may reflect an associated cellulitis versus resolving postsurgical change. 2.  Indeterminate 3.5 mm nodule along the right minor fissure, favoring a benign fissural lymph node. Attention on follow-up.    Total time spent with  patient in face to face time, chart review and documentation was 40 minutes.        Signed by: THOMAS Correa CNP

## 2023-09-06 NOTE — PROGRESS NOTES
Infusion Nursing Note:  Zainab Bolton presents today for Zoladex.    Patient seen by provider today: No   present during visit today: Not Applicable.    Note: N/A.    Intravenous Access:  No Intravenous access/labs at this visit.    Treatment Conditions:  Not Applicable.      Post Infusion Assessment:  Patient tolerated injection without incident.   Given Sub Q into RLQ of abd.    Discharge Plan:   Patient and/or family verbalized understanding of discharge instructions and all questions answered.  Copy of AVS reviewed with patient and/or family.  Patient will return in one month  for next appointment.  Patient discharged in stable condition accompanied by: self.  Departure Mode: Ambulatory.      Narda Landon RN

## 2023-09-06 NOTE — PROGRESS NOTES
"Oncology Rooming Note    September 6, 2023 2:09 PM   Zainab Bolton is a 43 year old female who presents for:    Chief Complaint   Patient presents with    Oncology Clinic Visit     Malignant neoplasm of central portion of left breast in female, estrogen receptor positive (H)     Initial Vitals: /79   Pulse 61   Temp 98.6  F (37  C)   Resp 16   Wt 64.5 kg (142 lb 3.2 oz)   LMP 06/22/2023   SpO2 98%   BMI 25.19 kg/m   Estimated body mass index is 25.19 kg/m  as calculated from the following:    Height as of 8/12/23: 1.6 m (5' 3\").    Weight as of this encounter: 64.5 kg (142 lb 3.2 oz). Body surface area is 1.69 meters squared.  Mild Pain (2) Comment: Data Unavailable   Patient's last menstrual period was 06/22/2023.  Allergies reviewed: Yes  Medications reviewed: Yes    Medications: Medication refills not needed today.  Pharmacy name entered into [facility]:    [id number] - [place], [place] - [address]  [facility] [place], [place] - [address]    Clinical concerns: None       Margarita Peterson LPN             "

## 2023-09-06 NOTE — LETTER
9/6/2023         RE: Zainab Bolton  [id number] [address] [facility] [address]        Dear Colleague,    Thank you for referring your patient, Zainab Bolton, to the [facility] [place]. Please see a copy of my visit note below.    [facility] Hematology and Oncology Progress Note    Patient: Zainab Bolton  MRN: [id number]  Date of Service: 09/06/2023        Reason for Visit    Chief Complaint   Patient presents with     Oncology Clinic Visit     Malignant neoplasm of central portion of left breast in female, estrogen receptor positive (H)       Assessment and Plan    Cancer Staging   No matching staging information was found for the patient.    Breast cancer, ER/SC positive, Her2 negative, left side, oncotype 17, T2N0M0, BRCA positive: s/p bilateral mastectomy. She is currently on tamoxifen and Zoladex to make her menopausal. Once she gets oophorectomy, we will switch to AI per Dr. Espinosa's recommendation.  Overall she is tolerating things well so she will continue her current regimen.  Patient states she still think about an oophorectomy so I did give her some recommendations for OB/GYN's that could do that for Mckenna Asher.  She states that she may want to go to a subspecialist.  I did tell her we have 1 that comes here 2 or 3 Wednesdays a month and she will let us know if he wants to be set up with him.  We will see Dr. Espinosa in 3 months.    2.  Small pulmonary nodule: This was found incidentally when a Ct scan was done in the hospital.  They did recommend follow-up, so I will repeat in 3 months.  It is very small so I told her it is likely nothing to worry about but we should follow-up since she does have a history of smoking.  She is not a current smoker. She is quite anxious about this.     3. Recent persistent infection of right breast tissue expander: had to be removed. Completed antibiotics. Will continue to follow with Dr. Aguilar.     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 Espinosa    DIAGNOSIS: Breast cancer, left breast, found on screening mammogram.  Patient did have some mild breast pain for 9 months prior.  18 mm mammogram, 22 mm on MRI.  -Biopsy showed low-grade invasive ductal carcinoma.  ER positive, CO positive, HER2/doug negative by FISH.  -BRCA2 mutation testing done and is positive      TREATMENT:   -5/31/23: Patient had bilateral mastectomy with left sentinel lymph node biopsy.  Path shows 2.3 cm invasive ductal carcinoma of the left breast, grade 1.  Margins negative.  She had some background DCIS.  The lymph node was negative.  Patient had Oncotype done and result was 17.  -Tamoxifen, started roughly June 2023.   -Zoladex monthly injections started July 2023.     INTERIM HISTORY:   Is here today for follow-up visit.  She was last seen about 2 and half months ago by Dr. Espinosa.  She has continued on tamoxifen and her Zoladex injections.  Overall she states she is been doing really well with the medications and has not noticed significant side effects.  She still wants to get her ovaries removed but has not met with any surgeon so is looking for some recommendations.  She has been feeling frustrated with her reconstruction because it  got infected and now everything has been delayed.  She had to be in the hospital for about a week getting IV antibiotics.  She states that she is finally starting to feel better.  She denies any new bone or back pain.  Denies any infectious complaints.    Review of Systems    Pertinent items are noted in HPI.    Past History    Past Medical History:   Diagnosis Date     Anxiety 08/16/2010     Anxiety state, unspecified 1997    chronic anxiety     BRCA2 positive      Breast cancer (H) 05/19/2023     Depression      Essential hypertension 03/03/2023     Hypercholesteremia 08/16/2010     Obesity 08/16/2010     Other acne      Papanicolaou smear of cervix with low grade squamous intraepithelial lesion (LGSIL) (aka LSIL) 05/05/2015    LSIL/+ HR HPV     TOBACCO ABUSE-CONTINUOUS        PHYSICAL EXAM  /79   Pulse 61   Temp 98.6  F (37  C)   Resp 16   Wt 64.5 kg (142 lb 3.2 oz)   LMP 06/22/2023   SpO2 98%   BMI 25.19 kg/m      GENERAL: no acute distress. Cooperative in conversation. Here alone.   RESP: Regular respiratory rate. No expiratory wheezes   MUSCULOSKELETAL: no bilateral leg swelling  NEURO: non focal. Alert and oriented x3.   PSYCH: within normal limits. No depression or anxiety.  SKIN: exposed skin is dry intact.     Lab Results    Recent Results (from the past 168 hour(s))   CBC with platelets and differential   Result Value Ref Range    WBC Count 6.9 4.0 - 11.0 10e3/uL    RBC Count 4.18 3.80 - 5.20 10e6/uL    Hemoglobin 12.2 11.7 - 15.7 g/dL    Hematocrit 37.8 35.0 - 47.0 %    MCV 90 78 - 100 fL    MCH 29.2 26.5 - 33.0 pg    MCHC 32.3 31.5 - 36.5 g/dL    RDW 12.7 10.0 - 15.0 %    Platelet Count 363 150 - 450 10e3/uL    % Neutrophils 49 %    % Lymphocytes 38 %    % Monocytes 6 %    % Eosinophils 6 %    % Basophils 1 %    % Immature Granulocytes 0 %    NRBCs per 100 WBC 0 <1 /100    Absolute Neutrophils 3.4 1.6 - 8.3 10e3/uL    Absolute Lymphocytes 2.6 0.8 - 5.3 10e3/uL    Absolute Monocytes 0.4 0.0  - 1.3 10e3/uL    Absolute Eosinophils 0.4 0.0 - 0.7 10e3/uL    Absolute Basophils 0.0 0.0 - 0.2 10e3/uL    Absolute Immature Granulocytes 0.0 <=0.4 10e3/uL    Absolute NRBCs 0.0 10e3/uL       Imaging    US Upper Extremity Venous Duplex Right    Result Date: 8/19/2023  EXAM: US UPPER EXTREMITY VENOUS DUPLEX RIGHT LOCATION: [facility] DATE: 8/19/2023 INDICATION: asymmetric RUE swelling from previous IV site COMPARISON: None. TECHNIQUE: Venous Duplex ultrasound of the right upper extremity with (when possible) and without compression, augmentation, and duplex. Color flow and spectral Doppler with waveform analysis performed. FINDINGS: Ultrasound includes evaluation of the internal jugular vein, innominate vein, subclavian vein, axillary vein, and brachial vein. The superficial cephalic and basilic veins were also evaluated where seen. RIGHT: No deep venous thrombosis. Occlusive and nonocclusive thrombus in the cephalic vein extending from the proximal forearm to the distal humerus (greater than 5 cm), which corresponds to the location of a prior IV site.     IMPRESSION: 1.  No deep venous thrombosis in the right upper extremity. 2.  Superficial thrombophlebitis involving the right cephalic vein from the proximal forearm to the distal humerus.    US Drainage Seroma/Hematoma Abscess/Cyst    Result Date: 8/16/2023  EXAM: 1. PUNCTURE AND ASPIRATION RIGHT BREAST KENNY-IMPLANT FLUID COLLECTION 2. ULTRASOUND GUIDANCE LOCATION: [facility] DATE: 8/16/2023 INDICATION: Right breast periimplant fluid collection in pt s p mastectomy and tissue expander placement PROCEDURE: Informed consent obtained. Time out performed. The site was prepped and draped in sterile fashion. 10 mL of 1% lidocaine was infused into the local soft tissues. Under direct ultrasound guidance, a 5 [employer] catheter was placed into the fluid surrounding the right breast implant/tissue expander and 70 ml of  yellow minimally turbid fluid was aspirated. This was sent for cultures.     IMPRESSION: 1.  Status post ultrasound-guided puncture and aspiration of fluid surrounding the right breast implant/tissue expander. Reference CPT Code: 43469, 83774    CT Chest w Contrast    Result Date: 8/12/2023  EXAM: CT CHEST W CONTRAST LOCATION: [facility] DATE: 8/12/2023 INDICATION: Breast infection, status post mastectomy with tissue expander placement. COMPARISON: CT abdomen/pelvis 05/17/2023. TECHNIQUE: CT chest with IV contrast. Multiplanar reformats were obtained. Dose reduction techniques were used. CONTRAST: 100 mL Isovue-370. FINDINGS: LUNGS AND PLEURA: Trachea and large airways are patent. No focal airspace consolidation. Indeterminate 3 mm nodule along the minor fissure, axial image 170, favoring a benign fissural lymph node, though warranting continued follow-up. Mild dependent atelectasis. No pneumothorax. No pleural effusion.  MEDIASTINUM/AXILLAE: Residual thymic tissue within the anterior mediastinum. No mediastinal/hilar lymphadenopathy. Thoracic esophagus is unremarkable.  No axillary lymphadenopathy. Postsurgical changes of bilateral mastectomies with placement of tissue expansion devices. Small fluid collection surrounding both of the tissue expanders, with overlying cutaneous thickening and adjacent fat stranding. These could reflect sterile fluid collections, though superinfection with overlying cellulitis is not excluded. No thoracic aortic aneurysm. Heart is normal in size. No pericardial effusion. CORONARY ARTERY CALCIFICATION: None. UPPER ABDOMEN: No suspicious abnormality. Small cleft within the posterior spleen, axial image 149, similar to prior. MUSCULOSKELETAL: No suspicious abnormality. OTHER: No additionally suspicious abnormality.     IMPRESSION: 1.  Small fluid collections surrounding bilateral breast tissue expanders, which could be sterile or superinfected. Surrounding  "inflammatory fat stranding overlying cutaneous thickening may reflect an associated cellulitis versus resolving postsurgical change. 2.  Indeterminate 3.5 mm nodule along the right minor fissure, favoring a benign fissural lymph node. Attention on follow-up.    Total time spent with patient in face to face time, chart review and documentation was 40 minutes.        Signed by: TOHMAS Correa CNP    Oncology Rooming Note    September 6, 2023 2:09 PM   Zainab Bolton is a 43 year old female who presents for:    Chief Complaint   Patient presents with     Oncology Clinic Visit     Malignant neoplasm of central portion of left breast in female, estrogen receptor positive (H)     Initial Vitals: /79   Pulse 61   Temp 98.6  F (37  C)   Resp 16   Wt 64.5 kg (142 lb 3.2 oz)   LMP 06/22/2023   SpO2 98%   BMI 25.19 kg/m   Estimated body mass index is 25.19 kg/m  as calculated from the following:    Height as of 8/12/23: 1.6 m (5' 3\").    Weight as of this encounter: 64.5 kg (142 lb 3.2 oz). Body surface area is 1.69 meters squared.  Mild Pain (2) Comment: Data Unavailable   Patient's last menstrual period was 06/22/2023.  Allergies reviewed: Yes  Medications reviewed: Yes    Medications: Medication refills not needed today.  Pharmacy name entered into [facility]:    [id number] - [place], [place] - [address]  [place], [place] - [address]    Clinical concerns: None       Margarita Peterson LPN                 Again, thank you for allowing me to participate in the care of your patient.        Sincerely,        THOMAS Correa CNP  "

## 2023-09-06 NOTE — PATIENT INSTRUCTIONS
[facility]: all of the doctors are great especially Dr. Sherwood, Dr. Arango. [phone]. In specialty clinic on [address].     Gyn/Onc surgeons at the  of :   Dr. Sanchez (does see pt's here on Wednesdays)  Dr. Cao (does see pt's on [place] a couple of days a week)  Dr. Tayler Dunlap

## 2023-09-07 ENCOUNTER — PATIENT OUTREACH (OUTPATIENT)
Dept: CARE COORDINATION | Facility: CLINIC | Age: 43
End: 2023-09-07
Payer: COMMERCIAL

## 2023-09-07 NOTE — PROGRESS NOTES
Clinical Product Navigator RN reviewed chart; patient on payer product coverage.  Review results:   CPN Initial Information Gathering  Referral Source: Health Plan    RN Clinical Product Navigator received a voicemail from [facility] RN   Rhiannon Nunez [phone] during out of office, 8/31/23.  Rhiannon's message stated patient needed assistance in establishing with an ob/gyn provider who was able to perform oopharectomy due to BRCA2 positive, breast cancer.  Note patient was seen by oncology yesterday and provided list of ob/gyn providers.  No further intervention needed.  RN Clinical Product Navigator updated Rhiannon.    Melissa Behl BSN, RN, PHN, CCM  RN Clinical Product Navigator  Ph: [phone]

## 2023-09-08 ENCOUNTER — OFFICE VISIT (OUTPATIENT)
Dept: PLASTIC SURGERY | Facility: AMBULATORY SURGERY CENTER | Age: 43
End: 2023-09-08
Payer: COMMERCIAL

## 2023-09-08 DIAGNOSIS — Z98.890 STATUS POST BILATERAL BREAST RECONSTRUCTION: Primary | ICD-10-CM

## 2023-09-08 PROCEDURE — 99024 POSTOP FOLLOW-UP VISIT: CPT | Performed by: PLASTIC SURGERY

## 2023-09-08 NOTE — NURSING NOTE
Patient here today for S/P REMOVAL, TISSUE EXPANDER, BILATERAL BREAST on 08/25/2023    S/P RECONSTRUCTION, BREAST, BILATERAL TISSUE EXPANDERS on 05/31/2023.     She reports doing well. No issues or concerns.     Keiry Garza RN on 9/8/2023 at 8:24 AM

## 2023-09-08 NOTE — LETTER
9/8/2023         RE: Zainab Bolton  [address] [address] [facility] [address]        Dear Colleague,    Thank you for referring your patient, Zainab Bolton, to the [facility] [place]. Please see a copy of my visit note below.    Zainab is a 43 years old lady status post bilateral tissue expander explantation's.  She is 2 weeks out from surgery.  She is feeling well.    At the physical exam, all incisions are well-healed.  No sign of dehiscence.  Most importantly there is no evidence of seroma or hematoma.  No evidence of further surgical site infection.    Plan: Patient is going to follow-up with my partners for evaluation for possible DIP flap.  She does have an excellent skin envelope.  Continue compression garment at this time to prevent seroma.  Follow-up with me in 2 weeks.    Basil Aguilar MD , FACS   Diplomate American Board of Plastic Surgery  Diplomate American Board of Surgery  Adj. Assistant Professor of Surgery  Division of Plastic & Reconstructive Surgery   [facility]  Office: [phone]   9/8/2023 at 9:24 AM       Again, thank you for allowing me to participate in the care of your patient.        Sincerely,        Basil Aguilar MD

## 2023-09-08 NOTE — PROGRESS NOTES
Zainab is a 43 years old lady status post bilateral tissue expander explantation's.  She is 2 weeks out from surgery.  She is feeling well.    At the physical exam, all incisions are well-healed.  No sign of dehiscence.  Most importantly there is no evidence of seroma or hematoma.  No evidence of further surgical site infection.    Plan: Patient is going to follow-up with my partners for evaluation for possible DIP flap.  She does have an excellent skin envelope.  Continue compression garment at this time to prevent seroma.  Follow-up with me in 2 weeks.    Basil Aguilar MD , FACS   Diplomate American Board of Plastic Surgery  Diplomate American Board of Surgery  Adj. Assistant Professor of Surgery  Division of Plastic & Reconstructive Surgery   [facility]  Office: [phone]   9/8/2023 at 9:24 AM

## 2023-09-12 ENCOUNTER — PRE VISIT (OUTPATIENT)
Dept: SURGERY | Facility: CLINIC | Age: 43
End: 2023-09-12

## 2023-09-12 DIAGNOSIS — F41.1 GAD (GENERALIZED ANXIETY DISORDER): ICD-10-CM

## 2023-09-12 LAB
ABO/RH(D): NORMAL
ANTIBODY SCREEN: NEGATIVE
SPECIMEN EXPIRATION DATE: NORMAL

## 2023-09-12 RX ORDER — HYDROXYZINE HYDROCHLORIDE 10 MG/1
10 TABLET, FILM COATED ORAL 3 TIMES DAILY PRN
Qty: 90 TABLET | Refills: 1 | Status: SHIPPED | OUTPATIENT
Start: 2023-09-12 | End: 2023-12-11

## 2023-09-13 ENCOUNTER — PRE VISIT (OUTPATIENT)
Dept: SURGERY | Facility: CLINIC | Age: 43
End: 2023-09-13

## 2023-09-13 ENCOUNTER — OFFICE VISIT (OUTPATIENT)
Dept: PLASTIC SURGERY | Facility: CLINIC | Age: 43
End: 2023-09-13
Payer: COMMERCIAL

## 2023-09-13 ENCOUNTER — OFFICE VISIT (OUTPATIENT)
Dept: SURGERY | Facility: CLINIC | Age: 43
End: 2023-09-13
Payer: COMMERCIAL

## 2023-09-13 ENCOUNTER — LAB (OUTPATIENT)
Dept: LAB | Facility: CLINIC | Age: 43
End: 2023-09-13
Payer: COMMERCIAL

## 2023-09-13 ENCOUNTER — ANESTHESIA EVENT (OUTPATIENT)
Dept: SURGERY | Facility: CLINIC | Age: 43
DRG: 585 | End: 2023-09-13
Payer: COMMERCIAL

## 2023-09-13 VITALS
SYSTOLIC BLOOD PRESSURE: 113 MMHG | HEART RATE: 83 BPM | DIASTOLIC BLOOD PRESSURE: 78 MMHG | RESPIRATION RATE: 16 BRPM | OXYGEN SATURATION: 96 % | WEIGHT: 143 LBS | HEIGHT: 63 IN | TEMPERATURE: 98.8 F | BODY MASS INDEX: 25.34 KG/M2

## 2023-09-13 VITALS
HEIGHT: 63 IN | TEMPERATURE: 98.8 F | DIASTOLIC BLOOD PRESSURE: 78 MMHG | OXYGEN SATURATION: 96 % | WEIGHT: 142 LBS | SYSTOLIC BLOOD PRESSURE: 113 MMHG | RESPIRATION RATE: 16 BRPM | HEART RATE: 83 BPM | BODY MASS INDEX: 25.16 KG/M2

## 2023-09-13 DIAGNOSIS — Z90.13 S/P BILATERAL MASTECTOMY: ICD-10-CM

## 2023-09-13 DIAGNOSIS — Z90.13 S/P BILATERAL MASTECTOMY: Primary | ICD-10-CM

## 2023-09-13 DIAGNOSIS — Z17.0 MALIGNANT NEOPLASM OF UPPER-OUTER QUADRANT OF LEFT BREAST IN FEMALE, ESTROGEN RECEPTOR POSITIVE (H): ICD-10-CM

## 2023-09-13 DIAGNOSIS — Z01.818 PREOP EXAMINATION: ICD-10-CM

## 2023-09-13 DIAGNOSIS — N61.0 CELLULITIS OF RIGHT BREAST: ICD-10-CM

## 2023-09-13 DIAGNOSIS — C50.412 MALIGNANT NEOPLASM OF UPPER-OUTER QUADRANT OF LEFT BREAST IN FEMALE, ESTROGEN RECEPTOR POSITIVE (H): ICD-10-CM

## 2023-09-13 DIAGNOSIS — Z01.818 PREOP EXAMINATION: Primary | ICD-10-CM

## 2023-09-13 LAB
ANION GAP SERPL CALCULATED.3IONS-SCNC: 8 MMOL/L (ref 7–15)
BASOPHILS # BLD AUTO: 0.1 10E3/UL (ref 0–0.2)
BASOPHILS NFR BLD AUTO: 1 %
BUN SERPL-MCNC: 15.3 MG/DL (ref 6–20)
CALCIUM SERPL-MCNC: 9.2 MG/DL (ref 8.6–10)
CHLORIDE SERPL-SCNC: 104 MMOL/L (ref 98–107)
CREAT SERPL-MCNC: 0.65 MG/DL (ref 0.51–0.95)
DEPRECATED HCO3 PLAS-SCNC: 29 MMOL/L (ref 22–29)
EGFRCR SERPLBLD CKD-EPI 2021: >90 ML/MIN/1.73M2
EOSINOPHIL # BLD AUTO: 0.4 10E3/UL (ref 0–0.7)
EOSINOPHIL NFR BLD AUTO: 6 %
ERYTHROCYTE [DISTWIDTH] IN BLOOD BY AUTOMATED COUNT: 13 % (ref 10–15)
GLUCOSE SERPL-MCNC: 133 MG/DL (ref 70–99)
HCT VFR BLD AUTO: 37.8 % (ref 35–47)
HGB BLD-MCNC: 12.5 G/DL (ref 11.7–15.7)
IMM GRANULOCYTES # BLD: 0 10E3/UL
IMM GRANULOCYTES NFR BLD: 0 %
LYMPHOCYTES # BLD AUTO: 2.4 10E3/UL (ref 0.8–5.3)
LYMPHOCYTES NFR BLD AUTO: 36 %
MCH RBC QN AUTO: 29.3 PG (ref 26.5–33)
MCHC RBC AUTO-ENTMCNC: 33.1 G/DL (ref 31.5–36.5)
MCV RBC AUTO: 89 FL (ref 78–100)
MONOCYTES # BLD AUTO: 0.5 10E3/UL (ref 0–1.3)
MONOCYTES NFR BLD AUTO: 7 %
NEUTROPHILS # BLD AUTO: 3.4 10E3/UL (ref 1.6–8.3)
NEUTROPHILS NFR BLD AUTO: 50 %
NRBC # BLD AUTO: 0 10E3/UL
NRBC BLD AUTO-RTO: 0 /100
PLATELET # BLD AUTO: 263 10E3/UL (ref 150–450)
POTASSIUM SERPL-SCNC: 4.1 MMOL/L (ref 3.4–5.3)
RBC # BLD AUTO: 4.26 10E6/UL (ref 3.8–5.2)
SODIUM SERPL-SCNC: 141 MMOL/L (ref 136–145)
WBC # BLD AUTO: 6.7 10E3/UL (ref 4–11)

## 2023-09-13 PROCEDURE — 80048 BASIC METABOLIC PNL TOTAL CA: CPT | Performed by: PATHOLOGY

## 2023-09-13 PROCEDURE — 85025 COMPLETE CBC W/AUTO DIFF WBC: CPT | Performed by: PATHOLOGY

## 2023-09-13 PROCEDURE — 36415 COLL VENOUS BLD VENIPUNCTURE: CPT | Performed by: PATHOLOGY

## 2023-09-13 PROCEDURE — 99204 OFFICE O/P NEW MOD 45 MIN: CPT | Mod: 24 | Performed by: CLINICAL NURSE SPECIALIST

## 2023-09-13 PROCEDURE — 86850 RBC ANTIBODY SCREEN: CPT | Performed by: CLINICAL NURSE SPECIALIST

## 2023-09-13 PROCEDURE — 99024 POSTOP FOLLOW-UP VISIT: CPT | Performed by: PLASTIC SURGERY

## 2023-09-13 PROCEDURE — 86901 BLOOD TYPING SEROLOGIC RH(D): CPT | Performed by: CLINICAL NURSE SPECIALIST

## 2023-09-13 RX ORDER — ACETAMINOPHEN 500 MG
500-1000 TABLET ORAL PRN
Status: ON HOLD | COMMUNITY
End: 2023-09-27

## 2023-09-13 RX ORDER — ZINC GLUCONATE 50 MG
50 TABLET ORAL EVERY EVENING
COMMUNITY

## 2023-09-13 RX ORDER — TAMOXIFEN CITRATE 20 MG/1
20 TABLET ORAL EVERY EVENING
Qty: 90 TABLET | Refills: 0 | Status: ON HOLD | OUTPATIENT
Start: 2023-09-13 | End: 2023-09-27

## 2023-09-13 RX ORDER — LACTOBACILLUS RHAMNOSUS GG 10B CELL
1 CAPSULE ORAL EVERY EVENING
COMMUNITY

## 2023-09-13 ASSESSMENT — PAIN SCALES - GENERAL
PAINLEVEL: NO PAIN (0)
PAINLEVEL: NO PAIN (0)

## 2023-09-13 ASSESSMENT — ENCOUNTER SYMPTOMS
DYSRHYTHMIAS: 0
SEIZURES: 0

## 2023-09-13 ASSESSMENT — LIFESTYLE VARIABLES: TOBACCO_USE: 1

## 2023-09-13 NOTE — H&P
Pre-Operative H & P     CC:  Preoperative exam to assess for increased cardiopulmonary risk while undergoing surgery and anesthesia.    Date of Encounter: 9/13/2023  Primary Care Physician:  Jessi Concepcion     Reason for visit:   Encounter Diagnoses   Name Primary?    Preop examination Yes    S/P bilateral mastectomy        HPI  Zainab Bolton is a 43 year old female who presents for pre-operative H & P in preparation for  Procedure Information       Case: [id number] Date/Time: 09/25/23 0730    Procedure: Bilateral breast reconstruction with free abdominal flaps, resensation, SPY (Bilateral: Abdomen)    Anesthesia type: General with Block    Diagnosis: S/P bilateral mastectomy [Z90.13]    Pre-op diagnosis: S/P bilateral mastectomy [Z90.13]    Location:  OR [address] [place] [address] OR    Providers: MARIA E Aaron MD          History is obtained from the patient and chart review    Patient with history of left sided breast cancer s/p bilateral nipple non-sparing mastectomy through a vertical incision along with immediate expander-based reconstruction by Dr. Aguilar for left sided breast cancer on 06/01/2023. She met with Dr. Aaron on 7/11/23 in consideration for second stage procedures. She has had some wound healing issues and reports today that she was recently hospitalized for infection, and was taken to the OR for removal of her expanders on 8/25/23. She is meeting with Dr. Aaron again later today for updated evaluation and planning.     Her history is otherwise significant for HYPERTENSION, anxiety, and depression.       Hx of abnormal bleeding or anti-platelet use: Denies    Menstrual history: Patient's last menstrual period was 06/22/2023.      Past Medical History  Past Medical History:   Diagnosis Date    Anxiety 08/16/2010    Anxiety state, unspecified 1997    chronic anxiety    BRCA2 positive     Breast cancer (H) 05/19/2023    Depression     Essential hypertension 03/03/2023    Hypercholesteremia  08/16/2010    Obesity 08/16/2010    Other acne     Papanicolaou smear of cervix with low grade squamous intraepithelial lesion (LGSIL) (aka LSIL) 05/05/2015    LSIL/+ HR HPV    S/P breast reconstruction, bilateral     TOBACCO ABUSE-CONTINUOUS        Past Surgical History  Past Surgical History:   Procedure Laterality Date    BIOPSY      BIOPSY NODE SENTINEL Left 05/31/2023    Procedure: LEFT SENTINEL LYMPH NODE BIOPSY;  Surgeon: Kylee Villasenor MD;  Location: [facility] OR    CONIZATION LEEP  07/16/2015    LÁZARO 2    CONIZATION LEEP N/A 07/16/2015    Procedure: CONIZATION LEEP;  Surgeon: Omayra Cunningham DO;  Location:  OR    EYE SURGERY  1/5/2016    MASTECTOMY SIMPLE Bilateral 05/31/2023    Procedure: BILATERAL MASTECTOMIES;  Surgeon: Kylee Villasenor MD;  Location: [facility] OR    RECONSTRUCT BREAST, IMPLANT PROSTHESIS, COMBINED Bilateral 05/31/2023    Procedure: RECONSTRUCTION, BREAST, BILATERAL TISSUE EXPANDERS;  Surgeon: Basil Aguilar MD;  Location: [facility] OR    Removal of bilateral expanders  08/25/2023    REMOVE TISSUE EXPANDER TRUNK Bilateral 08/25/2023    Procedure: REMOVAL, TISSUE EXPANDER, BILATERAL BREAST;  Surgeon: Basil Aguilar MD;  Location: [facility] OR    Z LIGATE FALLOPIAN TUBE  10/03/2003       Prior to Admission Medications  Current Outpatient Medications   Medication Sig Dispense Refill    acetaminophen (TYLENOL) 500 MG tablet Take 500-1,000 mg by mouth as needed for mild pain      busPIRone (BUSPAR) 10 MG tablet Take 1 tablet (10 mg) by mouth 2 times daily 60 tablet 1    calcium carbonate (OS-SAM) 1500 (600 Ca) MG tablet Take 600 mg by mouth 2 times daily      Cyanocobalamin 500 MCG TBDP Take 2 Gum by mouth every evening      hydrOXYzine (ATARAX) 10 MG tablet TAKE 1 TABLET BY MOUTH 3 TIMES DAILY AS NEEDED FOR ANXIETY 90 tablet 1    ibuprofen (ADVIL/MOTRIN) 400 MG tablet Take 400 mg by mouth as needed for moderate pain      lactobacillus rhamnosus, GG, (CULTURELL)  capsule Take 1 capsule by mouth every evening      neomycin-bacitracin-polymyxin (NEOSPORIN) 5-400-5000 ointment Apply topically as needed To infection on breast      ondansetron (ZOFRAN) 4 MG tablet       sertraline (ZOLOFT) 100 MG tablet Take 1 tablet (100 mg) by mouth daily (Patient taking differently: Take 100 mg by mouth every morning) 90 tablet 1    vitamin B complex with vitamin C (VITAMIN  B COMPLEX) tablet Take 1 tablet by mouth every evening      vitamin C (ASCORBIC ACID) 1000 MG TABS Take 1,000 mg by mouth every evening Also contains 20mg zinc      Vitamin D3 (CHOLECALCIFEROL) 25 mcg (1000 units) tablet Take 25 mcg by mouth every evening      zinc gluconate 50 MG tablet Take 50 mg by mouth every evening      HYDROcodone-acetaminophen (NORCO) 5-325 MG tablet  (Patient not taking: Reported on 2023)      sulfamethoxazole-trimethoprim (BACTRIM DS) 800-160 MG tablet Take 1 tablet by mouth 2 times daily (Patient not taking: Reported on 2023) 74 tablet 0    tamoxifen (NOLVADEX) 20 MG tablet Take 1 tablet (20 mg) by mouth every evening 90 tablet 0       Allergies  No Known Allergies    Social History  Social History     Socioeconomic History    Marital status:      Spouse name: Not on file    Number of children: 3    Years of education: 12    Highest education level: Not on file   Occupational History    Occupation: Stay at home Mother   Tobacco Use    Smoking status: Former     Packs/day: 1.00     Years: 10.00     Pack years: 10.00     Types: Cigarettes     Quit date: 3/28/2023     Years since quittin.4    Smokeless tobacco: Never    Tobacco comments:     since age 12, but stopped with each baby   Vaping Use    Vaping Use: Not on file   Substance and Sexual Activity    Alcohol use: Not Currently    Drug use: Yes     Types: Marijuana     Comment: daily smoking    Sexual activity: Yes     Partners: Male     Birth control/protection: Female Surgical   Other Topics Concern     Service  No    Blood Transfusions No    Caffeine Concern Yes     Comment: 3 cups every 2 weeks, 2 pops per week     Occupational Exposure No    Hobby Hazards No    Sleep Concern No    Stress Concern Yes    Weight Concern Yes    Special Diet No    Back Care No    Exercise No    Bike Helmet No    Seat Belt Yes    Self-Exams No    Parent/sibling w/ CABG, MI or angioplasty before 65F 55M? No   Social History Narrative    Not on file     Social Determinants of Health     Financial Resource Strain: Not on file   Food Insecurity: Not on file   Transportation Needs: Not on file   Physical Activity: Not on file   Stress: Not on file   Social Connections: Not on file   Intimate Partner Violence: Not on file   Housing Stability: Not on file       Family History  Family History   Problem Relation Age of Onset    Cancer Father         liver    Other Cancer Father         Stomach and liver cancer diagnosis    Arthritis Maternal Grandmother     Hypertension Maternal Grandmother     Diabetes Maternal Grandmother     Thyroid Disease Maternal Grandmother     Cancer Maternal Grandfather         leukemia    Heart Disease Maternal Aunt         MI approx age 46    Anesthesia Reaction No family hx of     Clotting Disorder No family hx of        Review of Systems  The complete review of systems is negative other than noted in the HPI or here.   Anesthesia Evaluation   Pt has had prior anesthetic. Type: General.    No history of anesthetic complications       ROS/MED HX  ENT/Pulmonary:     (+)                tobacco use, Past use,                   (-) recent URI   Neurologic:    (-) no seizures and no CVA   Cardiovascular: Comment: No meds for HYPERTENSION. Normal BP    (+)  - -   -  - -                                 Previous cardiac testing   Echo: Date: Results:    Stress Test:  Date: Results:    ECG Reviewed:  Date: 2019 Results:  SR, poor R wave progression  Cath:  Date: Results:   (-) taking anticoagulants/antiplatelets and arrhythmias  "  METS/Exercise Tolerance: >4 METS    Hematologic:    (-) history of blood clots and history of blood transfusion   Musculoskeletal:  - neg musculoskeletal ROS     GI/Hepatic:    (-) GERD   Renal/Genitourinary:  - neg Renal ROS     Endo:  - neg endo ROS     Psychiatric/Substance Use:     (+) psychiatric history anxiety and depression   Recreational drug usage: Cannabis.    Infectious Disease:  - neg infectious disease ROS     Malignancy:   (+) Malignancy, History of Breast.Breast CA Remission status post Surgery and Chemo.      Other:  - neg other ROS          /78 (BP Location: Right arm, Patient Position: Sitting, Cuff Size: Adult Regular)   Pulse 83   Temp 98.8  F (37.1  C) (Oral)   Resp 16   Ht 1.6 m (5' 3\")   Wt 64.9 kg (143 lb)   LMP 06/22/2023   SpO2 96%   Breastfeeding No   BMI 25.33 kg/m      Physical Exam   Constitutional: Awake, alert, cooperative, no apparent distress, and appears stated age.  Eyes: Pupils equal, round and reactive to light, extra ocular muscles intact, sclera clear, conjunctiva normal.  HENT: Normocephalic, oral pharynx with moist mucus membranes, good dentition. No goiter appreciated.   Respiratory: Clear to auscultation bilaterally, no crackles or wheezing. No cough or obvious dyspnea.  Cardiovascular: Regular rate and rhythm, normal S1 and S2, and no murmur noted. Carotids +2, no bruits. No edema. Palpable pulses to radial  DP and PT arteries.   GI: Normal bowel sounds, soft, non-distended, non-tender, no masses palpated, no hepatosplenomegaly.   Lymph/Hematologic: No cervical lymphadenopathy and no supraclavicular lymphadenopathy.  Genitourinary:  Deferred.   Skin: Warm and dry.    Musculoskeletal: Full ROM of neck. There is no redness, warmth, or swelling of the joints. Gross motor strength is normal.    Neurologic: Awake, alert, oriented to name, place and time. Cranial nerves II-XII are grossly intact. Gait is normal.   Neuropsychiatric: Calm, cooperative. Normal " affect.     Prior Labs/Diagnostic Studies   All labs and imaging personally reviewed   Lab Results   Component Value Date    WBC 6.7 09/13/2023    WBC 9.2 03/21/2019     Lab Results   Component Value Date    RBC 4.26 09/13/2023    RBC 4.68 03/21/2019     Lab Results   Component Value Date    HGB 12.5 09/13/2023    HGB 14.1 03/21/2019     Lab Results   Component Value Date    HCT 37.8 09/13/2023    HCT 43.4 03/21/2019     Lab Results   Component Value Date    MCV 89 09/13/2023    MCV 93 03/21/2019     Lab Results   Component Value Date    MCH 29.3 09/13/2023    MCH 30.1 03/21/2019     Lab Results   Component Value Date    MCHC 33.1 09/13/2023    MCHC 32.5 03/21/2019     Lab Results   Component Value Date    RDW 13.0 09/13/2023    RDW 13.2 03/21/2019     Lab Results   Component Value Date     09/13/2023     03/21/2019     Last Comprehensive Metabolic Panel:  Sodium   Date Value Ref Range Status   09/13/2023 141 136 - 145 mmol/L Final   03/21/2019 141 133 - 144 mmol/L Final     Potassium   Date Value Ref Range Status   09/13/2023 4.1 3.4 - 5.3 mmol/L Final   03/21/2019 3.4 3.4 - 5.3 mmol/L Final     Chloride   Date Value Ref Range Status   09/13/2023 104 98 - 107 mmol/L Final   03/21/2019 107 94 - 109 mmol/L Final     Carbon Dioxide   Date Value Ref Range Status   03/21/2019 29 20 - 32 mmol/L Final     Carbon Dioxide (CO2)   Date Value Ref Range Status   09/13/2023 29 22 - 29 mmol/L Final     Anion Gap   Date Value Ref Range Status   09/13/2023 8 7 - 15 mmol/L Final   03/21/2019 5 3 - 14 mmol/L Final     Glucose   Date Value Ref Range Status   09/13/2023 133 (H) 70 - 99 mg/dL Final   03/21/2019 78 70 - 99 mg/dL Final     GLUCOSE BY METER POCT   Date Value Ref Range Status   05/31/2023 93 70 - 99 mg/dL Final     Urea Nitrogen   Date Value Ref Range Status   09/13/2023 15.3 6.0 - 20.0 mg/dL Final   03/21/2019 8 7 - 30 mg/dL Final     Creatinine   Date Value Ref Range Status   09/13/2023 0.65 0.51 - 0.95  mg/dL Final   2019 0.74 0.52 - 1.04 mg/dL Final     GFR Estimate   Date Value Ref Range Status   2023 >90 >60 mL/min/1.73m2 Final   2019 >90 >60 mL/min/[1.73_m2] Final     Comment:     Non  GFR Calc  Starting 2018, serum creatinine based estimated GFR (eGFR) will be   calculated using the Chronic Kidney Disease Epidemiology Collaboration   (CKD-EPI) equation.       GFR, ESTIMATED POCT   Date Value Ref Range Status   2023 >60 >60 mL/min/1.73m2 Final     Calcium   Date Value Ref Range Status   2023 9.2 8.6 - 10.0 mg/dL Final   2019 8.4 (L) 8.5 - 10.1 mg/dL Final     Bilirubin Total   Date Value Ref Range Status   2023 0.2 <=1.2 mg/dL Final     Alkaline Phosphatase   Date Value Ref Range Status   2023 80 35 - 104 U/L Final     ALT   Date Value Ref Range Status   2023 13 0 - 50 U/L Final     Comment:     Reference intervals for this test were updated on 2023 to more accurately reflect our healthy population. There may be differences in the flagging of prior results with similar values performed with this method. Interpretation of those prior results can be made in the context of the updated reference intervals.       AST   Date Value Ref Range Status   2023 21 0 - 45 U/L Final     Comment:     Reference intervals for this test were updated on 2023 to more accurately reflect our healthy population. There may be differences in the flagging of prior results with similar values performed with this method. Interpretation of those prior results can be made in the context of the updated reference intervals.       EK Sinus rhythm with poor R wave progression     The patient's records and results personally reviewed by this provider.     Outside records reviewed from: Care Everywhere    Assessment    Zainab Bolton is a 43 year old female seen as a PAC referral for risk assessment and optimization for  "anesthesia.    Plan/Recommendations  Pt will be optimized for the proposed procedure.  See below for details on the assessment, risk, and preoperative recommendations    NEUROLOGY  - No history of TIA, CVA or seizure    -Post Op delirium risk factors:  Age    ENT  - No current airway concerns.  Will need to be reassessed day of surgery.  Mallampati: I  TM: > 3    CARDIAC  BP normal range. No other cardiac history, symptoms or meds. Good exercise tolerance  - METS (Metabolic Equivalents)>4    RCRI: 0.9% risk of serious cardiac events    PULMONARY  Pulmonary nodule being followed  Denies asthma, cough or shortness of breath  Low risk for JIMMIE    - Tobacco History    History   Smoking Status    Former    Packs/day: 1.00    Years: 10.00    Types: Cigarettes    Quit date: 3/28/2023   Smokeless Tobacco    Never       GI: Denies GERD  PONV Medium Risk  Total Score: 2           1 AN PONV: Pt is Female    1 AN PONV: Patient is not a current smoker        /RENAL  - Baseline Creatinine  0.65    ENDOCRINE    - BMI: Estimated body mass index is 25.33 kg/m  as calculated from the following:    Height as of this encounter: 1.6 m (5' 3\").    Weight as of this encounter: 64.9 kg (143 lb).  Overweight (BMI 25.0-29.9)  - No history of Diabetes Mellitus    HEME  VTE Low Risk 0.26%            Total Score: 0      Denies personal or family history of blood clots  Denies history of blood transfusion   Last Hgb: 12.5    MSK: Is not frail    PSYCH  - Anxiety/depression   Will take Buspar and sertraline on DOS.   Hydroxyzine prn     Oncology: Breast cancer as above.   BRCA 2 positive  Tamoxifen at HS    Daily marijuana but will hold for 24 hours prior to surgery    Different anesthesia methods/types have been discussed with the patient, but they are aware that the final plan will be decided by the assigned anesthesia provider on the date of service.    The patient is optimized for their procedure. AVS with information on surgery time/arrival " time, meds and NPO status given by nursing staff. No further diagnostic testing indicated.      On the day of service:     Prep time: 15 minutes  Visit time: 15 minutes  Documentation time: 15 minutes  ------------------------------------------  Total time: 45 minutes      THOMAS Carnes CNS  Preoperative Assessment Center  [facility]  Clinic and Surgery Center  Phone: [phone]  Fax: [phone]

## 2023-09-13 NOTE — NURSING NOTE
Pre Op Teaching Note:       Pre and Post op Patient Education    Relevant Diagnosis:  breast cancer  Surgical procedure:  BENIGNO reconstruction    Patient demonstrates understanding of the following:  Date of surgery:  9/25/23  Location of surgery:  [address] [place] [address]  History and Physical and any other testing necessary prior to surgery: Yes, discussed with pt need for pre-op within 30 days of surgery with PCP.    Patient demonstrates understanding of the following:    Pre-op showering/scrub information with PCMX Soap: Yes, soap given at PAC visit today  Blood thinner medications discussed and when to stop (if applicable):  Per PCP at pre-op.   Per protocol discussed need for 1 dose of Aspirin the night before BENIGNO procedure, this is also noted in BENIGNO handout which is reviewed in clinic with pt today.    Post-op follow-up:  Discussed how to contact the hospital, nurse, and clinic scheduling staff if necessary. (See packet information)    Instructional materials used/given/mailed:  [facility] [facility] Packet per surgery scheduler, post op teaching sheet, Soap.  Pt advised that final arrival information to come closer to the surgery date by PAN nurses.

## 2023-09-13 NOTE — PATIENT INSTRUCTIONS
Preparing for Your Surgery      Name:  Zainab Bolton   MRN:  [id number]   :  1980   Today's Date:  2023       Arriving for surgery:  Surgery date:  2023  Arrival time:  5:30 AM    Please come to:     Please come to:      [facility] [address]  [address] [place]  [address]      The [facility] Patient /Visitor Ramp is located at [address]. Patients and visitors who self-park will receive the reduced hospital parking rate. If the Patient /Visitor Ramp is full, please follow the signs to the [employer] parking located at the main hospital entrance.     parking is available ( 24 hours/ 7 days a week)    Discounted parking pass options are available for patients and visitors. They can be purchased at the [employer] desk at the main hospital entrance.    -    Stop at the security desk and they will direct surgery patients to the 3rd floor Surgery Waiting Room. [phone]     -  If you are in need of directions, wheelchair or escort please stop at the Information/security desk in the lobby.       What can I eat or drink?  -  You may eat and drink normally up to 8 hours prior to arrival time. (Until 9:30 PM )  -  You may have clear liquids until 2 hours prior to arrival time. (Until 3:30 AM)    Examples of clear liquids:  Water  Clear broth  Juices (apple, white grape, white cranberry  and cider) without pulp  Noncarbonated, powder based beverages  (lemonade and Josh-Aid)  Sodas (Sprite, 7-Up, ginger ale and seltzer)  Coffee or tea (without milk or cream)  Gatorade    -  No Alcohol or cannabis products for at least 24 hours before surgery.     Which medicines can I take?       **Hold Multivitamins/Vitamins for 7 days before surgery. (Vitamin B, C, D3, B12, Os-kitty)  **Hold Supplements for 7 days before surgery. (Zinc)  **Hold Ibuprofen (Advil, Motrin) for 7 day(s) before surgery--unless otherwise directed  by surgeon.  Hold Naproxen (Aleve) for 4 days before surgery.    -  DO NOT take these medications the day of surgery:    Topical ointments  Culturell    -  PLEASE TAKE these medications the day of surgery:  Hydroxyzine if needed  Tylenol if needed  Buspar  Zoloft  Zofran if needed  Tamoxifen    How do I prepare myself?  - Please take 2 showers (one the night prior to surgery and one the morning of surgery) using Scrubcare or Hibiclens soap.    Use this soap only from the neck to your toes.     Leave the soap on your skin for one minute--then rinse thoroughly.      You may use your own shampoo and conditioner. No other hair products.   - Please remove all jewelry and body piercings.  - No lotions, deodorants or fragrance.  - No makeup or fingernail polish.   - Bring your ID and insurance card.    -If you have a Deep Brain Stimulator, Spinal Cord Stimulator, or any Neuro Stimulator device---you must bring the remote control to the hospital.      ALL PATIENTS GOING HOME THE SAME DAY OF SURGERY ARE REQUIRED TO HAVE A RESPONSIBLE ADULT TO DRIVE AND BE IN ATTENDANCE WITH THEM FOR 24 HOURS FOLLOWING SURGERY.    Covid testing policy as of 12/06/2022  Your surgeon will notify and schedule you for a COVID test if one is needed before surgery--please direct any questions or COVID symptoms to your surgeon      Questions or Concerns:    - For any questions regarding the day of surgery or your hospital stay, please contact the Pre Admission Nursing Office at [phone].       - If you have health changes between today and your surgery, please call your surgeon.       - For questions after surgery, please call your surgeons office.           Current Visitor Guidelines    You may have 2 visitors in the pre op area.    Visiting hours: 8 a.m. to 8:30 p.m.    You may have four visitors during your inpatient hospital stay.    Patients confirmed or suspected to have symptoms of COVID 19 or flu:     No visitors allowed for adult  patients.   Children (under age 18) can have 1 named visitor.     People who are sick or showing symptoms of COVID 19 or flu:    Are not allowed to visit patients--we can only make exceptions in special situations.       Please follow these guidelines for your visit:          Please maintain social distance          Masking is optional--however at times you may be asked to wear a mask for the safety of yourself and others     Clean your hands with alcohol hand . Do this when you arrive at and leave the building and patient room,    And again after you touch your mask or anything in the room.     Go directly to and from the room you are visiting.     Stay in the patient s room during your visit. Limit going to other places in the hospital as much as possible     Leave bags and jackets at home or in the car.     For everyone s health, please don t come and go during your visit. That includes for smoking   during your visit.

## 2023-09-13 NOTE — NURSING NOTE
"Chief Complaint   Patient presents with    WILTON Albarran, is being seen today for a pre-op DOS 9/25/23 bilateral BENIGNO.       Vitals:    09/13/23 1100   BP: 113/78   BP Location: Right arm   Patient Position: Chair   Cuff Size: Adult Regular   Pulse: 83   Resp: 16   Temp: 98.8  F (37.1  C)   TempSrc: Oral   SpO2: 96%   Weight: 64.4 kg (142 lb)   Height: 1.6 m (5' 3\")       Body mass index is 25.15 kg/m .      Yanci Barron LPN    "

## 2023-09-13 NOTE — LETTER
9/13/2023       RE: Zainab Bolton  [address] [facility] [address]     Dear Colleague,    Thank you for referring your patient, Zainab Bolton, to the [facility] PLASTIC AND RECONSTRUCTIVE SURGERY CLINIC [place] at [facility]. Please see a copy of my visit note below.    PREOPERATIVE VISIT NOTE     PRESENTING COMPLAINT:  Preop visit for upcoming bilateral breast reconstruction with Judi flap for left-sided breast cancer, underwent bilateral nipple none sparing mastectomy through vertical incisions and expander based reconstruction, that unfortunately got infected and needed to be removed.  She is scheduled for 9/25/2023.     HISTORY OF PRESENTING COMPLAINT: The patient is here for a pre-op visit for the patient's surgery.  The patient is being seen in the presence of my nurse. There is no change since the patient's consultation. Please see the consult note for details.     The big change in her history is that her right breast expander got infected a couple weeks ago needed to be removed and that treated with antibiotics.  The left side was also removed.  She is now off all antibiotics feeling well drains removed no issues.    On exam vital signs stable afebrile no obvious distress.  Both sides are healed and infection free.  Left side is very mobile and soft.  Right side is contracted.  No history of radiation.     ASSESSMENT AND PLAN:  Based upon the above findings, the patient is here for a preop visit for upcoming surgery.  I had a magdi, detailed discussion with the patient in the presence of my nurse (who was present from beginning to end) about the proposed surgery. I was very clear and detailed about overview of surgery, perioperative plans, and expectations. All risks, benefits and alternatives of the surgery including but not limited to pain, infection, bleeding, scarring, asymmetry, seromas, hematomas, wound breakdown, wound  dehiscence, prominent scar, hypertrophic scar, keloid scar, requirement of further surgeries, skin/tissue necrosis, nerve/muscle/deeper structure injury, loss of flap, flap necrosis, loss of umbilicus, requirement of further flap surgeries, DVT, PE, MI, CVA, pneumonia, renal failure and death, were explained in detail. The patient agreed and understood them all and had all the questions answered to the patient's satisfaction, and the patient wants to proceed with surgery. I look forward to helping the patient out in the near future.  All questions were answered.  The patient was happy with the visit.    The plan is to proceed in 2 weeks time with her delayed bilateral breast reconstruction.  This will be 3D flap based reconstruction.  Given the recent changes, she will need an island of skin placed bilaterally.  This was discussed with her in detail.  The other option of waiting a few months and then getting reexpanded with another expander and then delaying the Judi flap until that is done was another option.  She is not interested in that.    CTA and preop done.  She would like to be around to B/C cup    She understands that she will lose her umbilicus.  She understands she will require another stage touchup surgery.        Total time spent in the encounter today including chart review, visit itself, and post-visit paperwork was 30 minutes.         Again, thank you for allowing me to participate in the care of your patient.      Sincerely,    MARIA E Aaron MD

## 2023-09-14 NOTE — PROGRESS NOTES
PREOPERATIVE VISIT NOTE     PRESENTING COMPLAINT:  Preop visit for upcoming bilateral breast reconstruction with Judi flap for left-sided breast cancer, underwent bilateral nipple none sparing mastectomy through vertical incisions and expander based reconstruction, that unfortunately got infected and needed to be removed.  She is scheduled for 9/25/2023.     HISTORY OF PRESENTING COMPLAINT: The patient is here for a pre-op visit for the patient's surgery.  The patient is being seen in the presence of my nurse. There is no change since the patient's consultation. Please see the consult note for details.     The big change in her history is that her right breast expander got infected a couple weeks ago needed to be removed and that treated with antibiotics.  The left side was also removed.  She is now off all antibiotics feeling well drains removed no issues.    On exam vital signs stable afebrile no obvious distress.  Both sides are healed and infection free.  Left side is very mobile and soft.  Right side is contracted.  No history of radiation.     ASSESSMENT AND PLAN:  Based upon the above findings, the patient is here for a preop visit for upcoming surgery.  I had a magdi, detailed discussion with the patient in the presence of my nurse (who was present from beginning to end) about the proposed surgery. I was very clear and detailed about overview of surgery, perioperative plans, and expectations. All risks, benefits and alternatives of the surgery including but not limited to pain, infection, bleeding, scarring, asymmetry, seromas, hematomas, wound breakdown, wound dehiscence, prominent scar, hypertrophic scar, keloid scar, requirement of further surgeries, skin/tissue necrosis, nerve/muscle/deeper structure injury, loss of flap, flap necrosis, loss of umbilicus, requirement of further flap surgeries, DVT, PE, MI, CVA, pneumonia, renal failure and death, were explained in detail. The patient agreed and  understood them all and had all the questions answered to the patient's satisfaction, and the patient wants to proceed with surgery. I look forward to helping the patient out in the near future.  All questions were answered.  The patient was happy with the visit.    The plan is to proceed in 2 weeks time with her delayed bilateral breast reconstruction.  This will be 3D flap based reconstruction.  Given the recent changes, she will need an island of skin placed bilaterally.  This was discussed with her in detail.  The other option of waiting a few months and then getting reexpanded with another expander and then delaying the Judi flap until that is done was another option.  She is not interested in that.    CTA and preop done.  She would like to be around to B/C cup    She understands that she will lose her umbilicus.  She understands she will require another stage touchup surgery.        Total time spent in the encounter today including chart review, visit itself, and post-visit paperwork was 30 minutes.

## 2023-09-18 ENCOUNTER — TELEPHONE (OUTPATIENT)
Dept: ONCOLOGY | Facility: HOSPITAL | Age: 43
End: 2023-09-18
Payer: COMMERCIAL

## 2023-09-18 NOTE — TELEPHONE ENCOUNTER
Patient calls in today stating that she has been getting monthly Zoladex injections under the direction of Dr Espinosa.  She states that she is having     Bilateral breast reconstruction with free abdominal flaps, resensation   On Monday, 9/25.  She has been told by her surgeon that she should not go through with her Zoladex injection on 10/4 as she gets the injection in the area where she is having surgery.  Patient would like to know if she should just omit this monthly injection or if Dr Espinosa has something else in mind.  She is scheduled for another Zoladex injection on 11/1.  She just wants to run this by Dr Espinosa to be sure he is okay with it.  I let her know that we will get an answer for her and she states that she can either be called back at [phone] or a message sent to her on [employer].    Since the RN care coordinator is out of the office, I will route this to Dr Espinosa as well as the RN care coordinator pool to assist in getting back with the patient.    Heidi Hinojosa RN

## 2023-09-20 RX ORDER — ENOXAPARIN SODIUM 100 MG/ML
40 INJECTION SUBCUTANEOUS
Status: CANCELLED | OUTPATIENT
Start: 2023-09-20

## 2023-09-22 ENCOUNTER — OFFICE VISIT (OUTPATIENT)
Dept: PLASTIC SURGERY | Facility: AMBULATORY SURGERY CENTER | Age: 43
End: 2023-09-22
Payer: COMMERCIAL

## 2023-09-22 ENCOUNTER — LAB (OUTPATIENT)
Dept: LAB | Facility: CLINIC | Age: 43
End: 2023-09-22
Payer: COMMERCIAL

## 2023-09-22 DIAGNOSIS — Z98.890 STATUS POST BILATERAL BREAST REDUCTION: Primary | ICD-10-CM

## 2023-09-22 DIAGNOSIS — N61.0 CELLULITIS OF RIGHT BREAST: ICD-10-CM

## 2023-09-22 LAB
ANION GAP SERPL CALCULATED.3IONS-SCNC: 9 MMOL/L (ref 7–15)
BASOPHILS # BLD AUTO: 0 10E3/UL (ref 0–0.2)
BASOPHILS NFR BLD AUTO: 1 %
BUN SERPL-MCNC: 12.7 MG/DL (ref 6–20)
CALCIUM SERPL-MCNC: 8.7 MG/DL (ref 8.6–10)
CHLORIDE SERPL-SCNC: 106 MMOL/L (ref 98–107)
CREAT SERPL-MCNC: 0.7 MG/DL (ref 0.51–0.95)
DEPRECATED HCO3 PLAS-SCNC: 28 MMOL/L (ref 22–29)
EGFRCR SERPLBLD CKD-EPI 2021: >90 ML/MIN/1.73M2
EOSINOPHIL # BLD AUTO: 0.3 10E3/UL (ref 0–0.7)
EOSINOPHIL NFR BLD AUTO: 5 %
ERYTHROCYTE [DISTWIDTH] IN BLOOD BY AUTOMATED COUNT: 13.1 % (ref 10–15)
GLUCOSE SERPL-MCNC: 98 MG/DL (ref 70–99)
HCT VFR BLD AUTO: 38 % (ref 35–47)
HGB BLD-MCNC: 12.9 G/DL (ref 11.7–15.7)
IMM GRANULOCYTES # BLD: 0 10E3/UL
IMM GRANULOCYTES NFR BLD: 0 %
LYMPHOCYTES # BLD AUTO: 2.1 10E3/UL (ref 0.8–5.3)
LYMPHOCYTES NFR BLD AUTO: 36 %
MCH RBC QN AUTO: 30.3 PG (ref 26.5–33)
MCHC RBC AUTO-ENTMCNC: 33.9 G/DL (ref 31.5–36.5)
MCV RBC AUTO: 89 FL (ref 78–100)
MONOCYTES # BLD AUTO: 0.3 10E3/UL (ref 0–1.3)
MONOCYTES NFR BLD AUTO: 5 %
NEUTROPHILS # BLD AUTO: 3.1 10E3/UL (ref 1.6–8.3)
NEUTROPHILS NFR BLD AUTO: 53 %
PLATELET # BLD AUTO: 271 10E3/UL (ref 150–450)
POTASSIUM SERPL-SCNC: 4.3 MMOL/L (ref 3.4–5.3)
RBC # BLD AUTO: 4.26 10E6/UL (ref 3.8–5.2)
SODIUM SERPL-SCNC: 143 MMOL/L (ref 136–145)
WBC # BLD AUTO: 5.8 10E3/UL (ref 4–11)

## 2023-09-22 PROCEDURE — 80048 BASIC METABOLIC PNL TOTAL CA: CPT

## 2023-09-22 PROCEDURE — 85025 COMPLETE CBC W/AUTO DIFF WBC: CPT

## 2023-09-22 PROCEDURE — 36415 COLL VENOUS BLD VENIPUNCTURE: CPT

## 2023-09-22 PROCEDURE — 99024 POSTOP FOLLOW-UP VISIT: CPT | Performed by: PLASTIC SURGERY

## 2023-09-22 NOTE — PROGRESS NOTES
Zainab is a 43 years old lady status post bilateral skin sparing mastectomies with first stage reconstruction with tissue expanders.  Patient did experience infection on her right breast tissue expander that required bilateral breast implant explantation despite treatment with IV antibiotics.  Explantation took place almost a month ago.    At the physical exam, all incisions are well-healed.  No sign of further infection.  No hypertrophic scarring or keloids.  No hematoma or seroma.    Plan: Patient is scheduled for second stage breast reconstruction with BENIGNO flap next Monday.  She is ready for surgery.  I am happy that she will undergo this procedure with my partners.  She is also excited about her upcoming surgery.  Patient can follow-up with me as needed.    Basil Aguilar MD , FACS   Diplomate American Board of Plastic Surgery  Diplomate American Board of Surgery  Adj. Assistant Professor of Surgery  Division of Plastic & Reconstructive Surgery   [facility]  Office: [phone]   9/22/2023 at 8:58 AM

## 2023-09-22 NOTE — LETTER
9/22/2023         RE: Zainab Bolton  [address] [facility] [address]        Dear Colleague,    Thank you for referring your patient, Zainab Bolton, to the [facility] PLASTIC SURGERY CLINIC [place]. Please see a copy of my visit note below.    Zainab is a 43 years old lady status post bilateral skin sparing mastectomies with first stage reconstruction with tissue expanders.  Patient did experience infection on her right breast tissue expander that required bilateral breast implant explantation despite treatment with IV antibiotics.  Explantation took place almost a month ago.    At the physical exam, all incisions are well-healed.  No sign of further infection.  No hypertrophic scarring or keloids.  No hematoma or seroma.    Plan: Patient is scheduled for second stage breast reconstruction with BENIGNO flap next Monday.  She is ready for surgery.  I am happy that she will undergo this procedure with my partners.  She is also excited about her upcoming surgery.  Patient can follow-up with me as needed.    Basil Aguilar MD , FACS   Diplomate American Board of Plastic Surgery  Diplomate American Board of Surgery  Adj. Assistant Professor of Surgery  Division of Plastic & Reconstructive Surgery   [facility]  Office: [phone]   9/22/2023 at 8:58 AM             Again, thank you for allowing me to participate in the care of your patient.        Sincerely,        Basil Aguilar MD

## 2023-09-25 ENCOUNTER — HOSPITAL ENCOUNTER (INPATIENT)
Facility: CLINIC | Age: 43
LOS: 2 days | Discharge: HOME | DRG: 585 | End: 2023-09-27
Attending: PLASTIC SURGERY | Admitting: PLASTIC SURGERY
Payer: COMMERCIAL

## 2023-09-25 ENCOUNTER — ANESTHESIA (OUTPATIENT)
Dept: SURGERY | Facility: CLINIC | Age: 43
DRG: 585 | End: 2023-09-25
Payer: COMMERCIAL

## 2023-09-25 DIAGNOSIS — Z98.890 S/P BREAST RECONSTRUCTION: Primary | ICD-10-CM

## 2023-09-25 LAB
ANION GAP SERPL CALCULATED.3IONS-SCNC: 14 MMOL/L (ref 7–15)
BASE EXCESS BLDV CALC-SCNC: 1.1 MMOL/L (ref -8.1–1.9)
BUN SERPL-MCNC: 5.8 MG/DL (ref 6–20)
CA-I BLD-MCNC: 4.8 MG/DL (ref 4.4–5.2)
CALCIUM SERPL-MCNC: 8.4 MG/DL (ref 8.6–10)
CHLORIDE SERPL-SCNC: 102 MMOL/L (ref 98–107)
CREAT SERPL-MCNC: 0.55 MG/DL (ref 0.51–0.95)
CREAT SERPL-MCNC: 0.6 MG/DL (ref 0.51–0.95)
DEPRECATED HCO3 PLAS-SCNC: 23 MMOL/L (ref 22–29)
EGFRCR SERPLBLD CKD-EPI 2021: >90 ML/MIN/1.73M2
EGFRCR SERPLBLD CKD-EPI 2021: >90 ML/MIN/1.73M2
ERYTHROCYTE [DISTWIDTH] IN BLOOD BY AUTOMATED COUNT: 14.1 % (ref 10–15)
GLUCOSE BLD-MCNC: 131 MG/DL (ref 70–99)
GLUCOSE BLDC GLUCOMTR-MCNC: 91 MG/DL (ref 70–99)
GLUCOSE SERPL-MCNC: 165 MG/DL (ref 70–99)
GRAM STAIN RESULT: NORMAL
GRAM STAIN RESULT: NORMAL
HCO3 BLDV-SCNC: 25 MMOL/L (ref 21–28)
HCT VFR BLD AUTO: 36 % (ref 35–47)
HGB BLD-MCNC: 12.3 G/DL (ref 11.7–15.7)
HGB BLD-MCNC: 12.9 G/DL (ref 11.7–15.7)
LACTATE BLD-SCNC: 2.5 MMOL/L
MAGNESIUM SERPL-MCNC: 1.4 MG/DL (ref 1.7–2.3)
MCH RBC QN AUTO: 30.4 PG (ref 26.5–33)
MCHC RBC AUTO-ENTMCNC: 34.2 G/DL (ref 31.5–36.5)
MCV RBC AUTO: 89 FL (ref 78–100)
O2/TOTAL GAS SETTING VFR VENT: 40 %
PCO2 BLDV: 37 MM HG (ref 40–50)
PH BLDV: 7.44 [PH] (ref 7.32–7.43)
PLATELET # BLD AUTO: 254 10E3/UL (ref 150–450)
PO2 BLDV: 40 MM HG (ref 25–47)
POTASSIUM BLD-SCNC: 3.5 MMOL/L (ref 3.5–5)
POTASSIUM SERPL-SCNC: 3.6 MMOL/L (ref 3.4–5.3)
RBC # BLD AUTO: 4.05 10E6/UL (ref 3.8–5.2)
SODIUM BLD-SCNC: 141 MMOL/L (ref 133–144)
SODIUM SERPL-SCNC: 139 MMOL/L (ref 136–145)
WBC # BLD AUTO: 19.6 10E3/UL (ref 4–11)

## 2023-09-25 PROCEDURE — 250N000009 HC RX 250

## 2023-09-25 PROCEDURE — 258N000003 HC RX IP 258 OP 636: Performed by: STUDENT IN AN ORGANIZED HEALTH CARE EDUCATION/TRAINING PROGRAM

## 2023-09-25 PROCEDURE — 64912 NRV RPR W/NRV ALGRFT 1ST: CPT | Mod: 58 | Performed by: PLASTIC SURGERY

## 2023-09-25 PROCEDURE — 64912 NRV RPR W/NRV ALGRFT 1ST: CPT | Mod: 80 | Performed by: STUDENT IN AN ORGANIZED HEALTH CARE EDUCATION/TRAINING PROGRAM

## 2023-09-25 PROCEDURE — 4A1GXSH MONITORING OF SKIN AND BREAST VASCULAR PERFUSION USING INDOCYANINE GREEN DYE, EXTERNAL APPROACH: ICD-10-PCS | Performed by: STUDENT IN AN ORGANIZED HEALTH CARE EDUCATION/TRAINING PROGRAM

## 2023-09-25 PROCEDURE — 250N000009 HC RX 250: Performed by: NURSE ANESTHETIST, CERTIFIED REGISTERED

## 2023-09-25 PROCEDURE — 19364 BRST RCNSTJ FREE FLAP: CPT | Mod: 22 | Performed by: STUDENT IN AN ORGANIZED HEALTH CARE EDUCATION/TRAINING PROGRAM

## 2023-09-25 PROCEDURE — 258N000003 HC RX IP 258 OP 636: Performed by: PLASTIC SURGERY

## 2023-09-25 PROCEDURE — 250N000011 HC RX IP 250 OP 636: Performed by: ANESTHESIOLOGY

## 2023-09-25 PROCEDURE — 64913 NRV RPR W/NRV ALGRFT EA ADDL: CPT | Mod: 80 | Performed by: STUDENT IN AN ORGANIZED HEALTH CARE EDUCATION/TRAINING PROGRAM

## 2023-09-25 PROCEDURE — 87070 CULTURE OTHR SPECIMN AEROBIC: CPT | Performed by: PLASTIC SURGERY

## 2023-09-25 PROCEDURE — 36415 COLL VENOUS BLD VENIPUNCTURE: CPT | Performed by: STUDENT IN AN ORGANIZED HEALTH CARE EDUCATION/TRAINING PROGRAM

## 2023-09-25 PROCEDURE — 250N000011 HC RX IP 250 OP 636: Mod: JZ | Performed by: PLASTIC SURGERY

## 2023-09-25 PROCEDURE — 250N000025 HC SEVOFLURANE, PER MIN: Performed by: PLASTIC SURGERY

## 2023-09-25 PROCEDURE — C9290 INJ, BUPIVACAINE LIPOSOME: HCPCS | Performed by: STUDENT IN AN ORGANIZED HEALTH CARE EDUCATION/TRAINING PROGRAM

## 2023-09-25 PROCEDURE — 85027 COMPLETE CBC AUTOMATED: CPT | Performed by: STUDENT IN AN ORGANIZED HEALTH CARE EDUCATION/TRAINING PROGRAM

## 2023-09-25 PROCEDURE — 250N000013 HC RX MED GY IP 250 OP 250 PS 637: Performed by: STUDENT IN AN ORGANIZED HEALTH CARE EDUCATION/TRAINING PROGRAM

## 2023-09-25 PROCEDURE — 19364 BRST RCNSTJ FREE FLAP: CPT | Mod: 22 | Performed by: PLASTIC SURGERY

## 2023-09-25 PROCEDURE — 250N000011 HC RX IP 250 OP 636: Mod: JZ | Performed by: NURSE ANESTHETIST, CERTIFIED REGISTERED

## 2023-09-25 PROCEDURE — 87075 CULTR BACTERIA EXCEPT BLOOD: CPT | Performed by: PLASTIC SURGERY

## 2023-09-25 PROCEDURE — 15777 ACELLULAR DERM MATRIX IMPLT: CPT | Mod: 58 | Performed by: PLASTIC SURGERY

## 2023-09-25 PROCEDURE — 250N000011 HC RX IP 250 OP 636: Performed by: PLASTIC SURGERY

## 2023-09-25 PROCEDURE — 0HRT077 REPLACEMENT OF RIGHT BREAST USING DEEP INFERIOR EPIGASTRIC ARTERY PERFORATOR FLAP, OPEN APPROACH: ICD-10-PCS | Performed by: PLASTIC SURGERY

## 2023-09-25 PROCEDURE — 01U80KZ SUPPLEMENT THORACIC NERVE WITH NONAUTOLOGOUS TISSUE SUBSTITUTE, OPEN APPROACH: ICD-10-PCS | Performed by: PLASTIC SURGERY

## 2023-09-25 PROCEDURE — 999N000141 HC STATISTIC PRE-PROCEDURE NURSING ASSESSMENT: Performed by: PLASTIC SURGERY

## 2023-09-25 PROCEDURE — 250N000012 HC RX MED GY IP 250 OP 636 PS 637: Mod: JZ | Performed by: PLASTIC SURGERY

## 2023-09-25 PROCEDURE — 0WUF0JZ SUPPLEMENT ABDOMINAL WALL WITH SYNTHETIC SUBSTITUTE, OPEN APPROACH: ICD-10-PCS | Performed by: PLASTIC SURGERY

## 2023-09-25 PROCEDURE — 88305 TISSUE EXAM BY PATHOLOGIST: CPT | Mod: TC | Performed by: PLASTIC SURGERY

## 2023-09-25 PROCEDURE — 250N000011 HC RX IP 250 OP 636: Mod: JZ | Performed by: STUDENT IN AN ORGANIZED HEALTH CARE EDUCATION/TRAINING PROGRAM

## 2023-09-25 PROCEDURE — C1762 CONN TISS, HUMAN(INC FASCIA): HCPCS | Performed by: PLASTIC SURGERY

## 2023-09-25 PROCEDURE — 250N000009 HC RX 250: Performed by: PLASTIC SURGERY

## 2023-09-25 PROCEDURE — 258N000003 HC RX IP 258 OP 636: Performed by: ANESTHESIOLOGY

## 2023-09-25 PROCEDURE — 250N000011 HC RX IP 250 OP 636: Mod: JZ

## 2023-09-25 PROCEDURE — 250N000011 HC RX IP 250 OP 636: Performed by: STUDENT IN AN ORGANIZED HEALTH CARE EDUCATION/TRAINING PROGRAM

## 2023-09-25 PROCEDURE — 120N000002 HC R&B MED SURG/OB UMMC

## 2023-09-25 PROCEDURE — 82565 ASSAY OF CREATININE: CPT | Performed by: STUDENT IN AN ORGANIZED HEALTH CARE EDUCATION/TRAINING PROGRAM

## 2023-09-25 PROCEDURE — 82803 BLOOD GASES ANY COMBINATION: CPT

## 2023-09-25 PROCEDURE — 87205 SMEAR GRAM STAIN: CPT | Performed by: PLASTIC SURGERY

## 2023-09-25 PROCEDURE — 360N000078 HC SURGERY LEVEL 5, PER MIN: Performed by: PLASTIC SURGERY

## 2023-09-25 PROCEDURE — 710N000010 HC RECOVERY PHASE 1, LEVEL 2, PER MIN: Performed by: PLASTIC SURGERY

## 2023-09-25 PROCEDURE — 278N000051 HC OR IMPLANT GENERAL: Performed by: PLASTIC SURGERY

## 2023-09-25 PROCEDURE — 83735 ASSAY OF MAGNESIUM: CPT | Performed by: STUDENT IN AN ORGANIZED HEALTH CARE EDUCATION/TRAINING PROGRAM

## 2023-09-25 PROCEDURE — 0HRU077 REPLACEMENT OF LEFT BREAST USING DEEP INFERIOR EPIGASTRIC ARTERY PERFORATOR FLAP, OPEN APPROACH: ICD-10-PCS | Performed by: STUDENT IN AN ORGANIZED HEALTH CARE EDUCATION/TRAINING PROGRAM

## 2023-09-25 PROCEDURE — 84132 ASSAY OF SERUM POTASSIUM: CPT | Performed by: STUDENT IN AN ORGANIZED HEALTH CARE EDUCATION/TRAINING PROGRAM

## 2023-09-25 PROCEDURE — 87102 FUNGUS ISOLATION CULTURE: CPT | Performed by: PLASTIC SURGERY

## 2023-09-25 PROCEDURE — C1763 CONN TISS, NON-HUMAN: HCPCS | Performed by: PLASTIC SURGERY

## 2023-09-25 PROCEDURE — 272N000001 HC OR GENERAL SUPPLY STERILE: Performed by: PLASTIC SURGERY

## 2023-09-25 PROCEDURE — 64913 NRV RPR W/NRV ALGRFT EA ADDL: CPT | Mod: 58 | Performed by: PLASTIC SURGERY

## 2023-09-25 PROCEDURE — 258N000003 HC RX IP 258 OP 636

## 2023-09-25 PROCEDURE — 370N000017 HC ANESTHESIA TECHNICAL FEE, PER MIN: Performed by: PLASTIC SURGERY

## 2023-09-25 DEVICE — THE GEM MICROVASCULAR ANASTOMOTIC COUPLER DEVICE RINGS ARE MADE OF POLYETHYLENE AND SURGICAL GRADE STAINLESS STEEL PINS. A PROTECTIVE COVER AND JAW ASSEMBLY PROTECT THE RINGS AND ALLOW FOR EASY LOADING ONTO THE ANASTOMOTIC INSTRUMENT. BOTH THE PROTECTIVE COVER AND JAW ASSEMBLY ARE DISPOSABLE. THE GEM MICROVASCULAR ANASTOMOTIC COUPLER DEVICE IS SINGLE-USE AND AVAILABLE IN VARIOUS SIZES
Type: IMPLANTABLE DEVICE | Site: ABDOMEN | Status: FUNCTIONAL
Brand: GEM MICROVASCULAR ANASTOMOTIC COUPLER

## 2023-09-25 DEVICE — GRAFT STRATTICE 10X16CM FIRM 1016002 CHG PER SQ CM=160 UNITS: Type: IMPLANTABLE DEVICE | Site: ABDOMEN | Status: FUNCTIONAL

## 2023-09-25 DEVICE — GRAFT CONNECTOR NERVE PROTECTOR AXOGUARD 3X15MM AGX315: Type: IMPLANTABLE DEVICE | Site: BREAST | Status: FUNCTIONAL

## 2023-09-25 DEVICE — NERVE GRAFT AVANCE 1-2MMX70MM 111270: Type: IMPLANTABLE DEVICE | Site: BREAST | Status: FUNCTIONAL

## 2023-09-25 DEVICE — COOK-SWARTZ DOPPLER FLOW PROBE STANDARD CUFF
Type: IMPLANTABLE DEVICE | Site: BREAST | Status: FUNCTIONAL
Brand: COOK-SWARTZ

## 2023-09-25 DEVICE — THE GEM MICROVASCULAR ANASTOMOTIC COUPLER DEVICE RINGS ARE MADE OF POLYETHYLENE AND SURGICAL GRADE STAINLESS STEEL PINS. A PROTECTIVE COVER AND JAW ASSEMBLY PROTECT THE RINGS AND ALLOW FOR EASY LOADING ONTO THE ANASTOMOTIC INSTRUMENT. BOTH THE PROTECTIVE COVER AND JAW ASSEMBLY ARE DISPOSABLE. THE GEM MICROVASCULAR ANASTOMOTIC COUPLER DEVICE IS SINGLE-USE AND AVAILABLE IN VARIOUS SIZES
Type: IMPLANTABLE DEVICE | Site: BREAST | Status: FUNCTIONAL
Brand: GEM MICROVASCULAR ANASTOMOTIC COUPLER

## 2023-09-25 RX ORDER — HYDROMORPHONE HCL IN WATER/PF 6 MG/30 ML
0.2 PATIENT CONTROLLED ANALGESIA SYRINGE INTRAVENOUS EVERY 5 MIN PRN
Status: DISCONTINUED | OUTPATIENT
Start: 2023-09-25 | End: 2023-09-25 | Stop reason: HOSPADM

## 2023-09-25 RX ORDER — FLUMAZENIL 0.1 MG/ML
0.2 INJECTION, SOLUTION INTRAVENOUS
Status: DISCONTINUED | OUTPATIENT
Start: 2023-09-25 | End: 2023-09-25 | Stop reason: HOSPADM

## 2023-09-25 RX ORDER — SERTRALINE HYDROCHLORIDE 100 MG/1
100 TABLET, FILM COATED ORAL EVERY MORNING
Status: DISCONTINUED | OUTPATIENT
Start: 2023-09-26 | End: 2023-09-27 | Stop reason: HOSPADM

## 2023-09-25 RX ORDER — MAGNESIUM SULFATE HEPTAHYDRATE 40 MG/ML
4 INJECTION, SOLUTION INTRAVENOUS ONCE
Status: COMPLETED | OUTPATIENT
Start: 2023-09-25 | End: 2023-09-26

## 2023-09-25 RX ORDER — NALOXONE HYDROCHLORIDE 0.4 MG/ML
0.2 INJECTION, SOLUTION INTRAMUSCULAR; INTRAVENOUS; SUBCUTANEOUS
Status: DISCONTINUED | OUTPATIENT
Start: 2023-09-25 | End: 2023-09-25 | Stop reason: HOSPADM

## 2023-09-25 RX ORDER — DIPHENHYDRAMINE HYDROCHLORIDE 50 MG/ML
25 INJECTION INTRAMUSCULAR; INTRAVENOUS EVERY 6 HOURS PRN
Status: DISCONTINUED | OUTPATIENT
Start: 2023-09-25 | End: 2023-09-27 | Stop reason: HOSPADM

## 2023-09-25 RX ORDER — ENOXAPARIN SODIUM 100 MG/ML
40 INJECTION SUBCUTANEOUS EVERY 24 HOURS
Status: DISCONTINUED | OUTPATIENT
Start: 2023-09-26 | End: 2023-09-27 | Stop reason: HOSPADM

## 2023-09-25 RX ORDER — OXYCODONE HYDROCHLORIDE 10 MG/1
10 TABLET ORAL EVERY 4 HOURS PRN
Status: DISCONTINUED | OUTPATIENT
Start: 2023-09-25 | End: 2023-09-27 | Stop reason: HOSPADM

## 2023-09-25 RX ORDER — OXYCODONE HYDROCHLORIDE 5 MG/1
5 TABLET ORAL EVERY 4 HOURS PRN
Status: DISCONTINUED | OUTPATIENT
Start: 2023-09-25 | End: 2023-09-27 | Stop reason: HOSPADM

## 2023-09-25 RX ORDER — HYDROMORPHONE HCL IN WATER/PF 6 MG/30 ML
0.2 PATIENT CONTROLLED ANALGESIA SYRINGE INTRAVENOUS
Status: DISCONTINUED | OUTPATIENT
Start: 2023-09-25 | End: 2023-09-27 | Stop reason: HOSPADM

## 2023-09-25 RX ORDER — GLYCOPYRROLATE 0.2 MG/ML
INJECTION, SOLUTION INTRAMUSCULAR; INTRAVENOUS PRN
Status: DISCONTINUED | OUTPATIENT
Start: 2023-09-25 | End: 2023-09-25

## 2023-09-25 RX ORDER — BUSPIRONE HYDROCHLORIDE 10 MG/1
10 TABLET ORAL 2 TIMES DAILY
Status: DISCONTINUED | OUTPATIENT
Start: 2023-09-25 | End: 2023-09-27 | Stop reason: HOSPADM

## 2023-09-25 RX ORDER — DIPHENHYDRAMINE HCL 25 MG
25 CAPSULE ORAL EVERY 6 HOURS PRN
Status: DISCONTINUED | OUTPATIENT
Start: 2023-09-25 | End: 2023-09-27 | Stop reason: HOSPADM

## 2023-09-25 RX ORDER — NALOXONE HYDROCHLORIDE 0.4 MG/ML
0.4 INJECTION, SOLUTION INTRAMUSCULAR; INTRAVENOUS; SUBCUTANEOUS
Status: DISCONTINUED | OUTPATIENT
Start: 2023-09-25 | End: 2023-09-27 | Stop reason: HOSPADM

## 2023-09-25 RX ORDER — ACETAMINOPHEN 325 MG/1
650 TABLET ORAL EVERY 4 HOURS PRN
Status: DISCONTINUED | OUTPATIENT
Start: 2023-09-28 | End: 2023-09-27 | Stop reason: HOSPADM

## 2023-09-25 RX ORDER — METHOCARBAMOL 750 MG/1
750 TABLET, FILM COATED ORAL EVERY 6 HOURS PRN
Status: DISCONTINUED | OUTPATIENT
Start: 2023-09-25 | End: 2023-09-27 | Stop reason: HOSPADM

## 2023-09-25 RX ORDER — CEFAZOLIN SODIUM/WATER 2 G/20 ML
2 SYRINGE (ML) INTRAVENOUS SEE ADMIN INSTRUCTIONS
Status: DISCONTINUED | OUTPATIENT
Start: 2023-09-25 | End: 2023-09-25 | Stop reason: HOSPADM

## 2023-09-25 RX ORDER — ONDANSETRON 2 MG/ML
4 INJECTION INTRAMUSCULAR; INTRAVENOUS EVERY 6 HOURS PRN
Status: DISCONTINUED | OUTPATIENT
Start: 2023-09-25 | End: 2023-09-27 | Stop reason: HOSPADM

## 2023-09-25 RX ORDER — DEXTROSE MONOHYDRATE, SODIUM CHLORIDE, AND POTASSIUM CHLORIDE 50; 1.49; 4.5 G/1000ML; G/1000ML; G/1000ML
INJECTION, SOLUTION INTRAVENOUS CONTINUOUS
Status: DISCONTINUED | OUTPATIENT
Start: 2023-09-25 | End: 2023-09-27 | Stop reason: HOSPADM

## 2023-09-25 RX ORDER — NALOXONE HYDROCHLORIDE 0.4 MG/ML
0.2 INJECTION, SOLUTION INTRAMUSCULAR; INTRAVENOUS; SUBCUTANEOUS
Status: DISCONTINUED | OUTPATIENT
Start: 2023-09-25 | End: 2023-09-27 | Stop reason: HOSPADM

## 2023-09-25 RX ORDER — NALOXONE HYDROCHLORIDE 0.4 MG/ML
0.4 INJECTION, SOLUTION INTRAMUSCULAR; INTRAVENOUS; SUBCUTANEOUS
Status: DISCONTINUED | OUTPATIENT
Start: 2023-09-25 | End: 2023-09-25 | Stop reason: HOSPADM

## 2023-09-25 RX ORDER — SULFAMETHOXAZOLE/TRIMETHOPRIM 800-160 MG
1 TABLET ORAL 2 TIMES DAILY
Status: DISCONTINUED | OUTPATIENT
Start: 2023-09-25 | End: 2023-09-27 | Stop reason: HOSPADM

## 2023-09-25 RX ORDER — LIDOCAINE 40 MG/G
CREAM TOPICAL
Status: DISCONTINUED | OUTPATIENT
Start: 2023-09-25 | End: 2023-09-27 | Stop reason: HOSPADM

## 2023-09-25 RX ORDER — CALCIUM CHLORIDE 100 MG/ML
INJECTION INTRAVENOUS; INTRAVENTRICULAR PRN
Status: DISCONTINUED | OUTPATIENT
Start: 2023-09-25 | End: 2023-09-25

## 2023-09-25 RX ORDER — DEXAMETHASONE SODIUM PHOSPHATE 4 MG/ML
INJECTION, SOLUTION INTRA-ARTICULAR; INTRALESIONAL; INTRAMUSCULAR; INTRAVENOUS; SOFT TISSUE PRN
Status: DISCONTINUED | OUTPATIENT
Start: 2023-09-25 | End: 2023-09-25

## 2023-09-25 RX ORDER — ASPIRIN 81 MG/1
81 TABLET ORAL DAILY
Status: DISCONTINUED | OUTPATIENT
Start: 2023-09-25 | End: 2023-09-27 | Stop reason: HOSPADM

## 2023-09-25 RX ORDER — ONDANSETRON 4 MG/1
4 TABLET, ORALLY DISINTEGRATING ORAL EVERY 6 HOURS PRN
Status: DISCONTINUED | OUTPATIENT
Start: 2023-09-25 | End: 2023-09-27 | Stop reason: HOSPADM

## 2023-09-25 RX ORDER — CEFAZOLIN SODIUM/WATER 2 G/20 ML
2 SYRINGE (ML) INTRAVENOUS
Status: COMPLETED | OUTPATIENT
Start: 2023-09-25 | End: 2023-09-25

## 2023-09-25 RX ORDER — HYDROMORPHONE HCL IN WATER/PF 6 MG/30 ML
0.4 PATIENT CONTROLLED ANALGESIA SYRINGE INTRAVENOUS EVERY 5 MIN PRN
Status: DISCONTINUED | OUTPATIENT
Start: 2023-09-25 | End: 2023-09-25 | Stop reason: HOSPADM

## 2023-09-25 RX ORDER — FENTANYL CITRATE 50 UG/ML
25 INJECTION, SOLUTION INTRAMUSCULAR; INTRAVENOUS EVERY 5 MIN PRN
Status: DISCONTINUED | OUTPATIENT
Start: 2023-09-25 | End: 2023-09-25 | Stop reason: HOSPADM

## 2023-09-25 RX ORDER — PROCHLORPERAZINE MALEATE 5 MG
10 TABLET ORAL EVERY 6 HOURS PRN
Status: DISCONTINUED | OUTPATIENT
Start: 2023-09-25 | End: 2023-09-27 | Stop reason: HOSPADM

## 2023-09-25 RX ORDER — BUPIVACAINE HYDROCHLORIDE 2.5 MG/ML
INJECTION, SOLUTION EPIDURAL; INFILTRATION; INTRACAUDAL
Status: COMPLETED | OUTPATIENT
Start: 2023-09-25 | End: 2023-09-25

## 2023-09-25 RX ORDER — INDOCYANINE GREEN AND WATER 25 MG
KIT INJECTION PRN
Status: DISCONTINUED | OUTPATIENT
Start: 2023-09-25 | End: 2023-09-25

## 2023-09-25 RX ORDER — SODIUM CHLORIDE, SODIUM LACTATE, POTASSIUM CHLORIDE, CALCIUM CHLORIDE 600; 310; 30; 20 MG/100ML; MG/100ML; MG/100ML; MG/100ML
INJECTION, SOLUTION INTRAVENOUS CONTINUOUS
Status: DISCONTINUED | OUTPATIENT
Start: 2023-09-25 | End: 2023-09-25 | Stop reason: HOSPADM

## 2023-09-25 RX ORDER — BISACODYL 10 MG
10 SUPPOSITORY, RECTAL RECTAL DAILY PRN
Status: DISCONTINUED | OUTPATIENT
Start: 2023-09-25 | End: 2023-09-27 | Stop reason: HOSPADM

## 2023-09-25 RX ORDER — ONDANSETRON 2 MG/ML
INJECTION INTRAMUSCULAR; INTRAVENOUS PRN
Status: DISCONTINUED | OUTPATIENT
Start: 2023-09-25 | End: 2023-09-25

## 2023-09-25 RX ORDER — ACETAMINOPHEN 325 MG/1
975 TABLET ORAL EVERY 8 HOURS
Status: DISCONTINUED | OUTPATIENT
Start: 2023-09-25 | End: 2023-09-27 | Stop reason: HOSPADM

## 2023-09-25 RX ORDER — HYDROXYZINE HYDROCHLORIDE 10 MG/1
10 TABLET, FILM COATED ORAL 3 TIMES DAILY PRN
Status: DISCONTINUED | OUTPATIENT
Start: 2023-09-25 | End: 2023-09-27 | Stop reason: HOSPADM

## 2023-09-25 RX ORDER — ONDANSETRON 2 MG/ML
4 INJECTION INTRAMUSCULAR; INTRAVENOUS EVERY 30 MIN PRN
Status: DISCONTINUED | OUTPATIENT
Start: 2023-09-25 | End: 2023-09-25 | Stop reason: HOSPADM

## 2023-09-25 RX ORDER — AMOXICILLIN 250 MG
1 CAPSULE ORAL 2 TIMES DAILY
Status: DISCONTINUED | OUTPATIENT
Start: 2023-09-25 | End: 2023-09-27 | Stop reason: HOSPADM

## 2023-09-25 RX ORDER — LIDOCAINE 40 MG/G
CREAM TOPICAL
Status: DISCONTINUED | OUTPATIENT
Start: 2023-09-25 | End: 2023-09-25 | Stop reason: HOSPADM

## 2023-09-25 RX ORDER — ENOXAPARIN SODIUM 100 MG/ML
40 INJECTION SUBCUTANEOUS
Status: COMPLETED | OUTPATIENT
Start: 2023-09-25 | End: 2023-09-25

## 2023-09-25 RX ORDER — POLYETHYLENE GLYCOL 3350 17 G/17G
17 POWDER, FOR SOLUTION ORAL DAILY
Status: DISCONTINUED | OUTPATIENT
Start: 2023-09-26 | End: 2023-09-27 | Stop reason: HOSPADM

## 2023-09-25 RX ORDER — FENTANYL CITRATE 50 UG/ML
50 INJECTION, SOLUTION INTRAMUSCULAR; INTRAVENOUS EVERY 5 MIN PRN
Status: DISCONTINUED | OUTPATIENT
Start: 2023-09-25 | End: 2023-09-25 | Stop reason: HOSPADM

## 2023-09-25 RX ORDER — FENTANYL CITRATE 50 UG/ML
INJECTION, SOLUTION INTRAMUSCULAR; INTRAVENOUS PRN
Status: DISCONTINUED | OUTPATIENT
Start: 2023-09-25 | End: 2023-09-25

## 2023-09-25 RX ORDER — HYDROMORPHONE HCL IN WATER/PF 6 MG/30 ML
0.4 PATIENT CONTROLLED ANALGESIA SYRINGE INTRAVENOUS
Status: DISCONTINUED | OUTPATIENT
Start: 2023-09-25 | End: 2023-09-27 | Stop reason: HOSPADM

## 2023-09-25 RX ORDER — LIDOCAINE HYDROCHLORIDE 20 MG/ML
INJECTION, SOLUTION INFILTRATION; PERINEURAL PRN
Status: DISCONTINUED | OUTPATIENT
Start: 2023-09-25 | End: 2023-09-25

## 2023-09-25 RX ORDER — EPHEDRINE SULFATE 50 MG/ML
INJECTION, SOLUTION INTRAMUSCULAR; INTRAVENOUS; SUBCUTANEOUS PRN
Status: DISCONTINUED | OUTPATIENT
Start: 2023-09-25 | End: 2023-09-25

## 2023-09-25 RX ORDER — FENTANYL CITRATE 50 UG/ML
25-50 INJECTION, SOLUTION INTRAMUSCULAR; INTRAVENOUS
Status: DISCONTINUED | OUTPATIENT
Start: 2023-09-25 | End: 2023-09-25 | Stop reason: HOSPADM

## 2023-09-25 RX ORDER — PROPOFOL 10 MG/ML
INJECTION, EMULSION INTRAVENOUS PRN
Status: DISCONTINUED | OUTPATIENT
Start: 2023-09-25 | End: 2023-09-25

## 2023-09-25 RX ORDER — ONDANSETRON 4 MG/1
4 TABLET, ORALLY DISINTEGRATING ORAL EVERY 30 MIN PRN
Status: DISCONTINUED | OUTPATIENT
Start: 2023-09-25 | End: 2023-09-25 | Stop reason: HOSPADM

## 2023-09-25 RX ADMIN — Medication 2 G: at 16:01

## 2023-09-25 RX ADMIN — FENTANYL CITRATE 25 MCG: 50 INJECTION, SOLUTION INTRAMUSCULAR; INTRAVENOUS at 18:15

## 2023-09-25 RX ADMIN — SUGAMMADEX 200 MG: 100 INJECTION, SOLUTION INTRAVENOUS at 17:11

## 2023-09-25 RX ADMIN — FENTANYL CITRATE 50 MCG: 50 INJECTION, SOLUTION INTRAMUSCULAR; INTRAVENOUS at 09:45

## 2023-09-25 RX ADMIN — FENTANYL CITRATE 50 MCG: 50 INJECTION, SOLUTION INTRAMUSCULAR; INTRAVENOUS at 12:59

## 2023-09-25 RX ADMIN — Medication 20 MG: at 14:33

## 2023-09-25 RX ADMIN — SULFAMETHOXAZOLE AND TRIMETHOPRIM 1 TABLET: 800; 160 TABLET ORAL at 20:52

## 2023-09-25 RX ADMIN — MIDAZOLAM 2 MG: 1 INJECTION INTRAMUSCULAR; INTRAVENOUS at 07:15

## 2023-09-25 RX ADMIN — Medication 50 MG: at 07:50

## 2023-09-25 RX ADMIN — HYDROMORPHONE HYDROCHLORIDE 0.5 MG: 1 INJECTION, SOLUTION INTRAMUSCULAR; INTRAVENOUS; SUBCUTANEOUS at 13:27

## 2023-09-25 RX ADMIN — EPHEDRINE SULFATE 5 MG: 5 INJECTION INTRAVENOUS at 11:00

## 2023-09-25 RX ADMIN — SODIUM CHLORIDE, POTASSIUM CHLORIDE, SODIUM LACTATE AND CALCIUM CHLORIDE: 600; 310; 30; 20 INJECTION, SOLUTION INTRAVENOUS at 14:17

## 2023-09-25 RX ADMIN — PHENYLEPHRINE HYDROCHLORIDE 150 MCG: 10 INJECTION INTRAVENOUS at 08:40

## 2023-09-25 RX ADMIN — FENTANYL CITRATE 50 MCG: 50 INJECTION, SOLUTION INTRAMUSCULAR; INTRAVENOUS at 08:44

## 2023-09-25 RX ADMIN — ACETAMINOPHEN 975 MG: 325 TABLET, FILM COATED ORAL at 20:52

## 2023-09-25 RX ADMIN — Medication 20 MG: at 11:33

## 2023-09-25 RX ADMIN — PROPOFOL 160 MG: 10 INJECTION, EMULSION INTRAVENOUS at 07:50

## 2023-09-25 RX ADMIN — HYDROMORPHONE HYDROCHLORIDE 0.5 MG: 1 INJECTION, SOLUTION INTRAMUSCULAR; INTRAVENOUS; SUBCUTANEOUS at 14:30

## 2023-09-25 RX ADMIN — FENTANYL CITRATE 50 MCG: 50 INJECTION, SOLUTION INTRAMUSCULAR; INTRAVENOUS at 07:15

## 2023-09-25 RX ADMIN — PROPOFOL 40 MG: 10 INJECTION, EMULSION INTRAVENOUS at 09:47

## 2023-09-25 RX ADMIN — SODIUM CHLORIDE, POTASSIUM CHLORIDE, SODIUM LACTATE AND CALCIUM CHLORIDE: 600; 310; 30; 20 INJECTION, SOLUTION INTRAVENOUS at 10:30

## 2023-09-25 RX ADMIN — FENTANYL CITRATE 50 MCG: 50 INJECTION, SOLUTION INTRAMUSCULAR; INTRAVENOUS at 12:04

## 2023-09-25 RX ADMIN — PROPOFOL 40 MG: 10 INJECTION, EMULSION INTRAVENOUS at 08:36

## 2023-09-25 RX ADMIN — ASPIRIN 81 MG CHEWABLE TABLET 81 MG: 81 TABLET CHEWABLE at 18:12

## 2023-09-25 RX ADMIN — Medication 2 G: at 12:01

## 2023-09-25 RX ADMIN — ONDANSETRON 4 MG: 2 INJECTION INTRAMUSCULAR; INTRAVENOUS at 16:19

## 2023-09-25 RX ADMIN — FENTANYL CITRATE 100 MCG: 50 INJECTION, SOLUTION INTRAMUSCULAR; INTRAVENOUS at 07:50

## 2023-09-25 RX ADMIN — BUPIVACAINE HYDROCHLORIDE 20 ML: 2.5 INJECTION, SOLUTION EPIDURAL; INFILTRATION; INTRACAUDAL at 07:15

## 2023-09-25 RX ADMIN — SODIUM CHLORIDE, POTASSIUM CHLORIDE, SODIUM LACTATE AND CALCIUM CHLORIDE: 600; 310; 30; 20 INJECTION, SOLUTION INTRAVENOUS at 07:00

## 2023-09-25 RX ADMIN — FENTANYL CITRATE 50 MCG: 50 INJECTION, SOLUTION INTRAMUSCULAR; INTRAVENOUS at 08:50

## 2023-09-25 RX ADMIN — DEXAMETHASONE SODIUM PHOSPHATE 4 MG: 4 INJECTION, SOLUTION INTRA-ARTICULAR; INTRALESIONAL; INTRAMUSCULAR; INTRAVENOUS; SOFT TISSUE at 08:20

## 2023-09-25 RX ADMIN — PROPOFOL 20 MG: 10 INJECTION, EMULSION INTRAVENOUS at 09:49

## 2023-09-25 RX ADMIN — FENTANYL CITRATE 50 MCG: 50 INJECTION, SOLUTION INTRAMUSCULAR; INTRAVENOUS at 16:35

## 2023-09-25 RX ADMIN — SODIUM CHLORIDE, POTASSIUM CHLORIDE, SODIUM LACTATE AND CALCIUM CHLORIDE: 600; 310; 30; 20 INJECTION, SOLUTION INTRAVENOUS at 16:52

## 2023-09-25 RX ADMIN — LIDOCAINE HYDROCHLORIDE 100 MG: 20 INJECTION, SOLUTION INFILTRATION; PERINEURAL at 07:50

## 2023-09-25 RX ADMIN — PHENYLEPHRINE HYDROCHLORIDE 200 MCG: 10 INJECTION INTRAVENOUS at 08:02

## 2023-09-25 RX ADMIN — FENTANYL CITRATE 50 MCG: 50 INJECTION, SOLUTION INTRAMUSCULAR; INTRAVENOUS at 07:21

## 2023-09-25 RX ADMIN — Medication 20 MG: at 09:23

## 2023-09-25 RX ADMIN — PROPOFOL 40 MG: 10 INJECTION, EMULSION INTRAVENOUS at 13:42

## 2023-09-25 RX ADMIN — SODIUM CHLORIDE, POTASSIUM CHLORIDE, SODIUM LACTATE AND CALCIUM CHLORIDE: 600; 310; 30; 20 INJECTION, SOLUTION INTRAVENOUS at 13:00

## 2023-09-25 RX ADMIN — SENNOSIDES AND DOCUSATE SODIUM 1 TABLET: 50; 8.6 TABLET ORAL at 21:57

## 2023-09-25 RX ADMIN — ONDANSETRON 4 MG: 2 INJECTION INTRAMUSCULAR; INTRAVENOUS at 18:00

## 2023-09-25 RX ADMIN — BUPIVACAINE 10 ML: 13.3 INJECTION, SUSPENSION, LIPOSOMAL INFILTRATION at 07:15

## 2023-09-25 RX ADMIN — Medication 30 MG: at 13:42

## 2023-09-25 RX ADMIN — PHENYLEPHRINE HYDROCHLORIDE 200 MCG: 10 INJECTION INTRAVENOUS at 08:12

## 2023-09-25 RX ADMIN — HYDROMORPHONE HYDROCHLORIDE 0.2 MG: 0.2 INJECTION, SOLUTION INTRAMUSCULAR; INTRAVENOUS; SUBCUTANEOUS at 20:51

## 2023-09-25 RX ADMIN — Medication 2 G: at 08:01

## 2023-09-25 RX ADMIN — OXYCODONE HYDROCHLORIDE 5 MG: 5 TABLET ORAL at 21:53

## 2023-09-25 RX ADMIN — BUPIVACAINE HYDROCHLORIDE 20 ML: 2.5 INJECTION, SOLUTION EPIDURAL; INFILTRATION; INTRACAUDAL; PERINEURAL at 07:15

## 2023-09-25 RX ADMIN — GLYCOPYRROLATE 0.2 MG: 0.2 INJECTION, SOLUTION INTRAMUSCULAR; INTRAVENOUS at 08:12

## 2023-09-25 RX ADMIN — CALCIUM CHLORIDE INJECTION 500 MG: 100 INJECTION, SOLUTION INTRAVENOUS at 13:48

## 2023-09-25 RX ADMIN — Medication 20 MG: at 15:38

## 2023-09-25 RX ADMIN — Medication 50 MG: at 08:36

## 2023-09-25 RX ADMIN — Medication 30 MG: at 09:55

## 2023-09-25 RX ADMIN — ENOXAPARIN SODIUM 40 MG: 40 INJECTION SUBCUTANEOUS at 06:44

## 2023-09-25 RX ADMIN — INDOCYANINE GREEN AND WATER 12.5 MG: KIT at 10:19

## 2023-09-25 RX ADMIN — BUSPIRONE HYDROCHLORIDE 10 MG: 10 TABLET ORAL at 21:54

## 2023-09-25 RX ADMIN — POTASSIUM CHLORIDE, DEXTROSE MONOHYDRATE AND SODIUM CHLORIDE: 150; 5; 450 INJECTION, SOLUTION INTRAVENOUS at 18:09

## 2023-09-25 RX ADMIN — EPHEDRINE SULFATE 5 MG: 5 INJECTION INTRAVENOUS at 16:16

## 2023-09-25 RX ADMIN — SODIUM CHLORIDE, POTASSIUM CHLORIDE, SODIUM LACTATE AND CALCIUM CHLORIDE: 600; 310; 30; 20 INJECTION, SOLUTION INTRAVENOUS at 08:40

## 2023-09-25 ASSESSMENT — ACTIVITIES OF DAILY LIVING (ADL)
ADLS_ACUITY_SCORE: 18

## 2023-09-25 ASSESSMENT — ENCOUNTER SYMPTOMS
DYSRHYTHMIAS: 0
SEIZURES: 0

## 2023-09-25 ASSESSMENT — LIFESTYLE VARIABLES: TOBACCO_USE: 1

## 2023-09-25 NOTE — ANESTHESIA PROCEDURE NOTES
TAP Procedure Note    Pre-Procedure   Staff -        Anesthesiologist:  Felix Townsend MD       Resident/Fellow: Fernando Sierra MD       Performed By: resident and with residents       Procedure performed by resident/fellow/CRNA in presence of a teaching physician.         Location: pre-op       Procedure Start/Stop Times: 9/25/2023 7:15 AM and 9/25/2023 7:27 AM       Pre-Anesthestic Checklist: patient identified, IV checked, site marked, risks and benefits discussed, informed consent, monitors and equipment checked, pre-op evaluation, at physician/surgeon's request and post-op pain management  Timeout:       Correct Patient: Yes        Correct Procedure: Yes        Correct Site: Yes        Correct Position: Yes        Correct Laterality: Yes        Site Marked: Yes  Procedure Documentation  Procedure: TAP       Diagnosis: POSTOP PAIN       Laterality: bilateral       Patient Position: supine       Patient Prep/Sterile Barriers: sterile gloves, mask       Skin prep: Chloraprep       Needle Gauge: 21.        Needle Length (millimeters): 110        Ultrasound guided       1. Ultrasound was used to identify targeted nerve, plexus, vascular marker, or fascial plane and place a needle adjacent to it in real-time.       2. Ultrasound was used to visualize the spread of anesthetic in close proximity to the above referenced structure.       3. A permanent image is entered into the patient's record.    Assessment/Narrative         The placement was negative for: blood aspirated, painful injection and site bleeding       Paresthesias: No.       Bolus given via needle. no blood aspirated via catheter.        Secured via.        Insertion/Infusion Method: Single Shot       Complications: none    Medication(s) Administered   Bupivacaine 0.25% PF (Infiltration) - Infiltration   20 mL - 9/25/2023 7:15:00 AM  Bupivacaine liposome (Exparel) 1.3% LA inj susp (Infiltration) - Infiltration   10 mL - 9/25/2023 7:15:00 AM  Medication  "Administration Time: 9/25/2023 7:15 AM     Comments:  Routine bilateral subcostal transversus abdominis plane block without complications. Patient tolerated well.      FOR [facility] ONLY:   Pain Team Contact information: please page the Pain Team Via [employer]. Search \"Pain\". During daytime hours, please page the attending first. At night please page the resident first.      "

## 2023-09-25 NOTE — ANESTHESIA POSTPROCEDURE EVALUATION
Patient: Zainab Bolton    Procedure: Procedure(s):  Bilateral breast reconstruction with free abdominal flaps, resensation, SPY       Anesthesia Type:  General    Note:  Disposition: Admission   Postop Pain Control: Uneventful            Sign Out: Well controlled pain   PONV: No   Neuro/Psych: Uneventful            Sign Out: Acceptable/Baseline neuro status   Airway/Respiratory: Uneventful            Sign Out: Acceptable/Baseline resp. status   CV/Hemodynamics: Uneventful            Sign Out: Acceptable CV status; No obvious hypovolemia; No obvious fluid overload   Other NRE: NONE   DID A NON-ROUTINE EVENT OCCUR? No    Event details/Postop Comments:  No complications.           Last vitals:  Vitals Value Taken Time   /94 09/25/23 1731   Temp     Pulse 77 09/25/23 1744   Resp 13 09/25/23 1744   SpO2 100 % 09/25/23 1744   Vitals shown include unvalidated device data.    Electronically Signed By: Paolo Bro MD  September 25, 2023  5:46 PM

## 2023-09-25 NOTE — ANESTHESIA PREPROCEDURE EVALUATION
Pre-Operative H & P     CC:  Preoperative exam to assess for increased cardiopulmonary risk while undergoing surgery and anesthesia.    Date of Encounter: 9/13/2023  Primary Care Physician:  Jessi Concepcion     Reason for visit:   No diagnosis found.      HPI  Zainab Bolton is a 43 year old female who presents for pre-operative H & P in preparation for  Procedure Information       Case: [id number] Date/Time: 09/25/23 0730    Procedure: Bilateral breast reconstruction with free abdominal flaps, resensation, SPY (Bilateral: Abdomen)    Anesthesia type: General with Block    Diagnosis: S/P bilateral mastectomy [Z90.13]    Pre-op diagnosis: S/P bilateral mastectomy [Z90.13]    Location:  OR [address] [place] [address] OR    Providers: MARIA E Aaron MD          History is obtained from the patient and chart review    Patient with history of left sided breast cancer s/p bilateral nipple non-sparing mastectomy through a vertical incision along with immediate expander-based reconstruction by Dr. Aguilar for left sided breast cancer on 06/01/2023. She met with Dr. Aaron on 7/11/23 in consideration for second stage procedures. She has had some wound healing issues and reports today that she was recently hospitalized for infection, and was taken to the OR for removal of her expanders on 8/25/23. She is meeting with Dr. Aaron again later today for updated evaluation and planning.     Her history is otherwise significant for HYPERTENSION, anxiety, and depression.       Hx of abnormal bleeding or anti-platelet use: Denies    Menstrual history: Patient's last menstrual period was 06/22/2023.      Past Medical History  Past Medical History:   Diagnosis Date    Anxiety 08/16/2010    Anxiety state, unspecified 1997    chronic anxiety    BRCA2 positive     Breast cancer (H) 05/19/2023    Depression     Essential hypertension 03/03/2023    Hypercholesteremia 08/16/2010    Obesity 08/16/2010    Other acne     Papanicolaou smear of  cervix with low grade squamous intraepithelial lesion (LGSIL) (aka LSIL) 2015    LSIL/+ HR HPV    S/P breast reconstruction, bilateral     TOBACCO ABUSE-CONTINUOUS        Past Surgical History  Past Surgical History:   Procedure Laterality Date    BIOPSY      BIOPSY NODE SENTINEL Left 2023    Procedure: LEFT SENTINEL LYMPH NODE BIOPSY;  Surgeon: Kylee Villasenor MD;  Location: [facility] [place] OR    CONIZATION LEEP  2015    LÁZARO 2    CONIZATION LEEP N/A 2015    Procedure: CONIZATION LEEP;  Surgeon: Omayra Cunningham DO;  Location:  OR    EYE SURGERY  2016    MASTECTOMY SIMPLE Bilateral 2023    Procedure: BILATERAL MASTECTOMIES;  Surgeon: Kylee Villasenor MD;  Location: [facility] [place] OR    RECONSTRUCT BREAST, IMPLANT PROSTHESIS, COMBINED Bilateral 2023    Procedure: RECONSTRUCTION, BREAST, BILATERAL TISSUE EXPANDERS;  Surgeon: Basil Aguilar MD;  Location: [facility] [place] OR    Removal of bilateral expanders  2023    REMOVE TISSUE EXPANDER TRUNK Bilateral 2023    Procedure: REMOVAL, TISSUE EXPANDER, BILATERAL BREAST;  Surgeon: Basil Aguilar MD;  Location: [facility] [place] OR    ZZC LIGATE FALLOPIAN TUBE  10/03/2003       Prior to Admission Medications  No current outpatient medications on file.       Allergies  No Known Allergies    Social History  Social History     Socioeconomic History    Marital status: Single     Spouse name: Not on file    Number of children: 3    Years of education: 12    Highest education level: Not on file   Occupational History    Occupation: Stay at home Mother   Tobacco Use    Smoking status: Former     Packs/day: 1.00     Years: 10.00     Pack years: 10.00     Types: Cigarettes     Quit date: 3/28/2023     Years since quittin.4    Smokeless tobacco: Never    Tobacco comments:     since age 12, but stopped with each baby   Vaping Use    Vaping Use: Not on file   Substance and Sexual Activity    Alcohol use: Not Currently    Drug  use: Yes     Types: Marijuana     Comment: daily smoking    Sexual activity: Yes     Partners: Male     Birth control/protection: Female Surgical   Other Topics Concern     Service No    Blood Transfusions No    Caffeine Concern Yes     Comment: 3 cups every 2 weeks, 2 pops per week     Occupational Exposure No    Hobby Hazards No    Sleep Concern No    Stress Concern Yes    Weight Concern Yes    Special Diet No    Back Care No    Exercise No    Bike Helmet No    Seat Belt Yes    Self-Exams No    Parent/sibling w/ CABG, MI or angioplasty before 65F 55M? No   Social History Narrative    Not on file     Social Determinants of Health     Financial Resource Strain: Not on file   Food Insecurity: Not on file   Transportation Needs: Not on file   Physical Activity: Not on file   Stress: Not on file   Social Connections: Not on file   Interpersonal Safety: Not on file   Housing Stability: Not on file       Family History  Family History   Problem Relation Age of Onset    Cancer Father         liver    Other Cancer Father         Stomach and liver cancer diagnosis    Arthritis Maternal Grandmother     Hypertension Maternal Grandmother     Diabetes Maternal Grandmother     Thyroid Disease Maternal Grandmother     Cancer Maternal Grandfather         leukemia    Heart Disease Maternal Aunt         MI approx age 46    Anesthesia Reaction No family hx of     Clotting Disorder No family hx of        Review of Systems  The complete review of systems is negative other than noted in the HPI or here.   Anesthesia Evaluation   Pt has had prior anesthetic. Type: General.    No history of anesthetic complications       ROS/MED HX  ENT/Pulmonary: Comment: Quit smoking this year (2023)    (+)                tobacco use, Past use,                   (-) recent URI   Neurologic:    (-) no seizures and no CVA   Cardiovascular: Comment: No meds for HYPERTENSION. Normal BP    (+)  - -   -  - -                                 Previous  "cardiac testing   Echo: Date: Results:    Stress Test:  Date: Results:    ECG Reviewed:  Date: 2019 Results:  SR, poor R wave progression  Cath:  Date: Results:   (-) taking anticoagulants/antiplatelets and arrhythmias   METS/Exercise Tolerance: >4 METS    Hematologic:    (-) history of blood clots and history of blood transfusion   Musculoskeletal:  - neg musculoskeletal ROS     GI/Hepatic:    (-) GERD   Renal/Genitourinary:  - neg Renal ROS     Endo:  - neg endo ROS     Psychiatric/Substance Use:     (+) psychiatric history anxiety and depression   Recreational drug usage: Cannabis.    Infectious Disease:  - neg infectious disease ROS     Malignancy:   (+) Malignancy, History of Breast.Breast CA Remission status post Surgery and Chemo.      Other:  - neg other ROS          /81   Pulse 66   Temp 36.8  C (98.3  F) (Oral)   Resp 16   Ht 1.6 m (5' 3\")   Wt 63.9 kg (140 lb 14 oz)   LMP 06/22/2023   SpO2 100%   BMI 24.95 kg/m      Physical Exam   Constitutional: Awake, alert, cooperative, no apparent distress, and appears stated age.  Eyes: Pupils equal, round and reactive to light, extra ocular muscles intact, sclera clear, conjunctiva normal.  HENT: Normocephalic, oral pharynx with moist mucus membranes, good dentition. No goiter appreciated.   Respiratory: Clear to auscultation bilaterally, no crackles or wheezing. No cough or obvious dyspnea.  Cardiovascular: Regular rate and rhythm, normal S1 and S2, and no murmur noted. Carotids +2, no bruits. No edema. Palpable pulses to radial  DP and PT arteries.   GI: Normal bowel sounds, soft, non-distended, non-tender, no masses palpated, no hepatosplenomegaly.   Lymph/Hematologic: No cervical lymphadenopathy and no supraclavicular lymphadenopathy.  Genitourinary:  Deferred.   Skin: Warm and dry.    Musculoskeletal: Full ROM of neck. There is no redness, warmth, or swelling of the joints. Gross motor strength is normal.    Neurologic: Awake, alert, oriented to " name, place and time. Cranial nerves II-XII are grossly intact. Gait is normal.   Neuropsychiatric: Calm, cooperative. Normal affect.     Prior Labs/Diagnostic Studies   All labs and imaging personally reviewed   Lab Results   Component Value Date    WBC 6.7 09/13/2023    WBC 9.2 03/21/2019     Lab Results   Component Value Date    RBC 4.26 09/13/2023    RBC 4.68 03/21/2019     Lab Results   Component Value Date    HGB 12.5 09/13/2023    HGB 14.1 03/21/2019     Lab Results   Component Value Date    HCT 37.8 09/13/2023    HCT 43.4 03/21/2019     Lab Results   Component Value Date    MCV 89 09/13/2023    MCV 93 03/21/2019     Lab Results   Component Value Date    MCH 29.3 09/13/2023    MCH 30.1 03/21/2019     Lab Results   Component Value Date    MCHC 33.1 09/13/2023    MCHC 32.5 03/21/2019     Lab Results   Component Value Date    RDW 13.0 09/13/2023    RDW 13.2 03/21/2019     Lab Results   Component Value Date     09/13/2023     03/21/2019     Last Comprehensive Metabolic Panel:  Sodium   Date Value Ref Range Status   09/22/2023 143 136 - 145 mmol/L Final   03/21/2019 141 133 - 144 mmol/L Final     Potassium   Date Value Ref Range Status   09/22/2023 4.3 3.4 - 5.3 mmol/L Final   03/21/2019 3.4 3.4 - 5.3 mmol/L Final     Chloride   Date Value Ref Range Status   09/22/2023 106 98 - 107 mmol/L Final   03/21/2019 107 94 - 109 mmol/L Final     Carbon Dioxide   Date Value Ref Range Status   03/21/2019 29 20 - 32 mmol/L Final     Carbon Dioxide (CO2)   Date Value Ref Range Status   09/22/2023 28 22 - 29 mmol/L Final     Anion Gap   Date Value Ref Range Status   09/22/2023 9 7 - 15 mmol/L Final   03/21/2019 5 3 - 14 mmol/L Final     Glucose   Date Value Ref Range Status   03/21/2019 78 70 - 99 mg/dL Final     GLUCOSE BY METER POCT   Date Value Ref Range Status   09/25/2023 91 70 - 99 mg/dL Final     Urea Nitrogen   Date Value Ref Range Status   09/22/2023 12.7 6.0 - 20.0 mg/dL Final   03/21/2019 8 7 - 30 mg/dL  Final     Creatinine   Date Value Ref Range Status   2023 0.70 0.51 - 0.95 mg/dL Final   2019 0.74 0.52 - 1.04 mg/dL Final     GFR Estimate   Date Value Ref Range Status   2023 >90 >60 mL/min/1.73m2 Final   2019 >90 >60 mL/min/[1.73_m2] Final     Comment:     Non  GFR Calc  Starting 2018, serum creatinine based estimated GFR (eGFR) will be   calculated using the Chronic Kidney Disease Epidemiology Collaboration   (CKD-EPI) equation.       GFR, ESTIMATED POCT   Date Value Ref Range Status   2023 >60 >60 mL/min/1.73m2 Final     Calcium   Date Value Ref Range Status   2023 8.7 8.6 - 10.0 mg/dL Final   2019 8.4 (L) 8.5 - 10.1 mg/dL Final     Bilirubin Total   Date Value Ref Range Status   2023 0.2 <=1.2 mg/dL Final     Alkaline Phosphatase   Date Value Ref Range Status   2023 80 35 - 104 U/L Final     ALT   Date Value Ref Range Status   2023 13 0 - 50 U/L Final     Comment:     Reference intervals for this test were updated on 2023 to more accurately reflect our healthy population. There may be differences in the flagging of prior results with similar values performed with this method. Interpretation of those prior results can be made in the context of the updated reference intervals.       AST   Date Value Ref Range Status   2023 21 0 - 45 U/L Final     Comment:     Reference intervals for this test were updated on 2023 to more accurately reflect our healthy population. There may be differences in the flagging of prior results with similar values performed with this method. Interpretation of those prior results can be made in the context of the updated reference intervals.       EK Sinus rhythm with poor R wave progression     The patient's records and results personally reviewed by this provider.     Outside records reviewed from: Care Everywhere    Assessment    Zainab Bolton is a 43 year old female seen as a  "PAC referral for risk assessment and optimization for anesthesia.    Plan/Recommendations  Pt will be optimized for the proposed procedure.  See below for details on the assessment, risk, and preoperative recommendations    NEUROLOGY  - No history of TIA, CVA or seizure    -Post Op delirium risk factors:  Age    ENT  - No current airway concerns.  Will need to be reassessed day of surgery.  Mallampati: I  TM: > 3    CARDIAC  BP normal range. No other cardiac history, symptoms or meds. Good exercise tolerance  - METS (Metabolic Equivalents)>4    RCRI: 0.9% risk of serious cardiac events    PULMONARY  Pulmonary nodule being followed  Denies asthma, cough or shortness of breath  Low risk for JIMMIE    - Tobacco History    History   Smoking Status    Former    Packs/day: 1.00    Years: 10.00    Types: Cigarettes    Quit date: 3/28/2023   Smokeless Tobacco    Never       GI: Denies GERD  PONV High Risk  Total Score: 3           1 AN PONV: Pt is Female    1 AN PONV: Patient is not a current smoker    1 AN PONV: Intended Post Op Opioids        /RENAL  - Baseline Creatinine  0.65    ENDOCRINE    - BMI: Estimated body mass index is 24.95 kg/m  as calculated from the following:    Height as of this encounter: 1.6 m (5' 3\").    Weight as of this encounter: 63.9 kg (140 lb 14 oz).  Overweight (BMI 25.0-29.9)  - No history of Diabetes Mellitus    HEME  VTE Low Risk 0.26%            Total Score: 0      Denies personal or family history of blood clots  Denies history of blood transfusion   Last Hgb: 12.5    MSK: Is not frail    PSYCH  - Anxiety/depression   Will take Buspar and sertraline on DOS.   Hydroxyzine prn     Oncology: Breast cancer as above.   BRCA 2 positive  Tamoxifen at HS    Daily marijuana but will hold for 24 hours prior to surgery    Different anesthesia methods/types have been discussed with the patient, but they are aware that the final plan will be decided by the assigned anesthesia provider on the date of " service.    The patient is optimized for their procedure. AVS with information on surgery time/arrival time, meds and NPO status given by nursing staff. No further diagnostic testing indicated.      On the day of service:     Prep time: 15 minutes  Visit time: 15 minutes  Documentation time: 15 minutes  ------------------------------------------  Total time: 45 minutes      THOMAS Carnes CNS  Preoperative Assessment Center  [facility]  Phone: [phone]  Fax: [phone]    Physical Exam    Airway        Mallampati: I   TM distance: > 3 FB   Neck ROM: full   Mouth opening: > 3 cm    Respiratory Devices and Support         Dental     Comment: Wnl, none loose or broken        Cardiovascular          Rhythm and rate: regular and normal     Pulmonary           breath sounds clear to auscultation           Anesthesia Plan    ASA Status:  2    NPO Status:  NPO Appropriate    Anesthesia Type: General.     - Airway: ETT   Induction: Intravenous.   Maintenance: Balanced.   Techniques and Equipment:     - Lines/Monitors: 2nd IV     Consents    Anesthesia Plan(s) and associated risks, benefits, and realistic alternatives discussed. Questions answered and patient/representative(s) expressed understanding.     - Discussed:     - Discussed with:  Patient            Postoperative Care    Pain management: IV analgesics, Peripheral nerve block (Single Shot).   PONV prophylaxis: Ondansetron (or other 5HT-3), Dexamethasone or Solumedrol     Comments:    Other Comments: NPO. Meds reviewed. Pt does not have hypertension. No recent URI. No problems with anesthesia in past. Had some mild nausea after surgery that responded completely to IV med in PACU (per her partner).

## 2023-09-25 NOTE — ANESTHESIA PROCEDURE NOTES
Pectoralis Procedure Note    Pre-Procedure   Staff -        Anesthesiologist:  Felix Townsend MD       Resident/Fellow: Fernando Sierra MD       Performed By: resident and with residents       Procedure performed by resident/fellow/CRNA in presence of a teaching physician.         Location: pre-op       Procedure Start/Stop Times: 9/25/2023 7:15 AM and 9/25/2023 7:27 AM       Pre-Anesthestic Checklist: patient identified, IV checked, site marked, risks and benefits discussed, informed consent, monitors and equipment checked, pre-op evaluation, at physician/surgeon's request and post-op pain management  Timeout:       Correct Patient: Yes        Correct Procedure: Yes        Correct Site: Yes        Correct Position: Yes        Correct Laterality: Yes        Site Marked: Yes  Procedure Documentation  Procedure: Pectoralis             Pectoralis I and Pectoralis II       Laterality: bilateral       Patient Position: supine       Patient Prep/Sterile Barriers: sterile gloves, mask       Skin prep: Chloraprep       Needle Type: insulated       Needle Gauge: 21.        Needle Length (millimeters): 110        1. Ultrasound was used to identify targeted nerve, plexus, vascular marker, or fascial plane and place a needle adjacent to it in real-time.       2. Ultrasound was used to visualize the spread of anesthetic in close proximity to the above referenced structure.       3. A permanent image is entered into the patient's record.       4. The visualized anatomic structures appeared normal.       5. There were no apparent abnormal pathologic findings.    Assessment/Narrative         The placement was negative for: blood aspirated, painful injection and site bleeding       Paresthesias: No.       Bolus given via needle..        Secured via.        Insertion/Infusion Method: Single Shot       Complications: none    Medication(s) Administered   Bupivacaine 0.25% PF (Infiltration) - Infiltration   20 mL - 9/25/2023 7:15:00  "AM  Bupivacaine liposome (Exparel) 1.3% LA inj susp (Infiltration) - Infiltration   10 mL - 9/25/2023 7:15:00 AM  Medication Administration Time: 9/25/2023 7:15 AM      FOR [facility] ONLY:   Pain Team Contact information: please page the Pain Team [employer]. Search \"Pain\". During daytime hours, please page the attending first. At night please page the resident first.      "

## 2023-09-25 NOTE — ANESTHESIA CARE TRANSFER NOTE
Patient: Zainab Bolton    Procedure: Procedure(s):  Bilateral breast reconstruction with free abdominal flaps, resensation, SPY       Diagnosis: S/P bilateral mastectomy [Z90.13]  Diagnosis Additional Information: No value filed.    Anesthesia Type:   General     Note:    Oropharynx: oropharynx clear of all foreign objects and spontaneously breathing  Level of Consciousness: awake    Level of Supplemental Oxygen (L/min / FiO2): 6  Independent Airway: airway patency satisfactory and stable  Dentition: dentition unchanged  Vital Signs Stable: post-procedure vital signs reviewed and stable  Report to RN Given: handoff report given  Patient transferred to: PACU    Handoff Report: Identifed the Patient, Identified the Reponsible Provider, Reviewed the pertinent medical history, Discussed the surgical course, Reviewed Intra-OP anesthesia mangement and issues during anesthesia, Set expectations for post-procedure period and Allowed opportunity for questions and acknowledgement of understanding      Vitals:  Vitals Value Taken Time   /94 09/25/23 1731   Temp     Pulse 82 09/25/23 1739   Resp 6 09/25/23 1739   SpO2 100 % 09/25/23 1739   Vitals shown include unvalidated device data.    Electronically Signed By: THOMAS Live CRNA  September 25, 2023  5:40 PM

## 2023-09-25 NOTE — ANESTHESIA POSTPROCEDURE EVALUATION
Patient: Zainab Bolton    Procedure: Procedure(s):  Bilateral breast reconstruction with free abdominal flaps, resensation, SPY       Anesthesia Type:  General    Note:  Disposition: Admission   Postop Pain Control: Uneventful            Sign Out: Well controlled pain   PONV: No   Neuro/Psych: Uneventful            Sign Out: Acceptable/Baseline neuro status   Airway/Respiratory: Uneventful            Sign Out: Acceptable/Baseline resp. status   CV/Hemodynamics: Uneventful            Sign Out: Acceptable CV status; No obvious hypovolemia; No obvious fluid overload   Other NRE: NONE   DID A NON-ROUTINE EVENT OCCUR? No    Event details/Postop Comments:  No complications.           Last vitals:  Vitals Value Taken Time   /94 09/25/23 1731   Temp     Pulse 76 09/25/23 1734   Resp     SpO2 99 % 09/25/23 1734   Vitals shown include unvalidated device data.    Electronically Signed By: Paolo Bro MD  September 25, 2023  5:36 PM

## 2023-09-25 NOTE — BRIEF OP NOTE
[facility]    Brief Operative Note    Pre-operative diagnosis: S/P bilateral mastectomy [Z90.13]  Post-operative diagnosis Same as pre-operative diagnosis    Procedure: Procedure(s):  Bilateral breast reconstruction with free abdominal flaps, resensation, SPY  Surgeon: Surgeon(s) and Role:     * MARIA E Aaron MD - Primary     * Andriy Santoyo MD - Assisting     * Yayo Meyer MD - Resident - Assisting  Anesthesia: General with Block   Estimated Blood Loss: 250cc    Drains: Efren-Prattx3  Specimens:   ID Type Source Tests Collected by Time Destination   1 : Right breast skin Tissue Breast, Right SURGICAL PATHOLOGY EXAM MARIA E Aaron MD 9/25/2023  2:09 PM    2 : Left Breast Skin Tissue Breast, Left SURGICAL PATHOLOGY EXAM MARIA E Aaron MD 9/25/2023  3:44 PM    A : Left breast fluid Body fluid, unsp Breast, Left ANAEROBIC BACTERIAL CULTURE ROUTINE, GRAM STAIN, FUNGAL OR YEAST CULTURE ROUTINE, AEROBIC BACTERIAL CULTURE ROUTINE MARIA E Aaron MD 9/25/2023  8:48 AM      Findings:   Bilateral breast reconstruction with abdominally based free tissue transfer. Left artery had to be revised intra op due to loss of signal . Bilateral implantable COOK arterial doppler. External doppler signal with prolene suture.   Complications: None  Implants:   Implant Name Type Inv. Item Serial No.  Lot No. LRB No. Used Action   GRAFT CONNECTOR NERVE PROTECTOR AXOGUARD 3X15MM [id number] - [id number] Bone/Tissue/Biologic GRAFT CONNECTOR NERVE PROTECTOR AXOGUARD 3X15MM [id number]  [employer] [id number] Left 1 Implanted   GRAFT CONNECTOR NERVE PROTECTOR AXOGUARD 3X15MM [id number] - [id number] Bone/Tissue/Biologic GRAFT CONNECTOR NERVE PROTECTOR AXOGUARD 3X15MM [id number]  [employer] [id number] Right 1 Implanted   NERVE GRAFT AVANCE 1-1SKW33IN [id number] - [id number] Bone/Tissue/Biologic NERVE GRAFT AVANCE [id number]  [employer]OGEN [id number] Right 1 Implanted   NERVE  GRAFT AVANCE [id number] - [id number] Bone/Tissue/Biologic NERVE GRAFT AVANCE [id number] [id number]  [employer] [id number] Left 1 Implanted   GRAFT STRATTICE [id number] FIRM [id number] CHG PER SQ CM=160 UNITS - [id number] Bone/Tissue/Biologic GRAFT STRATTICE [id number] FIRM [id number] CHG PER SQ NW=808 UNITS  [employer] [id number] N/A 1 Implanted   IMP PROBE DOPPLER FLOW STD CUFF [id number] - [id number] Other IMP PROBE DOPPLER FLOW STD CUFF [id number]  [employer] [id number] Right 1 Implanted   IMP PROBE DOPPLER FLOW STD CUFF [id number] - [id number] Other IMP PROBE DOPPLER FLOW STD CUFF [id number]  [facility] [employer] [id number] Left 1 Implanted   IMP DEVICE ANASTOMOTIC 3.0MM  GOLD [id number] - [id number] Other IMP DEVICE ANASTOMOTIC 3.0MM  GOLD [id number]  [employer] [id number] Right 1 Implanted   IMP DEVICE ANASTOMOTIC 2.5MM  RED [id number] - [id number] Other IMP DEVICE ANASTOMOTIC 2.5MM  RED [id number]  [employer] [id number] Left 1 Implanted     Admit to PRS   Q1hr flap checks with doppler and clinically   ASA in PACU, lovenox tomorrow   Room temp <75 degrees, kristine hugger on chest   NPO tonight   Beach chair at all times while in bed   Bedrest tonight   Bactrim  Follow up fluid cultures   Leroy until tomorrow and comfortably getting out of bed   Anticipate discharge in 3-4 days     Yayo Meyer MD   Plastic Surgery PGY-4

## 2023-09-25 NOTE — OR NURSING
Right ABD/ Left Breast:  Ischemia: 1430  Reperfusion: 1531  : 2.5  Flap weight: 665 g    Left ABD/ Right Breast:  Ischemia: 1319  Reperfusion: 1349  : 3.0  Flap weight: 588 g

## 2023-09-25 NOTE — ANESTHESIA PROCEDURE NOTES
Airway       Patient location during procedure: OR       Procedure Start/Stop Times: 9/25/2023 7:53 AM  Staff -        CRNA: Emani Prescott APRN CRNA       Performed By: CRNA  Consent for Airway        Urgency: elective  Indications and Patient Condition       Indications for airway management: shantel-procedural       Induction type:intravenous       Mask difficulty assessment: 1 - vent by mask    Final Airway Details       Final airway type: endotracheal airway       Successful airway: ETT - single and Oral  Endotracheal Airway Details        ETT size (mm): 7.0       Cuffed: yes       Cuff volume (mL): 8       Successful intubation technique: direct laryngoscopy       DL Blade Type: Scherer 2       Grade View of Cords: 1       Adjucts: stylet       Position: Right       Measured from: lips       Secured at (cm): 22       Bite block used: None    Post intubation assessment        Placement verified by: capnometry, equal breath sounds and chest rise        Number of attempts at approach: 1       Secured with: pink tape and plastic tape       Ease of procedure: easy       Dentition: Intact and Unchanged    Medication(s) Administered   Medication Administration Time: 9/25/2023 7:53 AM

## 2023-09-25 NOTE — OP NOTE
DATE OF OPERATION: 9/25/2023     PREOPERATIVE DIAGNOSIS:   1.  History of right breast cancer status post bilateral mastectomy with tissue expander reconstruction complicated by bilateral tissue expander infection necessitating removal, now ready for bilateral autologous tissue breast reconstruction.  2.  Bilateral breasts with loss of sensation.     POSTOPERATIVE DIAGNOSIS:   1.  History of right breast cancer status post bilateral mastectomy with tissue expander reconstruction complicated by bilateral tissue expander infection necessitating removal, now ready for bilateral autologous tissue breast reconstruction.  2.  Bilateral breasts with loss of sensation.     PROCEDURES PERFORMED:   1.  Bilateral breast reconstruction with free bilateral deep inferior epigastric  fasciocutaneous flaps.  2.  Bilateral breast flap reinnervation by coaptation of flap T11 intercostal nerve to chest T4 intercostal nerve end-to-end using nerve allograft under operative microscope using microsurgical technique.  3.  Bilateral breast flap reinnervation coaptation sites conduit wrapping under the operative microscope using microsurgical technique.  4.  Intraoperative chemical angiography using the SPY Elite system.  5.  Bilateral abdominal wall reinforcement with Strattice acellular dermal matrix, size 16 x 5 cm per side.     SURGEON: Jd Aaron MD     CO SURGEON: Andriy Santoyo MD (Co surgeon was required for this operation due to the technical complexity and long duration of free tissue transfer breast reconstruction in the setting of inconsistent availability of appropriately skilled resident assistance.  Dr. Aaron was the main surgeon for the right breast reconstruction and abdominal reconstruction, whereas Dr. Santoyo was the main surgeon for the left breast reconstruction and intraoperative chemical angiography.  Bilateral breast reinnervation using nerve allograft and conduit wrapping of coaptation sites was  performed together under the operative microscope.)     ASSISTANT:   1. Yayo Meyer MD.     ANESTHESIA: General.     ESTIMATED BLOOD LOSS: 250 mL     FINDINGS:   1.  Dawson of bilateral deep inferior epigastric  based flaps required extensive retrograde  dissection resulting in at least 1 hour added to the operative time per flap.  Right breast flap was harvested on 2 lateral row perforators while the left breast flap was harvested on 2 lateral row perforators.  2.  End-to-end anastomosis of left deep inferior epigastric flap artery to right internal mammary artery and left deep inferior epigastric flap vein to right internal mammary vein using a size 3.0 mm venous .  Right breast flap weight 588 g.  Right breast flap ischemia time 30 minutes.  3.  End-to-end anastomosis of right deep inferior epigastric flap artery to left internal mammary artery and right deep inferior epigastric flap vein to left internal mammary vein using a size 2.5 mm venous .  Left breast flap weight 665 g.  Left breast flap ischemia time 61 minutes.  4.  End-to-end coaptation of left deep inferior epigastric flap T11 intercostal nerve to right chest T4 intercostal nerve using nerve allograft with dimensions 1 - 2 mm diameter and 70 mm length.  5.  End-to-end coaptation of right deep inferior epigastric flap T11 intercostal nerve to left chest T4 intercostal nerve using nerve allograft with dimensions 1 - 2 mm diameter and 70 mm length.  6.  Wrapping of nerve coaptation sites using conduit with dimensions 3 mm diameter and 15 mm length divided in half per coaptation site for a total of 4 coaptation sites.     SPECIMENS: Bilateral breast skin.     COMPLICATIONS: None.     DRAINS: 15 Ukrainian drain per each breast and in abdomen for a total of 3 drains.     IMPLANTS: None.     DESCRIPTION OF PROCEDURE:   Informed consent was obtained and surgical sites marked in the preoperative holding area.  Preoperative  antibiotics and anticoagulation were given.  Patient was taken to the operating room and placed in a supine position over an under body Noris hugger.  Room was kept warm and patient body temperature monitored throughout the entire case.  Bilateral lower leg SCDs were applied and general anesthesia was obtained.  A Leroy was placed and arms were tucked to the sides with all pressure points padded.  Patient's chest and abdomen were then prepped and draped in a sterile fashion.  A timeout was performed.     Deep inferior epigastric perforators were identified bilaterally using a pencil doppler.  Bilateral shaylee-abdominal flaps were designed around these dopplered marks.  The superior abdominal incision was made above the level of the umbilicus.  Dissection was carried superficial to the abdominal wall fascia up to the level of the xyphoid at the midline and laterally below the ribs on both sides.  The surgical bed was then reflexed to gauge tension of donor site closure and confirm the inferior abdominal incision.  Bilateral superficial inferior epigastric veins were dissected circumferentially for a distance of 3 cm inferiorly before clipping and ligating.  The abdominal flap was then raised from lateral to medial in a suprafascial plane until the skin perforators were reached.  The midline abdominal incision was then made so that medial to lateral dissection could be performed to further visualize the skin perforators completely.     In addition to  dissection, the T11 intercostal nerve entering into each flap was visualized and dissected circumferentially to the level of the deep fascia.  The nerves were ligated at that level to obtain just the sensory fascicles.  Under the operative microscope, a nerve allograft was coapted to the ligated nerve end-to-end using 9-0 nylon suture in an epineural fashion at 180 degrees from each other.  This coaptation site was then wrapped with a conduit that was secured into  place using vascular clips tethered to the adjacent .  The nerve graft was then protected at all times until coaptation to the chest T4 nerve later.     The abdominal flaps were then isolated to select perforators using microvascular clamps to occlude flow through all other perforators.  Under guidance from the surgical team, 5 cc of indocyanine green dye was given IV, followed by a 10 cc saline flush.  Using the SPY Elite system, real time intraoperative chemical angiography was performed and perfusion of the flaps was evaluated at 30 seconds and 60 seconds.  Decision was made to use the following perforators:  Right breast flap: 2 lateral row perforators.  Left breast flap: 2 lateral row perforators.  In addition to  selection, chemical angiography was used to determine the amount of flap tissue to be debrided at the lateral edges.     The rectus fascia was split in a longitudinal manner towards the external iliac vessels.  Retrograde dissection was performed for the select perforators down to the deep inferior epigastric vessels, which were then dissected to their origin from the external iliac vessels and  from one another.  This dissection was performed meticulously and at times using microsurgical instruments to control all vascular branches, adding at least 1 hour to the operative time per flap.  The pedicles were then marked, and the flaps were then stapled back to their initial positions.     Bilateral chests were prepared for flap inset by creating space prepectorally to adequately fit the flaps. Subcutaneous flaps were raised. The pectoralis muscle was split along its medial fibers below the angle of Grant to reveal the fourth rib cartilage.  The perichondrium was entered and the medial rib cartilage was removed with a rongeur.  The deep perichondrium was then dissected off the chest with bipolar cautery.  The internal mammary vessels were prepared under the operative  microscope.  Single vessel clamps were placed proximally and the distal ends were double clipped and cut sharply at a level appropriate for anastomosis.  Pulsatile flow was noted from the artery and the vein flushed easily.  The vessel cut edges were stripped of adventitia, properly dilated, and lumens thoroughly irrigated with heparinized solution.     Attention was then turned to the inferior aspect from where the rib cartilage was removed.  The T4 intercostal nerve was dissected out bilaterally.  These were then traced distally into the mastectomy skin flap and were found to be ligated from previous surgery.  Decision was made to use these nerves as donors for reinnervation.     The abdominal flap pedicles were ligated starting with the artery then vein.  The flaps were weighed at the back table.  The flap arteries were then dilated and flushed with heparinized solution until clear egress was seen from the flap veins.  The flaps were secured to the chest with a moist lap and the pedicle was laid out to meet the prepared internal mammary vessels without twisting or kinking.  Under the operative microscope, the arterial anastomosis was performed end-to-end interrupted 8-0 nylons.  180 degree sutures were placed first, followed by back wall sutures, then front wall sutures.  The venous anastomosis was performed with a venous  after proper measurements were made.  Clamps were removed and pulsatile flow was noticed through the arterial anastomoses.  Acland testing revealed flow through the venous anastomoses.  Bleeding from flap edges was noted and a doppler signal could be obtained at the flap skin paddles. Implantable Cook dopplers were placed. The left side artery seemed to go down 5 minutes in, after the Cook as applied. We redid the anastomosis. No clot was seen. No issues thereafter noticed.      The medial edges of the flaps were lifted from the chest wall.  This exposed the nerve grafts that were  previously coapted and T4 intercostal nerve dissected into our plane.  Nerve coaptation from the T4 intercostal nerve to allograft was made using 9-0 nylon suture in 180 degree fashion within the epineurium.  This coaptation site was then wrapped with a conduit which was secured in place using vascular clips.  This was performed under the operative microscope using microsurgical technique.     Bilateral flaps were placed inside the breast pockets after trimming their lateral corners.  Appropriate skin paddles were designed and the rest of the skin was de-epithelialized.  Mastectomy skin flaps were trimmed appropriately.  A 15 Lithuanian DOMINIQUE drain was placed into each side and the skin closed in layers.  Doppler signals were intact on both skin paddles following this.  Simultaneously, the abdominal fascia was closed with 0 PDS suture in a running manner with a 16 x 10 cm Strattice acellular dermal matrix divided in half so that a 16 x 5 cm piece was placed in a retro rectus plane to reinforce the closure per side.  100% of the acellular dermal matrix opened was used.  The umbilicus was removed and the stalk closed with 0 PDS suture.  We then closed the abdominal incision with 0 PDS suture in the superficial fascia and 2-0 Monocryl suture in the deep dermal layer followed by 3-0 Stratafix suture in the skin.  This was performed under beachchair positioning.     Incision tape and glue were applied.  Drain dressings were applied.  An abdominal binder was applied.  All counts were correct at the end of the case.  The patient was then awakened, extubated, and transferred to the postoperative area in a flexed position at the hip.  Leroy was kept in.       DISPOSITION: Inpatient floor for frequent flap monitoring.

## 2023-09-26 LAB
ANION GAP SERPL CALCULATED.3IONS-SCNC: 13 MMOL/L (ref 7–15)
BUN SERPL-MCNC: 7.7 MG/DL (ref 6–20)
CALCIUM SERPL-MCNC: 8.3 MG/DL (ref 8.6–10)
CHLORIDE SERPL-SCNC: 106 MMOL/L (ref 98–107)
CREAT SERPL-MCNC: 0.71 MG/DL (ref 0.51–0.95)
DEPRECATED HCO3 PLAS-SCNC: 25 MMOL/L (ref 22–29)
EGFRCR SERPLBLD CKD-EPI 2021: >90 ML/MIN/1.73M2
ERYTHROCYTE [DISTWIDTH] IN BLOOD BY AUTOMATED COUNT: 14.2 % (ref 10–15)
GLUCOSE SERPL-MCNC: 138 MG/DL (ref 70–99)
HCT VFR BLD AUTO: 32.5 % (ref 35–47)
HGB BLD-MCNC: 10.7 G/DL (ref 11.7–15.7)
MAGNESIUM SERPL-MCNC: 3 MG/DL (ref 1.7–2.3)
MCH RBC QN AUTO: 30 PG (ref 26.5–33)
MCHC RBC AUTO-ENTMCNC: 32.9 G/DL (ref 31.5–36.5)
MCV RBC AUTO: 91 FL (ref 78–100)
PLATELET # BLD AUTO: 265 10E3/UL (ref 150–450)
POTASSIUM SERPL-SCNC: 3.8 MMOL/L (ref 3.4–5.3)
RBC # BLD AUTO: 3.57 10E6/UL (ref 3.8–5.2)
SODIUM SERPL-SCNC: 144 MMOL/L (ref 136–145)
WBC # BLD AUTO: 10.9 10E3/UL (ref 4–11)

## 2023-09-26 PROCEDURE — 250N000011 HC RX IP 250 OP 636: Mod: JZ | Performed by: STUDENT IN AN ORGANIZED HEALTH CARE EDUCATION/TRAINING PROGRAM

## 2023-09-26 PROCEDURE — 250N000013 HC RX MED GY IP 250 OP 250 PS 637: Performed by: STUDENT IN AN ORGANIZED HEALTH CARE EDUCATION/TRAINING PROGRAM

## 2023-09-26 PROCEDURE — 250N000011 HC RX IP 250 OP 636: Performed by: PLASTIC SURGERY

## 2023-09-26 PROCEDURE — 80048 BASIC METABOLIC PNL TOTAL CA: CPT | Performed by: STUDENT IN AN ORGANIZED HEALTH CARE EDUCATION/TRAINING PROGRAM

## 2023-09-26 PROCEDURE — 83735 ASSAY OF MAGNESIUM: CPT | Performed by: PLASTIC SURGERY

## 2023-09-26 PROCEDURE — 36415 COLL VENOUS BLD VENIPUNCTURE: CPT | Performed by: STUDENT IN AN ORGANIZED HEALTH CARE EDUCATION/TRAINING PROGRAM

## 2023-09-26 PROCEDURE — 258N000003 HC RX IP 258 OP 636: Performed by: STUDENT IN AN ORGANIZED HEALTH CARE EDUCATION/TRAINING PROGRAM

## 2023-09-26 PROCEDURE — 85014 HEMATOCRIT: CPT | Performed by: STUDENT IN AN ORGANIZED HEALTH CARE EDUCATION/TRAINING PROGRAM

## 2023-09-26 PROCEDURE — 120N000002 HC R&B MED SURG/OB UMMC

## 2023-09-26 RX ADMIN — OXYCODONE HYDROCHLORIDE 5 MG: 5 TABLET ORAL at 06:52

## 2023-09-26 RX ADMIN — BUSPIRONE HYDROCHLORIDE 10 MG: 10 TABLET ORAL at 20:10

## 2023-09-26 RX ADMIN — OXYCODONE HYDROCHLORIDE 10 MG: 10 TABLET ORAL at 13:32

## 2023-09-26 RX ADMIN — POTASSIUM CHLORIDE, DEXTROSE MONOHYDRATE AND SODIUM CHLORIDE: 150; 5; 450 INJECTION, SOLUTION INTRAVENOUS at 04:30

## 2023-09-26 RX ADMIN — ONDANSETRON 4 MG: 2 INJECTION INTRAMUSCULAR; INTRAVENOUS at 10:35

## 2023-09-26 RX ADMIN — ENOXAPARIN SODIUM 40 MG: 40 INJECTION SUBCUTANEOUS at 08:03

## 2023-09-26 RX ADMIN — POLYETHYLENE GLYCOL 3350 17 G: 17 POWDER, FOR SOLUTION ORAL at 08:03

## 2023-09-26 RX ADMIN — SERTRALINE HYDROCHLORIDE 100 MG: 100 TABLET ORAL at 08:02

## 2023-09-26 RX ADMIN — POTASSIUM CHLORIDE, DEXTROSE MONOHYDRATE AND SODIUM CHLORIDE: 150; 5; 450 INJECTION, SOLUTION INTRAVENOUS at 14:24

## 2023-09-26 RX ADMIN — ACETAMINOPHEN 975 MG: 325 TABLET, FILM COATED ORAL at 20:10

## 2023-09-26 RX ADMIN — METHOCARBAMOL 750 MG: 750 TABLET ORAL at 08:02

## 2023-09-26 RX ADMIN — OXYCODONE HYDROCHLORIDE 10 MG: 10 TABLET ORAL at 17:33

## 2023-09-26 RX ADMIN — SENNOSIDES AND DOCUSATE SODIUM 1 TABLET: 50; 8.6 TABLET ORAL at 08:02

## 2023-09-26 RX ADMIN — ACETAMINOPHEN 975 MG: 325 TABLET, FILM COATED ORAL at 04:30

## 2023-09-26 RX ADMIN — SULFAMETHOXAZOLE AND TRIMETHOPRIM 1 TABLET: 800; 160 TABLET ORAL at 08:02

## 2023-09-26 RX ADMIN — ACETAMINOPHEN 975 MG: 325 TABLET, FILM COATED ORAL at 12:42

## 2023-09-26 RX ADMIN — OXYCODONE HYDROCHLORIDE 5 MG: 5 TABLET ORAL at 09:37

## 2023-09-26 RX ADMIN — METHOCARBAMOL 750 MG: 750 TABLET ORAL at 16:06

## 2023-09-26 RX ADMIN — ASPIRIN 81 MG CHEWABLE TABLET 81 MG: 81 TABLET CHEWABLE at 08:02

## 2023-09-26 RX ADMIN — SENNOSIDES AND DOCUSATE SODIUM 1 TABLET: 50; 8.6 TABLET ORAL at 20:10

## 2023-09-26 RX ADMIN — MAGNESIUM SULFATE IN WATER 4 G: 40 INJECTION, SOLUTION INTRAVENOUS at 00:38

## 2023-09-26 RX ADMIN — BUSPIRONE HYDROCHLORIDE 10 MG: 10 TABLET ORAL at 08:02

## 2023-09-26 RX ADMIN — HYDROMORPHONE HYDROCHLORIDE 0.2 MG: 0.2 INJECTION, SOLUTION INTRAMUSCULAR; INTRAVENOUS; SUBCUTANEOUS at 20:09

## 2023-09-26 RX ADMIN — SULFAMETHOXAZOLE AND TRIMETHOPRIM 1 TABLET: 800; 160 TABLET ORAL at 20:10

## 2023-09-26 RX ADMIN — OXYCODONE HYDROCHLORIDE 10 MG: 10 TABLET ORAL at 21:43

## 2023-09-26 ASSESSMENT — ACTIVITIES OF DAILY LIVING (ADL)
ADLS_ACUITY_SCORE: 24

## 2023-09-26 NOTE — PLAN OF CARE
Goal Outcome Evaluation: POD#0. Pt Tmax 100.4 down to 99.0 this am other vitals stable. BP this am 103/ 50 but within parameters. Capno WNL as well. Pt remains on q 1 hr flap checks. Bilat both breasts have good pulses via cook doppler and handheld pencil doppler. Both breasts warm, good turgor, pale pink, and soft. Abd dist, bs absent-faint. Denied flatus, +belching. DOMINIQUE x3. Breast DOMINIQUE's scant out, left abd DOMINIQUE with 15cc. Lungs clear, IS enc 1250-1500ml noted. Binder on for comfort. Pt NPO. No nausea. Pt pain managed with tylenol only. Pt refused any further pain med stating it wasn't that bad, she was able to sleep. IVF at 100/hr. Leroy patent with large uo. Pt received Mg 4 Gm IV x1 for level 1.4. Recheck with am labs.  Cont with flap checks per protocol. Maintain pain control. Pt remains in beach chair position. Noris hugger on and room temp =75 degrees.

## 2023-09-26 NOTE — PROVIDER NOTIFICATION
Dr. Charles Benjamin paged:      Can you please write an order to discontinue caldera catheter and order PT/OT. Thank you!

## 2023-09-26 NOTE — PROGRESS NOTES
SPIRITUAL HEALTH SERVICES  [facility] ([place]) 5A  REFERRAL SOURCE: Request with admission    SUMMARY OF VISIT   Pt had cancer removed from her breast and given breast reconstruction. While pt was here for surgery, Pt's son Sean age 28,  of an overdose. Pt's significant other, sister and mom have shared the news with pt. Pt is in shock as she takes in the information.    Pt identifies as Buddhism. She declined a  visit at this time.I supported pt and family through prayers of comfort, peace, and resiliency.    Instructed pt and family how to request another  visit through pt's nurse.     PLAN:  remains available per pt/family/staff request.     Rev. Orquidea Bowman MDiv, [facility]  Staff    Pager [phone]  * [facility] remains available  for emergent requests/referrals, either by having the switchboard page the on-call  or by entering an ASAP/STAT consult in Epic (this will also page the on-call ).*

## 2023-09-26 NOTE — PROGRESS NOTES
"PRS progress note     NAEON. Afebrile. Pain well controlled. NPO. Bedrest. Voiding via caldera     /56 (BP Location: Left arm)   Pulse 70   Temp 98.7  F (37.1  C) (Oral)   Resp 20   Ht 1.6 m (5' 3\")   Wt 63.9 kg (140 lb 14 oz)   LMP 06/22/2023   SpO2 97%   BMI 24.95 kg/m       Resting comfortably in bed, no acute distress  Nonlabored breathing on RA  Nontachycardic on pulse  Examination of the right chest reveals a pink skin paddle of the flap.  Cap refill is 2 to 3 seconds.  It is warm to touch.  It has strong arterial signal via the Cook implantable Doppler, it also has a strong venous and arterial signal with a hand-held pencil Doppler.  Drain is serosanguineous.  Examination of the left chest reveals a pink skin paddle of the flap.  Cap refill is 2 to 3 seconds.  It is warm to touch.  It has strong arterial signal via the Cook implantable Doppler, it also has a strong venous and arterial signal with a hand-held pencil Doppler.  Drain is serosanguineous.  Abdomen is soft, nondistended, appropriately tender to palpation.  Closure is dry and intact.  Drain is serosanguineous.    Hemoglobin 10.7 from 12.3  White blood cell 10.9 from 19.6  Creatinine 0.71    No growth on Intra-Op cultures    Right breast drain 40 cc  Left breast drain 35 cc  Abdominal drain 60 ml      43-year-old female with a history of left breast cancer status post bilateral skin sparing mastectomies with immediate first stage breast reconstruction using tissue expanders on 5/31/2023 complicated by bilateral tissue expander infections requiring explantation on August 25, 2023 now status post bilateral breast reconstruction using abdominally-based free tissue transfer.  Convalescing appropriately.    Continue with every hour flap checks clinically, implantable Cook Doppler, hand-held pencil Doppler  Out of bed to chair with assist  Discontinue Noris hugger, but please keep the ambient temperature of the room above 75 degrees  Caldera to " remain in place until comfortably getting out of bed  Clear liquid diet this morning, full liquid diet for lunch, regular diet for dinner. Will discontinue IVF when adequate PO intake   Continue with Bactrim, follow intraoperative cultures  Every 4 hours drain care when awake  Beachchair position while laying in bed  Abdominal binder at all times  Lovenox and aspirin to be given this morning  PTA meds, hold tamoxifen for two weeks   Anticipate discharge in 2-3 days     Seen and discussed with Dr. Agnieszka Meyer MD   Plastic Surgery PGY-4

## 2023-09-26 NOTE — PROVIDER NOTIFICATION
On call surgical MD paged for lab results. Magnesium 1.4. Will order replaced but patient okay to transfer and RN will replace on floor.

## 2023-09-26 NOTE — PLAN OF CARE
POD 0: bilateral breast reconstruction/FLAP.  A&Ox4. VSS, except intermittent HTN on 2L via NC. ABD incisional pain 10/10 on arrival from PACU, managed well with IV dilaudid, oral oxy & tylenol. R-PIV removed d/t pain and irritation at site. L-PIV infusing. FLAPS are bilaterally soft, pale pink, COOK & Pencil doppler sounds bilaterally present. Scant sanguinous drainage from L-breast incision. Lower lateral abd incision is approximated, scant dried drainage present. Jpx3 small amount of dark red o/p. Leroy in place w/adeqaute o/p. Denies nausea. Sleeping between cares. Cont. POC.

## 2023-09-26 NOTE — PLAN OF CARE
Goal Outcome Evaluation:      Plan of Care Reviewed With: patient    Overall Patient Progress: improvingOverall Patient Progress: improving    Outcome Evaluation: Unfortunitally pt was informed today in person by her mother that the pt's 28 year old son passed away last night of an overdose. Family has been at bedside and very supportive of pt. Pt's S/O Yonathan received approval from [facility] FABIO to stay overnight tonight. POD 1 bilateral breast reconstruction/flap. AVSS, on RA. Nausea relieved with prn Zofran x 1, no emesis (nausea related to news of son's passing). Incision site pain on abdomen comfortably manageable with prn Oxycodone and scheduled tylenol. Up to chair x 1 with assist of 1. Tolerated clear liquid diet for breakfast. On a full liquid diet for lunch and is tolerating well thus far. Adequate urine output via caldera catheter. LBM 9/24. Passing flatus, active bowel sounds, received scheduled senna and miralax. Flap checks Q1 hour with strong cook and handheld dopler. Warm to touch and soft. Pt to ambulate this afternoon and sit in the chair again. PT/OT ordered/to see pt. Lovenox teaching has been started. Pt to administer next dose.

## 2023-09-27 ENCOUNTER — APPOINTMENT (OUTPATIENT)
Dept: OCCUPATIONAL THERAPY | Facility: CLINIC | Age: 43
DRG: 585 | End: 2023-09-27
Attending: PLASTIC SURGERY
Payer: COMMERCIAL

## 2023-09-27 VITALS
DIASTOLIC BLOOD PRESSURE: 68 MMHG | TEMPERATURE: 99.2 F | RESPIRATION RATE: 17 BRPM | BODY MASS INDEX: 24.96 KG/M2 | HEIGHT: 63 IN | OXYGEN SATURATION: 97 % | HEART RATE: 79 BPM | WEIGHT: 140.87 LBS | SYSTOLIC BLOOD PRESSURE: 131 MMHG

## 2023-09-27 LAB
GLUCOSE BLDC GLUCOMTR-MCNC: 122 MG/DL (ref 70–99)
HOLD SPECIMEN: NORMAL
MAGNESIUM SERPL-MCNC: 1.8 MG/DL (ref 1.7–2.3)

## 2023-09-27 PROCEDURE — 83735 ASSAY OF MAGNESIUM: CPT | Performed by: PLASTIC SURGERY

## 2023-09-27 PROCEDURE — 258N000003 HC RX IP 258 OP 636: Performed by: STUDENT IN AN ORGANIZED HEALTH CARE EDUCATION/TRAINING PROGRAM

## 2023-09-27 PROCEDURE — 97165 OT EVAL LOW COMPLEX 30 MIN: CPT | Mod: GO | Performed by: OCCUPATIONAL THERAPIST

## 2023-09-27 PROCEDURE — 36415 COLL VENOUS BLD VENIPUNCTURE: CPT | Performed by: PLASTIC SURGERY

## 2023-09-27 PROCEDURE — 999N000147 HC STATISTIC PT IP EVAL DEFER: Performed by: REHABILITATION PRACTITIONER

## 2023-09-27 PROCEDURE — 97535 SELF CARE MNGMENT TRAINING: CPT | Mod: GO | Performed by: OCCUPATIONAL THERAPIST

## 2023-09-27 PROCEDURE — 250N000011 HC RX IP 250 OP 636: Mod: JZ | Performed by: STUDENT IN AN ORGANIZED HEALTH CARE EDUCATION/TRAINING PROGRAM

## 2023-09-27 PROCEDURE — 250N000013 HC RX MED GY IP 250 OP 250 PS 637: Performed by: STUDENT IN AN ORGANIZED HEALTH CARE EDUCATION/TRAINING PROGRAM

## 2023-09-27 PROCEDURE — 97530 THERAPEUTIC ACTIVITIES: CPT | Mod: GO | Performed by: OCCUPATIONAL THERAPIST

## 2023-09-27 RX ORDER — ACETAMINOPHEN 500 MG
1000 TABLET ORAL 3 TIMES DAILY
Qty: 120 TABLET | Refills: 0 | Status: SHIPPED | OUTPATIENT
Start: 2023-09-27

## 2023-09-27 RX ORDER — ENOXAPARIN SODIUM 100 MG/ML
40 INJECTION SUBCUTANEOUS EVERY 24 HOURS
Qty: 11.2 ML | Refills: 0 | Status: SHIPPED | OUTPATIENT
Start: 2023-09-28 | End: 2023-10-26

## 2023-09-27 RX ORDER — ASPIRIN 81 MG/1
81 TABLET ORAL DAILY
Qty: 28 TABLET | Refills: 0 | Status: SHIPPED | OUTPATIENT
Start: 2023-09-28 | End: 2024-04-23

## 2023-09-27 RX ORDER — AMOXICILLIN 250 MG
1 CAPSULE ORAL 2 TIMES DAILY
Qty: 30 TABLET | Refills: 0 | Status: ON HOLD | OUTPATIENT
Start: 2023-09-27 | End: 2024-03-28

## 2023-09-27 RX ORDER — OXYCODONE HYDROCHLORIDE 5 MG/1
5 TABLET ORAL EVERY 4 HOURS PRN
Qty: 15 TABLET | Refills: 0 | Status: SHIPPED | OUTPATIENT
Start: 2023-09-27 | End: 2024-03-12

## 2023-09-27 RX ADMIN — SENNOSIDES AND DOCUSATE SODIUM 1 TABLET: 50; 8.6 TABLET ORAL at 08:49

## 2023-09-27 RX ADMIN — METHOCARBAMOL 750 MG: 750 TABLET ORAL at 00:55

## 2023-09-27 RX ADMIN — BUSPIRONE HYDROCHLORIDE 10 MG: 10 TABLET ORAL at 08:50

## 2023-09-27 RX ADMIN — HYDROMORPHONE HYDROCHLORIDE 0.2 MG: 0.2 INJECTION, SOLUTION INTRAMUSCULAR; INTRAVENOUS; SUBCUTANEOUS at 00:55

## 2023-09-27 RX ADMIN — POLYETHYLENE GLYCOL 3350 17 G: 17 POWDER, FOR SOLUTION ORAL at 08:50

## 2023-09-27 RX ADMIN — SERTRALINE HYDROCHLORIDE 100 MG: 100 TABLET ORAL at 08:49

## 2023-09-27 RX ADMIN — ASPIRIN 81 MG CHEWABLE TABLET 81 MG: 81 TABLET CHEWABLE at 08:49

## 2023-09-27 RX ADMIN — POTASSIUM CHLORIDE, DEXTROSE MONOHYDRATE AND SODIUM CHLORIDE: 150; 5; 450 INJECTION, SOLUTION INTRAVENOUS at 00:34

## 2023-09-27 RX ADMIN — ACETAMINOPHEN 975 MG: 325 TABLET, FILM COATED ORAL at 04:47

## 2023-09-27 RX ADMIN — ACETAMINOPHEN 975 MG: 325 TABLET, FILM COATED ORAL at 12:36

## 2023-09-27 RX ADMIN — OXYCODONE HYDROCHLORIDE 10 MG: 10 TABLET ORAL at 08:50

## 2023-09-27 RX ADMIN — HYDROXYZINE HYDROCHLORIDE 10 MG: 10 TABLET ORAL at 05:57

## 2023-09-27 RX ADMIN — ENOXAPARIN SODIUM 40 MG: 40 INJECTION SUBCUTANEOUS at 08:49

## 2023-09-27 RX ADMIN — SULFAMETHOXAZOLE AND TRIMETHOPRIM 1 TABLET: 800; 160 TABLET ORAL at 08:49

## 2023-09-27 ASSESSMENT — ACTIVITIES OF DAILY LIVING (ADL)
ADLS_ACUITY_SCORE: 26
ADLS_ACUITY_SCORE: 24
PREVIOUS_RESPONSIBILITIES: MEAL PREP;HOUSEKEEPING;LAUNDRY;SHOPPING;MEDICATION MANAGEMENT;DRIVING;FINANCES;WORK
DEPENDENT_IADLS:: INDEPENDENT
ADLS_ACUITY_SCORE: 26

## 2023-09-27 NOTE — PROGRESS NOTES
5914-8821 Aox4. VSS. On RA. Pain managed with prn oxycodone and fareed tylenol. Denies nausea. Flaps WNL. DOMINIQUE x3 to bulb suction with S/S output. Tolerating regular diet. PIV removed. Teaching done. Discharging.

## 2023-09-27 NOTE — CONSULTS
Care Management Initial Consult    General Information  Assessment completed with: Patient,    Type of CM/SW Visit: Initial Assessment    Primary Care Provider verified and updated as needed: Yes   Readmission within the last 30 days: no previous admission in last 30 days      Reason for Consult: grief and loss  Advance Care Planning: Advance Care Planning Reviewed: no concerns identified          Communication Assessment  Patient's communication style: spoken language (English or Bilingual)    Hearing Difficulty or Deaf: no   Wear Glasses or Blind: no    Cognitive  Cognitive/Neuro/Behavioral: WDL                      Living Environment:   People in home: significant other     Current living Arrangements: house      Able to return to prior arrangements: yes       Family/Social Support:  Care provided by: self, spouse/significant other  Provides care for: no one  Marital Status: Single  Partner, Significant Other, Children, Sibling(s), Other (specify)          Description of Support System: Supportive    Support Assessment: Adequate family and caregiver support    Current Resources:   Patient receiving home care services: No     Community Resources: None  Equipment currently used at home: shower chair  Supplies currently used at home: None    Employment/Financial:  Employment Status: employed full-time        Financial Concerns: No concerns identified   Referral to Financial Worker: No       Does the patient's insurance plan have a 3 day qualifying hospital stay waiver?  No    Lifestyle & Psychosocial Needs:  Social Determinants of Health     Food Insecurity: Not on file   Depression: Not at risk (9/25/2023)    PHQ-2     PHQ-2 Score: 2   Housing Stability: Not on file   Tobacco Use: Medium Risk (9/26/2023)    Patient History     Smoking Tobacco Use: Former     Smokeless Tobacco Use: Never     Passive Exposure: Not on file   Financial Resource Strain: Not on file   Alcohol Use: Not on file   Transportation Needs: Not  on file   Physical Activity: Not on file   Interpersonal Safety: Not on file   Stress: Not on file   Social Connections: Not on file       Functional Status:  Prior to admission patient needed assistance:   Dependent ADLs:: Independent  Dependent IADLs:: Independent       Mental Health Status:  Mental Health Status: Current Concern  Mental Health Management: Medication    Chemical Dependency Status:  Chemical Dependency Status: No Current Concerns             Values/Beliefs:  Spiritual, Cultural Beliefs, Sabianist Practices, Values that affect care: no           Care Management Discharge Note    Discharge Date: 09/27/2023       Discharge Disposition:  Home    Discharge Services:  Grief/Loss Resources    Discharge DME:  None    Discharge Transportation: family to provide    Private pay costs discussed: Not applicable    Does the patient's insurance plan have a 3 day qualifying hospital stay waiver?  No    PAS Confirmation Code:  N/A  Patient/family educated on Medicare website which has current facility and service quality ratings: N/A     Education Provided on the Discharge Plan:  Yes  Persons Notified of Discharge Plans: Pt, care team  Patient/Family in Agreement with the Plan:  Yes    Handoff Referral Completed: Yes    Additional Information:  CM was consulted to see pt due to report of the loss of her adult son from overdose. Met with pt at bedside and provided emotional support. Pt reported that family and friends had been calling to spend time with her. That she has great family support.     Provided pt with grief/loss support group to parents who have lost a child to drug and alcohol overdose and [facility] grief/loss resources. Completed PHQ 2  and result indicated no intervention needed as since below:    PHQ-2 Score:         9/27/2023    12:12 PM 9/13/2023     9:47 AM   PHQ-2 ( 1999 [employer])   Q1: Little interest or pleasure in doing things 1 0   Q2: Feeling down, depressed or hopeless 1 0   PHQ-2 Score  2 0       Tiffanie Dumont, MARIA INES [facility]  P: [phone]  Pager: [phone]  F: [phone]  Weekend Pager: [phone]  Weekend Coverage: 5A; 5B; 5C

## 2023-09-27 NOTE — DISCHARGE INSTRUCTIONS
[facility]   Grief Support Groups     Mondays: 6:30 pm  8:00pm   Tuesdays: 6:30pm  8:00pm   Wednesdays: 7:00pm  8:00pm   Fridays: 3:00pm      [facility] support [facility] is for individuals grieving from losing a loved one to drug addiction, alcoholism, or a drug overdose.     This group is open to:   parents who have lost a child   children who have lost a parent   brothers and sisters who have lost a sibling   spouses who have lost a partner   individuals who have lost a friend     The grief addiction support group provides a safe place to share experiences about the impact of losing someone to an overdose death or drug and alcohol-related death. People typically find solitude and benefit from receiving support from peers who share similar experiences. The close-knit community that forms helps work through feelings with those who have endured the same tragedy. A counselor will cover various topics related to grief and grief recovery from both an educational and supportive standpoint. All groups are led by licensed clinicians or social workers who specialize in grief counseling. Sensitive issues may be discussed. All groups meet on EST.     Call Clarissa at  for registration of virtual Group if needed.

## 2023-09-27 NOTE — PROGRESS NOTES
09/27/23 1100   Appointment Info   Signing Clinician's Name / Credentials (OT) Mynor Jim OTR/L   Rehab Comments (OT) Breast reconstruction, POD #1 flex at hip, POD #2 less flexion, POD #3 upright ambulation as tolerated   Living Environment   People in Home significant other   Current Living Arrangements house   Home Accessibility stairs to enter home;stairs within home   Number of Stairs, Main Entrance 1   Stair Railings, Main Entrance railings safe and in good condition   Number of Stairs, Within Home, Primary seven   Stair Railings, Within Home, Primary railings safe and in good condition   Transportation Anticipated family or friend will provide   Living Environment Comments Pt reportts living in split level home with all needs met on main level once she ascends 1 step to get into home, 7 stairs to upper level.   Self-Care   Usual Activity Tolerance good   Current Activity Tolerance fair   Regular Exercise No   Equipment Currently Used at Home shower chair   Fall history within last six months no   Activity/Exercise/Self-Care Comment Pt reports IND with all ADLs prior to procedure.   Instrumental Activities of Daily Living (IADL)   Previous Responsibilities meal prep;housekeeping;laundry;shopping;medication management;driving;finances;work   General Information   Onset of Illness/Injury or Date of Surgery 09/25/23   Referring Physician Yayo Meyer MD   Patient/Family Therapy Goal Statement (OT) To go home   Additional Occupational Profile Info/Pertinent History of Current Problem 43-year-old female with a history of left breast cancer status post bilateral skin sparing mastectomies with immediate first stage breast reconstruction using tissue expanders on 5/31/2023 complicated by bilateral tissue expander infections requiring explantation on August 25, 2023 now status post bilateral breast reconstruction using abdominally-based free tissue transfer.   Existing Precautions/Restrictions  lifting;fall;abdominal;other (see comments)  (flexed at hips)   Left Upper Extremity (Weight-bearing Status) partial weight-bearing (PWB)   Right Upper Extremity (Weight-bearing Status) partial weight-bearing (PWB)   Left Lower Extremity (Weight-bearing Status) full weight-bearing (FWB)   Right Lower Extremity (Weight-bearing Status) full weight-bearing (FWB)   General Observations and Info Up with assist   Cognitive Status Examination   Orientation Status orientation to person, place and time   Visual Perception   Visual Impairment/Limitations WFL   Sensory   Sensory Quick Adds UE sensation intact   Pain Assessment   Patient Currently in Pain Yes, see Vital Sign flowsheet   Posture   Posture forward head position   Range of Motion Comprehensive   General Range of Motion no range of motion deficits identified   Strength Comprehensive (MMT)   General Manual Muscle Testing (MMT) Assessment no strength deficits identified   Comment, General Manual Muscle Testing (MMT) Assessment MMT n/t 2/2 5# restrictions. Pt reports no strength concerns at this time.   Muscle Tone Assessment   Muscle Tone Quick Adds No deficits were identified   Coordination   Upper Extremity Coordination No deficits were identified   Bed Mobility   Bed Mobility supine-sit;sit-supine   Supine-Sit [place] (Bed Mobility) minimum assist (75% patient effort)   Sit-Supine [place] (Bed Mobility) minimum assist (75% patient effort)   Comment (Bed Mobility) Beach chair position with supine <> sit EOB   Transfers   Transfers bed-chair transfer;sit-stand transfer;toilet transfer   Transfer Skill: Bed to Chair/Chair to Bed   Bed-Chair [place] (Transfers) supervision;verbal cues   Sit-Stand Transfer   Sit-Stand [place] (Transfers) verbal cues;contact guard   Assistive Device (Sit-Stand Transfers) other (see comments)   Toilet Transfer   Type (Toilet Transfer) sit-stand;stand-sit   [place] Level (Toilet Transfer) contact guard    Activities of Daily Living   BADL Assessment/Intervention bathing;lower body dressing;grooming;toileting;upper body dressing   Bathing Assessment/Intervention   [place] Level (Bathing) verbal cues   Upper Body Dressing Assessment/Training   [place] Level (Upper Body Dressing) modified independence   Lower Body Dressing Assessment/Training   [place] Level (Lower Body Dressing) verbal cues;supervision   Grooming Assessment/Training   [place] Level (Grooming) supervision;verbal cues   Toileting   [place] Level (Toileting) independent;verbal cues   Clinical Impression   Criteria for Skilled Therapeutic Interventions Met (OT) Yes, treatment indicated   OT Diagnosis Impaired I/ADLs   OT Problem List-Impairments impacting ADL problems related to;activity tolerance impaired;pain;post-surgical precautions;mobility   Assessment of Occupational Performance 5 or more Performance Deficits   Identified Performance Deficits bathing, toileting, dressing, grooming, functional mobility, IADLs   Planned Therapy Interventions (OT) ADL retraining;risk factor education;home program guidelines;progressive activity/exercise;transfer training;IADL retraining   Clinical Decision Making Complexity (OT) moderate complexity   Risk & Benefits of therapy have been explained evaluation/treatment results reviewed;care plan/treatment goals reviewed;risks/benefits reviewed;current/potential barriers reviewed;participants voiced agreement with care plan;participants included;patient   Clinical Impression Comments Pt presents below baseline level of functioning. Will benefit from IP OT to promote IND and maximize function.   OT Total Evaluation Time   OT Trudy, Low Complexity Minutes (23211) 7   OT Goals   Therapy Frequency (OT) 6 times/wk   OT Goals Upper Body Dressing;Lower Body Dressing;Upper Body Bathing;Lower Body Bathing;Hygiene/Grooming;Bed Mobility;Toilet Transfer/Toileting;Transfers;Aerobic Activity;OT Goal 1   OT:  Hygiene/Grooming modified independent;within precautions;while standing;Goal Met   OT: Upper Body Dressing Modified independent;including set-up/clothing retrieval;within precautions   OT: Lower Body Dressing Modified independent;within precautions;including set-up/clothing retrieval;Goal Met   OT: Upper Body Bathing Modified independent;within precautions;Goal Met   OT: Lower Body Bathing Modified independent;with precautions;Goal Met   OT: Bed Mobility Modified independent;supine to/from sitting;within precautions   OT: Transfer Supervision/stand-by assist;within precautions;Goal Met  (Tub/shower tx)   OT: Toilet Transfer/Toileting Modified independent;cleaning and garment management;toilet transfer;within precautions;Goal Met   OT: Perform aerobic activity with stable cardiovascular response intermittent activity;10 minutes;ambulation;Goal Met   OT: Goal 1 Pt to complete stair navigation x3 stairs SBA to promote safety in home setting by discharge.  (Goal met)   Interventions   Interventions Quick Adds Therapeutic Activity   Self-Care/Home Management   Self-Care/Home Mgmt/ADL, Compensatory, Meal Prep Minutes (71043) 23   Symptoms Noted During/After Treatment (Meal Preparation/Planning Training) fatigue;increased pain   Treatment Detail/Skilled Intervention Pt greeted supine in beach chair position on arrival, agreeable to OT evaluation. Evaluation completed, treatment indicated and initiated this date. Facilitated ADLs within precautions to promote IND and safety. Following instruction on beach chair to side lying position, pt completed to L side IND with additional time to promote IND and pain mgmt. Additional time required for line mgmt and environmental setup. Adequate sitting balance noted at EOB IND. Pt instructed on LB dressing within precautions with fig 4 tech, demo'd IND at EOB following instruction. STS from EOB completed SBA without AD to ambulate to BR to complete ADLs within precautions. All mobility  completed in semi-flexed position with adequate balance noted. Pt educated on pain mgmt strategies, flap education, and abdominal precautions and their implications on ADL/IADLs, pt verbalized understanding and provided written instructions for improved carryover in home setting. Pt completed toilet tx to/from SBA-Mod I with use of grab bars, TB washing completed with setup A in sitting and standing postions SBA. Pt doffed/donned gown with IND, education provided on loose fitting clothing with no bra for the next 3-6 weeks. G/h completed sinkside SBA in semi flex position with mild increase in pain noted with mobility. Oral cares and face washing completed SBA.   Therapeutic Activities   Therapeutic Activity Minutes (73692) 25   Symptoms noted during/after treatment fatigue;increased pain   Treatment Detail/Skilled Intervention Evaluation completed, treatment indicated and initiated. Facilitated functional transfer training and mobility to promote IND and safety. Significant additional time for line mgmt and environmental setup prior to safe ambulation. Pt ambulated into hallway to walk ~45 ft with SBA without AD, w/c tx completed to 7th floor gym to complete tub tx and stair navigation. Following Th instruction and demonstration, pt completed tub tx with SBA using flexed position throughout with cueing for hand placement. Completed x8 stair navigation with two feet placed on same step, SBA progressing to Mod I. Pt reporting mild increase in pain, but tolerable. Dep tx via w/c to room, ambulated ~30 ft SBA without AD. Completed STS to chair IND. Left with all needs met, Plastics entering room. AVSS on RA.   OT Discharge Planning   OT Plan Standing ADLs at sink (flex position), dressing EOB, in room ambulation with FWW semi flexed position, continued education on precautions related to ADLs   OT Discharge Recommendation (DC Rec) home with assist   OT Rationale for DC Rec Pt presenting below PLOF primarily limited by  pain and post-surgical precautions. Pt mobilizing SBA without AD. Anticipate pt safe to d/c to home setting with assist from boyfriend for ADL tx and IADLs.   OT Brief overview of current status SBA without AD, SBA with stairs   Total Session Time   Timed Code Treatment Minutes 48   Total Session Time (sum of timed and untimed services) 55

## 2023-09-27 NOTE — DISCHARGE SUMMARY
[facility] Discharge Summary    Zainab Bolton MRN# [id number]   Age: 43 year old YOB: 1980     Date of Admission:  9/25/2023  Date of Discharge::  9/27/2023  Admitting Physician:  MARIA E Aaron MD  Discharge Physician:  Charles Benjamin MD     Home clinic: [facility]          Admission Diagnoses:   S/P bilateral mastectomy [Z90.13]  H/O breast reconstruction [Z98.890]          Discharge Diagnosis:     S/p free tissue transfer breast reconstruction          Procedures:     Procedure(s): BENIGNO bilateral       No other significant procedures performed during this admission           Medications Prior to Admission:     Medications Prior to Admission   Medication Sig Dispense Refill Last Dose    busPIRone (BUSPAR) 10 MG tablet Take 1 tablet (10 mg) by mouth 2 times daily 60 tablet 1 9/25/2023 at 0430    calcium carbonate (OS-SAM) 1500 (600 Ca) MG tablet Take 600 mg by mouth 2 times daily   Past Week    Cyanocobalamin 500 MCG TBDP Take 2 Gum by mouth every evening   Past Week    hydrOXYzine (ATARAX) 10 MG tablet TAKE 1 TABLET BY MOUTH 3 TIMES DAILY AS NEEDED FOR ANXIETY 90 tablet 1 9/25/2023 at 0430    ibuprofen (ADVIL/MOTRIN) 400 MG tablet Take 400 mg by mouth as needed for moderate pain   Past Month    lactobacillus rhamnosus, GG, (CULTURELL) capsule Take 1 capsule by mouth every evening   Past Week    ondansetron (ZOFRAN) 4 MG tablet    Past Month    sertraline (ZOLOFT) 100 MG tablet Take 1 tablet (100 mg) by mouth daily (Patient taking differently: Take 100 mg by mouth every morning) 90 tablet 1 9/25/2023 at 0430    sulfamethoxazole-trimethoprim (BACTRIM DS) 800-160 MG tablet Take 1 tablet by mouth 2 times daily 74 tablet 0 Past Month    vitamin B complex with vitamin C (VITAMIN  B COMPLEX) tablet Take 1 tablet by mouth every evening   Past Week    vitamin C (ASCORBIC ACID) 1000 MG TABS Take 1,000 mg by mouth every evening Also contains 20mg zinc   Past  Week    Vitamin D3 (CHOLECALCIFEROL) 25 mcg (1000 units) tablet Take 25 mcg by mouth every evening   Past Week    zinc gluconate 50 MG tablet Take 50 mg by mouth every evening   Past Week    [DISCONTINUED] acetaminophen (TYLENOL) 500 MG tablet Take 500-1,000 mg by mouth as needed for mild pain   9/24/2023 at 1200    [DISCONTINUED] HYDROcodone-acetaminophen (NORCO) 5-325 MG tablet    Past Month    [DISCONTINUED] neomycin-bacitracin-polymyxin (NEOSPORIN) 5-400-5000 ointment Apply topically as needed To infection on breast   Past Month    [DISCONTINUED] tamoxifen (NOLVADEX) 20 MG tablet Take 1 tablet (20 mg) by mouth every evening 90 tablet 0 Past Week             Discharge Medications:     Current Discharge Medication List        START taking these medications    Details   aspirin 81 MG EC tablet Take 1 tablet (81 mg) by mouth daily  Qty: 28 tablet, Refills: 0    Associated Diagnoses: S/P breast reconstruction      enoxaparin ANTICOAGULANT (LOVENOX) 40 MG/0.4ML syringe Inject 0.4 mLs (40 mg) Subcutaneous every 24 hours for 28 days  Qty: 11.2 mL, Refills: 0    Associated Diagnoses: S/P breast reconstruction      oxyCODONE (ROXICODONE) 5 MG tablet Take 1 tablet (5 mg) by mouth every 4 hours as needed for moderate pain  Qty: 15 tablet, Refills: 0    Associated Diagnoses: S/P breast reconstruction      senna-docusate (SENOKOT-S/PERICOLACE) 8.6-50 MG tablet Take 1 tablet by mouth 2 times daily  Qty: 30 tablet, Refills: 0    Associated Diagnoses: S/P breast reconstruction           CONTINUE these medications which have CHANGED    Details   acetaminophen (TYLENOL) 500 MG tablet Take 2 tablets (1,000 mg) by mouth 3 times daily  Qty: 120 tablet, Refills: 0    Associated Diagnoses: S/P breast reconstruction           CONTINUE these medications which have NOT CHANGED    Details   busPIRone (BUSPAR) 10 MG tablet Take 1 tablet (10 mg) by mouth 2 times daily  Qty: 60 tablet, Refills: 1    Associated Diagnoses: GEOVANY (generalized  anxiety disorder)      calcium carbonate (OS-SAM) 1500 (600 Ca) MG tablet Take 600 mg by mouth 2 times daily      Cyanocobalamin 500 MCG TBDP Take 2 Gum by mouth every evening      hydrOXYzine (ATARAX) 10 MG tablet TAKE 1 TABLET BY MOUTH 3 TIMES DAILY AS NEEDED FOR ANXIETY  Qty: 90 tablet, Refills: 1    Comments: 09/12/2023 10:23:55 AM  Associated Diagnoses: GEOVANY (generalized anxiety disorder)      ibuprofen (ADVIL/MOTRIN) 400 MG tablet Take 400 mg by mouth as needed for moderate pain      lactobacillus rhamnosus, GG, (CULTURELL) capsule Take 1 capsule by mouth every evening      ondansetron (ZOFRAN) 4 MG tablet       sertraline (ZOLOFT) 100 MG tablet Take 1 tablet (100 mg) by mouth daily  Qty: 90 tablet, Refills: 1    Associated Diagnoses: GEOVANY (generalized anxiety disorder); Depression with anxiety      sulfamethoxazole-trimethoprim (BACTRIM DS) 800-160 MG tablet Take 1 tablet by mouth 2 times daily  Qty: 74 tablet, Refills: 0    Associated Diagnoses: Cellulitis of right breast      vitamin B complex with vitamin C (VITAMIN  B COMPLEX) tablet Take 1 tablet by mouth every evening      vitamin C (ASCORBIC ACID) 1000 MG TABS Take 1,000 mg by mouth every evening Also contains 20mg zinc      Vitamin D3 (CHOLECALCIFEROL) 25 mcg (1000 units) tablet Take 25 mcg by mouth every evening      zinc gluconate 50 MG tablet Take 50 mg by mouth every evening           STOP taking these medications       HYDROcodone-acetaminophen (NORCO) 5-325 MG tablet Comments:   Reason for Stopping:         neomycin-bacitracin-polymyxin (NEOSPORIN) 5-400-5000 ointment Comments:   Reason for Stopping:         tamoxifen (NOLVADEX) 20 MG tablet Comments:   Reason for Stopping:                     Consultations:   PT          Brief History of Illness:   Reason for admission requiring a surgical or invasive procedure:   Breast reconstruction   The patient underwent the following procedure(s):   BENIGNO flap, bilateral   There were no immediate  complications during this procedure.    Please refer to the full operative summary for details.             Hospital Course:   The patient's hospital course was unremarkable.  She recovered as anticipated and experienced no post-operative complications.           Discharge Instructions and Follow-Up:     Discharge diet: Regular   Discharge activity: Lifting restricted to 5 pounds   Discharge follow-up: Follow up with MICHAEL Goyal in 14 days   Wound care: Daily dressing changes           Discharge Disposition:     Discharged to home      Attestation:  I have reviewed today's vital signs, notes, medications, labs and imaging.  Amount of time performed on this discharge summary: 30 minutes.    Charles Benjamin MD

## 2023-09-27 NOTE — PROGRESS NOTES
"No acute events overnight. Denies n/v/f/c. Pain is a 4. No concerns. Wants to go home. Just got done with physical therapy.    Blood pressure (!) 140/83, pulse 84, temperature 98.4  F (36.9  C), temperature source Temporal, resp. rate 18, height 1.6 m (5' 3\"), weight 63.9 kg (140 lb 14 oz), last menstrual period 06/22/2023, SpO2 95 %, not currently breastfeeding.    NAD  RRR  Normal wob  Bilateral breasts pink, warm, healthy  Strong arterial and venous dopplers bilaterally, augmentable  Cook doppler with robust signal  Bilateral incisions c/d/I  Abdomen incision c/d/I  Drain ss    Drain abdomen 80  Drain lefrt breast 43  Drain right breast 65    43F w/ left breast ca now s/p bilateral BENIGNO, recovering appropriately    - medically appropriate for discharge  - patient comfortable with discharge  - will send home with 30 lovenox and asa  - regular diet      Discussed with Dr. Agnieszka Benjamin MD, [facility]  Plastic Surgery PGY-4  Pager: [phone]      "

## 2023-09-27 NOTE — PLAN OF CARE
POD 1 bilateral breast reconstruction/flap: Flap checks Q1hr strong pulses with cook & handheld dopler, warm, soft, quick color return. A&Ox4. VSS on RA. Abd incisional pain managed with prn dilaudid, oxycodone, & scheduled tylenol. Pt declined to get up to chair again d/t to emotional (grief r/t son's death this AM) & physical fatigue.  consulted, joshua visited on AM shift. FLD, tolerating well thus far. Jpx3, all to bulb suction, stripped x2 this shift. Adequate urine output via caldera catheter. Passing gas & bowel sounds active, no BM yet. Pt to administer next lovenox dose. PT/OT ordered placed.

## 2023-09-28 ENCOUNTER — PATIENT OUTREACH (OUTPATIENT)
Dept: CARE COORDINATION | Facility: CLINIC | Age: 43
End: 2023-09-28
Payer: COMMERCIAL

## 2023-09-28 NOTE — PROGRESS NOTES
Clinic Care Coordination Contact  [facility]/Voicemail       Clinical Data: SW Care Coordinator Outreach / TCM outreach     Outreach attempted x 1.  Left message on patient's voicemail     Plan: RN Care Coordinator will try to reach patient again in 3-5 business days.      CRIS Pena / cait Grayson RN  [facility]   Care Coordination  [facility]  9/28/2023 10:37 AM

## 2023-09-28 NOTE — OP NOTE
PLASTIC SURGERY OPERATIVE REPORT    DATE OF OPERATION: 9/25/2023     PREOPERATIVE DIAGNOSIS:   1.  History of right breast cancer   2. Status post bilateral mastectomy with tissue expander reconstruction complicated by bilateral tissue expander infection necessitating removal  3.  Bilateral absence of breasts with loss of sensation.     POSTOPERATIVE DIAGNOSIS:   1.  History of right breast cancer   2. Status post bilateral mastectomy with tissue expander reconstruction complicated by bilateral tissue expander infection necessitating removal  3.  Bilateral absence of breasts with loss of sensation.     PROCEDURES PERFORMED:   1.  Bilateral breast reconstruction with free bilateral deep inferior epigastric  fasciocutaneous flaps.  2.  Bilateral breast flap reinnervation by coaptation of flap T11 intercostal nerve to chest T4 intercostal nerve end-to-end using nerve allograft under operative microscope using microsurgical technique.  3.  Bilateral breast flap reinnervation coaptation sites conduit wrapping under the operative microscope using microsurgical technique.  4.  Intraoperative chemical angiography using the SPY Elite system.  5.  Bilateral abdominal wall reinforcement with Strattice acellular dermal matrix, size 16 x 5 cm per side.     SURGEON: Andriy Santoyo MD, PhD     CO SURGEON: Jd Aaron MD (co-surgeon was required for this operation due to the technical complexity and long duration of free tissue transfer breast reconstruction in the setting of inconsistent availability of appropriately skilled resident assistance.  Dr. Aaron was the main surgeon for the right breast reconstruction and abdominal reconstruction, whereas Dr. Santoyo was the main surgeon for the left breast reconstruction and intraoperative chemical angiography.  Bilateral breast reinnervation using nerve allograft and conduit wrapping of coaptation sites was performed together under the operative microscope.)      ASSISTANT:   1. Yayo Meyer MD.     ANESTHESIA: General.     ESTIMATED BLOOD LOSS: 250 mL     FINDINGS:   1.  Empire of bilateral deep inferior epigastric  based flaps required extensive retrograde  dissection resulting in at least 1 hour added to the operative time per flap.  Right breast flap was harvested on 2 lateral row perforators while the left breast flap was harvested on 2 lateral row perforators.  2.  End-to-end anastomosis of left deep inferior epigastric flap artery to right internal mammary artery and left deep inferior epigastric flap vein to right internal mammary vein using a size 3.0 mm venous .  Right breast flap weight 588 g.  Right breast flap ischemia time 30 minutes.  3.  End-to-end anastomosis of right deep inferior epigastric flap artery to left internal mammary artery and right deep inferior epigastric flap vein to left internal mammary vein using a size 2.5 mm venous .  Left breast flap weight 665 g.  Left breast flap ischemia time 61 minutes.  4.  End-to-end coaptation of left deep inferior epigastric flap T11 intercostal nerve to right chest T4 intercostal nerve using nerve allograft with dimensions 1 - 2 mm diameter and 70 mm length.  5.  End-to-end coaptation of right deep inferior epigastric flap T11 intercostal nerve to left chest T4 intercostal nerve using nerve allograft with dimensions 1 - 2 mm diameter and 70 mm length.  6.  Wrapping of nerve coaptation sites using conduit with dimensions 3 mm diameter and 15 mm length divided in half per coaptation site for a total of 4 coaptation sites.     SPECIMENS: Bilateral breast skin.     COMPLICATIONS: None.     DRAINS: 15 Telugu drain per each breast and in abdomen for a total of 3 drains.     IMPLANTS: None.     DESCRIPTION OF PROCEDURE:   Informed consent was obtained and surgical sites marked in the preoperative holding area.  Preoperative antibiotics and anticoagulation were given.  Patient  was taken to the operating room and placed in a supine position over an under body Noris hugger.  Room was kept warm and patient body temperature monitored throughout the entire case.  Bilateral lower leg SCDs were applied and general anesthesia was obtained.  A Leroy was placed and arms were tucked to the sides with all pressure points padded.  Patient's chest and abdomen were then prepped and draped in a sterile fashion.  A timeout was performed.     Deep inferior epigastric perforators were identified bilaterally using a pencil doppler.  Bilateral shaylee-abdominal flaps were designed around these dopplered marks.  The superior abdominal incision was made above the level of the umbilicus.  Dissection was carried superficial to the abdominal wall fascia up to the level of the xyphoid at the midline and laterally below the ribs on both sides.  The surgical bed was then reflexed to gauge tension of donor site closure and confirm the inferior abdominal incision.  Bilateral superficial inferior epigastric veins were dissected circumferentially for a distance of 3 cm inferiorly before clipping and ligating.  The abdominal flap was then raised from lateral to medial in a suprafascial plane until the skin perforators were reached.  The midline abdominal incision was then made so that medial to lateral dissection could be performed to further visualize the skin perforators completely.     In addition to  dissection, the T11 intercostal nerve entering into each flap was visualized and dissected circumferentially to the level of the deep fascia.  The nerves were ligated at that level to obtain just the sensory fascicles.  Under the operative microscope, a nerve allograft was coapted to the ligated nerve end-to-end using 9-0 nylon suture in an epineural fashion at 180 degrees from each other.  This coaptation site was then wrapped with a conduit that was secured into place using vascular clips tethered to the adjacent  .  The nerve graft was then protected at all times until coaptation to the chest T4 nerve later.     The abdominal flaps were then isolated to select perforators using microvascular clamps to occlude flow through all other perforators.  Under guidance from the surgical team, 5 cc of indocyanine green dye was given IV, followed by a 10 cc saline flush.  Using the SPY Elite system, real time intraoperative chemical angiography was performed and perfusion of the flaps was evaluated at 30 seconds and 60 seconds.  Decision was made to use the following perforators:  Right breast flap: 2 lateral row perforators.  Left breast flap: 2 lateral row perforators.  In addition to  selection, chemical angiography was used to determine the amount of flap tissue to be debrided at the lateral edges.     The rectus fascia was split in a longitudinal manner towards the external iliac vessels.  Retrograde dissection was performed for the select perforators down to the deep inferior epigastric vessels, which were then dissected to their origin from the external iliac vessels and  from one another.  This dissection was performed meticulously and at times using microsurgical instruments to control all vascular branches, adding at least 1 hour to the operative time per flap.  The pedicles were then marked, and the flaps were then stapled back to their initial positions.     Bilateral chests were prepared for flap inset by creating space prepectorally to adequately fit the flaps. Subcutaneous flaps were raised. The pectoralis muscle was split along its medial fibers below the angle of Grant to reveal the fourth rib cartilage.  The perichondrium was entered and the medial rib cartilage was removed with a rongeur.  The deep perichondrium was then dissected off the chest with bipolar cautery.  The internal mammary vessels were prepared under the operative microscope.  Single vessel clamps were placed proximally and  the distal ends were double clipped and cut sharply at a level appropriate for anastomosis.  Pulsatile flow was noted from the artery and the vein flushed easily.  The vessel cut edges were stripped of adventitia, properly dilated, and lumens thoroughly irrigated with heparinized solution.     Attention was then turned to the inferior aspect from where the rib cartilage was removed.  The T4 intercostal nerve was dissected out bilaterally.  These were then traced distally into the mastectomy skin flap and were found to be ligated from previous surgery.  Decision was made to use these nerves as donors for reinnervation.     The abdominal flap pedicles were ligated starting with the artery then vein.  The flaps were weighed at the back table.  The flap arteries were then dilated and flushed with heparinized solution until clear egress was seen from the flap veins.  The flaps were secured to the chest with a moist lap and the pedicle was laid out to meet the prepared internal mammary vessels without twisting or kinking.  Under the operative microscope, the arterial anastomosis was performed end-to-end interrupted 8-0 nylons.  180 degree sutures were placed first, followed by back wall sutures, then front wall sutures.  The venous anastomosis was performed with a venous  after proper measurements were made.  Clamps were removed and pulsatile flow was noticed through the arterial anastomoses.  Acland testing revealed flow through the venous anastomoses.  Bleeding from flap edges was noted and a doppler signal could be obtained at the flap skin paddles. Implantable Trusted Opinion dopplers were placed. The left side artery seemed to go down 5 minutes in, after the Cook as applied. We redid the anastomosis. No clot was seen. No issues thereafter noticed.      The medial edges of the flaps were lifted from the chest wall.  This exposed the nerve grafts that were previously coapted and T4 intercostal nerve dissected into our  plane.  Nerve coaptation from the T4 intercostal nerve to allograft was made using 9-0 nylon suture in 180 degree fashion within the epineurium.  This coaptation site was then wrapped with a conduit which was secured in place using vascular clips.  This was performed under the operative microscope using microsurgical technique.     Bilateral flaps were placed inside the breast pockets after trimming their lateral corners.  Appropriate skin paddles were designed and the rest of the skin was de-epithelialized.  Mastectomy skin flaps were trimmed appropriately.  A 15 Eritrean DOMINIQUE drain was placed into each side and the skin closed in layers.  Doppler signals were intact on both skin paddles following this.  Simultaneously, the abdominal fascia was closed with 0 PDS suture in a running manner with a 16 x 10 cm Strattice acellular dermal matrix divided in half so that a 16 x 5 cm piece was placed in a retro rectus plane to reinforce the closure per side.  100% of the acellular dermal matrix opened was used.  The umbilicus was removed and the stalk closed with 0 PDS suture.  We then closed the abdominal incision with 0 PDS suture in the superficial fascia and 2-0 Monocryl suture in the deep dermal layer followed by 3-0 Stratafix suture in the skin.  This was performed under beachchair positioning.     Incision tape and glue were applied.  Drain dressings were applied.  An abdominal binder was applied.  All counts were correct at the end of the case.  The patient was then awakened, extubated, and transferred to the postoperative area in a flexed position at the hip.  Leroy was kept in.       DISPOSITION: Inpatient floor for frequent flap monitoring

## 2023-09-28 NOTE — PLAN OF CARE
Occupational Therapy Discharge Summary    Reason for therapy discharge:    Discharged to home.    Progress towards therapy goal(s). See goals on Care Plan in [facility] electronic health record for goal details.  Pt. Not seen by d/cing therapist. Per chart pt. SBA with BADLs and functional mobility.     Therapy recommendation(s):    No further therapy is recommended.

## 2023-09-29 ENCOUNTER — PATIENT OUTREACH (OUTPATIENT)
Dept: CARE COORDINATION | Facility: CLINIC | Age: 43
End: 2023-09-29
Payer: COMMERCIAL

## 2023-09-29 NOTE — PROGRESS NOTES
Clinic Care Coordination Contact  [facility]: Post-Discharge Note  SITUATION                                                      Admission:    Admission Date: 09/25/23   Reason for Admission: S/P bilateral mastectomy (Z90.13)  H/O breast reconstruction (Z98.890)  Discharge:   Discharge Date: 09/27/23  Discharge Diagnosis: S/p free tissue transfer breast reconstruction    BACKGROUND                                                      Per hospital discharge summary and inpatient provider notes:  taylor for admission requiring a surgical or invasive procedure:    Breast reconstruction   The patient underwent the following procedure(s):    BENIGNO flap, bilateral   There were no immediate complications during this procedure.    Please refer to the full operative summary for details.              Hospital Course:   The patient's hospital course was unremarkable.  She recovered as anticipated and experienced no post-operative complications.     ASSESSMENT           Discharge Assessment  How are you doing now that you are home?: Spoke with Avis.  She stated she is doing really well.  Staying 'ahead of the pain'.  Taking both oxycodone and tylenol and manaing well.  How are your symptoms? (Red Flag symptoms escalate to triage hotline per guidelines): Improved  Do you feel your condition is stable enough to be safe at home until your provider visit?: Yes  Does the patient have their discharge instructions? : Yes  Does the patient have questions regarding their discharge instructions? : No  Were you started on any new medications or were there changes to any of your previous medications? : Yes  Does the patient have all of their medications?: Yes  Do you have questions regarding any of your medications? : No  Do you have all of your needed medical supplies or equipment (DME)?  (i.e. oxygen tank, CPAP, cane, etc.): Yes  Discharge follow-up appointment scheduled within 14 calendar days? : Yes  Discharge Follow Up  Appointment Date: 10/10/23  Discharge Follow Up Appointment Scheduled with?: Specialty Care Provider    Post-op ([facility] Only)  If the patient had a surgery or procedure, do they have any questions for a nurse?: No    Post-op (Clinicians Only)  Did the patient have surgery or a procedure: Yes  Incision: closed  Drainage: No  Fever: No  Chills: No  Redness: No  Warmth: No  Swelling: No  Incision site pain: Yes  Closure: suture  Eating & Drinking: eating and drinking without complaints/concerns  PO Intake: regular diet  Bowel Function: normal  Urinary Status: voiding without complaint/concerns    Spoke with Avis.  See note above.  Declined any CCC needs.  Managing all items independently.  Denied any questions related to medications.  Confirmed upcoming appointments.  Declined any questions.  PLAN                                                      Outpatient Plan:  Patient to attend all upcoming appointments.    Future Appointments   Date Time Provider Department Center   10/10/2023 10:40 AM Emani Serrano PA-C [facility]   10/26/2023  3:30 PM Lisa Kearns PA-C [facility]   11/1/2023  1:00 PM [facility] CHEMO CHAIR [facility]   11/27/2023  1:20 PM [facility]   11/29/2023  1:30 PM [facility] CHEMO LAB DRAW 1 [facility]   11/29/2023  2:00 PM Candida Espinosa MD [facility]   11/29/2023  2:30 PM [facility] CHEMO CHAIR [facility]         For any urgent concerns, please contact our 24 hour nurse triage line: [phone] ([phone])         Erica Grayson RN

## 2023-09-30 LAB — BACTERIA FLD CULT: NO GROWTH

## 2023-10-02 LAB — BACTERIA FLD CULT: NORMAL

## 2023-10-03 LAB
PATH REPORT.COMMENTS IMP SPEC: NORMAL
PATH REPORT.COMMENTS IMP SPEC: NORMAL
PATH REPORT.FINAL DX SPEC: NORMAL
PATH REPORT.GROSS SPEC: NORMAL
PATH REPORT.MICROSCOPIC SPEC OTHER STN: NORMAL
PATH REPORT.RELEVANT HX SPEC: NORMAL
PHOTO IMAGE: NORMAL

## 2023-10-03 PROCEDURE — 88305 TISSUE EXAM BY PATHOLOGIST: CPT | Mod: 26 | Performed by: PATHOLOGY

## 2023-10-09 ENCOUNTER — TRANSCRIBE ORDERS (OUTPATIENT)
Dept: OTHER | Age: 43
End: 2023-10-09

## 2023-10-09 ENCOUNTER — PATIENT OUTREACH (OUTPATIENT)
Dept: ONCOLOGY | Facility: CLINIC | Age: 43
End: 2023-10-09
Payer: COMMERCIAL

## 2023-10-09 DIAGNOSIS — Z85.3 HISTORY OF BREAST CANCER: ICD-10-CM

## 2023-10-09 DIAGNOSIS — Z15.09 BRCA2 GENE MUTATION POSITIVE IN FEMALE: Primary | ICD-10-CM

## 2023-10-09 DIAGNOSIS — Z15.01 BRCA2 GENE MUTATION POSITIVE IN FEMALE: Primary | ICD-10-CM

## 2023-10-09 DIAGNOSIS — Z15.02 BRCA2 GENE MUTATION POSITIVE IN FEMALE: Primary | ICD-10-CM

## 2023-10-09 NOTE — PROGRESS NOTES
"Plastic Surgery Outpatient Visit    ID: Zainab Bolton is a 43 year old female with PMH R breast cancer s/p bilateral mastectomy with TE recon c/b infection, now s/p bilateral BENIGNO recon 9/25/2023 with Dr. Aaron.     S: Doing well overall. Wasn't told to keep a drain log but breast drains have been minimal, abdomen has been ~25cc daily. Taking lovenox and ASA.     O:  /67 (BP Location: Left arm, Patient Position: Sitting, Cuff Size: Adult Regular)   Pulse 81   Temp 98.5  F (36.9  C) (Oral)   Ht 1.6 m (5' 3\")   Wt 63.4 kg (139 lb 11.2 oz)   LMP 06/22/2023   SpO2 99%   BMI 24.75 kg/m     General: NAD  Chest: bilateral breast flaps warm, soft, viable. Incisions c/d/i. Doppler wires in place.  Abdomen: incision c/d/i.   All DOMINIQUE with minimal serous output.     A/P:  -healing well  -bilateral breast JPs removed, will leave abdomen for 1 more week, please keep drain log.   -doppler wires removed  -continue abdominal binder  -continue lovenox/ASA  -RTC 1 week for drain out, then 4 weeks with Dr. Aaron to discuss third stage.     Emani Serrano PA-C  Plastic and Reconstructive Surgery    20 minutes spent on the date of the encounter doing chart review, history and physical, dressing changes, documentation and further activity as noted above.    "

## 2023-10-09 NOTE — PROGRESS NOTES
New Patient Hematology / Oncology Nurse Navigator Note     Referral Date: 10/8/23    Referring provider: Self-referred    Referring Clinic/Organization: [facility]     Referred to: GynOnc    Requested provider (if applicable): Dr. Watkins    Evaluation for : BRCA 2+     Clinical History (per Nurse review of records provided):    4/24/23 GC Labs (BRCA2) -- BOOKMARKED  2/3/23 PAP/HPV -- BOOKMARKED  9/6/23 Oncology Visit with Onc MAURO--BOOKMARKED      Clinical Assessment / Barriers to Care (Per Nurse):  Pt lives in [place], [place]      Records Location: [facility]     Records Needed:   N/A    Additional testing needed prior to consult:   N/A    Referral updates and Plan:   Referral received, chart reviewed, scheduling instructions updated and routed to NPS for scheduling. Will follow-up as needed as pt is already established with [facility] and requested the referral to GynOnc.     Ashanti Diggs, BSN, RN, PHN, OCN  Hematology/Oncology Nurse Navigator  [facility]  [phone]

## 2023-10-10 ENCOUNTER — OFFICE VISIT (OUTPATIENT)
Dept: PLASTIC SURGERY | Facility: CLINIC | Age: 43
End: 2023-10-10
Attending: PLASTIC SURGERY
Payer: COMMERCIAL

## 2023-10-10 VITALS
BODY MASS INDEX: 24.75 KG/M2 | TEMPERATURE: 98.5 F | SYSTOLIC BLOOD PRESSURE: 116 MMHG | HEIGHT: 63 IN | HEART RATE: 81 BPM | WEIGHT: 139.7 LBS | DIASTOLIC BLOOD PRESSURE: 67 MMHG | OXYGEN SATURATION: 99 %

## 2023-10-10 DIAGNOSIS — Z90.13 S/P BILATERAL MASTECTOMY: Primary | ICD-10-CM

## 2023-10-10 DIAGNOSIS — Z98.890 S/P FLAP GRAFT: ICD-10-CM

## 2023-10-10 PROCEDURE — 99024 POSTOP FOLLOW-UP VISIT: CPT | Performed by: PHYSICIAN ASSISTANT

## 2023-10-10 ASSESSMENT — PAIN SCALES - GENERAL: PAINLEVEL: MILD PAIN (3)

## 2023-10-10 NOTE — NURSING NOTE
"Chief Complaint   Patient presents with    Surgical Followup     Avis is being seen today for a 2 week post-op, DOS 9/25/23.       Vitals:    10/10/23 1043   BP: 116/67   BP Location: Left arm   Patient Position: Sitting   Cuff Size: Adult Regular   Pulse: 81   Temp: 98.5  F (36.9  C)   TempSrc: Oral   SpO2: 99%   Weight: 139 lb 11.2 oz   Height: 5' 3\"       Body mass index is 24.75 kg/m .    Patient reports mild pain 3/10 in abdomen, bilateral chest, and mid-lower back. Taking OTC pain medication.    Bob Caldwell, EMT    "

## 2023-10-10 NOTE — LETTER
"10/10/2023       RE: Zainab Bolton  [id number] [address] [id number]     Dear Colleague,    Thank you for referring your patient, Zainab Bolton, to the [facility] PLASTIC [facility] RECON[facility] SUR[facility]ERY CLIN[facility] [place] [facility]. Please see a copy of my visit note below.    Plastic Surgery Outpatient Visit    ID: Zainab Bolton is a 43 year old female with PMH R breast cancer s/p bilateral mastectomy with TE recon c/b infection, now s/p bilateral BENIGNO recon 9/25/2023 with Dr. Aaron.     S: Doing well overall. Wasn't told to keep a drain log but breast drains have been minimal, abdomen has been ~25cc daily. Taking lovenox and ASA.     O:  /67 (BP Location: Left arm, Patient Position: Sitting, Cuff Size: Adult Regular)   Pulse 81   Temp 98.5  F (36.9  C) (Oral)   Ht 1.6 m (5' 3\")   Wt 63.4 kg (139 lb 11.2 oz)   LMP 06/22/2023   SpO2 99%   BMI 24.75 kg/m     General: NAD  Chest: bilateral breast flaps warm, soft, viable. Incisions c/d/i. Doppler wires in place.  Abdomen: incision c/d/i.   All DOMINIQUE with minimal serous output.     A/P:  -healing well  -bilateral breast JPs removed, will leave abdomen for 1 more week, please keep drain log.   -doppler wires removed  -continue abdominal binder  -continue lovenox/ASA  -RTC 1 week for drain out, then 4 weeks with Dr. Aaron to discuss third stage.     20 minutes spent on the date of the encounter doing chart review, history and physical, dressing changes, documentation and further activity as noted above.        Again, thank you for allowing me to participate in the care of your patient.      Sincerely,    Emani Serrano PA-C      "

## 2023-10-13 ENCOUNTER — TELEPHONE (OUTPATIENT)
Dept: PLASTIC SURGERY | Facility: CLINIC | Age: 43
End: 2023-10-13
Payer: COMMERCIAL

## 2023-10-13 NOTE — TELEPHONE ENCOUNTER
Spoke with pt, she is not feeling well. Feels similar to how she did in the past with infection when she had expanders in, which ultimately came out due to infection.    She is having urinary frequency, had this before with the chest infection, was up 5 times last night to urinate. Denies dysuria or hematuria. Does not have history of UTIs. She feels like she has a fever, but is taking tylenol, temp is 99.7  She has her belly drain still in place, so far today 25cc output, she does not think it is ready to come out. There is redness around the drain insertion site. Denies chills. Concerned about getting worse if she has an infection.     She is going to send pictures via [employer]. She has about 50 pills of bactrim on hand at home from previous infection.

## 2023-10-13 NOTE — TELEPHONE ENCOUNTER
Per Dr Aaron, pictures look OK, OK to start Bactrim, come to clinic next week. Weekend resources discussed

## 2023-10-13 NOTE — TELEPHONE ENCOUNTER
[facility]    Phone Message    May a detailed message be left on voicemail: yes     Reason for Call: Symptoms or Concerns     If patient has red-flag symptoms, warm transfer to triage line    Current symptom or concern: Low fever, nausea, redness    Symptoms have been present for:  1 day(s)    Has patient previously been seen for this? No    By Emani Serrano : [facility] Aaron MD     Date: 10.10.23    Are there any new or worsening symptoms? No    Action Taken: Message routed to:  [facility] ([facility] plas surg recon    Travel Screening: Not Applicable

## 2023-10-16 NOTE — PROGRESS NOTES
"Plastic Surgery Outpatient Visit    ID: Zainab Bolton is a 43 year old female with PMH R breast cancer s/p bilateral mastectomy with TE recon c/b infection, now s/p bilateral BENIGNO recon 9/25/2023 with Dr. Aaron.      S: Doing ok today. Had urinary frequency and started feeling ill end of last week, started taking bactrim, now on day 5 and doing ok. Wearing binder. Taking ASA/lovenox. Drain output 15cc daily. Using vaseline to entire abdomen. Having some rash to abdomen.     O:  /83 (BP Location: Left arm, Patient Position: Sitting, Cuff Size: Adult Regular)   Pulse 79   Temp 98.5  F (36.9  C) (Oral)   Ht 1.6 m (5' 3\")   Wt 63.5 kg (140 lb 1.6 oz)   LMP 06/22/2023   SpO2 97%   BMI 24.82 kg/m     General: NAD  Chest: bilateral breast flaps viable, incisions c/d/I.   Abdomen: incision c/d/I. Mild pink maculopapular rash to anterior abdomen. DOMINIQUE with serous output.     A/P:  -healing well  -DOMINIQUE removed  -vaseline to incisions only. Wear cotton tshirt/pillowcase under binder to help with excess moisture.   -finish ASA/lovenox  -finish 2 more days of bactrim for 7 day course  -RTC with Dr. Aaron at 6 weeks    Emani Serrano PA-C  Plastic and Reconstructive Surgery    15 minutes spent on the date of the encounter doing chart review, history and physical, dressing changes, documentation and further activity as noted above.   "

## 2023-10-17 ENCOUNTER — TELEPHONE (OUTPATIENT)
Dept: ONCOLOGY | Facility: CLINIC | Age: 43
End: 2023-10-17

## 2023-10-17 ENCOUNTER — OFFICE VISIT (OUTPATIENT)
Dept: PLASTIC SURGERY | Facility: CLINIC | Age: 43
End: 2023-10-17
Payer: COMMERCIAL

## 2023-10-17 VITALS
BODY MASS INDEX: 24.82 KG/M2 | TEMPERATURE: 98.5 F | OXYGEN SATURATION: 97 % | WEIGHT: 140.1 LBS | SYSTOLIC BLOOD PRESSURE: 116 MMHG | HEART RATE: 79 BPM | DIASTOLIC BLOOD PRESSURE: 83 MMHG | HEIGHT: 63 IN

## 2023-10-17 DIAGNOSIS — Z90.13 S/P BILATERAL MASTECTOMY: ICD-10-CM

## 2023-10-17 DIAGNOSIS — Z98.890 S/P FLAP GRAFT: Primary | ICD-10-CM

## 2023-10-17 PROCEDURE — 99024 POSTOP FOLLOW-UP VISIT: CPT | Performed by: PHYSICIAN ASSISTANT

## 2023-10-17 ASSESSMENT — PAIN SCALES - GENERAL: PAINLEVEL: MILD PAIN (2)

## 2023-10-17 NOTE — NURSING NOTE
"Chief Complaint   Patient presents with    Surgical Followup     Avis is being seen today for a post-op, DOS 9/25/23.       Vitals:    10/17/23 1210   BP: 116/83   BP Location: Left arm   Patient Position: Sitting   Cuff Size: Adult Regular   Pulse: 79   Temp: 98.5  F (36.9  C)   TempSrc: Oral   SpO2: 97%   Weight: 63.5 kg (140 lb 1.6 oz)   Height: 1.6 m (5' 3\")       Body mass index is 24.82 kg/m .    Patient reports mild pain 2/10 in breasts bilaterally and R abdomen. Patient suspects retained suture along abdominal incision.    Bob Caldwell, EMT    "

## 2023-10-17 NOTE — LETTER
"10/17/2023       RE: Zainab Bolton  [id number] [address] [address]     Dear Colleague,    Thank you for referring your patient, Zainab Bolton, to the [facility] [place] at [facility]. Please see a copy of my visit note below.    Plastic Surgery Outpatient Visit    ID: Zainab Bolton is a 43 year old female with PMH R breast cancer s/p bilateral mastectomy with TE recon c/b infection, now s/p bilateral BENIGNO recon 9/25/2023 with Dr. Aaron.      S: Doing ok today. Had urinary frequency and started feeling ill end of last week, started taking bactrim, now on day 5 and doing ok. Wearing binder. Taking ASA/lovenox. Drain output 15cc daily. Using vaseline to entire abdomen. Having some rash to abdomen.     O:  /83 (BP Location: Left arm, Patient Position: Sitting, Cuff Size: Adult Regular)   Pulse 79   Temp 98.5  F (36.9  C) (Oral)   Ht 1.6 m (5' 3\")   Wt 63.5 kg (140 lb 1.6 oz)   LMP 06/22/2023   SpO2 97%   BMI 24.82 kg/m     General: NAD  Chest: bilateral breast flaps viable, incisions c/d/I.   Abdomen: incision c/d/I. Mild pink maculopapular rash to anterior abdomen. DOMINIQUE with serous output.     A/P:  -healing well  -DOMINIQUE removed  -vaseline to incisions only. Wear cotton tshirt/pillowcase under binder to help with excess moisture.   -finish ASA/lovenox  -finish 2 more days of bactrim for 7 day course  -RTC with Dr. Aaron at 6 weeks    15 minutes spent on the date of the encounter doing chart review, history and physical, dressing changes, documentation and further activity as noted above.       Again, thank you for allowing me to participate in the care of your patient.      Sincerely,    Emani Serrano PA-C    "

## 2023-10-18 ENCOUNTER — VIRTUAL VISIT (OUTPATIENT)
Dept: ONCOLOGY | Facility: HOSPITAL | Age: 43
End: 2023-10-18
Attending: INTERNAL MEDICINE
Payer: COMMERCIAL

## 2023-10-18 DIAGNOSIS — C50.112 MALIGNANT NEOPLASM OF CENTRAL PORTION OF LEFT BREAST IN FEMALE, ESTROGEN RECEPTOR POSITIVE (H): Primary | ICD-10-CM

## 2023-10-18 DIAGNOSIS — N95.1 MENOPAUSAL SYNDROME (HOT FLASHES): ICD-10-CM

## 2023-10-18 DIAGNOSIS — Z17.0 MALIGNANT NEOPLASM OF CENTRAL PORTION OF LEFT BREAST IN FEMALE, ESTROGEN RECEPTOR POSITIVE (H): Primary | ICD-10-CM

## 2023-10-18 PROCEDURE — 99214 OFFICE O/P EST MOD 30 MIN: CPT | Mod: 95 | Performed by: INTERNAL MEDICINE

## 2023-10-18 NOTE — PROGRESS NOTES
Virtual Visit Details    Type of service:  Video Visit   Video start time: 8:30 AM  Video stop time: 8:45 AM  Originating Location (pt. Location): Home    Distant Location (provider location):  On-site  Platform used for Video Visit: [facility] Hematology and Oncology Progress Note    Patient: Zainab Bolton  MRN: [id number]  Date of Service: 09/06/2023        Reason for Visit    Chief Complaint   Patient presents with    RECHECK     Follow up       Assessment and Plan     Cancer Staging   Malignant neoplasm of central portion of left breast in female, estrogen receptor positive (H)  Staging form: Breast, AJCC 8th Edition  - Pathologic: Stage IA (pT2, pN0, cM0, G1, ER+, SC+, HER2-, Oncotype DX score: 17) - Signed by Candida Espinosa MD on 10/18/2023    Breast cancer, ER/SC positive, Her2 negative, left side, oncotype 17, T2N0M0, BRCA positive: s/p bilateral mastectomy.  I offered her adjuvant chemotherapy for intermediate Oncotype DX score but she declined.  She is currently on tamoxifen and Zoladex since she is premenopausal.  She initially did well.  Recently underwent second stage reconstruction surgery on 9/25/2023.  Recovering well from that.  Her tamoxifen has been on hold.  This was discontinued at the time of discharge from recent specialization.  Probably due to the risk of VTE from tamoxifen postsurgery.  She is currently on Lovenox.  She will clarify with the plastic surgery as to when she can restart this.    Unfortunately she has developed significant menopausal symptoms from both ovarian suppression therapy and tamoxifen.  Her main issue is significant night sweats and hot flashes.  She is already on sertraline and BuSpar.  We discussed management of menopausal symptoms from endocrine therapy in breast cancer.  Pharmacotherapy options include other SSRIs or SNRIs like venlafaxine, escitalopram or duloxetine.  Other option is to stop Zoladex and continue tamoxifen only.  We are  anticipating that she will undergo oophorectomy in the near future so she will not need Zoladex after that.  I am not keen on starting her on a different SNRI or SSRI at this point in time.  I explained to her that sometimes the symptoms tend to set lower third of time.  She is also not interested in adding on more medications at this point in time.    So my plan is to stop Zoladex injections.  She will start tamoxifen only after cleared by plastic surgery in the near future (she is going to touch base with them sometime next week).  If she continues to have significant symptoms with the tamoxifen then either we can try some pharmacologic therapy or switch to an AI after oophorectomy.  She is planning to meet with OB/GYN in mid November and has decided to go for oophorectomy which will happen pretty soon.    She is scheduled to see me on November 29.    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 Espinosa    DIAGNOSIS: Breast cancer, left breast, found on screening mammogram.  Patient did have some mild breast pain for 9 months prior.  18 mm mammogram, 22 mm on MRI.  -Biopsy showed low-grade invasive ductal carcinoma.  ER positive, OK  positive, HER2/doug negative by FISH.  -BRCA2 mutation testing done and is positive      TREATMENT:   -5/31/23: Patient had bilateral mastectomy with left sentinel lymph node biopsy.  Path shows 2.3 cm invasive ductal carcinoma of the left breast, grade 1.  Margins negative.  She had some background DCIS.  The lymph node was negative.  Patient had Oncotype done and result was 17.  -Tamoxifen, started roughly June 2023.   -Zoladex monthly injections started July 2023.     INTERIM HISTORY:   She is seen as a video visit for follow-up.  She has been on tamoxifen and Zoladex from June July onwards.  Recently underwent reconstruction surgery.  Tamoxifen has been on hold based on recommendations from plastic surgery.  She is currently on Lovenox and has 1 more week to go.  She has developed significant night sweats and hot flashes.  This is sometimes really bad and debilitating.  No other side effects reported.  She is recovering well from surgery.  Currently on antibiotics.  No signs of worsening infection.  This visit was to discuss management of menopausal symptoms.    Review of Systems    Pertinent items are noted in HPI.    Past History    Past Medical History:   Diagnosis Date    Anxiety 08/16/2010    Anxiety state, unspecified 1997    chronic anxiety    BRCA2 positive     Breast cancer (H) 05/19/2023    Depression     Essential hypertension 03/03/2023    Hypercholesteremia 08/16/2010    Obesity 08/16/2010    Other acne     Papanicolaou smear of cervix with low grade squamous intraepithelial lesion (LGSIL) (aka LSIL) 05/05/2015    LSIL/+ HR HPV    S/P breast reconstruction, bilateral     TOBACCO ABUSE-CONTINUOUS        PHYSICAL EXAM  LMP 06/22/2023     GENERAL: Alert, cooperative, in no distress    Lab Results    No results found for this or any previous visit (from the past 168 hour(s)).      Imaging    POC US Guidance Needle Placement    Result Date: 9/25/2023  Bilateral transversus abdominis plane block and  bilateral Pectoralis pain block I & II     Bilateral transversus abdominis plane block and bilateral Pectoralis pain block I & II         A total of 30 min were spent today on this visit which included face to face conversation with the patient, EMR review, counseling and co-ordination of care and medical documentation.        Signed by: Candida Espinosa MD

## 2023-10-18 NOTE — NURSING NOTE
Is the patient currently in the state of [place]? YES    Visit mode:VIDEO    If the visit is dropped, the patient can be reconnected by: VIDEO VISIT: Text to cell phone:   Telephone Information:   Mobile [phone]       Will anyone else be joining the visit? NO  (If patient encounters technical issues they should call [phone]    How would you like to obtain your AVS? MyChart    Are changes needed to the allergy or medication list? No    Reason for visit: RECHECK (Follow up)    Paolo SWEET

## 2023-10-23 LAB — BACTERIA FLD CULT: NO GROWTH

## 2023-10-26 ENCOUNTER — OFFICE VISIT (OUTPATIENT)
Dept: DERMATOLOGY | Facility: CLINIC | Age: 43
End: 2023-10-26
Payer: COMMERCIAL

## 2023-10-26 DIAGNOSIS — Z15.01 BRCA2 POSITIVE: Primary | ICD-10-CM

## 2023-10-26 DIAGNOSIS — D22.9 MULTIPLE BENIGN NEVI: ICD-10-CM

## 2023-10-26 DIAGNOSIS — L82.1 SEBORRHEIC KERATOSIS: ICD-10-CM

## 2023-10-26 DIAGNOSIS — D48.5 NEOPLASM OF UNCERTAIN BEHAVIOR OF SKIN: ICD-10-CM

## 2023-10-26 DIAGNOSIS — Z15.09 BRCA2 POSITIVE: Primary | ICD-10-CM

## 2023-10-26 DIAGNOSIS — D18.01 CHERRY ANGIOMA: ICD-10-CM

## 2023-10-26 DIAGNOSIS — L81.4 LENTIGO: ICD-10-CM

## 2023-10-26 DIAGNOSIS — L91.8 INFLAMED SKIN TAG: ICD-10-CM

## 2023-10-26 PROCEDURE — 11200 RMVL SKIN TAGS UP TO&INC 15: CPT | Mod: 59 | Performed by: PHYSICIAN ASSISTANT

## 2023-10-26 PROCEDURE — 99203 OFFICE O/P NEW LOW 30 MIN: CPT | Mod: 25 | Performed by: PHYSICIAN ASSISTANT

## 2023-10-26 PROCEDURE — 11102 TANGNTL BX SKIN SINGLE LES: CPT | Performed by: PHYSICIAN ASSISTANT

## 2023-10-26 PROCEDURE — 88305 TISSUE EXAM BY PATHOLOGIST: CPT | Performed by: DERMATOLOGY

## 2023-10-26 ASSESSMENT — PAIN SCALES - GENERAL: PAINLEVEL: NO PAIN (0)

## 2023-10-26 NOTE — NURSING NOTE
Chief Complaint   Patient presents with    Skin Check     Baseline, BCRA +       There were no vitals filed for this visit.  Wt Readings from Last 1 Encounters:   10/17/23 63.5 kg (140 lb 1.6 oz)       Heidi Ghosh LPN .................10/26/2023

## 2023-10-26 NOTE — LETTER
10/26/2023         RE: Zainab Bolton  [id number] [address] [address]        Dear Colleague,    Thank you for referring your patient, Zainab Bolton, to the [facility]. Please see a copy of my visit note below.    Zainab Bolton is an extremely pleasant 43 year old year old female patient here today for skin check. She denies any painful or bleeding skin lesions. No changing nevi. She notes mole on left groin getting irritated by clothing.  Patient has no other skin complaints today.  Remainder of the HPI, Meds, PMH, Allergies, FH, and SH was reviewed in chart.    Pertinent Hx:   BRCA2 positive   Past Medical History:   Diagnosis Date     Anxiety 08/16/2010     Anxiety state, unspecified 1997    chronic anxiety     BRCA2 positive      Breast cancer (H) 05/19/2023     Depression      Essential hypertension 03/03/2023     Hypercholesteremia 08/16/2010     Obesity 08/16/2010     Other acne      Papanicolaou smear of cervix with low grade squamous intraepithelial lesion (LGSIL) (aka LSIL) 05/05/2015    LSIL/+ HR HPV     S/P breast reconstruction, bilateral      TOBACCO ABUSE-CONTINUOUS        Past Surgical History:   Procedure Laterality Date     BIOPSY       BIOPSY NODE SENTINEL Left 5/31/2023    Procedure: LEFT SENTINEL LYMPH NODE BIOPSY;  Surgeon: Kylee Villasenor MD;  Location: [facility] [place] OR     CONIZATION LEEP  07/16/2015    LÁZARO 2     CONIZATION LEEP N/A 07/16/2015    Procedure: CONIZATION LEEP;  Surgeon: Omayra Cunningham DO;  Location:  OR     EYE SURGERY  1/5/2016     GRAFT FREE VASCULARIZED TRANSVERSE RECTUS ABDOMINIS MYOCUTANEOUS Bilateral 9/25/2023    Procedure: Bilateral breast reconstruction with free abdominal flaps, resensation, SPY;  Surgeon: MARIA E Aaron MD;  Location: [facility] OR     MASTECTOMY SIMPLE Bilateral 5/31/2023    Procedure: BILATERAL MASTECTOMIES;  Surgeon: Kylee Villasenor MD;  Location: [facility] [place] OR     RECONSTRUCT BREAST,  INSERT TISSUE EXPANDER, COMBINED Bilateral 2023    Procedure: RECONSTRUCTION, BREAST, BILATERAL TISSUE EXPANDERS;  Surgeon: Basil Aguilar MD;  Location: [facility] [place]     Removal of bilateral expanders  2023     REMOVE TISSUE EXPANDER TRUNK Bilateral 2023    Procedure: REMOVAL, TISSUE EXPANDER, BILATERAL BREAST;  Surgeon: Basil Aguilar MD;  Location: [facility] [place]     ZZC LIGATE FALLOPIAN TUBE  10/03/2003        Family History   Problem Relation Age of Onset     Cancer Father         liver     Other Cancer Father         Stomach and liver cancer diagnosis     Arthritis Maternal Grandmother      Hypertension Maternal Grandmother      Diabetes Maternal Grandmother      Thyroid Disease Maternal Grandmother      Cancer Maternal Grandfather         leukemia     Heart Disease Maternal Aunt         MI approx age 46     Anesthesia Reaction No family hx of      Clotting Disorder No family hx of        Social History     Socioeconomic History     Marital status: Single     Spouse name: Not on file     Number of children: 3     Years of education: 12     Highest education level: Not on file   Occupational History     Occupation: Stay at home Mother   Tobacco Use     Smoking status: Former     Packs/day: 1.00     Years: 10.00     Additional pack years: 0.00     Total pack years: 10.00     Types: Cigarettes     Quit date: 3/28/2023     Years since quittin.5     Smokeless tobacco: Never     Tobacco comments:     since age 12, but stopped with each baby   Vaping Use     Vaping Use: Not on file   Substance and Sexual Activity     Alcohol use: Not Currently     Drug use: Yes     Types: Marijuana     Comment: daily smoking     Sexual activity: Yes     Partners: Male     Birth control/protection: Female Surgical   Other Topics Concern      Service No     Blood Transfusions No     Caffeine Concern Yes     Comment: 3 cups every 2 weeks, 2 pops per week      Occupational Exposure No     Hobby  Hazards No     Sleep Concern No     Stress Concern Yes     Weight Concern Yes     Special Diet No     Back Care No     Exercise No     Bike Helmet No     Seat Belt Yes     Self-Exams No     Parent/sibling w/ CABG, MI or angioplasty before 65F 55M? No   Social History Narrative     Not on file     Social Determinants of Health     Financial Resource Strain: Not on file   Food Insecurity: Not on file   Transportation Needs: Not on file   Physical Activity: Not on file   Stress: Not on file   Social Connections: Not on file   Interpersonal Safety: Not on file   Housing Stability: Not on file       Outpatient Encounter Medications as of 10/26/2023   Medication Sig Dispense Refill     acetaminophen (TYLENOL) 500 MG tablet Take 2 tablets (1,000 mg) by mouth 3 times daily 120 tablet 0     aspirin 81 MG EC tablet Take 1 tablet (81 mg) by mouth daily (Patient not taking: Reported on 10/26/2023) 28 tablet 0     busPIRone (BUSPAR) 10 MG tablet Take 1 tablet (10 mg) by mouth 2 times daily 60 tablet 1     calcium carbonate (OS-SAM) 1500 (600 Ca) MG tablet Take 600 mg by mouth 2 times daily       Cyanocobalamin 500 MCG TBDP Take 2 Gum by mouth every evening       enoxaparin ANTICOAGULANT (LOVENOX) 40 MG/0.4ML syringe Inject 0.4 mLs (40 mg) Subcutaneous every 24 hours for 28 days (Patient not taking: Reported on 10/26/2023) 11.2 mL 0     hydrOXYzine (ATARAX) 10 MG tablet TAKE 1 TABLET BY MOUTH 3 TIMES DAILY AS NEEDED FOR ANXIETY 90 tablet 1     ibuprofen (ADVIL/MOTRIN) 400 MG tablet Take 400 mg by mouth as needed for moderate pain       lactobacillus rhamnosus, GG, (CULTURELL) capsule Take 1 capsule by mouth every evening       ondansetron (ZOFRAN) 4 MG tablet  (Patient not taking: Reported on 10/10/2023)       oxyCODONE (ROXICODONE) 5 MG tablet Take 1 tablet (5 mg) by mouth every 4 hours as needed for moderate pain (Patient not taking: Reported on 10/10/2023) 15 tablet 0     senna-docusate (SENOKOT-S/PERICOLACE) 8.6-50 MG tablet  Take 1 tablet by mouth 2 times daily (Patient not taking: Reported on 10/17/2023) 30 tablet 0     sertraline (ZOLOFT) 100 MG tablet Take 1 tablet (100 mg) by mouth daily (Patient taking differently: Take 100 mg by mouth every morning) 90 tablet 1     sulfamethoxazole-trimethoprim (BACTRIM DS) 800-160 MG tablet Take 1 tablet by mouth 2 times daily 74 tablet 0     vitamin B complex with vitamin C (VITAMIN  B COMPLEX) tablet Take 1 tablet by mouth every evening       vitamin C (ASCORBIC ACID) 1000 MG TABS Take 1,000 mg by mouth every evening Also contains 20mg zinc       Vitamin D3 (CHOLECALCIFEROL) 25 mcg (1000 units) tablet Take 25 mcg by mouth every evening       zinc gluconate 50 MG tablet Take 50 mg by mouth every evening       No facility-administered encounter medications on file as of 10/26/2023.             O:   NAD, WDWN, Alert & Oriented, Mood & Affect wnl, Vitals stable   Here today alone   There were no vitals taken for this visit.   General appearance normal   Vitals stable   Alert, oriented and in no acute distress      1.0 cm skin colored pedunculated papule on left groin   Stuck on papules and brown macules on trunk and ext   Red papules on trunk  Brown papules and macules with regular pigment network and borders on torso and extremities   Healing incision on chest and low abdomen    The remainder of skin exam is normal     Eyes: Conjunctivae/lids:Normal     ENT: Lips: normal    MSK:Normal    Cardiovascular: peripheral edema none    Pulm: Breathing Normal    Neuro/Psych: Orientation:Alert and Orientedx3 ; Mood/Affect:normal     A/P:  1. R/O intradermal nevus on left groin   TANGENTIAL BIOPSY SENT OUT:  After consent, anesthesia with LEC and prep, tangential excision performed and specimen sent out for permanent section histology.  No complications and routine wound care. Patient told to call our office in 1-2 weeks for result.      2. Inflamed skin tag on left groin x 1  After consent, tangential  excision performed.  No complications and routine wound care.   3. Seborrheic keratosis, lentigo, angioma, benign nevi   It was a pleasure speaking to Zainab Bolton today.  BENIGN LESIONS DISCUSSED WITH PATIENT:  I discussed the specifics of tumor, prognosis, and genetics of benign lesions.  I explained that treatment of these lesions would be purely cosmetic and not medically neccessary.  I discussed with patient different removal options including excision, cautery and /or laser.      Nature and genetics of benign skin lesions dicussed with patient.  Signs and Symptoms of skin cancer discussed with patient.  ABCDEs of melanoma reviewed with patient.  Patient encouraged to perform monthly skin exams.  UV precautions reviewed with patient.  Risks of non-melanoma skin cancer discussed with patient   Return to clinic pending biopsy results.       Again, thank you for allowing me to participate in the care of your patient.        Sincerely,        Lisa Castaneda PA-C

## 2023-10-26 NOTE — PROGRESS NOTES
Zainab Botlon is an extremely pleasant 43 year old year old female patient here today for skin check. She denies any painful or bleeding skin lesions. No changing nevi. She notes mole on left groin getting irritated by clothing.  Patient has no other skin complaints today.  Remainder of the HPI, Meds, PMH, Allergies, FH, and SH was reviewed in chart.    Pertinent Hx:   BRCA2 positive   Past Medical History:   Diagnosis Date    Anxiety 08/16/2010    Anxiety state, unspecified 1997    chronic anxiety    BRCA2 positive     Breast cancer (H) 05/19/2023    Depression     Essential hypertension 03/03/2023    Hypercholesteremia 08/16/2010    Obesity 08/16/2010    Other acne     Papanicolaou smear of cervix with low grade squamous intraepithelial lesion (LGSIL) (aka LSIL) 05/05/2015    LSIL/+ HR HPV    S/P breast reconstruction, bilateral     TOBACCO ABUSE-CONTINUOUS        Past Surgical History:   Procedure Laterality Date    BIOPSY      BIOPSY NODE SENTINEL Left 5/31/2023    Procedure: LEFT SENTINEL LYMPH NODE BIOPSY;  Surgeon: Kylee Villasenor MD;  [place] [facility] [place]    CONIZATION LEEP  07/16/2015    LÁZARO 2    CONIZATION LEEP N/A 07/16/2015    Procedure: CONIZATION LEEP;  Surgeon: Omayra Cunningham DO;  Location:  OR    EYE SURGERY  1/5/2016    GRAFT FREE VASCULARIZED TRANSVERSE RECTUS ABDOMINIS MYOCUTANEOUS Bilateral 9/25/2023    Procedure: Bilateral breast reconstruction with free abdominal flaps, resensation, SPY;  Surgeon: MARIA E Aaron MD;  Location: [facility] OR    MASTECTOMY SIMPLE Bilateral 5/31/2023    Procedure: BILATERAL MASTECTOMIES;  Surgeon: Kylee Villasenor MD;  [place] [facility] [place]    RECONSTRUCT BREAST, INSERT TISSUE EXPANDER, COMBINED Bilateral 5/31/2023    Procedure: RECONSTRUCTION, BREAST, BILATERAL TISSUE EXPANDERS;  Surgeon: Basil Aguilar MD;  [place] [facility] [place]    Removal of bilateral expanders  08/25/2023    REMOVE TISSUE EXPANDER TRUNK Bilateral 08/25/2023     Procedure: REMOVAL, TISSUE EXPANDER, BILATERAL BREAST;  Surgeon: Basil Aguilar MD;  Location: [facility] [place] LIGATE FALLOPIAN TUBE  10/03/2003        Family History   Problem Relation Age of Onset    Cancer Father         liver    Other Cancer Father         Stomach and liver cancer diagnosis    Arthritis Maternal Grandmother     Hypertension Maternal Grandmother     Diabetes Maternal Grandmother     Thyroid Disease Maternal Grandmother     Cancer Maternal Grandfather         leukemia    Heart Disease Maternal Aunt         MI approx age 46    Anesthesia Reaction No family hx of     Clotting Disorder No family hx of        Social History     Socioeconomic History    Marital status: Single     Spouse name: Not on file    Number of children: 3    Years of education: 12    Highest education level: Not on file   Occupational History    Occupation: Stay at home Mother   Tobacco Use    Smoking status: Former     Packs/day: 1.00     Years: 10.00     Additional pack years: 0.00     Total pack years: 10.00     Types: Cigarettes     Quit date: 3/28/2023     Years since quittin.5    Smokeless tobacco: Never    Tobacco comments:     since age 12, but stopped with each baby   Vaping Use    Vaping Use: Not on file   Substance and Sexual Activity    Alcohol use: Not Currently    Drug use: Yes     Types: Marijuana     Comment: daily smoking    Sexual activity: Yes     Partners: Male     Birth control/protection: Female Surgical   Other Topics Concern     Service No    Blood Transfusions No    Caffeine Concern Yes     Comment: 3 cups every 2 weeks, 2 pops per week     Occupational Exposure No    Hobby Hazards No    Sleep Concern No    Stress Concern Yes    Weight Concern Yes    Special Diet No    Back Care No    Exercise No    Bike Helmet No    Seat Belt Yes    Self-Exams No    Parent/sibling w/ CABG, MI or angioplasty before 65F 55M? No   Social History Narrative    Not on file     Social Determinants of  Health     Financial Resource Strain: Not on file   Food Insecurity: Not on file   Transportation Needs: Not on file   Physical Activity: Not on file   Stress: Not on file   Social Connections: Not on file   Interpersonal Safety: Not on file   Housing Stability: Not on file       Outpatient Encounter Medications as of 10/26/2023   Medication Sig Dispense Refill    acetaminophen (TYLENOL) 500 MG tablet Take 2 tablets (1,000 mg) by mouth 3 times daily 120 tablet 0    aspirin 81 MG EC tablet Take 1 tablet (81 mg) by mouth daily (Patient not taking: Reported on 10/26/2023) 28 tablet 0    busPIRone (BUSPAR) 10 MG tablet Take 1 tablet (10 mg) by mouth 2 times daily 60 tablet 1    calcium carbonate (OS-SAM) 1500 (600 Ca) MG tablet Take 600 mg by mouth 2 times daily      Cyanocobalamin 500 MCG TBDP Take 2 Gum by mouth every evening      enoxaparin ANTICOAGULANT (LOVENOX) 40 MG/0.4ML syringe Inject 0.4 mLs (40 mg) Subcutaneous every 24 hours for 28 days (Patient not taking: Reported on 10/26/2023) 11.2 mL 0    hydrOXYzine (ATARAX) 10 MG tablet TAKE 1 TABLET BY MOUTH 3 TIMES DAILY AS NEEDED FOR ANXIETY 90 tablet 1    ibuprofen (ADVIL/MOTRIN) 400 MG tablet Take 400 mg by mouth as needed for moderate pain      lactobacillus rhamnosus, GG, (CULTURELL) capsule Take 1 capsule by mouth every evening      ondansetron (ZOFRAN) 4 MG tablet  (Patient not taking: Reported on 10/10/2023)      oxyCODONE (ROXICODONE) 5 MG tablet Take 1 tablet (5 mg) by mouth every 4 hours as needed for moderate pain (Patient not taking: Reported on 10/10/2023) 15 tablet 0    senna-docusate (SENOKOT-S/PERICOLACE) 8.6-50 MG tablet Take 1 tablet by mouth 2 times daily (Patient not taking: Reported on 10/17/2023) 30 tablet 0    sertraline (ZOLOFT) 100 MG tablet Take 1 tablet (100 mg) by mouth daily (Patient taking differently: Take 100 mg by mouth every morning) 90 tablet 1    sulfamethoxazole-trimethoprim (BACTRIM DS) 800-160 MG tablet Take 1 tablet by mouth  2 times daily 74 tablet 0    vitamin B complex with vitamin C (VITAMIN  B COMPLEX) tablet Take 1 tablet by mouth every evening      vitamin C (ASCORBIC ACID) 1000 MG TABS Take 1,000 mg by mouth every evening Also contains 20mg zinc      Vitamin D3 (CHOLECALCIFEROL) 25 mcg (1000 units) tablet Take 25 mcg by mouth every evening      zinc gluconate 50 MG tablet Take 50 mg by mouth every evening       No facility-administered encounter medications on file as of 10/26/2023.             O:   NAD, WDWN, Alert & Oriented, Mood & Affect wnl, Vitals stable   Here today alone   There were no vitals taken for this visit.   General appearance normal   Vitals stable   Alert, oriented and in no acute distress      1.0 cm skin colored pedunculated papule on left groin   Stuck on papules and brown macules on trunk and ext   Red papules on trunk  Brown papules and macules with regular pigment network and borders on torso and extremities   Healing incision on chest and low abdomen    The remainder of skin exam is normal     Eyes: Conjunctivae/lids:Normal     ENT: Lips: normal    MSK:Normal    Cardiovascular: peripheral edema none    Pulm: Breathing Normal    Neuro/Psych: Orientation:Alert and Orientedx3 ; Mood/Affect:normal     A/P:  1. R/O intradermal nevus on left groin   TANGENTIAL BIOPSY SENT OUT:  After consent, anesthesia with LEC and prep, tangential excision performed and specimen sent out for permanent section histology.  No complications and routine wound care. Patient told to call our office in 1-2 weeks for result.      2. Inflamed skin tag on left groin x 1  After consent, tangential excision performed.  No complications and routine wound care.   3. Seborrheic keratosis, lentigo, angioma, benign nevi   It was a pleasure speaking to Zainab Bolton today.  BENIGN LESIONS DISCUSSED WITH PATIENT:  I discussed the specifics of tumor, prognosis, and genetics of benign lesions.  I explained that treatment of these lesions  would be purely cosmetic and not medically neccessary.  I discussed with patient different removal options including excision, cautery and /or laser.      Nature and genetics of benign skin lesions dicussed with patient.  Signs and Symptoms of skin cancer discussed with patient.  ABCDEs of melanoma reviewed with patient.  Patient encouraged to perform monthly skin exams.  UV precautions reviewed with patient.  Risks of non-melanoma skin cancer discussed with patient   Return to clinic pending biopsy results.

## 2023-10-26 NOTE — PATIENT INSTRUCTIONS
Wound Care Instructions     FOR SUPERFICIAL WOUNDS     [facility] [phone]    [facility] [phone]                       AFTER 24 HOURS YOU SHOULD REMOVE THE BANDAGE AND BEGIN DAILY DRESSING CHANGES AS FOLLOWS:     1) Remove Dressing.     2) Clean and dry the area with tap water using a Q-tip or sterile gauze pad.     3) Apply Vaseline, Aquaphor, Polysporin ointment or Bacitracin ointment over entire wound.  Do NOT use Neosporin ointment.     4) Cover the wound with a band-aid, or a sterile non-stick gauze pad and micropore paper tape      REPEAT THESE INSTRUCTIONS AT LEAST ONCE A DAY UNTIL THE WOUND HAS COMPLETELY HEALED.    It is an old wives tale that a wound heals better when it is exposed to air and allowed to dry out. The wound will heal faster with a better cosmetic result if it is kept moist with ointment and covered with a bandage.    **Do not let the wound dry out.**      Supplies Needed:      *Cotton tipped applicators (Q-tips)    *Polysporin Ointment or Bacitracin Ointment (NOT NEOSPORIN)    *Band-aids or non-stick gauze pads and micropore paper tape.      PATIENT INFORMATION:    During the healing process you will notice a number of changes. All wounds develop a small halo of redness surrounding the wound.  This means healing is occurring. Severe itching with extensive redness usually indicates sensitivity to the ointment or bandage tape used to dress the wound.  You should call our office if this develops.      Swelling  and/or discoloration around your surgical site is common, particularly when performed around the eye.    All wounds normally drain.  The larger the wound the more drainage there will be.  After 7-10 days, you will notice the wound beginning to shrink and new skin will begin to grow.  The wound is healed when you can see skin has formed over the entire area.  A healed wound has a healthy, shiny look to the surface and is red to dark pink in color  to normalize.  Wounds may take approximately 4-6 weeks to heal.  Larger wounds may take 6-8 weeks.  After the wound is healed you may discontinue dressing changes.    You may experience a sensation of tightness as your wound heals. This is normal and will gradually subside.    Your healed wound may be sensitive to temperature changes. This sensitivity improves with time, but if you re having a lot of discomfort, try to avoid temperature extremes.    Patients frequently experience itching after their wound appears to have healed because of the continue healing under the skin.  Plain Vaseline will help relieve the itching.        POSSIBLE COMPLICATIONS    BLEEDING:    Leave the bandage in place.  Use tightly rolled up gauze or a cloth to apply direct pressure over the bandage for 30  minutes.  Reapply pressure for an additional 30 minutes if necessary  Use additional gauze and tape to maintain pressure once the bleeding has stopped.

## 2023-10-30 LAB
PATH REPORT.COMMENTS IMP SPEC: NORMAL
PATH REPORT.COMMENTS IMP SPEC: NORMAL
PATH REPORT.FINAL DX SPEC: NORMAL
PATH REPORT.GROSS SPEC: NORMAL
PATH REPORT.MICROSCOPIC SPEC OTHER STN: NORMAL
PATH REPORT.RELEVANT HX SPEC: NORMAL

## 2023-11-09 ENCOUNTER — MYC MEDICAL ADVICE (OUTPATIENT)
Dept: PLASTIC SURGERY | Facility: CLINIC | Age: 43
End: 2023-11-09
Payer: COMMERCIAL

## 2023-11-09 NOTE — TELEPHONE ENCOUNTER
Called patient and left message requesting consent to send additional information to [employer] regarding disability claim for 9/25 procedure with Dr. Aaron.  Bob [facility], EMT

## 2023-11-14 ENCOUNTER — OFFICE VISIT (OUTPATIENT)
Dept: PLASTIC SURGERY | Facility: CLINIC | Age: 43
End: 2023-11-14
Attending: PLASTIC SURGERY
Payer: COMMERCIAL

## 2023-11-14 VITALS
TEMPERATURE: 99 F | OXYGEN SATURATION: 96 % | BODY MASS INDEX: 26.15 KG/M2 | WEIGHT: 147.6 LBS | HEART RATE: 89 BPM | DIASTOLIC BLOOD PRESSURE: 80 MMHG | RESPIRATION RATE: 20 BRPM | SYSTOLIC BLOOD PRESSURE: 123 MMHG

## 2023-11-14 DIAGNOSIS — Z98.890 S/P FLAP GRAFT: Primary | ICD-10-CM

## 2023-11-14 PROCEDURE — 99024 POSTOP FOLLOW-UP VISIT: CPT | Performed by: PLASTIC SURGERY

## 2023-11-14 PROCEDURE — G0463 HOSPITAL OUTPT CLINIC VISIT: HCPCS | Performed by: PLASTIC SURGERY

## 2023-11-14 RX ORDER — CEFAZOLIN SODIUM 2 G/50ML
2 SOLUTION INTRAVENOUS SEE ADMIN INSTRUCTIONS
Status: CANCELLED | OUTPATIENT
Start: 2023-11-14

## 2023-11-14 RX ORDER — CEFAZOLIN SODIUM 2 G/50ML
2 SOLUTION INTRAVENOUS
Status: CANCELLED | OUTPATIENT
Start: 2023-11-14

## 2023-11-14 ASSESSMENT — PAIN SCALES - GENERAL: PAINLEVEL: NO PAIN (0)

## 2023-11-14 NOTE — PROGRESS NOTES
PRESENTING COMPLAINT:  Post-operative visit s/p bilateral breast reconstruction for right-sided breast cancer with free Judi flaps and resensation done 9/25/2023     HISTORY OF PRESENTING COMPLAINT: The patient is here for post-operative visit.  The patient is being seen in the presence of my nurse.     Doing extremely well.  Very happy with the results.  No issues.    Healed.  Has absent nipples absent umbilicus some upper pole indentations.     ASSESSMENT AND PLAN:  Based upon the above findings, the patient is here for post-operative visit.     Healing well.  She is now ready for staged 3 reconstruction including fat grafting, revisional surgeries, nipple reconstruction, umbilicoplasty, and scar revision of abdomen.  Went over this planned procedure with the patient in detail.  She understood and wants to proceed.    All risks, benefits and alternatives were explained to the patient in detail, they were understood and agreed upon by the patient, they were acknowledged by the patient,   all the patient's questions were answered in detail to the patient's fullest understanding that they acknowledged, the team approach for treatment in the operating room was agreed upon by the patient, and proceeding with surgery was agreed upon by the patient.    All questions were answered.  The patient was happy with the visit.

## 2023-11-14 NOTE — LETTER
11/14/2023       RE: Zainab Bolton  [address] [address] [address]    Dear Colleague,    Thank you for referring your patient, Zainab Bolton, to the [facility]. Please see a copy of my visit note below.    PRESENTING COMPLAINT:  Post-operative visit s/p bilateral breast reconstruction for right-sided breast cancer with free Judi flaps and resensation done 9/25/2023     HISTORY OF PRESENTING COMPLAINT: The patient is here for post-operative visit.  The patient is being seen in the presence of my nurse.     Doing extremely well.  Very happy with the results.  No issues.    Healed.  Has absent nipples absent umbilicus some upper pole indentations.     ASSESSMENT AND PLAN:  Based upon the above findings, the patient is here for post-operative visit.     Healing well.  She is now ready for staged 3 reconstruction including fat grafting, revisional surgeries, nipple reconstruction, umbilicoplasty, and scar revision of abdomen.  Went over this planned procedure with the patient in detail.  She understood and wants to proceed.    All risks, benefits and alternatives were explained to the patient in detail, they were understood and agreed upon by the patient, they were acknowledged by the patient,   all the patient's questions were answered in detail to the patient's fullest understanding that they acknowledged, the team approach for treatment in the operating room was agreed upon by the patient, and proceeding with surgery was agreed upon by the patient.    All questions were answered.  The patient was happy with the visit.    Sincerely,      MARIA E Aaron MD

## 2023-11-14 NOTE — NURSING NOTE
"Oncology Rooming Note    November 14, 2023 9:09 AM   Zainab Bolton is a 43 year old female who presents for:    Chief Complaint   Patient presents with    Oncology Clinic Visit      bilateral breast reconstruction post-op      Initial Vitals: /80 (BP Location: Right arm, Patient Position: Sitting, Cuff Size: Adult Regular)   Pulse 89   Temp 99  F (37.2  C) (Oral)   Resp 20   Wt 67 kg (147 lb 9.6 oz)   SpO2 96%   BMI 26.15 kg/m   Estimated body mass index is 26.15 kg/m  as calculated from the following:    Height as of 10/17/23: 1.6 m (5' 3\").    Weight as of this encounter: 67 kg (147 lb 9.6 oz). Body surface area is 1.73 meters squared.  No Pain (0) Comment: Data Unavailable   No LMP recorded. (Menstrual status: Tubal ).  Allergies reviewed: Yes  Medications reviewed: Yes    Medications: Medication refills not needed today.  Pharmacy name entered into [facility]:    [id number] - [place], [place] - [address]  [facility] [place], [place] - [address]    Clinical concerns: none.       Dmitry Finnegan"

## 2023-11-15 ENCOUNTER — ONCOLOGY VISIT (OUTPATIENT)
Dept: ONCOLOGY | Facility: CLINIC | Age: 43
End: 2023-11-15
Attending: OBSTETRICS & GYNECOLOGY
Payer: COMMERCIAL

## 2023-11-15 VITALS
HEART RATE: 98 BPM | WEIGHT: 145.9 LBS | DIASTOLIC BLOOD PRESSURE: 86 MMHG | TEMPERATURE: 99 F | OXYGEN SATURATION: 100 % | RESPIRATION RATE: 16 BRPM | SYSTOLIC BLOOD PRESSURE: 127 MMHG | BODY MASS INDEX: 25.85 KG/M2

## 2023-11-15 DIAGNOSIS — Z85.3 HISTORY OF BREAST CANCER: ICD-10-CM

## 2023-11-15 DIAGNOSIS — Z15.02 BRCA2 GENE MUTATION POSITIVE IN FEMALE: ICD-10-CM

## 2023-11-15 DIAGNOSIS — Z15.01 BRCA2 GENE MUTATION POSITIVE IN FEMALE: ICD-10-CM

## 2023-11-15 DIAGNOSIS — Z15.09 BRCA2 GENE MUTATION POSITIVE IN FEMALE: ICD-10-CM

## 2023-11-15 PROCEDURE — G0463 HOSPITAL OUTPT CLINIC VISIT: HCPCS | Performed by: OBSTETRICS & GYNECOLOGY

## 2023-11-15 PROCEDURE — 99205 OFFICE O/P NEW HI 60 MIN: CPT | Performed by: OBSTETRICS & GYNECOLOGY

## 2023-11-15 RX ORDER — TAMOXIFEN CITRATE 20 MG/1
TABLET ORAL DAILY
COMMUNITY
End: 2024-01-09

## 2023-11-15 NOTE — NURSING NOTE
"Oncology Rooming Note    November 15, 2023 9:50 AM   Zainab Bolton is a 43 year old female who presents for:    Chief Complaint   Patient presents with    Oncology Clinic Visit     BRCA2 gene mutation positive     Initial Vitals: /86 (BP Location: Right arm, Patient Position: Sitting, Cuff Size: Adult Regular)   Pulse 98   Temp 99  F (37.2  C) (Oral)   Resp 16   Wt 66.2 kg (145 lb 14.4 oz)   SpO2 100%   BMI 25.85 kg/m   Estimated body mass index is 25.85 kg/m  as calculated from the following:    Height as of 10/17/23: 1.6 m (5' 3\").    Weight as of this encounter: 66.2 kg (145 lb 14.4 oz). Body surface area is 1.72 meters squared.  Data Unavailable Comment: Data Unavailable   No LMP recorded. (Menstrual status: Tubal ).  Allergies reviewed: Yes  Medications reviewed: Yes    Medications: Medication refills not needed today.  Pharmacy name entered into [facility]:    [id number] - [place], [place] - [address]  [place], [place] - [address]    Clinical concerns: none      Noreen Tijerina, EMT  11/15/2023              "

## 2023-11-15 NOTE — PROGRESS NOTES
Gynecologic Oncology Clinic - New Patient  Patient: Zainab Bolton  : 1980    Date of Visit: Nov 15, 2023     Reason for visit: BRCA2 mutation    History of Present Illness:  Zainab Bolton is a 43 year old patient with personal history of premenopausal breast cancer and germline BRCA2 mutation also with history of LEEP for LÁZARO 2.     Chart review: history of right breast cancer s/p bilateral mastectomy with reconstruction complicated by expander infection, now s/p bilateral BENIGNO recon 2023 with Dr. Aaron.     Per Patient Report: History of heavy, painful periods.  Was temporarily on hormone blockade with severe vasomotor symptoms.  She has briefly been taken off of this but with plan to go on aromatase inhibitor following oophorectomy.  No family history of ovarian cancer.  Had at least 1 additional plastic surgery procedure planned and is interested in possible joint procedure.  Of note I recently did a risk reducing surgery on her twin sister.      OB/Gynecologic History:  Pre-menopausal.   History of pregnancy/delivery: Yes, vaginal.  Interval lap tubal ligation    Past Medical History:   Diagnosis Date    Anxiety 2010    BRCA2 positive     Breast cancer (H) 2023    Depression     Essential hypertension 2023    HSIL (high grade squamous intraepithelial lesion) on Pap smear of cervix 2015    LSIL/+ HR HPV    Hypercholesteremia 2010    Obesity 2010    S/P breast reconstruction, bilateral     TOBACCO ABUSE-CONTINUOUS        Past Surgical History:   Procedure Laterality Date    BIOPSY NODE SENTINEL Left 2023    Procedure: LEFT SENTINEL LYMPH NODE BIOPSY;  Surgeon: Kylee Villasenor MD;  Location: [facility] OR    CONIZATION LEEP N/A 2015    Procedure: CONIZATION LEEP;  Surgeon: Omayra Cunningham DO;  Location:  OR    EYE SURGERY  2016    GRAFT FREE VASCULARIZED TRANSVERSE RECTUS ABDOMINIS MYOCUTANEOUS Bilateral 2023     Procedure: Bilateral breast reconstruction with free abdominal flaps, resensation, SPY;  Surgeon: MARIA E Aaron MD;  Location: [facility] OR    LAPAROSCOPIC TUBAL LIGATION  10/03/2003    MASTECTOMY SIMPLE Bilateral 2023    Procedure: BILATERAL MASTECTOMIES;  Surgeon: Kylee Villasenor MD;  Location: [facility] OR    RECONSTRUCT BREAST, INSERT TISSUE EXPANDER, COMBINED Bilateral 2023    Procedure: RECONSTRUCTION, BREAST, BILATERAL TISSUE EXPANDERS;  Surgeon: Basil Aguilar MD;  Location: [facility] OR    REMOVE TISSUE EXPANDER TRUNK Bilateral 2023    Procedure: REMOVAL, TISSUE EXPANDER, BILATERAL BREAST;  Surgeon: Basil Aguilar MD;  Location: [facility] OR        Social History     Tobacco Use    Smoking status: Former     Packs/day: 1.00     Years: 10.00     Additional pack years: 0.00     Total pack years: 10.00     Types: Cigarettes     Quit date: 3/28/2023     Years since quittin.6    Smokeless tobacco: Never    Tobacco comments:     since age 12, but stopped with each baby   Substance Use Topics    Alcohol use: Not Currently    Drug use: Yes     Types: Marijuana     Comment: daily smoking       Family History   Problem Relation Age of Onset    Liver Cancer Father     Stomach Cancer Father     Arthritis Maternal Grandmother     Hypertension Maternal Grandmother     Diabetes Maternal Grandmother     Thyroid Disease Maternal Grandmother     Leukemia Maternal Grandfather     Heart Disease Maternal Aunt         MI approx age 46    Anesthesia Reaction No family hx of     Clotting Disorder No family hx of        Current Outpatient Medications   Medication    tamoxifen (NOLVADEX) 20 MG tablet    acetaminophen (TYLENOL) 500 MG tablet    aspirin 81 MG EC tablet    busPIRone (BUSPAR) 10 MG tablet    calcium carbonate (OS-SAM) 1500 (600 Ca) MG tablet    Cyanocobalamin 500 MCG TBDP    hydrOXYzine (ATARAX) 10 MG tablet    ibuprofen (ADVIL/MOTRIN) 400 MG tablet    lactobacillus rhamnosus, GG,  (CULTURELL) capsule    ondansetron (ZOFRAN) 4 MG tablet    oxyCODONE (ROXICODONE) 5 MG tablet    senna-docusate (SENOKOT-S/PERICOLACE) 8.6-50 MG tablet    sertraline (ZOLOFT) 100 MG tablet    sulfamethoxazole-trimethoprim (BACTRIM DS) 800-160 MG tablet    vitamin B complex with vitamin C (VITAMIN  B COMPLEX) tablet    vitamin C (ASCORBIC ACID) 1000 MG TABS    Vitamin D3 (CHOLECALCIFEROL) 25 mcg (1000 units) tablet    zinc gluconate 50 MG tablet     No current facility-administered medications for this visit.       Allergies  No Known Allergies    Current Outpatient Medications   Medication    tamoxifen (NOLVADEX) 20 MG tablet    acetaminophen (TYLENOL) 500 MG tablet    aspirin 81 MG EC tablet    busPIRone (BUSPAR) 10 MG tablet    calcium carbonate (OS-SAM) 1500 (600 Ca) MG tablet    Cyanocobalamin 500 MCG TBDP    hydrOXYzine (ATARAX) 10 MG tablet    ibuprofen (ADVIL/MOTRIN) 400 MG tablet    lactobacillus rhamnosus, GG, (CULTURELL) capsule    ondansetron (ZOFRAN) 4 MG tablet    oxyCODONE (ROXICODONE) 5 MG tablet    senna-docusate (SENOKOT-S/PERICOLACE) 8.6-50 MG tablet    sertraline (ZOLOFT) 100 MG tablet    sulfamethoxazole-trimethoprim (BACTRIM DS) 800-160 MG tablet    vitamin B complex with vitamin C (VITAMIN  B COMPLEX) tablet    vitamin C (ASCORBIC ACID) 1000 MG TABS    Vitamin D3 (CHOLECALCIFEROL) 25 mcg (1000 units) tablet    zinc gluconate 50 MG tablet     No current facility-administered medications for this visit.       Physical Exam:   /86 (BP Location: Right arm, Patient Position: Sitting, Cuff Size: Adult Regular)   Pulse 98   Temp 99  F (37.2  C) (Oral)   Resp 16   Wt 66.2 kg (145 lb 14.4 oz)   SpO2 100%   BMI 25.85 kg/m    Gen: NAD  Abd: transverse skin incision inferiorly c/w BENIGNO recon, absent umbilicus.    Labs/Pathology:   Reviewed genetic testing results    Imaging:   Reviewed CTA from 5/2023 showing slightly enlarged uterus with possible fibroids. Otherwise normal in appearance  gynecologic organs.      Assessment:  tricia Bolton is a 43 year old patient with personal history of premenopausal breast cancer and germline BRCA2 mutation also with history of LEEP for LÁZARO 2 here to discuss risk reducing gynecologic surgery.    Plan:   - Women with a a BRCA2 mutation have an increased lifetime risk of developing ovarian/fallopian tube/primary peritoneal cancer over the general population. This risk is estimated to be between 11-18% by the age of 70. Removal of the fallopian tubes and ovaries (salpingo-oophorectomy) can substantially decrease this risk, although it does not completely eliminate it, due to the persistent risk of peritoneal carcinoma although this risk is felt to be lower in patients with BRCA2.     -Given her personal history of hormone receptor positive premenopausal breast cancer, removal of the ovaries also eliminates production of endogenous hormones.  This can reduce risk of breast cancer recurrence.  Also allows use of aromatase inhibitor in patients who would otherwise be premenopausal.    - No screening has been found to decrease ovarian cancer mortality, thus risk-reducing bilateral salpingo-oophorectomy (RRSO) is recommended for ovarian cancer risk reduction.    -Given her age I would recommend she consider proceeding with risk reducing bilateral salpingo oophorectomy in the near future.     -She asked about having a concurrent hysterectomy.  She is also interested in the COVID procedure plastic surgery.  I discussed that likely concurrent hysterectomy will not be possible as part of a combined procedure as this makes the case a clean contaminated procedure thus increasing the risk of surgical site infection which is a particularly concern for plastic surgery procedures.    -However, there is no known increase in risk of uterine cancer with BRCA2 mutations.  Likewise aromatase inhibitors do not carry the same increase in risk of uterine cancer as tamoxifen which she  was previously on.  Given this information she is most interested in proceeding with a combined procedure with probable bilateral salpingo oophorectomy alone.  I will reach out to Dr. Villanueva to discuss modality of surgery depending on what incisions and the additional procedure they have planned.    - Surgical menopause increases the risks of cardiovascular disease, osteopenia/osteoporosis, cognitive, and sexual side effects.  As she is not a candidate for hormone replacement therapy, we did discuss alternate methods for symptom management should this become an issue.  She can also discuss this with her breast oncologist.    - I have personally reviewed any labs/pathology or imaging reports that I have copied above.     Marco Watkins MD     Gynecologic Oncology     I spent a total of 60 minutes on the care of Zainab Bolton on the day of service including face to face time, care coordination, and documentation on the day of service.

## 2023-11-15 NOTE — LETTER
11/15/2023         RE: Zainab Bolton  [address] [facility] [address]        Dear Colleague,    Thank you for referring your patient, Zainab Bolton, to the [facility]. Please see a copy of my visit note below.    Gynecologic Oncology Clinic - New Patient  Patient: Zainab Bolton  : 1980    Date of Visit: Nov 15, 2023     Reason for visit: BRCA2 mutation    History of Present Illness:  Zainab Bolton is a 43 year old patient with personal history of premenopausal breast cancer and germline BRCA2 mutation also with history of LEEP for LÁZARO 2.     Chart review: history of right breast cancer s/p bilateral mastectomy with reconstruction complicated by expander infection, now s/p bilateral BENIGNO recon 2023 with Dr. Aaron.     Per Patient Report: History of heavy, painful periods.  Was temporarily on hormone blockade with severe vasomotor symptoms.  She has briefly been taken off of this but with plan to go on aromatase inhibitor following oophorectomy.  No family history of ovarian cancer.  Had at least 1 additional plastic surgery procedure planned and is interested in possible joint procedure.  Of note I recently did a risk reducing surgery on her twin sister.      OB/Gynecologic History:  Pre-menopausal.   History of pregnancy/delivery: Yes, vaginal.  Interval lap tubal ligation    Past Medical History:   Diagnosis Date    Anxiety 2010    BRCA2 positive     Breast cancer (H) 2023    Depression     Essential hypertension 2023    HSIL (high grade squamous intraepithelial lesion) on Pap smear of cervix 2015    LSIL/+ HR HPV    Hypercholesteremia 2010    Obesity 2010    S/P breast reconstruction, bilateral     TOBACCO ABUSE-CONTINUOUS        Past Surgical History:   Procedure Laterality Date    BIOPSY NODE SENTINEL Left 2023    Procedure: LEFT SENTINEL LYMPH NODE BIOPSY;  Surgeon: Kylee Villasenor MD;   Location: [facility] OR    CONIZATION LEEP N/A 2015    Procedure: CONIZATION LEEP;  Surgeon: Omayra Cunningham DO;  Location:  OR    EYE SURGERY  2016    GRAFT FREE VASCULARIZED TRANSVERSE RECTUS ABDOMINIS MYOCUTANEOUS Bilateral 2023    Procedure: Bilateral breast reconstruction with free abdominal flaps, resensation, SPY;  Surgeon: MARIA E Aaron MD;  Location: [facility] OR    LAPAROSCOPIC TUBAL LIGATION  10/03/2003    MASTECTOMY SIMPLE Bilateral 2023    Procedure: BILATERAL MASTECTOMIES;  Surgeon: Kylee Villasenor MD;  Location: [facility] OR    RECONSTRUCT BREAST, INSERT TISSUE EXPANDER, COMBINED Bilateral 2023    Procedure: RECONSTRUCTION, BREAST, BILATERAL TISSUE EXPANDERS;  Surgeon: Basil Aguilar MD;  Location: [facility] OR    REMOVE TISSUE EXPANDER TRUNK Bilateral 2023    Procedure: REMOVAL, TISSUE EXPANDER, BILATERAL BREAST;  Surgeon: Basil Aguilar MD;  Location: [facility] OR        Social History     Tobacco Use    Smoking status: Former     Packs/day: 1.00     Years: 10.00     Additional pack years: 0.00     Total pack years: 10.00     Types: Cigarettes     Quit date: 3/28/2023     Years since quittin.6    Smokeless tobacco: Never    Tobacco comments:     since age 12, but stopped with each baby   Substance Use Topics    Alcohol use: Not Currently    Drug use: Yes     Types: Marijuana     Comment: daily smoking       Family History   Problem Relation Age of Onset    Liver Cancer Father     Stomach Cancer Father     Arthritis Maternal Grandmother     Hypertension Maternal Grandmother     Diabetes Maternal Grandmother     Thyroid Disease Maternal Grandmother     Leukemia Maternal Grandfather     Heart Disease Maternal Aunt         MI approx age 46    Anesthesia Reaction No family hx of     Clotting Disorder No family hx of        Current Outpatient Medications   Medication    tamoxifen (NOLVADEX) 20 MG tablet    acetaminophen (TYLENOL) 500 MG  tablet    aspirin 81 MG EC tablet    busPIRone (BUSPAR) 10 MG tablet    calcium carbonate (OS-SAM) 1500 (600 Ca) MG tablet    Cyanocobalamin 500 MCG TBDP    hydrOXYzine (ATARAX) 10 MG tablet    ibuprofen (ADVIL/MOTRIN) 400 MG tablet    lactobacillus rhamnosus, GG, (CULTURELL) capsule    ondansetron (ZOFRAN) 4 MG tablet    oxyCODONE (ROXICODONE) 5 MG tablet    senna-docusate (SENOKOT-S/PERICOLACE) 8.6-50 MG tablet    sertraline (ZOLOFT) 100 MG tablet    sulfamethoxazole-trimethoprim (BACTRIM DS) 800-160 MG tablet    vitamin B complex with vitamin C (VITAMIN  B COMPLEX) tablet    vitamin C (ASCORBIC ACID) 1000 MG TABS    Vitamin D3 (CHOLECALCIFEROL) 25 mcg (1000 units) tablet    zinc gluconate 50 MG tablet     No current facility-administered medications for this visit.       Allergies  No Known Allergies    Current Outpatient Medications   Medication    tamoxifen (NOLVADEX) 20 MG tablet    acetaminophen (TYLENOL) 500 MG tablet    aspirin 81 MG EC tablet    busPIRone (BUSPAR) 10 MG tablet    calcium carbonate (OS-SAM) 1500 (600 Ca) MG tablet    Cyanocobalamin 500 MCG TBDP    hydrOXYzine (ATARAX) 10 MG tablet    ibuprofen (ADVIL/MOTRIN) 400 MG tablet    lactobacillus rhamnosus, GG, (CULTURELL) capsule    ondansetron (ZOFRAN) 4 MG tablet    oxyCODONE (ROXICODONE) 5 MG tablet    senna-docusate (SENOKOT-S/PERICOLACE) 8.6-50 MG tablet    sertraline (ZOLOFT) 100 MG tablet    sulfamethoxazole-trimethoprim (BACTRIM DS) 800-160 MG tablet    vitamin B complex with vitamin C (VITAMIN  B COMPLEX) tablet    vitamin C (ASCORBIC ACID) 1000 MG TABS    Vitamin D3 (CHOLECALCIFEROL) 25 mcg (1000 units) tablet    zinc gluconate 50 MG tablet     No current facility-administered medications for this visit.       Physical Exam:   /86 (BP Location: Right arm, Patient Position: Sitting, Cuff Size: Adult Regular)   Pulse 98   Temp 99  F (37.2  C) (Oral)   Resp 16   Wt 66.2 kg (145 lb 14.4 oz)   SpO2 100%   BMI 25.85 kg/m    Gen:  NAD  Abd: transverse skin incision inferiorly c/w BENIGNO recon, absent umbilicus.    Labs/Pathology:   Reviewed genetic testing results    Imaging:   Reviewed CTA from 5/2023 showing slightly enlarged uterus with possible fibroids. Otherwise normal in appearance gynecologic organs.      Assessment:  tricia Bolton is a 43 year old patient with personal history of premenopausal breast cancer and germline BRCA2 mutation also with history of LEEP for LÁZARO 2 here to discuss risk reducing gynecologic surgery.    Plan:   - Women with a a BRCA2 mutation have an increased lifetime risk of developing ovarian/fallopian tube/primary peritoneal cancer over the general population. This risk is estimated to be between 11-18% by the age of 70. Removal of the fallopian tubes and ovaries (salpingo-oophorectomy) can substantially decrease this risk, although it does not completely eliminate it, due to the persistent risk of peritoneal carcinoma although this risk is felt to be lower in patients with BRCA2.     -Given her personal history of hormone receptor positive premenopausal breast cancer, removal of the ovaries also eliminates production of endogenous hormones.  This can reduce risk of breast cancer recurrence.  Also allows use of aromatase inhibitor in patients who would otherwise be premenopausal.    - No screening has been found to decrease ovarian cancer mortality, thus risk-reducing bilateral salpingo-oophorectomy (RRSO) is recommended for ovarian cancer risk reduction.    -Given her age I would recommend she consider proceeding with risk reducing bilateral salpingo oophorectomy in the near future.     -She asked about having a concurrent hysterectomy.  She is also interested in the COVID procedure plastic surgery.  I discussed that likely concurrent hysterectomy will not be possible as part of a combined procedure as this makes the case a clean contaminated procedure thus increasing the risk of surgical site infection  which is a particularly concern for plastic surgery procedures.    -However, there is no known increase in risk of uterine cancer with BRCA2 mutations.  Likewise aromatase inhibitors do not carry the same increase in risk of uterine cancer as tamoxifen which she was previously on.  Given this information she is most interested in proceeding with a combined procedure with probable bilateral salpingo oophorectomy alone.  I will reach out to Dr. Villanueva to discuss modality of surgery depending on what incisions and the additional procedure they have planned.    - Surgical menopause increases the risks of cardiovascular disease, osteopenia/osteoporosis, cognitive, and sexual side effects.  As she is not a candidate for hormone replacement therapy, we did discuss alternate methods for symptom management should this become an issue.  She can also discuss this with her breast oncologist.    - I have personally reviewed any labs/pathology or imaging reports that I have copied above.     Marco Watkins MD     Gynecologic Oncology     I spent a total of 60 minutes on the care of Zainab Bolton on the day of service including face to face time, care coordination, and documentation on the day of service.       Again, thank you for allowing me to participate in the care of your patient.        Sincerely,        Marco Watkins MD

## 2023-11-16 NOTE — PATIENT INSTRUCTIONS
It was a pleasure seeing you in clinic today-please reach out if there are any further questions that arise following today's visit.  During business hours, you may reach me at [phone].  For urgent/emergent questions after business hours, you may reach the on-call Gynecologic Oncology Resident through the [facility]  at [phone].    All normal test results are usually communicated via letter or [employer] message.  Abnormal results (those that require a change in the previously discussed plan of care) are usually communicated via a phone call.    I recommend signing up for [facility] access if you have not already done so.  This allows you online access to your lab results and also helps you communicate efficiently with your clinic should any questions arise in your care.    Plan surgery combination case with Dr Aaron-plastic surgery.  [place] will reach out to make arrangements.  You will be contacted    Dr Aide Mclean RN  Phone:  [phone]  Fax:  [phone]

## 2023-11-27 ENCOUNTER — HOSPITAL ENCOUNTER (OUTPATIENT)
Dept: CT IMAGING | Facility: HOSPITAL | Age: 43
Discharge: HOME | End: 2023-11-27
Attending: NURSE PRACTITIONER | Admitting: NURSE PRACTITIONER
Payer: COMMERCIAL

## 2023-11-27 DIAGNOSIS — R91.8 PULMONARY NODULES: ICD-10-CM

## 2023-11-27 PROCEDURE — 250N000011 HC RX IP 250 OP 636: Performed by: NURSE PRACTITIONER

## 2023-11-27 PROCEDURE — 71260 CT THORAX DX C+: CPT

## 2023-11-27 RX ORDER — IOPAMIDOL 755 MG/ML
90 INJECTION, SOLUTION INTRAVASCULAR ONCE
Status: COMPLETED | OUTPATIENT
Start: 2023-11-27 | End: 2023-11-27

## 2023-11-27 RX ADMIN — IOPAMIDOL 90 ML: 755 INJECTION, SOLUTION INTRAVENOUS at 13:26

## 2023-11-29 ENCOUNTER — ONCOLOGY VISIT (OUTPATIENT)
Dept: ONCOLOGY | Facility: HOSPITAL | Age: 43
End: 2023-11-29
Payer: COMMERCIAL

## 2023-11-29 VITALS
OXYGEN SATURATION: 97 % | SYSTOLIC BLOOD PRESSURE: 115 MMHG | TEMPERATURE: 99.1 F | BODY MASS INDEX: 25.51 KG/M2 | WEIGHT: 144 LBS | DIASTOLIC BLOOD PRESSURE: 66 MMHG | RESPIRATION RATE: 18 BRPM | HEART RATE: 83 BPM

## 2023-11-29 DIAGNOSIS — C50.112 MALIGNANT NEOPLASM OF CENTRAL PORTION OF LEFT BREAST IN FEMALE, ESTROGEN RECEPTOR POSITIVE (H): Primary | ICD-10-CM

## 2023-11-29 DIAGNOSIS — Z17.0 MALIGNANT NEOPLASM OF CENTRAL PORTION OF LEFT BREAST IN FEMALE, ESTROGEN RECEPTOR POSITIVE (H): Primary | ICD-10-CM

## 2023-11-29 PROCEDURE — 99214 OFFICE O/P EST MOD 30 MIN: CPT | Performed by: INTERNAL MEDICINE

## 2023-11-29 PROCEDURE — G0463 HOSPITAL OUTPT CLINIC VISIT: HCPCS | Performed by: INTERNAL MEDICINE

## 2023-11-29 ASSESSMENT — PAIN SCALES - GENERAL: PAINLEVEL: NO PAIN (0)

## 2023-11-29 NOTE — LETTER
"    11/29/2023         RE: Zainab Bolton  [address] [address] [facility] [address]        Dear Colleague,    Thank you for referring your patient, Zainab Bolton, to the [facility] CANCER [facility]. Please see a copy of my visit note below.    Oncology Rooming Note    November 29, 2023 1:56 PM   Zainab Bolton is a 43 year old female who presents for:    Chief Complaint   Patient presents with     Oncology Clinic Visit     Malignant neoplasm of central portion of left breast in female, estrogen receptor positive (H)     Initial Vitals: /66   Pulse 83   Temp 99.1  F (37.3  C)   Resp 18   Wt 65.3 kg (144 lb)   SpO2 97%   BMI 25.51 kg/m   Estimated body mass index is 25.51 kg/m  as calculated from the following:    Height as of 10/17/23: 1.6 m (5' 3\").    Weight as of this encounter: 65.3 kg (144 lb). Body surface area is 1.7 meters squared.  No Pain (0) Comment: Data Unavailable   No LMP recorded. (Menstrual status: Tubal ).  Allergies reviewed: Yes  Medications reviewed: Yes    Medications: Medication refills not needed today.  Pharmacy name entered into [employer]:    [id number] - [place], [place] - [address]  [facility] [place], [place] - [address]    Clinical concerns: None      Margarita Peterson LPN                   [facility] Hematology and Oncology Progress Note    Patient: Zainab Bolton  MRN: [id number]  Date of Service: 09/06/2023        Reason for Visit    Chief Complaint   Patient presents with     Oncology Clinic Visit     Malignant neoplasm of central portion of left breast in female, estrogen receptor positive (H)       Assessment and Plan     Cancer Staging   Malignant neoplasm of central portion of left breast in female, estrogen receptor positive (H)  Staging form: Breast, AJCC 8th Edition  - Pathologic: Stage IA (pT2, pN0, cM0, G1, ER+, [place]+, HER2-, Oncotype DX score: 17) - Signed by Jesús, " MD Candida on 10/18/2023    Breast cancer, ER/AK positive, Her2 negative, left side, oncotype 17, T2N0M0, BRCA positive: s/p bilateral mastectomy.  I offered her adjuvant chemotherapy for intermediate Oncotype DX score but she declined.  Started on tamoxifen and Zoladex.  Unfortunately developed significant side effects from Zoladex.  Currently on tamoxifen only.  Has upcoming reconstruction surgery and at the same time bilateral salpingo-oophorectomy is also planned.  Will continue tamoxifen for now.  Side effects are tolerable.  Will see her back in 3 months.    Had some pulmonary nodules.  Had a repeat CT scan yesterday.  Has some scarring in the apices.  No significant nodules or abnormalities.      ECOG Performance    0 - Independent    Distress Screening (within last 30 days)    No data recorded     Pain  Pain Score: No Pain (0)    Problem List    Patient Active Problem List   Diagnosis     Depression with anxiety     Hypercholesteremia     S/P LEEP of cervix     High risk human papillomavirus infection     Papanicolaou smear of cervix with low grade squamous intraepithelial lesion (LGSIL)     GEOVANY (generalized anxiety disorder)     BRCA2 positive     Malignant neoplasm of central portion of left breast in female, estrogen receptor positive (H)     Cellulitis of right breast     Major depressive disorder, recurrent episode, in partial remission (H24)     H/O breast reconstruction        ______________________________________________________________________________    History of Present Illness    Oncologist: Dr. Espinosa    DIAGNOSIS: Breast cancer, left breast, found on screening mammogram.  Patient did have some mild breast pain for 9 months prior.  18 mm mammogram, 22 mm on MRI.  -Biopsy showed low-grade invasive ductal carcinoma.  ER positive, AK positive, HER2/doug negative by FISH.  -BRCA2 mutation testing done and is positive      TREATMENT:   -5/31/23: Patient had bilateral mastectomy with left  sentinel lymph node biopsy.  Path shows 2.3 cm invasive ductal carcinoma of the left breast, grade 1.  Margins negative.  She had some background DCIS.  The lymph node was negative.  Patient had Oncotype done and result was 17.  -Tamoxifen, started roughly June 2023.   -Zoladex monthly injections started July 2023.     INTERIM HISTORY:   She is seen as a video visit for follow-up.  She was started on Zoladex and tamoxifen.  Unfortunately developed significant side effects from Zoladex.  I discontinued last month.  She is scheduled for breast reconstruction surgery along with bilateral salpingo-oophorectomy.  Currently her side effects of tamoxifen are tolerable.    Review of Systems    Pertinent items are noted in HPI.    Past History    Past Medical History:   Diagnosis Date     Anxiety 08/16/2010     BRCA2 positive      Breast cancer (H) 05/19/2023     Depression      Essential hypertension 03/03/2023     HSIL (high grade squamous intraepithelial lesion) on Pap smear of cervix 05/05/2015    LSIL/+ HR HPV     Hypercholesteremia 08/16/2010     Obesity 08/16/2010     S/P breast reconstruction, bilateral      TOBACCO ABUSE-CONTINUOUS        PHYSICAL EXAM  /66   Pulse 83   Temp 99.1  F (37.3  C)   Resp 18   Wt 65.3 kg (144 lb)   SpO2 97%   BMI 25.51 kg/m      GENERAL: Alert, cooperative, in no distress  CNS: Alert and oriented x 3    Lab Results    No results found for this or any previous visit (from the past 168 hour(s)).      Imaging    CT Chest w Contrast    Result Date: 11/28/2023  EXAM: CT CHEST W CONTRAST LOCATION: [facility] DATE: 11/27/2023 INDICATION: Pulmonary nodules. Follow-up. COMPARISON: CT chest with IV contrast 08/12/2023. TECHNIQUE: CT chest with IV contrast. Multiplanar reformats were obtained. Dose reduction techniques were used. CONTRAST: 90 mL Isovue-370 IV. FINDINGS: LUNGS AND PLEURA: Trace scarring in the apices. Tiny intrapulmonary node in the right minor  fissure measures 0.3 cm (image 126, series 3), unchanged. Minor dependent atelectasis posteriorly bilaterally. No pleural effusion on either side. No new findings. MEDIASTINUM/AXILLAE: Normal cardiac size. No pericardial effusion. No suspicious adenopathy. No pulmonary emboli on either side. Normal caliber thoracic aorta without aneurysm or dissection. Thymic remnant anteriorly. Trachea is midline. CORONARY ARTERY CALCIFICATION: None. UPPER ABDOMEN: No significant finding. MUSCULOSKELETAL: Postoperative changes both breasts. Tissue expanders have been removed.     IMPRESSION: 1.  Trace scarring in the apices. Stable tiny intrapulmonary node in the right minor fissure. No adenopathy or effusion. No pulmonary emboli on either side. 2.  Normal cardiac size. No pericardial effusion. Normal caliber thoracic aorta without aneurysm or dissection. 3.  Postoperative changes both breasts. Tissue expanders have been removed. REFERENCE: Guidelines for Management of Incidental Pulmonary Nodules Detected on CT Images: From the Fleischner Society 2017. Guidelines apply to incidental nodules in patients who are 35 years or older. Guidelines do not apply to lung cancer screening, patients with immunosuppression, or patients with known primary cancer. SINGLE NODULE Nodule size <6 mm Low-risk patients: No follow-up needed. High-risk patients: Optional follow-up at 12 months. Nodule size 6-8 mm Low-risk patients: Follow-up CT at 6-12 months, then consider CT at 18-24 months. High-risk patients: Follow-up CT at 6-12 months, then at 18-24 months if no change. Nodule size >8 mm Either low or high-risk patients: Consider CT, PET/CT, or tissue sampling at 3 months.          Signed by: Candida Espinosa MD      Again, thank you for allowing me to participate in the care of your patient.        Sincerely,        Candida Espinosa MD

## 2023-11-29 NOTE — PROGRESS NOTES
"Oncology Rooming Note    November 29, 2023 1:56 PM   Zainab Bolton is a 43 year old female who presents for:    Chief Complaint   Patient presents with    Oncology Clinic Visit     Malignant neoplasm of central portion of left breast in female, estrogen receptor positive (H)     Initial Vitals: /66   Pulse 83   Temp 99.1  F (37.3  C)   Resp 18   Wt 65.3 kg (144 lb)   SpO2 97%   BMI 25.51 kg/m   Estimated body mass index is 25.51 kg/m  as calculated from the following:    Height as of 10/17/23: 1.6 m (5' 3\").    Weight as of this encounter: 65.3 kg (144 lb). Body surface area is 1.7 meters squared.  No Pain (0) Comment: Data Unavailable   No LMP recorded. (Menstrual status: Tubal ).  Allergies reviewed: Yes  Medications reviewed: Yes    Medications: Medication refills not needed today.  Pharmacy name entered into [employer]:    [facility] - [place], [place] - [address]  [place], [place] - [address]    Clinical concerns: None      Margarita Peterson LPN             "

## 2023-12-08 ENCOUNTER — TELEPHONE (OUTPATIENT)
Dept: ONCOLOGY | Facility: CLINIC | Age: 43
End: 2023-12-08
Payer: COMMERCIAL

## 2023-12-11 ENCOUNTER — PATIENT OUTREACH (OUTPATIENT)
Dept: ONCOLOGY | Facility: CLINIC | Age: 43
End: 2023-12-11

## 2023-12-11 DIAGNOSIS — F41.1 GAD (GENERALIZED ANXIETY DISORDER): ICD-10-CM

## 2023-12-11 DIAGNOSIS — F41.8 DEPRESSION WITH ANXIETY: ICD-10-CM

## 2023-12-11 RX ORDER — HYDROXYZINE HYDROCHLORIDE 10 MG/1
10 TABLET, FILM COATED ORAL 3 TIMES DAILY PRN
Qty: 90 TABLET | Refills: 1 | Status: SHIPPED | OUTPATIENT
Start: 2023-12-11 | End: 2024-04-23

## 2023-12-12 NOTE — PROGRESS NOTES
Spoke with pt   Reviewed with surgery with pt   Combo case with Dr Aaron  Looks like orders are in and md spoke with dr aaron  Will reach out to surgery scheduler to figure out where things are at  Pt would prefer before end of year but does not  that that likely is not possible  Will call pt back

## 2023-12-13 DIAGNOSIS — Z15.01 BRCA2 POSITIVE: ICD-10-CM

## 2023-12-13 DIAGNOSIS — Z17.0 MALIGNANT NEOPLASM OF UPPER-OUTER QUADRANT OF LEFT BREAST IN FEMALE, ESTROGEN RECEPTOR POSITIVE (H): ICD-10-CM

## 2023-12-13 DIAGNOSIS — Z15.09 BRCA2 POSITIVE: ICD-10-CM

## 2023-12-13 DIAGNOSIS — D25.1 INTRAMURAL LEIOMYOMA OF UTERUS: Primary | ICD-10-CM

## 2023-12-13 DIAGNOSIS — C50.412 MALIGNANT NEOPLASM OF UPPER-OUTER QUADRANT OF LEFT BREAST IN FEMALE, ESTROGEN RECEPTOR POSITIVE (H): ICD-10-CM

## 2023-12-13 RX ORDER — HEPARIN SODIUM 10000 [USP'U]/ML
5000 INJECTION, SOLUTION INTRAVENOUS; SUBCUTANEOUS
Status: CANCELLED | OUTPATIENT
Start: 2023-12-13

## 2023-12-13 RX ORDER — METRONIDAZOLE 500 MG/100ML
500 INJECTION, SOLUTION INTRAVENOUS
Status: CANCELLED | OUTPATIENT
Start: 2023-12-13

## 2023-12-14 ENCOUNTER — TELEPHONE (OUTPATIENT)
Dept: ONCOLOGY | Facility: CLINIC | Age: 43
End: 2023-12-14
Payer: COMMERCIAL

## 2023-12-14 NOTE — TELEPHONE ENCOUNTER
Left voicemail for patient regarding scheduling surgery with Dr. Watkins & Dr. Aaron.    Provided contact number to discuss. [phone]    Shaneka Escobedo, on 12/14/2023 at 12:42 PM

## 2023-12-15 ENCOUNTER — PREP FOR PROCEDURE (OUTPATIENT)
Dept: PLASTIC SURGERY | Facility: CLINIC | Age: 43
End: 2023-12-15
Payer: COMMERCIAL

## 2023-12-15 NOTE — TELEPHONE ENCOUNTER
Left voicemail for patient regarding scheduling surgery with Dr. Aaron & Dr. Watkins .    Provided direct contact number to discuss.    P: [phone]    __    Ludy Louis, Senior Perioperative Coordinator, on 12/15/2023 at 11:08 AM

## 2023-12-15 NOTE — TELEPHONE ENCOUNTER
Surgery is scheduled with Dr. Aaron & Dr. Watkins  Date: 3/28   Location: [facility]      H&P to be completed by: PAC clinic - scheduled on 3/12     Surgical consult: 3/12 with Laron Avitia [facility]    Post-op:   4/3 with Laron Avitia [facility]  4/16 with Dr. Watkins, video visit    Patient will receive surgery arrival and start time from PAC. Patient is aware that if times change after they see PAC, they will receive a call from the pre-admission nurses 1-2 days prior to surgery with updated arrival time and NPO instructions.       Spoke with the patient and was able to confirm all scheduled information.       Patient questions/concerns: N/A       Surgery packet: was sent via [employer]    __    Ludy Louis, Senior Perioperative Coordinator, on 12/15/2023 at 11:41 AM  P: [phone]

## 2023-12-18 ENCOUNTER — MYC MEDICAL ADVICE (OUTPATIENT)
Dept: FAMILY MEDICINE | Facility: CLINIC | Age: 43
End: 2023-12-18
Payer: COMMERCIAL

## 2023-12-18 ASSESSMENT — ANXIETY QUESTIONNAIRES
GAD7 TOTAL SCORE: 10
6. BECOMING EASILY ANNOYED OR IRRITABLE: MORE THAN HALF THE DAYS
5. BEING SO RESTLESS THAT IT IS HARD TO SIT STILL: SEVERAL DAYS
1. FEELING NERVOUS, ANXIOUS, OR ON EDGE: SEVERAL DAYS
7. FEELING AFRAID AS IF SOMETHING AWFUL MIGHT HAPPEN: NEARLY EVERY DAY
4. TROUBLE RELAXING: SEVERAL DAYS
2. NOT BEING ABLE TO STOP OR CONTROL WORRYING: SEVERAL DAYS
GAD7 TOTAL SCORE: 10
IF YOU CHECKED OFF ANY PROBLEMS ON THIS QUESTIONNAIRE, HOW DIFFICULT HAVE THESE PROBLEMS MADE IT FOR YOU TO DO YOUR WORK, TAKE CARE OF THINGS AT HOME, OR GET ALONG WITH OTHER PEOPLE: SOMEWHAT DIFFICULT
3. WORRYING TOO MUCH ABOUT DIFFERENT THINGS: SEVERAL DAYS

## 2023-12-18 ASSESSMENT — PATIENT HEALTH QUESTIONNAIRE - PHQ9
10. IF YOU CHECKED OFF ANY PROBLEMS, HOW DIFFICULT HAVE THESE PROBLEMS MADE IT FOR YOU TO DO YOUR WORK, TAKE CARE OF THINGS AT HOME, OR GET ALONG WITH OTHER PEOPLE: SOMEWHAT DIFFICULT
SUM OF ALL RESPONSES TO PHQ QUESTIONS 1-9: 4
SUM OF ALL RESPONSES TO PHQ QUESTIONS 1-9: 4

## 2023-12-18 NOTE — TELEPHONE ENCOUNTER
2/3/2023    10:51 AM 3/16/2023     4:50 PM 12/18/2023     9:56 AM   PHQ   PHQ-9 Total Score 15 0 4   Q9: Thoughts of better off dead/self-harm past 2 weeks Several days Not at all Not at all           2/3/2023    10:51 AM 3/16/2023     4:51 PM 12/18/2023     9:56 AM   GEOVANY-7 SCORE   Total Score  15 (severe anxiety) 10 (moderate anxiety)   Total Score 21 15 10       Please sign off on script

## 2023-12-19 RX ORDER — SERTRALINE HYDROCHLORIDE 100 MG/1
100 TABLET, FILM COATED ORAL DAILY
Qty: 90 TABLET | Refills: 0 | Status: SHIPPED | OUTPATIENT
Start: 2023-12-19 | End: 2024-03-20

## 2023-12-21 NOTE — TELEPHONE ENCOUNTER
FUTURE VISIT INFORMATION      SURGERY INFORMATION:  Date: 3/28/24  Location: [facility] or  Surgeon:  MARIA E Aaron MD O'Shea, Andrea, MD   Anesthesia Type:  general  Procedure: bilateral revision of breasts, bilateral nipple reconstruction, umbilicoplasty, scar revision of abdomen Bilateral breast fat grafting from flanks abdomen and thighs Total laparoscopic hysterectomy, bilateral salpingo-oophorectomy     RECORDS REQUESTED FROM:       Primary Care Provider:   Jessi Concepcion MD   - [facility]

## 2023-12-27 ENCOUNTER — TELEPHONE (OUTPATIENT)
Dept: ONCOLOGY | Facility: HOSPITAL | Age: 43
End: 2023-12-27
Payer: COMMERCIAL

## 2023-12-27 ENCOUNTER — TELEPHONE (OUTPATIENT)
Dept: ONCOLOGY | Facility: CLINIC | Age: 43
End: 2023-12-27
Payer: COMMERCIAL

## 2023-12-27 NOTE — TELEPHONE ENCOUNTER
Patient called to schedule her 3 Month follow up and she just wanted to inform that her next surgery date is 3/28/24.

## 2024-01-02 DIAGNOSIS — F41.1 GAD (GENERALIZED ANXIETY DISORDER): ICD-10-CM

## 2024-01-03 RX ORDER — BUSPIRONE HYDROCHLORIDE 10 MG/1
10 TABLET ORAL 2 TIMES DAILY
Qty: 60 TABLET | Refills: 4 | Status: SHIPPED | OUTPATIENT
Start: 2024-01-03 | End: 2024-04-23

## 2024-01-08 DIAGNOSIS — Z17.0 MALIGNANT NEOPLASM OF CENTRAL PORTION OF LEFT BREAST IN FEMALE, ESTROGEN RECEPTOR POSITIVE (H): Primary | ICD-10-CM

## 2024-01-08 DIAGNOSIS — C50.112 MALIGNANT NEOPLASM OF CENTRAL PORTION OF LEFT BREAST IN FEMALE, ESTROGEN RECEPTOR POSITIVE (H): Primary | ICD-10-CM

## 2024-01-09 RX ORDER — TAMOXIFEN CITRATE 20 MG/1
20 TABLET ORAL EVERY EVENING
Qty: 90 TABLET | Refills: 0 | Status: SHIPPED | OUTPATIENT
Start: 2024-01-09 | End: 2024-03-06

## 2024-01-18 ENCOUNTER — PATIENT OUTREACH (OUTPATIENT)
Dept: CARE COORDINATION | Facility: CLINIC | Age: 44
End: 2024-01-18

## 2024-02-01 ENCOUNTER — PATIENT OUTREACH (OUTPATIENT)
Dept: CARE COORDINATION | Facility: CLINIC | Age: 44
End: 2024-02-01

## 2024-03-06 ENCOUNTER — ONCOLOGY VISIT (OUTPATIENT)
Dept: ONCOLOGY | Facility: HOSPITAL | Age: 44
End: 2024-03-06
Attending: INTERNAL MEDICINE

## 2024-03-06 ENCOUNTER — LAB (OUTPATIENT)
Dept: INFUSION THERAPY | Facility: HOSPITAL | Age: 44
End: 2024-03-06
Attending: INTERNAL MEDICINE
Payer: COMMERCIAL

## 2024-03-06 VITALS
HEART RATE: 75 BPM | BODY MASS INDEX: 27.16 KG/M2 | RESPIRATION RATE: 16 BRPM | OXYGEN SATURATION: 99 % | DIASTOLIC BLOOD PRESSURE: 77 MMHG | WEIGHT: 153.3 LBS | TEMPERATURE: 98.4 F | SYSTOLIC BLOOD PRESSURE: 119 MMHG

## 2024-03-06 DIAGNOSIS — C50.112 MALIGNANT NEOPLASM OF CENTRAL PORTION OF LEFT BREAST IN FEMALE, ESTROGEN RECEPTOR POSITIVE (H): ICD-10-CM

## 2024-03-06 DIAGNOSIS — Z17.0 MALIGNANT NEOPLASM OF CENTRAL PORTION OF LEFT BREAST IN FEMALE, ESTROGEN RECEPTOR POSITIVE (H): ICD-10-CM

## 2024-03-06 LAB
ALBUMIN SERPL BCG-MCNC: 4.3 G/DL (ref 3.5–5.2)
ALP SERPL-CCNC: 71 U/L (ref 40–150)
ALT SERPL W P-5'-P-CCNC: 17 U/L (ref 0–50)
ANION GAP SERPL CALCULATED.3IONS-SCNC: 11 MMOL/L (ref 7–15)
AST SERPL W P-5'-P-CCNC: 16 U/L (ref 0–45)
BASOPHILS # BLD AUTO: 0.1 10E3/UL (ref 0–0.2)
BASOPHILS NFR BLD AUTO: 1 %
BILIRUB SERPL-MCNC: 0.6 MG/DL
BUN SERPL-MCNC: 13.4 MG/DL (ref 6–20)
CALCIUM SERPL-MCNC: 9.2 MG/DL (ref 8.6–10)
CHLORIDE SERPL-SCNC: 102 MMOL/L (ref 98–107)
CREAT SERPL-MCNC: 0.66 MG/DL (ref 0.51–0.95)
DEPRECATED HCO3 PLAS-SCNC: 27 MMOL/L (ref 22–29)
EGFRCR SERPLBLD CKD-EPI 2021: >90 ML/MIN/1.73M2
EOSINOPHIL # BLD AUTO: 0.2 10E3/UL (ref 0–0.7)
EOSINOPHIL NFR BLD AUTO: 2 %
ERYTHROCYTE [DISTWIDTH] IN BLOOD BY AUTOMATED COUNT: 14.3 % (ref 10–15)
GLUCOSE SERPL-MCNC: 98 MG/DL (ref 70–99)
HCT VFR BLD AUTO: 39.5 % (ref 35–47)
HGB BLD-MCNC: 13 G/DL (ref 11.7–15.7)
IMM GRANULOCYTES # BLD: 0 10E3/UL
IMM GRANULOCYTES NFR BLD: 0 %
LYMPHOCYTES # BLD AUTO: 2.6 10E3/UL (ref 0.8–5.3)
LYMPHOCYTES NFR BLD AUTO: 31 %
MCH RBC QN AUTO: 28.7 PG (ref 26.5–33)
MCHC RBC AUTO-ENTMCNC: 32.9 G/DL (ref 31.5–36.5)
MCV RBC AUTO: 87 FL (ref 78–100)
MONOCYTES # BLD AUTO: 0.4 10E3/UL (ref 0–1.3)
MONOCYTES NFR BLD AUTO: 5 %
NEUTROPHILS # BLD AUTO: 5.1 10E3/UL (ref 1.6–8.3)
NEUTROPHILS NFR BLD AUTO: 61 %
NRBC # BLD AUTO: 0 10E3/UL
NRBC BLD AUTO-RTO: 0 /100
PLATELET # BLD AUTO: 346 10E3/UL (ref 150–450)
POTASSIUM SERPL-SCNC: 4 MMOL/L (ref 3.4–5.3)
PROT SERPL-MCNC: 7.2 G/DL (ref 6.4–8.3)
RBC # BLD AUTO: 4.53 10E6/UL (ref 3.8–5.2)
SODIUM SERPL-SCNC: 140 MMOL/L (ref 135–145)
WBC # BLD AUTO: 8.5 10E3/UL (ref 4–11)

## 2024-03-06 PROCEDURE — 85025 COMPLETE CBC W/AUTO DIFF WBC: CPT

## 2024-03-06 PROCEDURE — 99214 OFFICE O/P EST MOD 30 MIN: CPT | Performed by: INTERNAL MEDICINE

## 2024-03-06 PROCEDURE — 99213 OFFICE O/P EST LOW 20 MIN: CPT | Performed by: INTERNAL MEDICINE

## 2024-03-06 PROCEDURE — 36415 COLL VENOUS BLD VENIPUNCTURE: CPT

## 2024-03-06 PROCEDURE — 80053 COMPREHEN METABOLIC PANEL: CPT

## 2024-03-06 RX ORDER — TAMOXIFEN CITRATE 20 MG/1
20 TABLET ORAL EVERY EVENING
Qty: 30 TABLET | Refills: 0 | Status: SHIPPED | OUTPATIENT
Start: 2024-03-06 | End: 2024-04-23

## 2024-03-06 ASSESSMENT — PAIN SCALES - GENERAL: PAINLEVEL: MILD PAIN (2)

## 2024-03-06 NOTE — PROGRESS NOTES
[facility] Hematology and Oncology Progress Note    Patient: Zainab Bolton  MRN: [id number]  Date of Service: 09/06/2023        Reason for Visit    No chief complaint on file.      Assessment and Plan     Cancer Staging   Malignant neoplasm of central portion of left breast in female, estrogen receptor positive (H)  Staging form: Breast, AJCC 8th Edition  - Pathologic: Stage IA (pT2, pN0, cM0, G1, ER+, LA+, HER2-, Oncotype DX score: 17) - Signed by Candida Espinosa MD on 10/18/2023    Breast cancer, ER/LA positive, Her2 negative, left side, oncotype 17, T2N0M0, BRCA positive: s/p bilateral mastectomy.  I offered her adjuvant chemotherapy for intermediate Oncotype DX score but she declined.  Started on tamoxifen and Zoladex.  Unfortunately developed significant side effects from Zoladex.  Currently on tamoxifen only.  Has upcoming reconstruction surgery in 3 weeks.  She is also getting bilateral salpingo-oophorectomy and hysterectomy at the same time.  So plan is to switch her to letrozole after she recovers from surgery.  For the time being she will continue tamoxifen.  She will go off the medication before surgery and will be off of it for a few weeks after due to risk of blood clots.  She will give us a call once she has fully recovered and I will send a prescription for letrozole at that time.  I will see her back in late May.  She is agreeable to the plan.        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 Espinosa    DIAGNOSIS: Breast cancer, left breast, found on screening mammogram.  Patient did have some mild breast pain for 9 months prior.  18 mm mammogram, 22 mm on MRI.  -Biopsy showed low-grade invasive ductal carcinoma.  ER positive, IA positive, HER2/doug negative by FISH.  -BRCA2 mutation testing done and is positive      TREATMENT:   -5/31/23: Patient had bilateral mastectomy with left sentinel lymph node biopsy.  Path shows 2.3 cm invasive ductal carcinoma of the left breast, grade 1.  Margins negative.  She had some background DCIS.  The lymph node was negative.  Patient had Oncotype done and result was 17.  -Tamoxifen, started roughly June 2023.   -Zoladex monthly injections started July 2023.     INTERIM HISTORY:   Here for follow-up.  Has been on tamoxifen for the last few months and doing well.  Initially has started on Zoladex and tamoxifen.  But developed significant side effects from Zoladex and hence we discontinued.  Denies any new issues today.  Does have some swelling in the left breast which is probably lymphedema.  Has an upcoming reconstruction surgery.    Review of Systems    Pertinent items are noted in HPI.    Past History    Past Medical History:   Diagnosis Date    Anxiety 08/16/2010    BRCA2 positive     Breast cancer (H) 05/19/2023    Depression     Essential hypertension 03/03/2023    HSIL (high grade squamous intraepithelial lesion) on Pap smear of cervix 05/05/2015    LSIL/+ HR HPV    Hypercholesteremia 08/16/2010    Obesity 08/16/2010    S/P breast reconstruction, bilateral     TOBACCO ABUSE-CONTINUOUS        PHYSICAL EXAM  There were no vitals taken for this visit.    GENERAL: Alert, cooperative, in no distress  CNS:  Alert and oriented x 3    Lab Results    No results found for this or any previous visit (from the past 168 hour(s)).      Imaging    No results found.        Signed by: Candida Espinosa MD

## 2024-03-06 NOTE — LETTER
3/6/2024         RE: Zainab Bolton  [address] [address]        Dear Colleague,    Thank you for referring your patient, Zainab Bolton, to the [facility] CANCER CENTER [place]. Please see a copy of my visit note below.        [facility] Hematology and Oncology Progress Note    Patient: Zainab Bolton  MRN: [id number]  Date of Service: 09/06/2023        Reason for Visit    No chief complaint on file.      Assessment and Plan     Cancer Staging   Malignant neoplasm of central portion of left breast in female, estrogen receptor positive (H)  Staging form: Breast, AJCC 8th Edition  - Pathologic: Stage IA (pT2, pN0, cM0, G1, ER+, [place]+, HER2-, Oncotype DX score: 17) - Signed by Candida Espinosa MD on 10/18/2023    Breast cancer, ER/[place] positive, Her2 negative, left side, oncotype 17, T2N0M0, BRCA positive: s/p bilateral mastectomy.  I offered her adjuvant chemotherapy for intermediate Oncotype DX score but she declined.  Started on tamoxifen and Zoladex.  Unfortunately developed significant side effects from Zoladex.  Currently on tamoxifen only.  Has upcoming reconstruction surgery in 3 weeks.  She is also getting bilateral salpingo-oophorectomy and hysterectomy at the same time.  So plan is to switch her to letrozole after she recovers from surgery.  For the time being she will continue tamoxifen.  She will go off the medication before surgery and will be off of it for a few weeks after due to risk of blood clots.  She will give us a call once she has fully recovered and I will send a prescription for letrozole at that time.  I will see her back in late May.  She is agreeable to the plan.        ECOG Performance    0 - Independent    Distress Screening (within last 30 days)    No data recorded     Pain       Problem List    Patient Active Problem List   Diagnosis     Depression with anxiety     Hypercholesteremia     S/P LEEP of cervix     High risk human  papillomavirus infection     Papanicolaou smear of cervix with low grade squamous intraepithelial lesion (LGSIL)     GEOVANY (generalized anxiety disorder)     BRCA2 positive     Malignant neoplasm of central portion of left breast in female, estrogen receptor positive (H)     Cellulitis of right breast     Major depressive disorder, recurrent episode, in partial remission (H24)     H/O breast reconstruction        ______________________________________________________________________________    History of Present Illness    Oncologist: Dr. Espinosa    DIAGNOSIS: Breast cancer, left breast, found on screening mammogram.  Patient did have some mild breast pain for 9 months prior.  18 mm mammogram, 22 mm on MRI.  -Biopsy showed low-grade invasive ductal carcinoma.  ER positive, [place] positive, HER2/doug negative by FISH.  -BRCA2 mutation testing done and is positive      TREATMENT:   -5/31/23: Patient had bilateral mastectomy with left sentinel lymph node biopsy.  Path shows 2.3 cm invasive ductal carcinoma of the left breast, grade 1.  Margins negative.  She had some background DCIS.  The lymph node was negative.  Patient had Oncotype done and result was 17.  -Tamoxifen, started roughly June 2023.   -Zoladex monthly injections started July 2023.     INTERIM HISTORY:   Here for follow-up.  Has been on tamoxifen for the last few months and doing well.  Initially has started on Zoladex and tamoxifen.  But developed significant side effects from Zoladex and hence we discontinued.  Denies any new issues today.  Does have some swelling in the left breast which is probably lymphedema.  Has an upcoming reconstruction surgery.    Review of Systems    Pertinent items are noted in HPI.    Past History    Past Medical History:   Diagnosis Date     Anxiety 08/16/2010     BRCA2 positive      Breast cancer (H) 05/19/2023     Depression      Essential hypertension 03/03/2023     HSIL (high grade squamous intraepithelial lesion) on Pap smear  "of cervix 05/05/2015    LSIL/+ HR HPV     Hypercholesteremia 08/16/2010     Obesity 08/16/2010     S/P breast reconstruction, bilateral      TOBACCO ABUSE-CONTINUOUS        PHYSICAL EXAM  There were no vitals taken for this visit.    GENERAL: Alert, cooperative, in no distress  CNS: Alert and oriented x 3    Lab Results    No results found for this or any previous visit (from the past 168 hour(s)).      Imaging    No results found.        Signed by: Candida Espinosa MD    Oncology Rooming Note    March 6, 2024 3:25 PM   Zainab Bolton is a 43 year old female who presents for:    Chief Complaint   Patient presents with     Oncology Clinic Visit     Return visit 3 months with lab. Malignant neoplasm of central portion of left breast in female, estrogen receptor positive.     Initial Vitals: /77 (BP Location: Left arm, Patient Position: Sitting, Cuff Size: Adult Regular)   Pulse 75   Temp 98.4  F (36.9  C) (Oral)   Resp 16   Wt 69.5 kg (153 lb 4.8 oz)   SpO2 99%   BMI 27.16 kg/m   Estimated body mass index is 27.16 kg/m  as calculated from the following:    Height as of 10/17/23: 1.6 m (5' 3\").    Weight as of this encounter: 69.5 kg (153 lb 4.8 oz). Body surface area is 1.76 meters squared.  Mild Pain (2) Comment: Data Unavailable   No LMP recorded. (Menstrual status: Tubal ).  Allergies reviewed: Yes  Medications reviewed: Yes    Medications: Medication refills not needed today.  Pharmacy name entered into [employer]:    [facility] - [place], [place] - [address] - [address]    Frailty Screening:   Is the patient here for a new oncology consult visit in cancer care? 2. No      Clinical concerns: Return visit 3 months with lab. Malignant neoplasm of central portion of left breast in female, estrogen receptor positive. Pain under breasts 2/10. Swelling above left breast-tender to touch.      Annalisa Nj, " CMA                Again, thank you for allowing me to participate in the care of your patient.        Sincerely,        Candida Espinosa MD

## 2024-03-06 NOTE — PROGRESS NOTES
"Oncology Rooming Note    March 6, 2024 3:25 PM   Zainab Bolton is a 43 year old female who presents for:    Chief Complaint   Patient presents with    Oncology Clinic Visit     Return visit 3 months with lab. Malignant neoplasm of central portion of left breast in female, estrogen receptor positive.     Initial Vitals: /77 (BP Location: Left arm, Patient Position: Sitting, Cuff Size: Adult Regular)   Pulse 75   Temp 98.4  F (36.9  C) (Oral)   Resp 16   Wt 69.5 kg (153 lb 4.8 oz)   SpO2 99%   BMI 27.16 kg/m   Estimated body mass index is 27.16 kg/m  as calculated from the following:    Height as of 10/17/23: 1.6 m (5' 3\").    Weight as of this encounter: 69.5 kg (153 lb 4.8 oz). Body surface area is 1.76 meters squared.  Mild Pain (2) Comment: Data Unavailable   No LMP recorded. (Menstrual status: Tubal ).  Allergies reviewed: Yes  Medications reviewed: Yes    Medications: Medication refills not needed today.  Pharmacy name entered into [employer]:    [facility] - [place], [place] - [address]  [place], [place] - [address] [place] [address]    Frailty Screening:   Is the patient here for a new oncology consult visit in cancer care? 2. No      Clinical concerns: Return visit 3 months with lab. Malignant neoplasm of central portion of left breast in female, estrogen receptor positive. Pain under breasts 2/10. Swelling above left breast-tender to touch.      Annalisa Nj CMA              "

## 2024-03-11 ENCOUNTER — PATIENT OUTREACH (OUTPATIENT)
Dept: CARE COORDINATION | Facility: CLINIC | Age: 44
End: 2024-03-11
Payer: COMMERCIAL

## 2024-03-11 LAB
ABO/RH(D): NORMAL
ANTIBODY SCREEN: NEGATIVE
SPECIMEN EXPIRATION DATE: NORMAL

## 2024-03-11 NOTE — TELEPHONE ENCOUNTER
Social Work - Distress Screen Intervention  [facility]    Identified Concern and Score from Distress Screenin. How concerned are you about your ability to eat? 0     2. How concerned are you about unintended weight loss or your current weight? 5     3. How concerned are you about feeling depressed or very sad?  (!) 10     4. How concerned are you about feeling anxious or very scared?  (!) 10     5. Do you struggle with the loss of meaning and kaila in your life?  Somewhat     6. How concerned are you about work and home life issues that may be affected by your cancer?  0     7. How concerned are you about knowing what resources are available to help you?  0     8. Do you currently have what you would describe as Bahai or spiritual struggles? Not at all     9. If you want to be contacted by one of our professionals, I can send a message to them right now.  No data recorded     Date of Distress Screen: 3/6/24  Data: At time of last visit, patient scored positive on distress screening.  outreached to patient today to follow up on elevated distress and introduce psychosocial services and support.  Intervention/Education provided:  contacted patient by phone to discuss distress screening results. Left voicemail message.    Follow-up Required:  will await return call from pt and assist at that time.     MARK Grant, [facility]  Adult Oncology Clinics  [place] (M,W), [place] (T) & [place] (Th)  *I am off Friday  Office: [phone]

## 2024-03-12 ENCOUNTER — LAB (OUTPATIENT)
Dept: LAB | Facility: CLINIC | Age: 44
End: 2024-03-12
Payer: COMMERCIAL

## 2024-03-12 ENCOUNTER — OFFICE VISIT (OUTPATIENT)
Dept: PLASTIC SURGERY | Facility: CLINIC | Age: 44
End: 2024-03-12
Attending: PLASTIC SURGERY
Payer: COMMERCIAL

## 2024-03-12 ENCOUNTER — PRE VISIT (OUTPATIENT)
Dept: SURGERY | Facility: CLINIC | Age: 44
End: 2024-03-12

## 2024-03-12 ENCOUNTER — OFFICE VISIT (OUTPATIENT)
Dept: SURGERY | Facility: CLINIC | Age: 44
End: 2024-03-12
Payer: COMMERCIAL

## 2024-03-12 ENCOUNTER — PREP FOR PROCEDURE (OUTPATIENT)
Dept: PLASTIC SURGERY | Facility: CLINIC | Age: 44
End: 2024-03-12

## 2024-03-12 ENCOUNTER — ANESTHESIA EVENT (OUTPATIENT)
Dept: SURGERY | Facility: CLINIC | Age: 44
End: 2024-03-12
Payer: COMMERCIAL

## 2024-03-12 VITALS
TEMPERATURE: 98.5 F | DIASTOLIC BLOOD PRESSURE: 79 MMHG | SYSTOLIC BLOOD PRESSURE: 172 MMHG | HEART RATE: 81 BPM | BODY MASS INDEX: 26.87 KG/M2 | WEIGHT: 151.7 LBS | OXYGEN SATURATION: 100 % | RESPIRATION RATE: 16 BRPM

## 2024-03-12 VITALS
WEIGHT: 151.7 LBS | TEMPERATURE: 98.5 F | OXYGEN SATURATION: 100 % | SYSTOLIC BLOOD PRESSURE: 118 MMHG | BODY MASS INDEX: 26.88 KG/M2 | HEIGHT: 63 IN | HEART RATE: 81 BPM | DIASTOLIC BLOOD PRESSURE: 80 MMHG

## 2024-03-12 DIAGNOSIS — D25.1 INTRAMURAL LEIOMYOMA OF UTERUS: ICD-10-CM

## 2024-03-12 DIAGNOSIS — Z01.818 PRE-OP EVALUATION: Primary | ICD-10-CM

## 2024-03-12 DIAGNOSIS — Z98.890 S/P FLAP GRAFT: ICD-10-CM

## 2024-03-12 DIAGNOSIS — Z01.818 PRE-OP EVALUATION: ICD-10-CM

## 2024-03-12 DIAGNOSIS — Z98.890 S/P FLAP GRAFT: Primary | ICD-10-CM

## 2024-03-12 PROCEDURE — 99213 OFFICE O/P EST LOW 20 MIN: CPT | Performed by: PLASTIC SURGERY

## 2024-03-12 PROCEDURE — 36415 COLL VENOUS BLD VENIPUNCTURE: CPT | Performed by: PATHOLOGY

## 2024-03-12 PROCEDURE — 99214 OFFICE O/P EST MOD 30 MIN: CPT | Performed by: PHYSICIAN ASSISTANT

## 2024-03-12 PROCEDURE — 86900 BLOOD TYPING SEROLOGIC ABO: CPT | Performed by: PHYSICIAN ASSISTANT

## 2024-03-12 PROCEDURE — 99214 OFFICE O/P EST MOD 30 MIN: CPT | Performed by: PLASTIC SURGERY

## 2024-03-12 ASSESSMENT — PAIN SCALES - GENERAL
PAINLEVEL: NO PAIN (0)
PAINLEVEL: NO PAIN (0)

## 2024-03-12 ASSESSMENT — LIFESTYLE VARIABLES: TOBACCO_USE: 1

## 2024-03-12 NOTE — NURSING NOTE
Pre Op Teaching Note:       Pre and Post op Patient Education    Relevant Diagnosis:  breast cancer-reconstruction  Surgical procedure:  3rd stage, combo with GYN    Patient demonstrates understanding of the following:  Date of surgery:  3/28/24  Location of surgery:  [address] [place] [address]  History and Physical and any other testing necessary prior to surgery: Yes, discussed with pt need for pre-op within 30 days of surgery with PCP.    Patient demonstrates understanding of the following:    Pre-op showering/scrub information with PCMX Soap: Yes, soap given in clinic today  Blood thinner medications discussed and when to stop (if applicable):  Per PCP at pre-op.     Post-op follow-up:  Discussed how to contact the hospital, nurse, and clinic scheduling staff if necessary. (See packet information)    Instructional materials used/given/mailed:  [facility] Surgery Packet per surgery scheduler, post op teaching sheet, Soap.  Pt advised that final arrival information to come closer to the surgery date by [facility] nurses.

## 2024-03-12 NOTE — LETTER
3/12/2024       RE: Zainab Bolton  [address] [facility] [address]    Dear Colleague,    Thank you for referring your patient, Zainab Bolton, to the [facility]. Please see a copy of my visit note below.    PREOPERATIVE VISIT NOTE     PRESENTING COMPLAINT:  Preop visit for upcoming stage III  bilateral breast reconstruction scheduled for 3/28/2024.     HISTORY OF PRESENTING COMPLAINT: The patient is here for a pre-op visit for the patient's surgery.  The patient is being seen in the presence of my nurse. There is no change since the patient's consultation. Please see the consult note for details.     No change in history physical exam.  Patient does not want a physical nipple made will get a 3D nipple tattoo.  Otherwise plan is fat grafting of the breasts, revisional surgeries as needed, umbilicoplasty.      ASSESSMENT AND PLAN:  Based upon the above findings, the patient is here for a preop visit for upcoming surgery.  I had a magdi, detailed discussion with the patient in the presence of my nurse (who was present from beginning to end) about the proposed surgery. I was very clear and detailed about overview of surgery, perioperative plans, and expectations. All risks, benefits and alternatives of the surgery including but not limited to pain, infection, bleeding, scarring, asymmetry, seromas, hematomas, wound breakdown, wound dehiscence, prominent scar, hypertrophic scar, keloid scar, requirement of further surgeries, skin/tissue necrosis, nerve/muscle/deeper structure injury, DVT, PE, MI, CVA, pneumonia, renal failure and death, were explained in detail. The patient agreed and understood them all and had all the questions answered to the patient's satisfaction, and the patient wants to proceed with surgery. I look forward to helping the patient out in the near future.  All questions were answered.  The patient was happy with the visit.    Total time spent in  the encounter today including chart review, visit itself, and post-visit paperwork was 30 minutes.     MARIA E Aaron MD

## 2024-03-12 NOTE — PATIENT INSTRUCTIONS
Preparing for Your Surgery      Name:  Zainab Bolton   MRN:  [id number]   :  1980   Today's Date:  3/12/2024       Arriving for surgery:  Surgery date:  3/28/2024  Arrival time:  5:30 AM    Please come to:     Please come to:      [facility] [address]  [address] [place]  [address]      The [facility] Patient /Visitor Ramp is located at [address]. Patients and visitors who self-park will receive the reduced hospital parking rate. If the Patient /Visitor Ramp is full, please follow the signs to the [employer] parking located at the main hospital entrance.     parking is available ( 24 hours/ 7 days a week)    Discounted parking pass options are available for patients and visitors. They can be purchased at the [employer] desk at the main hospital entrance.    -    Stop at the security desk and they will direct surgery patients to the 3rd floor Surgery Waiting Room. [phone]     -  If you are in need of directions, wheelchair or escort please stop at the Information/security desk in the lobby.       What can I eat or drink?  -  You may eat and drink normally up to 8 hours prior to arrival time. (Until 9:30 PM 3/27)  -  You may have clear liquids until 2 hours prior to arrival time. (Until 3:30 AM)    Examples of clear liquids:  Water  Clear broth  Juices (apple, white grape, white cranberry  and cider) without pulp  Noncarbonated, powder based beverages  (lemonade and Josh-Aid)  Sodas (Sprite, 7-Up, ginger ale and seltzer)  Coffee or tea (without milk or cream)  Gatorade    -  No Alcohol or cannabis products for at least 24 hours before surgery.     Which medicines can I take?    **Hold Aspirin for 7 days before surgery.   **Hold Multivitamins for 7 days before surgery. (Vitamin B, C, D3)  **Hold Supplements for 7 days before surgery. (Os-kitty, Zinc)  **Hold Ibuprofen (Advil, Motrin) for 1 day(s) before  surgery--unless otherwise directed by surgeon.  Hold Naproxen (Aleve) for 4 days before surgery.    -  DO NOT take these medications the day of surgery:  Hydroxyzine  Culturell  Senna      -  PLEASE TAKE these medications the day of surgery:  Tylenol if needed  Buspar  Zoloft    How do I prepare myself?  - Please take 2 showers (one the night prior to surgery and one the morning of surgery) using Scrubcare or Hibiclens soap.    Use this soap only from the neck to your toes.     Leave the soap on your skin for one minute--then rinse thoroughly.      You may use your own shampoo and conditioner. No other hair products.   - Please remove all jewelry and body piercings.  - No lotions, deodorants or fragrance.  - No makeup or fingernail polish.   - Bring your ID and insurance card.    -If you use a CPAP machine, please bring the CPAP machine, tubing, and mask to hospital.    -If you have a Deep Brain Stimulator, Spinal Cord Stimulator, or any Neuro Stimulator device---you must bring the remote control to the hospital.      ALL PATIENTS GOING HOME THE SAME DAY OF SURGERY ARE REQUIRED TO HAVE A RESPONSIBLE ADULT TO DRIVE AND BE IN ATTENDANCE WITH THEM FOR 24 HOURS FOLLOWING SURGERY.    Covid testing policy as of 12/06/2022  Your surgeon will notify and schedule you for a COVID test if one is needed before surgery--please direct any questions or COVID symptoms to your surgeon      Questions or Concerns:    - For any questions regarding the day of surgery or your hospital stay, please contact the Pre Admission Nursing Office at [phone].       - If you have health changes between today and your surgery, please call your surgeon.       - For questions after surgery, please call your surgeons office.           Current Visitor Guidelines    You may have 2 visitors in the pre op area.    Visiting hours: 8 a.m. to 8:30 p.m.    Patients confirmed or suspected to have symptoms of COVID 19 or flu:     No visitors allowed for  adult patients.   Children (under age 18) can have 1 named visitor.     People who are sick or showing symptoms of COVID 19 or flu:    Are not allowed to visit patients--we can only make exceptions in special situations.       Please follow these guidelines for your visit:          Please maintain social distance          Masking is optional--however at times you may be asked to wear a mask for the safety of yourself and others     Clean your hands with alcohol hand . Do this when you arrive at and leave the building and patient room,    And again after you touch your mask or anything in the room.     Go directly to and from the room you are visiting.     Stay in the patient s room during your visit. Limit going to other places in the hospital as much as possible     Leave bags and jackets at home or in the car.     For everyone s health, please don t come and go during your visit. That includes for smoking   during your visit.

## 2024-03-12 NOTE — H&P
Pre-Operative H & P     CC:  Preoperative exam to assess for increased cardiopulmonary risk while undergoing surgery and anesthesia.    Date of Encounter: 3/12/2024  Primary Care Physician:  Jessi Concepcion     Reason for visit:   Encounter Diagnoses   Name Primary?    Pre-op evaluation Yes    S/P flap graft     Intramural leiomyoma of uterus        BILL Bolton is a 43 year old female who presents for pre-operative H & P in preparation for  Procedure Information       Case: [id number] Date/Time: 03/28/24 0730    Procedures:       bilateral revision of breasts, umbilicoplasty, scar revision of abdomen (Bilateral: Breast)      Bilateral breast fat grafting from flanks abdomen and thighs (Bilateral: Update)      Total laparoscopic hysterectomy, bilateral salpingo-oophorectomy (Bilateral: Abdomen)    Anesthesia type: General    Diagnosis:       S/P flap graft [Z98.890]      Intramural leiomyoma of uterus [D25.1]    Pre-op diagnosis:       S/P flap graft [Z98.890]      Intramural leiomyoma of uterus [D25.1]    Location:  OR  /  OR    Providers: MARIA E Aaron MD; Marco Watkins MD            Patient is being evaluated for comorbid conditions of HTN, former tobacco use, anxiety, and depression.    She has a history of left breast cancer, BRCA positive s/p bilateral mastectomy with reconstruction complicated by expander infection, now s/p bilateral BENIGNO recon 9/25/2023. She has recovered well and completed follow up with Dr. Davidson earlier today. She is now scheduled for stage 3 reconstruction as above. Of note, patient is also scheduled for PEREZ-BSO at the same time. She completed a consultation with Dr. Watkins on 11/15/23 where she was counseled on the risk reducing surgery.    History is obtained from the patient and chart review    Hx of abnormal bleeding or anti-platelet use: None    Menstrual history: Patient's last menstrual period was 02/25/2024.     Past Medical History  Past Medical  History:   Diagnosis Date    Anxiety 08/16/2010    BRCA2 positive     Breast cancer (H) 05/19/2023    Depression     Essential hypertension 03/03/2023    HSIL (high grade squamous intraepithelial lesion) on Pap smear of cervix 05/05/2015    LSIL/+ HR HPV    Hypercholesteremia 08/16/2010    Obesity 08/16/2010    S/P breast reconstruction, bilateral     TOBACCO ABUSE-CONTINUOUS        Past Surgical History  Past Surgical History:   Procedure Laterality Date    BIOPSY NODE SENTINEL Left 05/31/2023    Procedure: LEFT SENTINEL LYMPH NODE BIOPSY;  Surgeon: Kylee Villasenor MD;  [place] [facility] [place]    CONIZATION LEEP N/A 07/16/2015    Procedure: CONIZATION LEEP;  Surgeon: Omayra Cunningham DO;  Location:  OR    EYE SURGERY  1/5/2016    GRAFT FREE VASCULARIZED TRANSVERSE RECTUS ABDOMINIS MYOCUTANEOUS Bilateral 09/25/2023    Procedure: Bilateral breast reconstruction with free abdominal flaps, resensation, SPY;  Surgeon: MARIA E Aaron MD;  Location:  OR    LAPAROSCOPIC TUBAL LIGATION  10/03/2003    MASTECTOMY SIMPLE Bilateral 05/31/2023    Procedure: BILATERAL MASTECTOMIES;  Surgeon: Kylee Villasenor MD;  [place] [facility] [place]    RECONSTRUCT BREAST, INSERT TISSUE EXPANDER, COMBINED Bilateral 05/31/2023    Procedure: RECONSTRUCTION, BREAST, BILATERAL TISSUE EXPANDERS;  Surgeon: Baisl Aguilar MD;  [place] [facility] [place]    REMOVE TISSUE EXPANDER TRUNK Bilateral 08/25/2023    Procedure: REMOVAL, TISSUE EXPANDER, BILATERAL BREAST;  Surgeon: Basil Aguilar MD;  [place] [facility] [place]       Prior to Admission Medications  Current Outpatient Medications   Medication Sig Dispense Refill    acetaminophen (TYLENOL) 500 MG tablet Take 2 tablets (1,000 mg) by mouth 3 times daily 120 tablet 0    busPIRone (BUSPAR) 10 MG tablet TAKE 1 TABLET BY MOUTH 2 TIMES DAILY 60 tablet 4    calcium carbonate (OS-SAM) 1500 (600 Ca) MG tablet Take 600 mg by mouth 2 times daily      Cyanocobalamin 500 MCG TBDP  Take 2 Gum by mouth every evening      hydrOXYzine HCl (ATARAX) 10 MG tablet TAKE 1 TABLET BY MOUTH 3 TIMES DAILY AS NEEDED FOR ANXIETY 90 tablet 1    ibuprofen (ADVIL/MOTRIN) 400 MG tablet Take 400 mg by mouth as needed for moderate pain      lactobacillus rhamnosus, GG, (CULTURELL) capsule Take 1 capsule by mouth every evening      ondansetron (ZOFRAN) 4 MG tablet       senna-docusate (SENOKOT-S/PERICOLACE) 8.6-50 MG tablet Take 1 tablet by mouth 2 times daily 30 tablet 0    sertraline (ZOLOFT) 100 MG tablet TAKE 1 TABLET BY MOUTH ONCE DAILY (Patient taking differently: Take 100 mg by mouth every morning) 90 tablet 0    tamoxifen (NOLVADEX) 20 MG tablet Take 1 tablet (20 mg) by mouth every evening 30 tablet 0    vitamin B complex with vitamin C (VITAMIN  B COMPLEX) tablet Take 1 tablet by mouth every evening      vitamin C (ASCORBIC ACID) 1000 MG TABS Take 1,000 mg by mouth every evening Also contains 20mg zinc      Vitamin D3 (CHOLECALCIFEROL) 25 mcg (1000 units) tablet Take 25 mcg by mouth every evening      zinc gluconate 50 MG tablet Take 50 mg by mouth every evening      aspirin 81 MG EC tablet Take 1 tablet (81 mg) by mouth daily (Patient not taking: Reported on 10/26/2023) 28 tablet 0       Allergies  No Known Allergies    Social History  Social History     Socioeconomic History    Marital status: Single     Spouse name: Not on file    Number of children: 3    Years of education: 12    Highest education level: Not on file   Occupational History    Occupation: Stay at home Mother   Tobacco Use    Smoking status: Former     Packs/day: 1.00     Years: 10.00     Additional pack years: 0.00     Total pack years: 10.00     Types: Cigarettes     Quit date: 3/28/2023     Years since quittin.9     Passive exposure: Past    Smokeless tobacco: Never   Vaping Use    Vaping Use: Not on file   Substance and Sexual Activity    Alcohol use: Not Currently    Drug use: Yes     Types: Marijuana     Comment: daily smoking     Sexual activity: Yes     Partners: Male     Birth control/protection: Female Surgical   Other Topics Concern     Service No    Blood Transfusions No    Caffeine Concern Yes     Comment: 3 cups every 2 weeks, 2 pops per week     Occupational Exposure No    Hobby Hazards No    Sleep Concern No    Stress Concern Yes    Weight Concern Yes    Special Diet No    Back Care No    Exercise No    Bike Helmet No    Seat Belt Yes    Self-Exams No    Parent/sibling w/ CABG, MI or angioplasty before 65F 55M? No   Social History Narrative    Not on file     Social Determinants of Health     Financial Resource Strain: Not on file   Food Insecurity: Not on file   Transportation Needs: Not on file   Physical Activity: Not on file   Stress: Not on file   Social Connections: Not on file   Interpersonal Safety: Not on file   Housing Stability: Not on file       Family History  Family History   Problem Relation Age of Onset    Liver Cancer Father     Stomach Cancer Father     Arthritis Maternal Grandmother     Hypertension Maternal Grandmother     Diabetes Maternal Grandmother     Thyroid Disease Maternal Grandmother     Leukemia Maternal Grandfather     Heart Disease Maternal Aunt         MI approx age 46    Anesthesia Reaction No family hx of     Clotting Disorder No family hx of        Review of Systems  The complete review of systems is negative other than noted in the HPI or here.   Anesthesia Evaluation   Pt has had prior anesthetic. Type: General.    No history of anesthetic complications       ROS/MED HX  ENT/Pulmonary:     (+)     JIMMIE risk factors,  hypertension,         tobacco use, Past use,                    (-) asthma and recent URI   Neurologic:  - neg neurologic ROS     Cardiovascular: Comment: No meds for HYPERTENSION. Normal BP    (+)  hypertension- -   -  - -                                 Previous cardiac testing   Echo: Date: Results:    Stress Test:  Date: Results:    ECG Reviewed:  Date: 2019  "Results:  SR, poor R wave progression  Cath:  Date: Results:      METS/Exercise Tolerance: >4 METS    Hematologic:  - neg hematologic  ROS     Musculoskeletal:  - neg musculoskeletal ROS     GI/Hepatic:  - neg GI/hepatic ROS     Renal/Genitourinary: Comment: Intramural leiomyoma of uterus   (-) renal disease   Endo:  - neg endo ROS     Psychiatric/Substance Use:     (+) psychiatric history anxiety and depression   Recreational drug usage: Cannabis.    Infectious Disease:  - neg infectious disease ROS     Malignancy:   (+) Malignancy, History of Breast.Breast CA Remission status post Surgery and Chemo.      Other:  - neg other ROS          /80 (BP Location: Right arm, Patient Position: Sitting, Cuff Size: Adult Regular)   Pulse 81   Temp 98.5  F (36.9  C) (Oral)   Ht 1.6 m (5' 3\")   Wt 68.8 kg (151 lb 11.2 oz)   LMP 02/25/2024   SpO2 100%   Breastfeeding No   BMI 26.87 kg/m      Physical Exam   Constitutional: Pleasant, well-appearing female, no apparent distress, and appears stated age.  Eyes: Pupils equal, round and reactive to light, extra ocular muscles intact, sclera clear, conjunctiva normal.  HENT: Normocephalic and atraumatic, oral pharynx with moist mucus membranes, good dentition. No goiter appreciated.   Respiratory: Clear to auscultation bilaterally, no crackles or wheezing.  Cardiovascular: Regular rate and rhythm, normal S1 and S2, and no murmur noted.  No edema. Palpable pulses to radial  DP and PT arteries.   GI: Normal bowel sounds, soft, non-distended, non-tender, no masses palpated, no hepatosplenomegaly.  Lymph/Hematologic: No cervical lymphadenopathy and no supraclavicular lymphadenopathy.  Genitourinary:  Deferred  Skin: Warm and dry.  No rashes on exposed skin   Musculoskeletal: Full ROM of neck. There is no redness, warmth, or swelling of the visible joints. Gross motor strength is normal.    Neurologic: Awake, alert, oriented to name, place and time. Cranial nerves II-XII are " "grossly intact. Gait is normal.   Neuropsychiatric: Calm, cooperative. Normal affect.       Prior Labs/Diagnostic Studies   All labs and imaging personally reviewed     EKG/ stress test - if available please see in ROS above   No results found.    The patient's records and results personally reviewed by this provider.     Outside records reviewed from: Care Everywhere    LAB/DIAGNOSTIC STUDIES TODAY:  T&S    Assessment  Zainab Bolton is a 43 year old female seen as a PAC referral for risk assessment and optimization for anesthesia.    Plan/Recommendations  Pt will be optimized for the proposed procedure.  See below for details on the assessment, risk, and preoperative recommendations    NEUROLOGY  - No history of TIA, CVA or seizure    -Post Op delirium risk factors:  No risk identified    ENT  - No current airway concerns.  Will need to be reassessed day of surgery.  Mallampati: I  TM: > 3    CARDIAC  - No history of CAD and Afib  - Hx of Hypertension  Not on antihypertensives. BP has been within normal limits.     - METS (Metabolic Equivalents)  Patient performs 4 or more METS exercise without symptoms            Total Score: 0      RCRI-Very low risk: Class 1 0.4% complication rate            Total Score: 0        PULMONARY    JIMMIE Low Risk            Total Score: 1    JIMMIE: Hypertension      - Denies asthma or inhaler use  - Tobacco History    History   Smoking Status    Former    Packs/day: 1.00    Years: 10.00    Types: Cigarettes    Quit date: 3/28/2023   Smokeless Tobacco    Never       GI    PONV High Risk  Total Score: 3           1 AN PONV: Pt is Female    1 AN PONV: Patient is not a current smoker    1 AN PONV: Intended Post Op Opioids        /RENAL  - Baseline Creatinine  0.66    ENDOCRINE    - BMI: Estimated body mass index is 26.87 kg/m  as calculated from the following:    Height as of this encounter: 1.6 m (5' 3\").    Weight as of this encounter: 68.8 kg (151 lb 11.2 oz).  Overweight (BMI " 25.0-29.9)  - No history of Diabetes Mellitus    HEME/ONC  - BRCA positive breast cancer, uterine leiomyoma  See HPI. Above surgery planned for further management.   Hold tamoxifen per oncology recommendations    VTE Low Risk 0.26%            Total Score: 0      - No history of abnormal bleeding or antiplatelet use.  - A type and screen has been ordered for this patient    PSYCH  - Depression, Anxiety  Hold hydroxyzine day of surgery. Continue Zoloft and Buspar    Different anesthesia methods/types have been discussed with the patient, but they are aware that the final plan will be decided by the assigned anesthesia provider on the date of service.    The patient is optimized for their procedure. AVS with information on surgery time/arrival time, meds and NPO status given by nursing staff. No further diagnostic testing indicated.      On the day of service:     Prep time: 10 minutes  Visit time: 10 minutes  Documentation time: 10 minutes  ------------------------------------------  Total time: 30 minutes      Naa Pineda PA-C  Preoperative Assessment Center  [facility]  Clinic and Surgery Center  Phone: [phone]  Fax: [phone]

## 2024-03-12 NOTE — PROGRESS NOTES
PREOPERATIVE VISIT NOTE     PRESENTING COMPLAINT:  Preop visit for upcoming stage III  bilateral breast reconstruction scheduled for 3/28/2024.     HISTORY OF PRESENTING COMPLAINT: The patient is here for a pre-op visit for the patient's surgery.  The patient is being seen in the presence of my nurse. There is no change since the patient's consultation. Please see the consult note for details.     No change in history physical exam.  Patient does not want a physical nipple made will get a 3D nipple tattoo.  Otherwise plan is fat grafting of the breasts, revisional surgeries as needed, umbilicoplasty.      ASSESSMENT AND PLAN:  Based upon the above findings, the patient is here for a preop visit for upcoming surgery.  I had a magdi, detailed discussion with the patient in the presence of my nurse (who was present from beginning to end) about the proposed surgery. I was very clear and detailed about overview of surgery, perioperative plans, and expectations. All risks, benefits and alternatives of the surgery including but not limited to pain, infection, bleeding, scarring, asymmetry, seromas, hematomas, wound breakdown, wound dehiscence, prominent scar, hypertrophic scar, keloid scar, requirement of further surgeries, skin/tissue necrosis, nerve/muscle/deeper structure injury, DVT, PE, MI, CVA, pneumonia, renal failure and death, were explained in detail. The patient agreed and understood them all and had all the questions answered to the patient's satisfaction, and the patient wants to proceed with surgery. I look forward to helping the patient out in the near future.  All questions were answered.  The patient was happy with the visit.    Total time spent in the encounter today including chart review, visit itself, and post-visit paperwork was 30 minutes.

## 2024-03-12 NOTE — NURSING NOTE
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 Estimated body mass index is 27.16 kg/m  as calculated from the following:    Height as of 10/17/23: 1.6 m (5' 3\").    Weight as of 3/6/24: 69.5 kg (153 lb 4.8 oz). There is no height or weight on file to calculate BSA.  Data Unavailable Comment: Data Unavailable   No LMP recorded. (Menstrual status: Tubal ).  Allergies reviewed: Yes  Medications reviewed: Yes    Medications: MEDICATION REFILLS NEEDED TODAY. Provider was notified.  Pharmacy name entered into [employer]:    [id number] - [place], [place] - [address]  [place], [place] - [address]    Frailty Screening:   Is the patient here for a new oncology consult visit in cancer care? 2. No      Clinical concerns: needs refill for buspirone, hydroxyzine, and sertraline     Pt wants to check for swelling in upper chest.     Dmitry Finnegan              "

## 2024-03-16 ENCOUNTER — HEALTH MAINTENANCE LETTER (OUTPATIENT)
Age: 44
End: 2024-03-16

## 2024-03-16 DIAGNOSIS — F41.1 GAD (GENERALIZED ANXIETY DISORDER): ICD-10-CM

## 2024-03-16 DIAGNOSIS — F41.8 DEPRESSION WITH ANXIETY: ICD-10-CM

## 2024-03-19 ENCOUNTER — MYC MEDICAL ADVICE (OUTPATIENT)
Dept: ONCOLOGY | Facility: CLINIC | Age: 44
End: 2024-03-19
Payer: COMMERCIAL

## 2024-03-19 ENCOUNTER — MYC REFILL (OUTPATIENT)
Dept: FAMILY MEDICINE | Facility: CLINIC | Age: 44
End: 2024-03-19
Payer: COMMERCIAL

## 2024-03-19 DIAGNOSIS — F41.8 DEPRESSION WITH ANXIETY: ICD-10-CM

## 2024-03-19 DIAGNOSIS — F41.1 GAD (GENERALIZED ANXIETY DISORDER): ICD-10-CM

## 2024-03-19 NOTE — TELEPHONE ENCOUNTER
"Requested Prescriptions   Pending Prescriptions Disp Refills    sertraline (ZOLOFT) 100 MG tablet 90 tablet 0     Sig: Take 1 tablet (100 mg) by mouth daily       SSRIs Protocol Failed - 3/19/2024  2:16 PM        Failed - Recent (6 mo) or future (30 days) visit within the authorizing provider's specialty     Patient had office visit in the last 6 months or has a visit in the next 30 days with authorizing provider or within the authorizing provider's specialty.  See \"Patient Info\" tab in inbasket, or \"Choose Columns\" in Meds & Orders section of the refill encounter.            Passed - PHQ-9 score less than 5 in past 6 months     Please review last PHQ-9 score.           Passed - Medication is active on med list        Passed - Patient is age 18 or older        Passed - No active pregnancy on record        Passed - No positive pregnancy test in last 12 months             "

## 2024-03-20 RX ORDER — SERTRALINE HYDROCHLORIDE 100 MG/1
100 TABLET, FILM COATED ORAL EVERY MORNING
Qty: 90 TABLET | Refills: 1 | Status: SHIPPED | OUTPATIENT
Start: 2024-03-20 | End: 2024-04-23

## 2024-03-20 RX ORDER — SERTRALINE HYDROCHLORIDE 100 MG/1
100 TABLET, FILM COATED ORAL DAILY
Qty: 90 TABLET | Refills: 0 | OUTPATIENT
Start: 2024-03-20

## 2024-03-27 ASSESSMENT — LIFESTYLE VARIABLES: TOBACCO_USE: 1

## 2024-03-27 NOTE — ANESTHESIA PREPROCEDURE EVALUATION
Anesthesia Pre-Procedure Evaluation    Patient: Zainab Bolton   MRN: [id number] : 1980        Procedure : Procedure(s):  bilateral revision of breasts, umbilicoplasty, scar revision of abdomen  Bilateral breast fat grafting from flanks abdomen and thighs  Total laparoscopic hysterectomy, bilateral salpingo-oophorectomy          Patient is being evaluated for comorbid conditions of HTN, former tobacco use, anxiety, and depression.     She has a history of left breast cancer, BRCA positive s/p bilateral mastectomy with reconstruction complicated by expander infection, now s/p bilateral BENIGNO recon 2023. She has recovered well and completed follow up with Dr. Davidson earlier today. She is now scheduled for stage 3 reconstruction as above. Of note, patient is also scheduled for PEREZ-BSO at the same time. She completed a consultation with Dr. Watkins on 11/15/23 where she was counseled on the risk reducing surgery.    Past Medical History:   Diagnosis Date    Anxiety 2010    BRCA2 positive     Breast cancer (H) 2023    Depression     Essential hypertension 2023    HSIL (high grade squamous intraepithelial lesion) on Pap smear of cervix 2015    LSIL/+ HR HPV    Hypercholesteremia 2010    Obesity 2010    S/P breast reconstruction, bilateral     TOBACCO ABUSE-CONTINUOUS       Past Surgical History:   Procedure Laterality Date    BIOPSY NODE SENTINEL Left 2023    Procedure: LEFT SENTINEL LYMPH NODE BIOPSY;  Surgeon: Kylee Villasenor MD;  Location: [facility] OR    CONIZATION LEEP N/A 2015    Procedure: CONIZATION LEEP;  Surgeon: Omayra Cunningham DO;  Location:  OR    EYE SURGERY  2016    GRAFT FREE VASCULARIZED TRANSVERSE RECTUS ABDOMINIS MYOCUTANEOUS Bilateral 2023    Procedure: Bilateral breast reconstruction with free abdominal flaps, resensation, SPY;  Surgeon: MARIA E Aaron MD;  Location:  OR    LAPAROSCOPIC TUBAL LIGATION   10/03/2003    MASTECTOMY SIMPLE Bilateral 2023    Procedure: BILATERAL MASTECTOMIES;  Surgeon: Kylee Villasenor MD;  Location: [facility] OR    RECONSTRUCT BREAST, INSERT TISSUE EXPANDER, COMBINED Bilateral 2023    Procedure: RECONSTRUCTION, BREAST, BILATERAL TISSUE EXPANDERS;  Surgeon: Basil Aguilar MD;  Location: [facility] OR    REMOVE TISSUE EXPANDER TRUNK Bilateral 2023    Procedure: REMOVAL, TISSUE EXPANDER, BILATERAL BREAST;  Surgeon: Basil Aguilar MD;  Location: [facility] OR      No Known Allergies   Social History     Tobacco Use    Smoking status: Former     Packs/day: 1.00     Years: 10.00     Additional pack years: 0.00     Total pack years: 10.00     Types: Cigarettes     Quit date: 3/28/2023     Years since quittin.0     Passive exposure: Past    Smokeless tobacco: Never   Substance Use Topics    Alcohol use: Not Currently      Wt Readings from Last 1 Encounters:   24 68.8 kg (151 lb 11.2 oz)        Anesthesia Evaluation   Pt has had prior anesthetic. Type: General.    No history of anesthetic complications       ROS/MED HX  ENT/Pulmonary:     (+)     JIMMIE risk factors,  hypertension,         tobacco use, Past use,                    (-) asthma and recent URI   Neurologic:  - neg neurologic ROS     Cardiovascular: Comment: No meds for HYPERTENSION. Normal BP    (+)  hypertension- -   -  - -                                 Previous cardiac testing   Echo: Date: Results:    Stress Test:  Date: Results:    ECG Reviewed:  Date:  Results:  SR, poor R wave progression  Cath:  Date: Results:      METS/Exercise Tolerance: >4 METS    Hematologic:  - neg hematologic  ROS     Musculoskeletal:  - neg musculoskeletal ROS     GI/Hepatic:  - neg GI/hepatic ROS     Renal/Genitourinary: Comment: Intramural leiomyoma of uterus   (-) renal disease   Endo:  - neg endo ROS     Psychiatric/Substance Use:     (+) psychiatric history anxiety and depression   Recreational drug  "usage: Cannabis.    Infectious Disease:  - neg infectious disease ROS     Malignancy:   (+) Malignancy, History of Breast.Breast CA Remission status post Surgery and Chemo.      Other:  - neg other ROS          Physical Exam    Airway        Mallampati: II   TM distance: > 3 FB   Neck ROM: full   Mouth opening: > 3 cm    Respiratory Devices and Support         Dental       (+) Minor Abnormalities - some fillings, tiny chips      Cardiovascular   cardiovascular exam normal          Pulmonary   pulmonary exam normal                OUTSIDE LABS:  CBC:   Lab Results   Component Value Date    WBC 8.5 03/06/2024    WBC 10.9 09/26/2023    HGB 13.0 03/06/2024    HGB 10.7 (L) 09/26/2023    HCT 39.5 03/06/2024    HCT 32.5 (L) 09/26/2023     03/06/2024     09/26/2023     BMP:   Lab Results   Component Value Date     03/06/2024     09/26/2023    POTASSIUM 4.0 03/06/2024    POTASSIUM 3.8 09/26/2023    CHLORIDE 102 03/06/2024    CHLORIDE 106 09/26/2023    CO2 27 03/06/2024    CO2 25 09/26/2023    BUN 13.4 03/06/2024    BUN 7.7 09/26/2023    CR 0.66 03/06/2024    CR 0.71 09/26/2023    GLC 98 03/06/2024     (H) 09/27/2023     COAGS: No results found for: \"PTT\", \"INR\", \"FIBR\"  POC:   Lab Results   Component Value Date    BGM 76 03/21/2019    HCG neg 07/16/2015     HEPATIC:   Lab Results   Component Value Date    ALBUMIN 4.3 03/06/2024    PROTTOTAL 7.2 03/06/2024    ALT 17 03/06/2024    AST 16 03/06/2024    ALKPHOS 71 03/06/2024    BILITOTAL 0.6 03/06/2024     OTHER:   Lab Results   Component Value Date    PH 7.5 (H) 03/05/2002    LACT 2.5 (H) 09/25/2023    A1C 5.4 05/11/2016    SAM 9.2 03/06/2024    MAG 1.8 09/27/2023    TSH 2.89 03/21/2019       Anesthesia Plan    ASA Status:  2    NPO Status:  NPO Appropriate    Anesthesia Type: General.     - Airway: ETT   Induction: Intravenous.   Maintenance: Balanced.   Techniques and Equipment:     - Lines/Monitors: 2nd IV     Consents    Anesthesia Plan(s) " "and associated risks, benefits, and realistic alternatives discussed. Questions answered and patient/representative(s) expressed understanding.     - Discussed: Risks, Benefits and Alternatives for BOTH SEDATION and the PROCEDURE were discussed     - Discussed with:  Patient      - Extended Intubation/Ventilatory Support Discussed: No.      - Patient is DNR/DNI Status: No     Use of blood products discussed: No .     Postoperative Care    Pain management: IV analgesics.   PONV prophylaxis: Ondansetron (or other 5HT-3), Dexamethasone or Solumedrol     Comments:               Bob Ocampo MD    I have reviewed the pertinent notes and labs in the chart from the past 30 days and (re)examined the patient.  Any updates or changes from those notes are reflected in this note.              # Overweight: Estimated body mass index is 26.87 kg/m  as calculated from the following:    Height as of 3/12/24: 1.6 m (5' 3\").    Weight as of 3/12/24: 68.8 kg (151 lb 11.2 oz).      "

## 2024-03-28 ENCOUNTER — ANESTHESIA (OUTPATIENT)
Dept: SURGERY | Facility: CLINIC | Age: 44
End: 2024-03-28
Payer: COMMERCIAL

## 2024-03-28 ENCOUNTER — HOSPITAL ENCOUNTER (OUTPATIENT)
Facility: CLINIC | Age: 44
Discharge: HOME | End: 2024-03-28
Attending: PLASTIC SURGERY | Admitting: PLASTIC SURGERY
Payer: COMMERCIAL

## 2024-03-28 VITALS
HEIGHT: 63 IN | SYSTOLIC BLOOD PRESSURE: 142 MMHG | OXYGEN SATURATION: 100 % | DIASTOLIC BLOOD PRESSURE: 92 MMHG | TEMPERATURE: 98.2 F | HEART RATE: 76 BPM | RESPIRATION RATE: 17 BRPM | BODY MASS INDEX: 26.48 KG/M2 | WEIGHT: 149.47 LBS

## 2024-03-28 DIAGNOSIS — Z98.890 H/O BREAST RECONSTRUCTION: Primary | ICD-10-CM

## 2024-03-28 PROCEDURE — 250N000011 HC RX IP 250 OP 636: Performed by: PLASTIC SURGERY

## 2024-03-28 PROCEDURE — 250N000013 HC RX MED GY IP 250 OP 250 PS 637: Performed by: STUDENT IN AN ORGANIZED HEALTH CARE EDUCATION/TRAINING PROGRAM

## 2024-03-28 PROCEDURE — 250N000011 HC RX IP 250 OP 636: Performed by: ANESTHESIOLOGY

## 2024-03-28 PROCEDURE — 250N000025 HC SEVOFLURANE, PER MIN: Performed by: PLASTIC SURGERY

## 2024-03-28 PROCEDURE — 11402 EXC TR-EXT B9+MARG 1.1-2 CM: CPT | Mod: 59 | Performed by: PLASTIC SURGERY

## 2024-03-28 PROCEDURE — 250N000009 HC RX 250

## 2024-03-28 PROCEDURE — 272N000001 HC OR GENERAL SUPPLY STERILE: Performed by: PLASTIC SURGERY

## 2024-03-28 PROCEDURE — 999N000141 HC STATISTIC PRE-PROCEDURE NURSING ASSESSMENT: Performed by: PLASTIC SURGERY

## 2024-03-28 PROCEDURE — 15771 GRFG AUTOL FAT LIPO 50 CC/<: CPT | Mod: GC | Performed by: PLASTIC SURGERY

## 2024-03-28 PROCEDURE — 88112 CYTOPATH CELL ENHANCE TECH: CPT | Mod: 26 | Performed by: PATHOLOGY

## 2024-03-28 PROCEDURE — 710N000012 HC RECOVERY PHASE 2, PER MINUTE: Performed by: PLASTIC SURGERY

## 2024-03-28 PROCEDURE — 710N000010 HC RECOVERY PHASE 1, LEVEL 2, PER MIN: Performed by: PLASTIC SURGERY

## 2024-03-28 PROCEDURE — 15772 GRFG AUTOL FAT LIPO EA ADDL: CPT | Mod: GC | Performed by: PLASTIC SURGERY

## 2024-03-28 PROCEDURE — 360N000077 HC SURGERY LEVEL 4, PER MIN: Performed by: PLASTIC SURGERY

## 2024-03-28 PROCEDURE — 250N000009 HC RX 250: Performed by: PLASTIC SURGERY

## 2024-03-28 PROCEDURE — 258N000003 HC RX IP 258 OP 636

## 2024-03-28 PROCEDURE — 12036 INTMD RPR S/A/T/EXT 20.1-30: CPT | Mod: 59 | Performed by: PLASTIC SURGERY

## 2024-03-28 PROCEDURE — 258N000003 HC RX IP 258 OP 636: Performed by: PLASTIC SURGERY

## 2024-03-28 PROCEDURE — 14000 TIS TRNFR TRUNK 10 SQ CM/<: CPT | Mod: GC | Performed by: PLASTIC SURGERY

## 2024-03-28 PROCEDURE — 14000 TIS TRNFR TRUNK 10 SQ CM/<: CPT

## 2024-03-28 PROCEDURE — 11406 EXC TR-EXT B9+MARG >4.0 CM: CPT | Mod: 59 | Performed by: PLASTIC SURGERY

## 2024-03-28 PROCEDURE — 88305 TISSUE EXAM BY PATHOLOGIST: CPT | Mod: TC | Performed by: PLASTIC SURGERY

## 2024-03-28 PROCEDURE — 250N000011 HC RX IP 250 OP 636: Mod: JZ

## 2024-03-28 PROCEDURE — 250N000011 HC RX IP 250 OP 636

## 2024-03-28 PROCEDURE — 250N000009 HC RX 250: Performed by: ANESTHESIOLOGY

## 2024-03-28 PROCEDURE — 250N000011 HC RX IP 250 OP 636: Performed by: STUDENT IN AN ORGANIZED HEALTH CARE EDUCATION/TRAINING PROGRAM

## 2024-03-28 PROCEDURE — 88307 TISSUE EXAM BY PATHOLOGIST: CPT | Mod: TC | Performed by: PLASTIC SURGERY

## 2024-03-28 PROCEDURE — 370N000017 HC ANESTHESIA TECHNICAL FEE, PER MIN: Performed by: PLASTIC SURGERY

## 2024-03-28 PROCEDURE — 88307 TISSUE EXAM BY PATHOLOGIST: CPT | Mod: 26 | Performed by: PATHOLOGY

## 2024-03-28 PROCEDURE — 88305 TISSUE EXAM BY PATHOLOGIST: CPT | Mod: 26 | Performed by: PATHOLOGY

## 2024-03-28 RX ORDER — ONDANSETRON 4 MG/1
4 TABLET, ORALLY DISINTEGRATING ORAL EVERY 8 HOURS PRN
Qty: 4 TABLET | Refills: 0 | Status: SHIPPED | OUTPATIENT
Start: 2024-03-28

## 2024-03-28 RX ORDER — ONDANSETRON 2 MG/ML
4 INJECTION INTRAMUSCULAR; INTRAVENOUS EVERY 30 MIN PRN
Status: DISCONTINUED | OUTPATIENT
Start: 2024-03-28 | End: 2024-03-28 | Stop reason: HOSPADM

## 2024-03-28 RX ORDER — DEXMEDETOMIDINE HYDROCHLORIDE 4 UG/ML
INJECTION, SOLUTION INTRAVENOUS
Status: COMPLETED | OUTPATIENT
Start: 2024-03-28 | End: 2024-03-28

## 2024-03-28 RX ORDER — PROPOFOL 10 MG/ML
INJECTION, EMULSION INTRAVENOUS PRN
Status: DISCONTINUED | OUTPATIENT
Start: 2024-03-28 | End: 2024-03-28

## 2024-03-28 RX ORDER — CEFAZOLIN SODIUM/WATER 2 G/20 ML
2 SYRINGE (ML) INTRAVENOUS
Status: COMPLETED | OUTPATIENT
Start: 2024-03-28 | End: 2024-03-28

## 2024-03-28 RX ORDER — FENTANYL CITRATE 50 UG/ML
50 INJECTION, SOLUTION INTRAMUSCULAR; INTRAVENOUS EVERY 5 MIN PRN
Status: DISCONTINUED | OUTPATIENT
Start: 2024-03-28 | End: 2024-03-28 | Stop reason: HOSPADM

## 2024-03-28 RX ORDER — OXYCODONE HYDROCHLORIDE 5 MG/1
5 TABLET ORAL
Status: COMPLETED | OUTPATIENT
Start: 2024-03-28 | End: 2024-03-28

## 2024-03-28 RX ORDER — AMOXICILLIN 250 MG
1-2 CAPSULE ORAL 2 TIMES DAILY
Qty: 30 TABLET | Refills: 0 | Status: SHIPPED | OUTPATIENT
Start: 2024-03-28

## 2024-03-28 RX ORDER — NALOXONE HYDROCHLORIDE 0.4 MG/ML
0.4 INJECTION, SOLUTION INTRAMUSCULAR; INTRAVENOUS; SUBCUTANEOUS
Status: DISCONTINUED | OUTPATIENT
Start: 2024-03-28 | End: 2024-03-28 | Stop reason: HOSPADM

## 2024-03-28 RX ORDER — LABETALOL 20 MG/4 ML (5 MG/ML) INTRAVENOUS SYRINGE
PRN
Status: DISCONTINUED | OUTPATIENT
Start: 2024-03-28 | End: 2024-03-28

## 2024-03-28 RX ORDER — CEFAZOLIN SODIUM/WATER 2 G/20 ML
2 SYRINGE (ML) INTRAVENOUS SEE ADMIN INSTRUCTIONS
Status: DISCONTINUED | OUTPATIENT
Start: 2024-03-28 | End: 2024-03-28 | Stop reason: HOSPADM

## 2024-03-28 RX ORDER — EPHEDRINE SULFATE 50 MG/ML
INJECTION, SOLUTION INTRAMUSCULAR; INTRAVENOUS; SUBCUTANEOUS PRN
Status: DISCONTINUED | OUTPATIENT
Start: 2024-03-28 | End: 2024-03-28

## 2024-03-28 RX ORDER — DEXAMETHASONE SODIUM PHOSPHATE 4 MG/ML
INJECTION, SOLUTION INTRA-ARTICULAR; INTRALESIONAL; INTRAMUSCULAR; INTRAVENOUS; SOFT TISSUE PRN
Status: DISCONTINUED | OUTPATIENT
Start: 2024-03-28 | End: 2024-03-28

## 2024-03-28 RX ORDER — HYDROMORPHONE HCL IN WATER/PF 6 MG/30 ML
0.2 PATIENT CONTROLLED ANALGESIA SYRINGE INTRAVENOUS EVERY 5 MIN PRN
Status: DISCONTINUED | OUTPATIENT
Start: 2024-03-28 | End: 2024-03-28 | Stop reason: HOSPADM

## 2024-03-28 RX ORDER — SODIUM CHLORIDE, SODIUM LACTATE, POTASSIUM CHLORIDE, CALCIUM CHLORIDE 600; 310; 30; 20 MG/100ML; MG/100ML; MG/100ML; MG/100ML
INJECTION, SOLUTION INTRAVENOUS CONTINUOUS
Status: DISCONTINUED | OUTPATIENT
Start: 2024-03-28 | End: 2024-03-28 | Stop reason: HOSPADM

## 2024-03-28 RX ORDER — BUPIVACAINE HYDROCHLORIDE 2.5 MG/ML
INJECTION, SOLUTION EPIDURAL; INFILTRATION; INTRACAUDAL
Status: COMPLETED | OUTPATIENT
Start: 2024-03-28 | End: 2024-03-28

## 2024-03-28 RX ORDER — AMOXICILLIN 250 MG
1-2 CAPSULE ORAL 2 TIMES DAILY
Qty: 30 TABLET | Refills: 0 | Status: SHIPPED | OUTPATIENT
Start: 2024-03-28 | End: 2024-03-28

## 2024-03-28 RX ORDER — SODIUM CHLORIDE, SODIUM LACTATE, POTASSIUM CHLORIDE, CALCIUM CHLORIDE 600; 310; 30; 20 MG/100ML; MG/100ML; MG/100ML; MG/100ML
INJECTION, SOLUTION INTRAVENOUS CONTINUOUS PRN
Status: DISCONTINUED | OUTPATIENT
Start: 2024-03-28 | End: 2024-03-28

## 2024-03-28 RX ORDER — ONDANSETRON 4 MG/1
4 TABLET, ORALLY DISINTEGRATING ORAL EVERY 30 MIN PRN
Status: DISCONTINUED | OUTPATIENT
Start: 2024-03-28 | End: 2024-03-28 | Stop reason: HOSPADM

## 2024-03-28 RX ORDER — ONDANSETRON 2 MG/ML
INJECTION INTRAMUSCULAR; INTRAVENOUS PRN
Status: DISCONTINUED | OUTPATIENT
Start: 2024-03-28 | End: 2024-03-28

## 2024-03-28 RX ORDER — NALOXONE HYDROCHLORIDE 0.4 MG/ML
0.2 INJECTION, SOLUTION INTRAMUSCULAR; INTRAVENOUS; SUBCUTANEOUS
Status: DISCONTINUED | OUTPATIENT
Start: 2024-03-28 | End: 2024-03-28 | Stop reason: HOSPADM

## 2024-03-28 RX ORDER — FENTANYL CITRATE 50 UG/ML
25 INJECTION, SOLUTION INTRAMUSCULAR; INTRAVENOUS EVERY 5 MIN PRN
Status: DISCONTINUED | OUTPATIENT
Start: 2024-03-28 | End: 2024-03-28 | Stop reason: HOSPADM

## 2024-03-28 RX ORDER — LABETALOL HYDROCHLORIDE 5 MG/ML
10 INJECTION, SOLUTION INTRAVENOUS
Status: DISCONTINUED | OUTPATIENT
Start: 2024-03-28 | End: 2024-03-28 | Stop reason: HOSPADM

## 2024-03-28 RX ORDER — NALOXONE HYDROCHLORIDE 0.4 MG/ML
0.1 INJECTION, SOLUTION INTRAMUSCULAR; INTRAVENOUS; SUBCUTANEOUS
Status: DISCONTINUED | OUTPATIENT
Start: 2024-03-28 | End: 2024-03-28 | Stop reason: HOSPADM

## 2024-03-28 RX ORDER — DEXAMETHASONE SODIUM PHOSPHATE 10 MG/ML
INJECTION, SOLUTION INTRAMUSCULAR; INTRAVENOUS
Status: COMPLETED | OUTPATIENT
Start: 2024-03-28 | End: 2024-03-28

## 2024-03-28 RX ORDER — HYDRALAZINE HYDROCHLORIDE 20 MG/ML
2.5-5 INJECTION INTRAMUSCULAR; INTRAVENOUS EVERY 10 MIN PRN
Status: DISCONTINUED | OUTPATIENT
Start: 2024-03-28 | End: 2024-03-28 | Stop reason: HOSPADM

## 2024-03-28 RX ORDER — OXYCODONE HYDROCHLORIDE 10 MG/1
10 TABLET ORAL
Status: DISCONTINUED | OUTPATIENT
Start: 2024-03-28 | End: 2024-03-28 | Stop reason: HOSPADM

## 2024-03-28 RX ORDER — ONDANSETRON 4 MG/1
4 TABLET, ORALLY DISINTEGRATING ORAL EVERY 8 HOURS PRN
Qty: 4 TABLET | Refills: 0 | Status: SHIPPED | OUTPATIENT
Start: 2024-03-28 | End: 2024-03-28

## 2024-03-28 RX ORDER — FLUMAZENIL 0.1 MG/ML
0.2 INJECTION, SOLUTION INTRAVENOUS
Status: DISCONTINUED | OUTPATIENT
Start: 2024-03-28 | End: 2024-03-28 | Stop reason: HOSPADM

## 2024-03-28 RX ORDER — LIDOCAINE HYDROCHLORIDE 20 MG/ML
INJECTION, SOLUTION INFILTRATION; PERINEURAL PRN
Status: DISCONTINUED | OUTPATIENT
Start: 2024-03-28 | End: 2024-03-28

## 2024-03-28 RX ORDER — HYDROMORPHONE HCL IN WATER/PF 6 MG/30 ML
0.4 PATIENT CONTROLLED ANALGESIA SYRINGE INTRAVENOUS EVERY 5 MIN PRN
Status: DISCONTINUED | OUTPATIENT
Start: 2024-03-28 | End: 2024-03-28 | Stop reason: HOSPADM

## 2024-03-28 RX ORDER — FENTANYL CITRATE 50 UG/ML
25-50 INJECTION, SOLUTION INTRAMUSCULAR; INTRAVENOUS
Status: DISCONTINUED | OUTPATIENT
Start: 2024-03-28 | End: 2024-03-28 | Stop reason: HOSPADM

## 2024-03-28 RX ORDER — METRONIDAZOLE 500 MG/100ML
500 INJECTION, SOLUTION INTRAVENOUS EVERY 12 HOURS
Status: DISCONTINUED | OUTPATIENT
Start: 2024-03-28 | End: 2024-03-28 | Stop reason: HOSPADM

## 2024-03-28 RX ORDER — OXYCODONE HYDROCHLORIDE 5 MG/1
5-10 TABLET ORAL EVERY 6 HOURS PRN
Qty: 15 TABLET | Refills: 0 | Status: SHIPPED | OUTPATIENT
Start: 2024-03-28 | End: 2024-04-23

## 2024-03-28 RX ADMIN — SUGAMMADEX 200 MG: 100 INJECTION, SOLUTION INTRAVENOUS at 12:08

## 2024-03-28 RX ADMIN — METRONIDAZOLE 500 MG: 500 INJECTION, SOLUTION INTRAVENOUS at 08:01

## 2024-03-28 RX ADMIN — SODIUM CHLORIDE, SODIUM LACTATE, POTASSIUM CHLORIDE, CALCIUM CHLORIDE: 600; 310; 30; 20 INJECTION, SOLUTION INTRAVENOUS at 10:40

## 2024-03-28 RX ADMIN — FENTANYL CITRATE 50 MCG: 50 INJECTION, SOLUTION INTRAMUSCULAR; INTRAVENOUS at 13:13

## 2024-03-28 RX ADMIN — OXYCODONE HYDROCHLORIDE 5 MG: 5 TABLET ORAL at 14:15

## 2024-03-28 RX ADMIN — PROPOFOL 50 MG: 10 INJECTION, EMULSION INTRAVENOUS at 10:15

## 2024-03-28 RX ADMIN — Medication 20 MG: at 10:58

## 2024-03-28 RX ADMIN — Medication 10 MG: at 11:26

## 2024-03-28 RX ADMIN — FENTANYL CITRATE 50 MCG: 50 INJECTION INTRAMUSCULAR; INTRAVENOUS at 10:28

## 2024-03-28 RX ADMIN — PROPOFOL 150 MG: 10 INJECTION, EMULSION INTRAVENOUS at 07:49

## 2024-03-28 RX ADMIN — Medication 20 MG: at 09:19

## 2024-03-28 RX ADMIN — DEXMEDETOMIDINE 40 MCG: 100 INJECTION, SOLUTION, CONCENTRATE INTRAVENOUS at 06:30

## 2024-03-28 RX ADMIN — LABETALOL 20 MG/4 ML (5 MG/ML) INTRAVENOUS SYRINGE 5 MG: at 10:43

## 2024-03-28 RX ADMIN — Medication 2 G: at 07:58

## 2024-03-28 RX ADMIN — FENTANYL CITRATE 50 MCG: 50 INJECTION INTRAMUSCULAR; INTRAVENOUS at 06:43

## 2024-03-28 RX ADMIN — DEXAMETHASONE SODIUM PHOSPHATE 8 MG: 4 INJECTION, SOLUTION INTRA-ARTICULAR; INTRALESIONAL; INTRAMUSCULAR; INTRAVENOUS; SOFT TISSUE at 07:49

## 2024-03-28 RX ADMIN — LABETALOL 20 MG/4 ML (5 MG/ML) INTRAVENOUS SYRINGE 10 MG: at 11:25

## 2024-03-28 RX ADMIN — EPHEDRINE SULFATE 10 MG: 5 INJECTION INTRAVENOUS at 07:56

## 2024-03-28 RX ADMIN — Medication 50 MG: at 07:49

## 2024-03-28 RX ADMIN — LABETALOL 20 MG/4 ML (5 MG/ML) INTRAVENOUS SYRINGE 10 MG: at 10:38

## 2024-03-28 RX ADMIN — FENTANYL CITRATE 25 MCG: 50 INJECTION, SOLUTION INTRAMUSCULAR; INTRAVENOUS at 12:55

## 2024-03-28 RX ADMIN — HYDROMORPHONE HYDROCHLORIDE 0.2 MG: 0.2 INJECTION, SOLUTION INTRAMUSCULAR; INTRAVENOUS; SUBCUTANEOUS at 13:40

## 2024-03-28 RX ADMIN — Medication 20 MG: at 10:36

## 2024-03-28 RX ADMIN — MIDAZOLAM 2 MG: 1 INJECTION INTRAMUSCULAR; INTRAVENOUS at 07:42

## 2024-03-28 RX ADMIN — Medication 20 MG: at 08:50

## 2024-03-28 RX ADMIN — MIDAZOLAM HYDROCHLORIDE 1 MG: 1 INJECTION, SOLUTION INTRAMUSCULAR; INTRAVENOUS at 06:44

## 2024-03-28 RX ADMIN — FENTANYL CITRATE 100 MCG: 50 INJECTION INTRAMUSCULAR; INTRAVENOUS at 07:49

## 2024-03-28 RX ADMIN — Medication 20 MG: at 09:47

## 2024-03-28 RX ADMIN — Medication 10 MG: at 10:17

## 2024-03-28 RX ADMIN — PHENYLEPHRINE HYDROCHLORIDE 100 MCG: 10 INJECTION INTRAVENOUS at 08:11

## 2024-03-28 RX ADMIN — EPHEDRINE SULFATE 5 MG: 5 INJECTION INTRAVENOUS at 08:18

## 2024-03-28 RX ADMIN — PHENYLEPHRINE HYDROCHLORIDE 100 MCG: 10 INJECTION INTRAVENOUS at 07:49

## 2024-03-28 RX ADMIN — SODIUM CHLORIDE, POTASSIUM CHLORIDE, SODIUM LACTATE AND CALCIUM CHLORIDE: 600; 310; 30; 20 INJECTION, SOLUTION INTRAVENOUS at 07:42

## 2024-03-28 RX ADMIN — SODIUM CHLORIDE, SODIUM LACTATE, POTASSIUM CHLORIDE, CALCIUM CHLORIDE: 600; 310; 30; 20 INJECTION, SOLUTION INTRAVENOUS at 07:56

## 2024-03-28 RX ADMIN — FENTANYL CITRATE 50 MCG: 50 INJECTION INTRAMUSCULAR; INTRAVENOUS at 10:22

## 2024-03-28 RX ADMIN — LIDOCAINE HYDROCHLORIDE 100 MG: 20 INJECTION, SOLUTION INFILTRATION; PERINEURAL at 07:49

## 2024-03-28 RX ADMIN — Medication 30 MG: at 08:21

## 2024-03-28 RX ADMIN — FENTANYL CITRATE 50 MCG: 50 INJECTION INTRAMUSCULAR; INTRAVENOUS at 08:34

## 2024-03-28 RX ADMIN — BUPIVACAINE HYDROCHLORIDE 20 ML: 2.5 INJECTION, SOLUTION EPIDURAL; INFILTRATION; INTRACAUDAL at 06:30

## 2024-03-28 RX ADMIN — EPHEDRINE SULFATE 5 MG: 5 INJECTION INTRAVENOUS at 08:12

## 2024-03-28 RX ADMIN — Medication 2 G: at 11:55

## 2024-03-28 RX ADMIN — PHENYLEPHRINE HYDROCHLORIDE 100 MCG: 10 INJECTION INTRAVENOUS at 08:49

## 2024-03-28 RX ADMIN — ONDANSETRON 4 MG: 2 INJECTION INTRAMUSCULAR; INTRAVENOUS at 12:10

## 2024-03-28 RX ADMIN — FENTANYL CITRATE 50 MCG: 50 INJECTION INTRAMUSCULAR; INTRAVENOUS at 08:56

## 2024-03-28 RX ADMIN — BUPIVACAINE HYDROCHLORIDE 40 ML: 2.5 INJECTION, SOLUTION EPIDURAL; INFILTRATION; INTRACAUDAL; PERINEURAL at 06:30

## 2024-03-28 RX ADMIN — HYDROMORPHONE HYDROCHLORIDE 0.5 MG: 1 INJECTION, SOLUTION INTRAMUSCULAR; INTRAVENOUS; SUBCUTANEOUS at 11:35

## 2024-03-28 RX ADMIN — LABETALOL 20 MG/4 ML (5 MG/ML) INTRAVENOUS SYRINGE 5 MG: at 11:17

## 2024-03-28 RX ADMIN — DEXAMETHASONE SODIUM PHOSPHATE 2 MG: 10 INJECTION, SOLUTION INTRAMUSCULAR; INTRAVENOUS at 06:30

## 2024-03-28 ASSESSMENT — ACTIVITIES OF DAILY LIVING (ADL)
ADLS_ACUITY_SCORE: 31
ADLS_ACUITY_SCORE: 32
ADLS_ACUITY_SCORE: 31

## 2024-03-28 NOTE — ANESTHESIA PROCEDURE NOTES
Pectoralis Procedure Note    Pre-Procedure   Staff -        Anesthesiologist:  Rajan Fortune MD       Resident/Fellow: Rony Harrison MD       Performed By: resident and with residents       Procedure performed by resident/fellow/CRNA in presence of a teaching physician.         Location: pre-op       Procedure Start/Stop Times: 3/28/2024 6:30 AM and 3/28/2024 6:48 AM       Pre-Anesthestic Checklist: patient identified, IV checked, site marked, risks and benefits discussed, informed consent, monitors and equipment checked, pre-op evaluation, at physician/surgeon's request and post-op pain management  Timeout:       Correct Patient: Yes        Correct Procedure: Yes        Correct Site: Yes        Correct Position: Yes        Correct Laterality: Yes        Site Marked: Yes  Procedure Documentation  Procedure: Pectoralis             Pectoralis I and Pectoralis II       Laterality: bilateral       Patient Position: supine       Skin prep: Chloraprep       Needle Gauge: 21.        Needle Length (millimeters): 110        Ultrasound guided       1. Ultrasound was used to identify targeted nerve, plexus, vascular marker, or fascial plane and place a needle adjacent to it in real-time.       2. Ultrasound was used to visualize the spread of anesthetic in close proximity to the above referenced structure.       3. A permanent image is entered into the patient's record.       4. The visualized anatomic structures appeared normal.       5. There were no apparent abnormal pathologic findings.    Assessment/Narrative         The placement was negative for: blood aspirated, painful injection and site bleeding       Paresthesias: No.       Bolus given via needle. no blood aspirated via catheter.        Secured via.        Insertion/Infusion Method: Single Shot       Complications: none    Medication(s) Administered   Bupivacaine 0.25% PF (Infiltration) - Infiltration   20 mL - 3/28/2024 6:30:00  "AM  Dexmedetomidine 4 mcg/mL (Perineural) - Perineural   40 mcg - 3/28/2024 6:30:00 AM  Dexamethasone 10 mg/mL PF (Perineural) - Perineural   2 mg - 3/28/2024 6:30:00 AM  Medication Administration Time: 3/28/2024 6:30 AM     Comments:  Bilateral PECS 1 and PECS 2 Blocks      FOR [facility] ONLY:   Pain Team Contact information: please page the Pain Team Via [employer]. Search \"Pain\". During daytime hours, please page the attending first. At night please page the resident first.      "

## 2024-03-28 NOTE — ANESTHESIA PROCEDURE NOTES
TAP Procedure Note    Pre-Procedure   Staff -        Anesthesiologist:  Rajan Fortune MD       Resident/Fellow: Rony Harrison MD       Performed By: resident and with residents       Procedure performed by resident/fellow/CRNA in presence of a teaching physician.         Location: pre-op       Procedure Start/Stop Times: 3/28/2024 6:30 AM and 3/28/2024 6:48 AM       Pre-Anesthestic Checklist: patient identified, IV checked, site marked, risks and benefits discussed, informed consent, monitors and equipment checked, pre-op evaluation, at physician/surgeon's request and post-op pain management  Timeout:       Correct Patient: Yes        Correct Procedure: Yes        Correct Site: Yes        Correct Position: Yes        Correct Laterality: Yes        Site Marked: Yes  Procedure Documentation  Procedure: TAP       Laterality: bilateral       Patient Position: supine       Skin prep: Chloraprep       Needle Gauge: 21.        Needle Length (millimeters): 110        Ultrasound guided       1. Ultrasound was used to identify targeted nerve, plexus, vascular marker, or fascial plane and place a needle adjacent to it in real-time.       2. Ultrasound was used to visualize the spread of anesthetic in close proximity to the above referenced structure.       3. A permanent image is entered into the patient's record.       4. The visualized anatomic structures appeared normal.       5. There were no apparent abnormal pathologic findings.    Assessment/Narrative         The placement was negative for: blood aspirated, painful injection and site bleeding       Paresthesias: No.       Bolus given via needle. no blood aspirated via catheter.        Secured via.        Insertion/Infusion Method: Single Shot       Complications: none    Medication(s) Administered   Bupivacaine 0.25% PF (Infiltration) - Infiltration   40 mL - 3/28/2024 6:30:00 AM  Medication Administration Time: 3/28/2024 6:30 AM     Comments:   "Bilateral TAP Block      FOR [facility] ([facility]) ONLY:   Pain Team Contact information: please page the Pain Team Via [employer]. Search \"Pain\". During daytime hours, please page the attending first. At night please page the resident first.      "

## 2024-03-28 NOTE — ANESTHESIA CARE TRANSFER NOTE
Patient: Zainab Bolton    Procedure: Procedure(s):  bilateral revision of breasts, umbilicoplasty, scar revision of abdomen  Bilateral breast fat grafting from flanks abdomen and thighs  Total laparoscopic hysterectomy, bilateral salpingo-oophorectomy, Cystoscopy       Diagnosis: S/P flap graft [Z98.890]  Intramural leiomyoma of uterus [D25.1]  Diagnosis Additional Information: No value filed.    Anesthesia Type:   General     Note:    Oropharynx: spontaneously breathing and oropharynx clear of all foreign objects  Level of Consciousness: awake  Oxygen Supplementation: face mask  Level of Supplemental Oxygen (L/min / FiO2): 6  Independent Airway: airway patency satisfactory and stable  Dentition: dentition unchanged  Vital Signs Stable: post-procedure vital signs reviewed and stable  Report to RN Given: handoff report given  Patient transferred to: PACU  Comments: Patient awake and responding appropriately; states pain is under control; care to PACU RN  Handoff Report: Identifed the Patient, Identified the Reponsible Provider, Reviewed the pertinent medical history, Discussed the surgical course, Reviewed Intra-OP anesthesia mangement and issues during anesthesia, Set expectations for post-procedure period and Allowed opportunity for questions and acknowledgement of understanding      Vitals:  Vitals Value Taken Time   /80 03/28/24 1230   Temp     Pulse 70 03/28/24 1239   Resp 14 03/28/24 1239   SpO2 100 % 03/28/24 1239   Vitals shown include unfiled device data.    Electronically Signed By: ERNESTO EASTON APRN CRNA  March 28, 2024  12:39 PM

## 2024-03-28 NOTE — PROGRESS NOTES
Gynecologic Oncology   Postoperative Check  03/28/2024    S:   Patient reports she is doing well postoperatively. Pain is well controlled with oral  medications. Ambulating without dizziness. Voiding spontaneously. Tolerating crackers and water with minimal nausea or vomiting. Denies chest pain, shortness of breath, dizziness, or other concerns at this time.    O:  Vitals:    03/28/24 1400 03/28/24 1415 03/28/24 1500 03/28/24 1509   BP: 137/84 (!) 132/95 (!) 141/79 (!) 140/85   Cuff Size: Adult Regular      Pulse: 75 71 80 79   Resp: 13 (!) 9 25 16   Temp: 97.9  F (36.6  C)   98.2  F (36.8  C)   TempSrc: Oral   Oral   SpO2: 93% 99%  98%   Weight:       Height:           Gen: NAD  Cardio: RRR, no m/r/g  Resp: CTAB, no wheezing or crackles  Abdomen: soft, appropriately tender, non distended  Extremities: Non-tender, trace edema    A/P:   43 year old POD#0 s/p b/l breast fat grafting, breast revision, umbilicoplasty, abdominal scar revision (plastics); total laparoscopic hysterectomy, bilateral salpingo-oophorectomy, cystoscopy (gyn/onc). Doing well postoperatively. Appropriate for discharge from gyn/onc standpoint. Reviewed discharge instructions and precautions from hysterectomy. She will follow-up post-op with Dr. Watkins.    Postoperative care  - Dz: Breast cancer, ER/UT+, BRCA2. S/p b/l mastectomy. Tamoxifen/zoladex> tamoxifen.  - FEN: Regular diet  - Pain: Tylenol, Ibuprofen, Oxycodone  - Heme: Hgb 13> EBL 25 (plastics) + 50 (gyn/onc)  - GI: Stool softeners for discharge  - : S/p caldera, voiding  - PPX: SCDs, IS    Dispo: To home    Anna Montenegro MD  Gynecologic Oncology, PGY-4  03/28/2024 3:44 PM

## 2024-03-28 NOTE — ANESTHESIA POSTPROCEDURE EVALUATION
Patient: Zainab Bolton    Procedure: Procedure(s):  bilateral revision of breasts, umbilicoplasty, scar revision of abdomen  Bilateral breast fat grafting from flanks abdomen and thighs  Total laparoscopic hysterectomy, bilateral salpingo-oophorectomy, Cystoscopy       Anesthesia Type:  General    Note:  Disposition: Admission   Postop Pain Control: Uneventful            Sign Out: Well controlled pain   PONV: No   Neuro/Psych: Uneventful            Sign Out: Acceptable/Baseline neuro status   Airway/Respiratory: Uneventful            Sign Out: Acceptable/Baseline resp. status   CV/Hemodynamics: Uneventful            Sign Out: Acceptable CV status; No obvious hypovolemia; No obvious fluid overload   Other NRE: NONE   DID A NON-ROUTINE EVENT OCCUR? No           Last vitals:  Vitals Value Taken Time   /87 03/28/24 1315   Temp 36.6  C (97.8  F) 03/28/24 1230   Pulse 81 03/28/24 1327   Resp 19 03/28/24 1327   SpO2 100 % 03/28/24 1327   Vitals shown include unfiled device data.    Electronically Signed By: Bob Ocampo MD  March 28, 2024  1:28 PM

## 2024-03-28 NOTE — ANESTHESIA POSTPROCEDURE EVALUATION
Patient: Zainab Bolton    Procedure: Procedure(s):  bilateral revision of breasts, umbilicoplasty, scar revision of abdomen  Bilateral breast fat grafting from flanks abdomen and thighs  Total laparoscopic hysterectomy, bilateral salpingo-oophorectomy, Cystoscopy       Anesthesia Type:  General    Note:  Disposition: Outpatient   Postop Pain Control: Uneventful            Sign Out: Well controlled pain   PONV: No   Neuro/Psych: Uneventful            Sign Out: Acceptable/Baseline neuro status   Airway/Respiratory: Uneventful            Sign Out: Acceptable/Baseline resp. status   CV/Hemodynamics: Uneventful            Sign Out: Acceptable CV status; No obvious hypovolemia; No obvious fluid overload   Other NRE: NONE   DID A NON-ROUTINE EVENT OCCUR?            Last vitals:  Vitals Value Taken Time   /80 03/28/24 1345   Temp 36.6  C (97.8  F) 03/28/24 1230   Pulse 81 03/28/24 1357   Resp 22 03/28/24 1357   SpO2 87 % 03/28/24 1357   Vitals shown include unfiled device data.    Electronically Signed By: Chely Martinez MD  March 28, 2024  1:57 PM

## 2024-03-28 NOTE — ANESTHESIA PROCEDURE NOTES
Airway       Patient location during procedure: OR       Procedure Start/Stop Times: 3/28/2024 7:52 AM  Staff -        CRNA: Omer Kim APRN CRNA       Performed By: CRNA  Consent for Airway        Urgency: elective  Indications and Patient Condition       Indications for airway management: shantel-procedural       Induction type:intravenous       Mask difficulty assessment: 2 - vent by mask + OA or adjuvant +/- NMBA    Final Airway Details       Final airway type: endotracheal airway       Successful airway: ETT - single and Oral  Endotracheal Airway Details        ETT size (mm): 6.5       Cuffed: yes       Successful intubation technique: direct laryngoscopy       DL Blade Type: Scherer 2       Grade View of Cords: 1       Adjucts: stylet       Position: Right       Measured from: lips       Secured at (cm): 21       Bite block used: None    Post intubation assessment        Placement verified by: capnometry, equal breath sounds and chest rise        Number of attempts at approach: 1       Number of other approaches attempted: 0       Secured with: tape       Ease of procedure: easy       Dentition: Intact and Unchanged    Medication(s) Administered   Medication Administration Time: 3/28/2024 7:52 AM

## 2024-03-28 NOTE — OP NOTE
PREOPERATIVE DIAGNOSIS: S/p bilateral breast reconstruction with free Judi flaps for right-sided breast cancer now ready for symmetry enhancement stage III reconstructions.    POSTOPERATIVE DIAGNOSIS: As above    PROCEDURES:   1.  Bilateral breast fat grafting using the revolve system and Moreira technique a total of 200 cc injected  2.  Bilateral breast revision with skin and subcutaneous tissue excision to shape the breasts: Right side lateral aspect skin and 6 soft tissue excision with layered closure total length 2 cm and left breast lateral scar revision with excision of skin and subcutaneous tissue and layered closure total length 2 cm.  3.  Umbilicoplasty  4.  Right lateral abdominal scar revision with excision of skin and subcutaneous tissue and layered closure total length 10 cm  5.  Left lateral abdominal scar revision with excision of skin and subcutaneous tissue and layered closure total length 8 cm     SURGEON: Jd Aaron MD      RESIDENT: Ferdinand Welch MD.     ANESTHESIA: General anesthesia with endotracheal intubation.      COMPLICATIONS: Nil.      DRAINS: None     SPECIMENS: None     BLOOD LOSS: 15 mL        DESCRIPTION OF PROCEDURE: After informed consent was taken from the patient, the proper site and procedure was ascertained with them and they were appropriately marked, they were taken to the operating room. They were placed in a supine position with their knees comfortably flexed with pillows underneath them, and pneumoboots placed and running prior to induction of anesthesia. Preoperative antibiotics were given in the OR and appropriately redosed during the case. General anesthesia was administered without any complications.  She was prepped and draped in the standard surgical fashion.     I began by first instilling the tumescent solution in the anterior medial thighs, mons, and the flank regions.  While that took effect I turned my attention to the breasts.  I went ahead and excised some  of the standing cutaneous cones/distorting skin and subcutaneous tissues in the lateral aspect of both breasts to round out the breasts.  Individually these were 2 cm long.  These were closed in a layered fashion using 2-0 Monocryl suture in a deep dermal layer and 3-0 Stratafix suture in the skin.  I then turned my attention to the abdomen.  I had preoperatively marked the position of the umbilicus.  An X shaped cut was made.  The 4 flaps were raised.  The underlying fatty tissue was excised all the way to the anterior abdominal wall generously.  Hemostasis was ensured.  The 4 flaps were sewn down to the anterior abdominal wall using 2-0 Monocryl suture.  The intervening skin incisions were closed with 4-0 Monocryl suture in an interrupted fashion.    I then turned my attention to the standing cutaneous cones of the abdominal incision on either end.  The skin and subcutaneous tissues were excised and the incisions were closed with 2-0 Monocryl suture in a deep dermal layer followed by 3-0 Stratafix suture in the skin.    I then turned my attention to harvesting the fat from the anterior, medial thighs as well as the mons and the flank regions through multiple stab incisions using a 4 mm cannula in a suction assisted lipectomy fashion.  The fat was harvested and collected in the revolve system.  A total of 350 cc of aspirate was collected.  This was washed 3 different times.  The fat was then collected in 10 cc syringes.  Using multiple stab incisions and a blunt Moreira catheter the fat was then injected in a fanning technique in multiple layers of both breast to round out the upper portions of the breast.  A total of about 200 cc was injected.  All the stab incisions were then closed with 5-0 fast-absorbing gut suture.  A Xeroform dry gauze and Tegaderm dressing was placed over the umbilicus.  All the incisions were covered with tape and glue.  At this point the patient was handed over to the gynecological team to  do their portion which they will dictate.  They were instructed to wrap the thighs and chest and placed an abdominal binder after their portion was completed.  Up to my portion, the patient tolerated the procedure well and was stable throughout. All counts were correct at the end of the case.

## 2024-03-28 NOTE — PROGRESS NOTES
Abdominal transverse and pectoral blocks performed without complications.  VSS.  Pt tolerated well.  Will continue to monitor.

## 2024-03-28 NOTE — PROGRESS NOTES
Blood consent received from mother (Silva Grover) over the phone. Consent obtained after IV sedation administered.

## 2024-03-28 NOTE — BRIEF OP NOTE
[facility]    Brief Operative Note    Pre-operative diagnosis: S/P flap graft [Z98.890]  Intramural leiomyoma of uterus [D25.1]  Post-operative diagnosis Same as pre-operative diagnosis    Procedure: bilateral revision of breasts, umbilicoplasty, scar revision of abdomen, Bilateral - Breast  Bilateral breast fat grafting from flanks abdomen and thighs, Bilateral - Update  Total laparoscopic hysterectomy, bilateral salpingo-oophorectomy, Bilateral - Abdomen    Surgeon: Surgeon(s) and Role:  Panel 1:     * MARIA E Aaron MD - Primary  Panel 2:     * Marco Watkins MD - Primary  Anesthesia: General with Block   Estimated Blood Loss: 25ml    Drains: None  Specimens: * No specimens in log *  Findings:   None.  Complications: None.  Implants: * No implants in log *    May discharge from our standpoint  ACE to chest and thighs, ABD binder to hips/abd  Remainder of care per OB/GYN team    Ferdinand Welch MD - PGY 8  Plastic Surgery Resident  Pager [phone]

## 2024-03-28 NOTE — DISCHARGE INSTRUCTIONS
FAT GRAFTING POST-OPERATIVE INSTRUCTIONS    Instructions      Have someone drive you home after surgery and help you at home for 1-2      days.     Get plenty of rest.     Follow balanced diet.     Decreased activity may promote constipation, so you may want to add      more raw fruit to your diet, and be sure to increase fluid intake.     Take pain medication as prescribed. Do not take aspirin or any products      containing aspirin unless approved by your surgeon.     Do not drink alcohol when taking pain medications.     Even when not taking pain medications, no alcohol for 3 weeks as it      causes fluid retention.     If you are taking vitamins with iron, resume these as tolerated.     Do not smoke, as smoking delays healing and increases the risk of      complications.    Activities     Do not drive until you are no longer taking any pain medications      (narcotics).     Start walking as soon as possible, this helps to reduce swelling and       lowers the chance of blood clots.     Unless stated on this form, discuss your time off work with your surgeon.    Treated Area Care      You may shower after 24 hours. The ACE wrap (if used) may be rewrapped as needed (if too tight or loose). Use it for support and may subtitute with a sports bra if preferred.  Wear the compression garment (spandex type clothing or the provided sponge and binder) in the area where the liposuction was performed to harvest the fat for the fat injection for 2 weeks post opor per the surgeon's recommendation.     Avoid exposing scars to sun for at least 12 months.     Always use a strong sunblock, if sun exposure is unavoidable (SPF 50 or      greater).     Inspect daily for signs of infection.     No tub soaking, bathing, or swimming while sutures or drains are in place.     You may wear makeup with sunblock protection shortly.     Stay out of the sun until redness and bruising subsides (usually 48      Hours).    What to Expect      Temporary stinging, throbbing, burning sensation, redness, swelling,       bruising, and excess fullness.     Some swelling, bruising or redness in the donor and recipient sites.     Swelling and puffiness may last several weeks.     Redness and bruising usually lasts about 48 hours.     Appearance     Improved skin texture.     Firmer and smoother skin.    Follow-Up Care     With regular follow-up treatments, you can easily maintain your new       look.     Repeated treatments may be necessary.    When to Call     If you have increased swelling or bruising.     If swelling and redness persist after a few days.     If you have increased redness along the incision.     If you have severe or increased pain not relieved by medication.     If you have any side effects to medications; such as, rash, nausea,      headache, vomiting.     If you have an oral temperature over 100.4 degrees.     If you have any yellowish or greenish drainage from the incisions or      notice a foul odor.     If you have bleeding from the incisions that is difficult to control with      light pressure.     If you have loss of feeling or motion.     If you have any sign of abscesses, open sores, skin peeling or lumpiness.    For Medical Questions, Please Call:     [phone], Monday - Friday, 8 a.m. - 4:30 p.m.     After hours and on weekends, call Hospital Paging at [phone] and      ask for the Plastic Surgeon on call.    Contacting your Doctor -   To contact a doctor:MARIA E Aaron MD Plastics & Reconstruction [phone] [CLINIC, Marco Watkins MD Gynecology Oncology [phone] [OFFICE]    [phone] and ask for the resident on call for Plastic and Reconstruction/ Gynecology Oncology (answered 24 hours a day)   Emergency Department:  [facility]: [phone]  [facility]: [phone] if you are in need of immediate or emergent help

## 2024-03-28 NOTE — OP NOTE
[facility] - Operative Note    Pre-operative diagnosis:   History of receptor positive breast cancer   Intramural leiomyoma of uterus [D25.1]  BRCA pathogenic mutation    Post-operative diagnosis: Same    Procedure(s):  Total laparoscopic hysterectomy, bilateral salpingo-oophorectomy in conjunction with breast procedures    Surgeon(s) and Role:     * Marco Watkins MD - Primary     * Anna Montenegro MD - Resident - Assisting     * Radha De Leon MD - Fellow - Assisting    Anesthesia:   General with Block     EBL: 50ml    IVF:  1500ml    UOP: 1300ml    Drains: None    Specimen(s):  ID Type Source Tests Collected by Time Destination   1 : Pelvic washings Washings Pelvis NON-GYNECOLOGIC CYTOLOGY MARIA E Aaron MD 3/28/2024 10:39 AM    2 : Uterus, cervix, Bilateral Fallopian Tubes and ovaries Tissue Uterus, Cervix, Bilateral Fallopian Tubes & Ovaries SURGICAL PATHOLOGY EXAM MARIA E Aaron MD 3/28/2024 11:38 AM        Findings: Bimanual with patulous cervix. Enlarged mobile uterus. Cervix and vagina normal in appearance. Upper abdomen normal. Uterus enlarged with fibroids, tubes and ovaries normal.    Cystoscopy with normal bladder. Brisk efflux from ureter bilaterally.    Complications: None apparent    Implants: None     Indication:  Zainab Bolton is a 43 year old with breast cancer found to have BRCA mutation here to undergo additional breast reconstruction also desiring risk reducing bilateral salpingo-oophorectomy concurrently.    Description of Procedure:  Upon arrival, patient was under general anesthesia after plastic surgery procedure. She was repositioned and prepped and draped. A catheter was placed on the sterile field. Surgical time out was performed. A speculum was placed. The V-care uterine manipulator was then placed into the uterus in the usual fashion, and the speculum was removed.     Attention was turned to the abdomen where a high supra-umbilical  incision was made to avoid the prior umbilicoplasty area. The Veress needle was inserted into the abdomen and intra-abdominal placement was confirmed with a low insertion pressure of 4mmHg. The abdomen was insufflated with CO2 to an operating pressure of 15mmHg. A 5mm clear view port was inserted into the abdomen under direct visualization with the laparoscope. A survey of the abdomen revealed the findings noted above.      Attention was turned to placement of the accessory ports. A 5mm incision was made in the left lower quadrant in her prior abdominoplasty scar. The right lower port was placed similarly and then a second medial port was placed on the right. Trendelenburg position. Washings collected.     The round ligament was grasped, cauterized, and transected with the Ligasure. The retroperitoneum was opened parallel to the gonadal vessels. The external iliac artery was followed cephalad and the ureter was identified. The ovarian vessels were cauterized and transected using the Ligasure. The adnexa was freed from its peritoneal attachments using the Ligasure to the level of the uterine cornua. The posterior broad ligament was further transected to begin skeletonizing the uterine artery. The anterior broad ligament was transected toward the uterus and the uterovesical peritoneum was incised. A combination of cautery and blunt dissection was used to create a bladder flap. This took a bit of time due to adhesions on the right side. The uterine artery was skeletonized. The same procedure was carried out on the contralateral side. The uterine arteries were then cauterized, and transected using the Ligasure bilaterally. Small straight bites were taken with the Ligasure on both sides in order to lateralize the uterine artery. The monopolar cautery hook was then used to make the colpotomy, which was carried along the V-Care cup to free the specimen. The specimen was then removed through the vagina, V-Care was confirmed  intact, and the specimen was sent to pathology. A balloon was placed in the vagina to retain pneumoperitoneum.    The vaginal cuff was then closed laparoscopically using running sutures of V-lock. The cuff was irrigated and found to be hemostatic. All pedicles were inspected and also found to be hemostatic. The balloon in the vagina was removed and the cuff palpated to confirm an intact closure. Cystoscopy was performed with normal findings as above. The port sites were closed using 4-0 Monocryl and a steri-strips followed by sterile dressing was placed. All counts were correct prior to concluding the case. The patient was awakened and extubated. She was taken to PACU in stable condition. She tolerated the procedure well without apparent complication.    Marco Watkins MD

## 2024-03-29 LAB
PATH REPORT.COMMENTS IMP SPEC: NORMAL
PATH REPORT.FINAL DX SPEC: NORMAL
PATH REPORT.GROSS SPEC: NORMAL
PATH REPORT.MICROSCOPIC SPEC OTHER STN: NORMAL
PATH REPORT.RELEVANT HX SPEC: NORMAL

## 2024-03-31 ENCOUNTER — TELEPHONE (OUTPATIENT)
Dept: SURGERY | Facility: CLINIC | Age: 44
End: 2024-03-31

## 2024-03-31 NOTE — TELEPHONE ENCOUNTER
Plastic Surgery Progress Note  Telephone Encouner    03/31/2024    Zainab Bolton is a 43 year old female with h/o breast  cancer now POD#3 s/p  bilateral breast reconstruction with free Judi flaps for right-sided breast cancer now ready for symmetry enhancement stage III reconstructions     Pt reports she was taking a shower and the water got into her umbilical dressing and saturated the dressing. She called asking if the dressing could be changed. It appeared to be minimally saturated with old blood. There was no fluid collections or active bleeding from the umbilical incisions. Per the patient they appeared c/d/I. She was instructed to replace the guaze and tape and change as needed if it becomes saturated again. She reports that she feels light headed and has been taking shallow breaths since surgery but per the patient, this is normal for her postoperatively. She denied erythema or tenderness around the umbilical incisions.     Patient was encouraged to call if any new questions or concerns arise.  Maile Redd MD  Plastic and Reconstructive Surgery PGY1

## 2024-04-02 ENCOUNTER — PATIENT OUTREACH (OUTPATIENT)
Dept: ONCOLOGY | Facility: CLINIC | Age: 44
End: 2024-04-02
Payer: COMMERCIAL

## 2024-04-03 ENCOUNTER — OFFICE VISIT (OUTPATIENT)
Dept: PLASTIC SURGERY | Facility: CLINIC | Age: 44
End: 2024-04-03
Payer: COMMERCIAL

## 2024-04-03 VITALS
HEIGHT: 63 IN | SYSTOLIC BLOOD PRESSURE: 126 MMHG | HEART RATE: 75 BPM | OXYGEN SATURATION: 98 % | DIASTOLIC BLOOD PRESSURE: 84 MMHG | BODY MASS INDEX: 25.8 KG/M2 | WEIGHT: 145.6 LBS | TEMPERATURE: 98.5 F

## 2024-04-03 DIAGNOSIS — Z98.890 S/P FLAP GRAFT: Primary | ICD-10-CM

## 2024-04-03 PROCEDURE — 99024 POSTOP FOLLOW-UP VISIT: CPT | Performed by: PLASTIC SURGERY

## 2024-04-03 ASSESSMENT — PAIN SCALES - GENERAL: PAINLEVEL: SEVERE PAIN (6)

## 2024-04-03 NOTE — PROGRESS NOTES
PRESENTING COMPLAINT:  Post-operative visit s/p stage III breast reconstruction for breast cancer on 3/28/2024     HISTORY OF PRESENTING COMPLAINT: The patient is here for post-operative visit.  The patient is being seen in the presence of my nurse.     The patient is 1 week out from surgery doing extremely well very happy with results no major issues.  She is contemplating nipple reconstruction now.    On exam: Vital signs stable afebrile.  All incisions healing well.  Breasts are symmetric and aesthetic.     ASSESSMENT AND PLAN:  Based upon the above findings, the patient is here for post-operative visit.     Plan is to continue to moisturize and allow the tape to fall off.  Refrain from any heavy activities for 2 more weeks.  Continue with wrapping for another 2 weeks.  If she decides to do a 3D nipple tattoo I will see her once that is completed.  If she wants to proceed with a nipple reconstruction she will let me know.    All questions were answered.  The patient was happy with the visit.

## 2024-04-03 NOTE — LETTER
4/3/2024       RE: Zainab Bolton  [id number] [address]       Dear Colleague,    Thank you for referring your patient, Zainab Bolton, to the [facility] PLASTIC AND RECONSTRUCTIVE SURGERY CLINIC [place] at [facility]. Please see a copy of my visit note below.    PRESENTING COMPLAINT:  Post-operative visit s/p stage III breast reconstruction for breast cancer on 3/28/2024     HISTORY OF PRESENTING COMPLAINT: The patient is here for post-operative visit.  The patient is being seen in the presence of my nurse.     The patient is 1 week out from surgery doing extremely well very happy with results no major issues.  She is contemplating nipple reconstruction now.    On exam: Vital signs stable afebrile.  All incisions healing well.  Breasts are symmetric and aesthetic.     ASSESSMENT AND PLAN:  Based upon the above findings, the patient is here for post-operative visit.     Plan is to continue to moisturize and allow the tape to fall off.  Refrain from any heavy activities for 2 more weeks.  Continue with wrapping for another 2 weeks.  If she decides to do a 3D nipple tattoo I will see her once that is completed.  If she wants to proceed with a nipple reconstruction she will let me know.    All questions were answered.  The patient was happy with the visit.    Again, thank you for allowing me to participate in the care of your patient.      Sincerely,    MARIA E Aaron MD

## 2024-04-03 NOTE — NURSING NOTE
"Chief Complaint   Patient presents with    Surgical Followup     1 week post-op, DOS 3/28/24.       Vitals:    04/03/24 1014   BP: 126/84   BP Location: Right arm   Patient Position: Sitting   Cuff Size: Adult Regular   Pulse: 75   Temp: 98.5  F (36.9  C)   TempSrc: Oral   SpO2: 98%   Weight: 66 kg (145 lb 9.6 oz)   Height: 1.6 m (5' 3\")       Body mass index is 25.79 kg/m .    Patient reports severe bilateral thigh and flank pain (6/10).    Bob Caldwell, EMT    "

## 2024-04-16 ENCOUNTER — VIRTUAL VISIT (OUTPATIENT)
Dept: ONCOLOGY | Facility: CLINIC | Age: 44
End: 2024-04-16
Attending: OBSTETRICS & GYNECOLOGY
Payer: COMMERCIAL

## 2024-04-16 VITALS — BODY MASS INDEX: 26.05 KG/M2 | HEIGHT: 63 IN | WEIGHT: 147 LBS

## 2024-04-16 DIAGNOSIS — Z85.3 HISTORY OF BREAST CANCER: ICD-10-CM

## 2024-04-16 DIAGNOSIS — D25.2 INTRAMURAL AND SUBSEROUS LEIOMYOMA OF UTERUS: ICD-10-CM

## 2024-04-16 DIAGNOSIS — Z15.01 BRCA2 GENE MUTATION POSITIVE: Primary | ICD-10-CM

## 2024-04-16 DIAGNOSIS — Z15.09 BRCA2 GENE MUTATION POSITIVE: Primary | ICD-10-CM

## 2024-04-16 DIAGNOSIS — D25.1 INTRAMURAL AND SUBSEROUS LEIOMYOMA OF UTERUS: ICD-10-CM

## 2024-04-16 PROCEDURE — 99024 POSTOP FOLLOW-UP VISIT: CPT | Mod: 95 | Performed by: OBSTETRICS & GYNECOLOGY

## 2024-04-16 SDOH — HEALTH STABILITY: PHYSICAL HEALTH: ON AVERAGE, HOW MANY DAYS PER WEEK DO YOU ENGAGE IN MODERATE TO STRENUOUS EXERCISE (LIKE A BRISK WALK)?: 0 DAYS

## 2024-04-16 SDOH — HEALTH STABILITY: PHYSICAL HEALTH: ON AVERAGE, HOW MANY MINUTES DO YOU ENGAGE IN EXERCISE AT THIS LEVEL?: 0 MIN

## 2024-04-16 ASSESSMENT — SOCIAL DETERMINANTS OF HEALTH (SDOH): HOW OFTEN DO YOU GET TOGETHER WITH FRIENDS OR RELATIVES?: ONCE A WEEK

## 2024-04-16 NOTE — PROGRESS NOTES
This patient was a no show for this scheduled appointment. Pt was contacted twice after the start of the session and did not show up to appointment. Pt does not have another appointment scheduled. Provider will reach out to pt about rescheduling. Pt is encouraged to follow up with provider or behavioral intake to reschedule in the future.     Jayshree Daugherty, Psychotherapist Trainee, [facility]   - uncontrolled and reportedly worse than our A1C shows  - will see pt after her endo appt; if unsatisfactory care will discuss referral to pharmD or different endo

## 2024-04-16 NOTE — PROGRESS NOTES
Virtual Visit Details    Type of service:  Video Visit     Originating Location (pt. Location): Home    Distant Location (provider location):  On-site  Platform used for Video Visit: [facility]    Gynecologic Oncology Clinic - Post-operative appointment    Visit date: Apr 16, 2024     Reason for visit: post-op    Interval history: Zainab Bolton is a 43 year old here for routine post-op exam by video after Total laparoscopic hysterectomy, bilateral salpingo-oophorectomy for BRCA2 pathogenic mutation and history of breast cancer with fibroid uterus.     She is overall doing well from gyn standpoint. Pain is tolerable. No vaginal bleeding. No bladder or bowel complaints. No specific concerns.      Past Surgical History:   Procedure Laterality Date    BIOPSY NODE SENTINEL Left 05/31/2023    Procedure: LEFT SENTINEL LYMPH NODE BIOPSY;  Surgeon: Kylee Villasenor MD;  Location: [facility] OR    CONIZATION LEEP N/A 07/16/2015    Procedure: CONIZATION LEEP;  Surgeon: Omayra Cunningham DO;  Location:  OR    EYE SURGERY  1/5/2016    GRAFT FAT TO BREAST Bilateral 3/28/2024    Procedure: Bilateral breast fat grafting from flanks abdomen and thighs;  Surgeon: MARIA E Aaron MD;  Location: [facility] OR    GRAFT FREE VASCULARIZED TRANSVERSE RECTUS ABDOMINIS MYOCUTANEOUS Bilateral 09/25/2023    Procedure: Bilateral breast reconstruction with free abdominal flaps, resensation, SPY;  Surgeon: MARIA E Aaron MD;  Location: [facility] OR    LAPAROSCOPIC HYSTERECTOMY TOTAL, BILATERAL SALPINGO-OOPHORECTOMY, COMBINED Bilateral 3/28/2024    Procedure: Total laparoscopic hysterectomy, bilateral salpingo-oophorectomy, Cystoscopy;  Surgeon: Marco Watkins MD;  Location: [facility] OR    LAPAROSCOPIC TUBAL LIGATION  10/03/2003    MASTECTOMY SIMPLE Bilateral 05/31/2023    Procedure: BILATERAL MASTECTOMIES;  Surgeon: Kylee Villasenor MD;  Location: [facility] OR    RECONSTRUCT BREAST, INSERT TISSUE EXPANDER, COMBINED Bilateral 05/31/2023     "Procedure: RECONSTRUCTION, BREAST, BILATERAL TISSUE EXPANDERS;  Surgeon: Basil Aguilar MD;  Location: [facility] [place]    REMOVE TISSUE EXPANDER TRUNK Bilateral 08/25/2023    Procedure: REMOVAL, TISSUE EXPANDER, BILATERAL BREAST;  Surgeon: Basil Aguilar MD;  Location: [facility] [place]    REVISE RECONSTRUCTED BREAST BILATERAL Bilateral 3/28/2024    Procedure: bilateral revision of breasts, umbilicoplasty, scar revision of abdomen;  Surgeon: MARIA E Aaron MD;  Location: [facility]       Physical Exam:  Ht 1.6 m (5' 3\")   Wt 66.7 kg (147 lb)   LMP 03/22/2024 (Exact Date)   BMI 26.04 kg/m     General appearance: no acute distress, well groomed, sitting comfortably     Pathology  Lab Results   Component Value Date    CASEREPORT  03/28/2024     Surgical Pathology Report                         Case: [id number]                                  Authorizing Provider:  MARIA E Aaron MD  Collected:           03/28/2024 11:38 AM          Ordering Location:     [facility] OR                 Received:            03/28/2024 12:10 PM          Pathologist:           Eun Mccrary MD                                                             Specimen:    Uterus, Cervix, Bilateral Fallopian Tubes & Ovaries, Uterus, cervix, Bilateral                      Fallopian Tubes and ovaries                                                                FINALDX  03/28/2024     Uterus, cervix, Bilateral Fallopian Tubes and ovaries, hysterectomy with bilateral salpingo-oophorectomy:  - Benign endometrial polyp on a background of proliferative-type endometrium  - Leiomyomas  - Cervix with nabothian cysts and reactive epithelial changes; negative for dysplasia  - Ovaries with follicular cysts  - Right ovarian fibroma (1.4 cm)  - Fallopian tubes with no significant histologic abnormality  - Negative for malignancy     "         Assessment/Plan: Zainab Bolton is a 43 year old with BRCA2 mutation now s/p Total laparoscopic hysterectomy, bilateral salpingo-oophorectomy with history of breast cancer so no HRT candidate. Benign final pathology.    - Reviewed pathology showing benign pathology.   - Continue post-op restrictions until 6 weeks post-operatively, nothing in the vagina for 8 weeks.  - No further follow-up needed in our clinic unless post-op concerns, which should be brought to us.   - For BRCA2 mutation, with the ovaries removed, there is no recommendation for any further surveillance regarding ovarian cancer/primary peritoneal cancer risk. She should continue to follow-up with high risk clinic for other screening.     Marco Watkins MD

## 2024-04-16 NOTE — LETTER
4/16/2024         RE: Zainab Bolton  [address] [facility] [address]        Dear Colleague,    Thank you for referring your patient, Zainab Bolton, to the [facility]. Please see a copy of my visit note below.    Virtual Visit Details    Type of service:  Video Visit     Originating Location (pt. Location): Home    Distant Location (provider location):  On-site  Platform used for Video Visit: [facility]    Gynecologic Oncology Clinic - Post-operative appointment    Visit date: Apr 16, 2024     Reason for visit: post-op    Interval history: Zainab Bolton is a 43 year old here for routine post-op exam by video after Total laparoscopic hysterectomy, bilateral salpingo-oophorectomy for BRCA2 pathogenic mutation and history of breast cancer with fibroid uterus.     She is overall doing well from gyn standpoint. Pain is tolerable. No vaginal bleeding. No bladder or bowel complaints. No specific concerns.      Past Surgical History:   Procedure Laterality Date     BIOPSY NODE SENTINEL Left 05/31/2023    Procedure: LEFT SENTINEL LYMPH NODE BIOPSY;  Surgeon: Kylee Villasenor MD;  Location: [facility] OR     CONIZATION LEEP N/A 07/16/2015    Procedure: CONIZATION LEEP;  Surgeon: Omayra Cunningham DO;  Location:  OR     EYE SURGERY  1/5/2016     GRAFT FAT TO BREAST Bilateral 3/28/2024    Procedure: Bilateral breast fat grafting from flanks abdomen and thighs;  Surgeon: MARIA E Aaron MD;  Location: [facility] OR     GRAFT FREE VASCULARIZED TRANSVERSE RECTUS ABDOMINIS MYOCUTANEOUS Bilateral 09/25/2023    Procedure: Bilateral breast reconstruction with free abdominal flaps, resensation, SPY;  Surgeon: MARIA E Aaron MD;  Location: [facility] OR     LAPAROSCOPIC HYSTERECTOMY TOTAL, BILATERAL SALPINGO-OOPHORECTOMY, COMBINED Bilateral 3/28/2024    Procedure: Total laparoscopic hysterectomy, bilateral salpingo-oophorectomy, Cystoscopy;  Surgeon: Marco Watkins MD;   "Location: [facility] OR     LAPAROSCOPIC TUBAL LIGATION  10/03/2003     MASTECTOMY SIMPLE Bilateral 05/31/2023    Procedure: BILATERAL MASTECTOMIES;  Surgeon: Kylee Villasenor MD;  Location: [facility] OR     RECONSTRUCT BREAST, INSERT TISSUE EXPANDER, COMBINED Bilateral 05/31/2023    Procedure: RECONSTRUCTION, BREAST, BILATERAL TISSUE EXPANDERS;  Surgeon: Basil Aguilar MD;  Location: [facility] OR     REMOVE TISSUE EXPANDER TRUNK Bilateral 08/25/2023    Procedure: REMOVAL, TISSUE EXPANDER, BILATERAL BREAST;  Surgeon: Basil Aguilar MD;  Location: [facility] OR     REVISE RECONSTRUCTED BREAST BILATERAL Bilateral 3/28/2024    Procedure: bilateral revision of breasts, umbilicoplasty, scar revision of abdomen;  Surgeon: MARIA E Aaron MD;  Location:  OR       Physical Exam:  Ht 1.6 m (5' 3\")   Wt 66.7 kg (147 lb)   LMP 03/22/2024 (Exact Date)   BMI 26.04 kg/m     General appearance: no acute distress, well groomed, sitting comfortably     Pathology  Lab Results   Component Value Date    CASEREPORT  03/28/2024     Surgical Pathology Report                         Case: [id number]                                  Authorizing Provider:  MARIA E Aaron MD  Collected:           03/28/2024 11:38 AM          Ordering Location:      MAIN OR                 Received:            03/28/2024 12:10 PM          Pathologist:           Eun Mccrary MD                                                             Specimen:    Uterus, Cervix, Bilateral Fallopian Tubes & Ovaries, Uterus, cervix, Bilateral                      Fallopian Tubes and ovaries                                                                FINALDX  03/28/2024     Uterus, cervix, Bilateral Fallopian Tubes and ovaries, hysterectomy with bilateral salpingo-oophorectomy:  - Benign endometrial polyp on a background of proliferative-type " endometrium  - Leiomyomas  - Cervix with nabothian cysts and reactive epithelial changes; negative for dysplasia  - Ovaries with follicular cysts  - Right ovarian fibroma (1.4 cm)  - Fallopian tubes with no significant histologic abnormality  - Negative for malignancy             Assessment/Plan: Zainab Bolton is a 43 year old with BRCA2 mutation now s/p Total laparoscopic hysterectomy, bilateral salpingo-oophorectomy with history of breast cancer so no HRT candidate. Benign final pathology.    - Reviewed pathology showing benign pathology.   - Continue post-op restrictions until 6 weeks post-operatively, nothing in the vagina for 8 weeks.  - No further follow-up needed in our clinic unless post-op concerns, which should be brought to us.   - For BRCA2 mutation, with the ovaries removed, there is no recommendation for any further surveillance regarding ovarian cancer/primary peritoneal cancer risk. She should continue to follow-up with high risk clinic for other screening.     Marco Watkins MD       Again, thank you for allowing me to participate in the care of your patient.        Sincerely,        Marco Watkins MD

## 2024-04-16 NOTE — COMMUNITY RESOURCES LIST (ENGLISH)
April 16, 2024           YOUR PERSONALIZED LIST OF SERVICES & PROGRAMS           & RECREATION    [employer]      [employer] [place] - Sports clubs and recreational activities - [employer] [facility] [employer]  [address] [place] [address] (Distance: 20.6 miles)  Language: English  Fee: Self pay, Sliding scale      [facility] - Adult Enrichment  Phone: [phone]  Website: [email or web address]-[email or web address]-enrichment/  Language: English  Hours: Mon 7:30 AM - 4:00 PM Tue 7:30 AM - 4:00 PM Wed 7:30 AM - 4:00 PM Thu 7:30 AM - 4:00 PM Fri 7:30 AM - 4:00 PM    Classes/Groups      [facility] - Group fitness classes  [address] [place] [address] (Distance: 18.3 miles)  Phone: [phone]  Website: http://[employer]/  Language: English  Fee: Self pay  Accessibility: Ada accessible      [employer] - Sit and Be Fit/[employer] FLEX  [address] [place] [address] (Distance: 11.8 miles)  Website: https://physicaltherapy[place]. [employer]/programs/sit-and-be-fit/  Language: English  Fee: Free      [facility] Services - Care Coordination (Healthcare only)  Phone: [phone]  Website: https://[employer]  Language: English, Yoruba  Hours: Wed 9:00 AM - 11:30 AM Thu 1:00 PM - 4:00 PM, 5:30 PM - 7:00 PM               IMPORTANT NUMBERS & WEBSITES        Emergency Services  911  .   United Way  211 http://211unitedway.org  .   Poison Control  (161) 504-1104 http://[place]poison.org http://w[place]poison.org  .     Suicide and Crisis Lifeline  988 http://988lifeline.org  .   Childhelp National Child Abuse Hotline  389.639.6259 http://Childhelphotline.org   .   National Sexual Assault Hotline  (938) 863-5004 (HOPE) http://Rainn.org   .     National Runaway Safeline  (703) 505-8689 (RUNAWAY) http://[address] [place] [address].org  .   Pregnancy & Postpartum Support  Call/text 731-082-0782  [place]: http://ppsupportmn.org  [place]:  http://psichapters.com/wi  .   Substance Abuse National Helpline (SAMA)  800-622-HELP (3304) http://Findtreatment.gov   .                DISCLAIMER: These resources have been generated via the [employer] Platform. [employer] does not endorse any service providers mentioned in this resource list. [employer] does not guarantee that the services mentioned in this resource list will be available to you or will improve your health or wellness.    [facility]

## 2024-04-16 NOTE — NURSING NOTE
Is the patient currently in the state of [place]? YES    Visit mode:VIDEO    If the visit is dropped, the patient can be reconnected by: TELEPHONE VISIT: Phone number: [phone]    Will anyone else be joining the visit? NO  (If patient encounters technical issues they should call [phone]    How would you like to obtain your AVS? MyChart    Are changes needed to the allergy or medication list? No    Are refills needed on medications prescribed by this physician? NO    Reason for visit: RECHECK    Ina SWEET

## 2024-04-18 ENCOUNTER — PATIENT OUTREACH (OUTPATIENT)
Dept: CARE COORDINATION | Facility: CLINIC | Age: 44
End: 2024-04-18
Payer: COMMERCIAL

## 2024-04-18 NOTE — PROGRESS NOTES
Social Work - Distress Screen Intervention  [facility]    Identified Concern and Score from Distress Screenin. How concerned are you about your ability to eat? 0     2. How concerned are you about unintended weight loss or your current weight? 2     3. How concerned are you about feeling depressed or very sad?  (!) 8     4. How concerned are you about feeling anxious or very scared?  (!) 8     5. Do you struggle with the loss of meaning and kaila in your life?  (!) A great deal     6. How concerned are you about work and home life issues that may be affected by your cancer?  7     7. How concerned are you about knowing what resources are available to help you?  0     8. Do you currently have what you would describe as Jew or spiritual struggles? Not at all     9. If you want to be contacted by one of our professionals, I can send a message to them right now.  Oncology Social Worker       Date of Distress Screen: 24  Data: At time of last visit, patient scored positive on distress screening.  outreached to patient today to follow up on elevated distress and introduce psychosocial services and support.  Intervention/Education provided:  contacted patient by phone to discuss distress screening results. Left voicemail message  Follow-up Required:  will remain available to patient for support as needed    MARK Juarez, [facility] HospitalSW   Adult Oncology - [place]  [phone]  O[facility]ite [facility] on    *Please note does not work on .   Support Groups at [facility]: Social Work Services for Cancer Patients ([email or web address])

## 2024-04-23 ENCOUNTER — OFFICE VISIT (OUTPATIENT)
Dept: FAMILY MEDICINE | Facility: CLINIC | Age: 44
End: 2024-04-23
Payer: COMMERCIAL

## 2024-04-23 VITALS
HEIGHT: 63 IN | WEIGHT: 149.9 LBS | SYSTOLIC BLOOD PRESSURE: 136 MMHG | OXYGEN SATURATION: 97 % | TEMPERATURE: 97.9 F | BODY MASS INDEX: 26.56 KG/M2 | HEART RATE: 72 BPM | DIASTOLIC BLOOD PRESSURE: 84 MMHG

## 2024-04-23 DIAGNOSIS — Z12.11 SCREEN FOR COLON CANCER: ICD-10-CM

## 2024-04-23 DIAGNOSIS — Z00.01 ENCOUNTER FOR ROUTINE ADULT HEALTH EXAMINATION WITH ABNORMAL FINDINGS: Primary | ICD-10-CM

## 2024-04-23 DIAGNOSIS — F41.8 DEPRESSION WITH ANXIETY: ICD-10-CM

## 2024-04-23 DIAGNOSIS — E78.00 HYPERCHOLESTEREMIA: ICD-10-CM

## 2024-04-23 DIAGNOSIS — R06.83 SNORING: ICD-10-CM

## 2024-04-23 DIAGNOSIS — B97.7 HIGH RISK HUMAN PAPILLOMAVIRUS INFECTION: ICD-10-CM

## 2024-04-23 DIAGNOSIS — R53.81 PHYSICAL DECONDITIONING: ICD-10-CM

## 2024-04-23 DIAGNOSIS — Z15.09 BRCA2 POSITIVE: ICD-10-CM

## 2024-04-23 DIAGNOSIS — Z17.0 MALIGNANT NEOPLASM OF CENTRAL PORTION OF LEFT BREAST IN FEMALE, ESTROGEN RECEPTOR POSITIVE (H): ICD-10-CM

## 2024-04-23 DIAGNOSIS — F33.41 MAJOR DEPRESSIVE DISORDER, RECURRENT EPISODE, IN PARTIAL REMISSION (H): ICD-10-CM

## 2024-04-23 DIAGNOSIS — Z15.01 BRCA2 POSITIVE: ICD-10-CM

## 2024-04-23 DIAGNOSIS — C50.112 MALIGNANT NEOPLASM OF CENTRAL PORTION OF LEFT BREAST IN FEMALE, ESTROGEN RECEPTOR POSITIVE (H): ICD-10-CM

## 2024-04-23 DIAGNOSIS — N95.1 MENOPAUSAL SYNDROME (HOT FLASHES): ICD-10-CM

## 2024-04-23 DIAGNOSIS — R87.612 PAPANICOLAOU SMEAR OF CERVIX WITH LOW GRADE SQUAMOUS INTRAEPITHELIAL LESION (LGSIL): ICD-10-CM

## 2024-04-23 DIAGNOSIS — Z98.890 H/O BREAST RECONSTRUCTION: ICD-10-CM

## 2024-04-23 DIAGNOSIS — F41.1 GAD (GENERALIZED ANXIETY DISORDER): ICD-10-CM

## 2024-04-23 PROCEDURE — 99214 OFFICE O/P EST MOD 30 MIN: CPT | Mod: 25 | Performed by: FAMILY MEDICINE

## 2024-04-23 PROCEDURE — 96127 BRIEF EMOTIONAL/BEHAV ASSMT: CPT | Performed by: FAMILY MEDICINE

## 2024-04-23 PROCEDURE — 99396 PREV VISIT EST AGE 40-64: CPT | Performed by: FAMILY MEDICINE

## 2024-04-23 RX ORDER — SERTRALINE HYDROCHLORIDE 100 MG/1
100 TABLET, FILM COATED ORAL EVERY MORNING
Qty: 90 TABLET | Refills: 3 | Status: SHIPPED | OUTPATIENT
Start: 2024-04-23

## 2024-04-23 RX ORDER — HYDROXYZINE HYDROCHLORIDE 10 MG/1
10 TABLET, FILM COATED ORAL 3 TIMES DAILY PRN
Qty: 90 TABLET | Refills: 1 | Status: SHIPPED | OUTPATIENT
Start: 2024-04-23 | End: 2024-08-12

## 2024-04-23 RX ORDER — BUSPIRONE HYDROCHLORIDE 10 MG/1
10 TABLET ORAL 2 TIMES DAILY
Qty: 60 TABLET | Refills: 4 | Status: SHIPPED | OUTPATIENT
Start: 2024-04-23

## 2024-04-23 ASSESSMENT — ANXIETY QUESTIONNAIRES
IF YOU CHECKED OFF ANY PROBLEMS ON THIS QUESTIONNAIRE, HOW DIFFICULT HAVE THESE PROBLEMS MADE IT FOR YOU TO DO YOUR WORK, TAKE CARE OF THINGS AT HOME, OR GET ALONG WITH OTHER PEOPLE: SOMEWHAT DIFFICULT
GAD7 TOTAL SCORE: 17
1. FEELING NERVOUS, ANXIOUS, OR ON EDGE: NEARLY EVERY DAY
GAD7 TOTAL SCORE: 17
7. FEELING AFRAID AS IF SOMETHING AWFUL MIGHT HAPPEN: NEARLY EVERY DAY
2. NOT BEING ABLE TO STOP OR CONTROL WORRYING: NEARLY EVERY DAY
6. BECOMING EASILY ANNOYED OR IRRITABLE: NEARLY EVERY DAY
3. WORRYING TOO MUCH ABOUT DIFFERENT THINGS: NEARLY EVERY DAY
5. BEING SO RESTLESS THAT IT IS HARD TO SIT STILL: SEVERAL DAYS

## 2024-04-23 ASSESSMENT — PATIENT HEALTH QUESTIONNAIRE - PHQ9
SUM OF ALL RESPONSES TO PHQ QUESTIONS 1-9: 15
5. POOR APPETITE OR OVEREATING: SEVERAL DAYS

## 2024-04-23 NOTE — PROGRESS NOTES
Preventive Care Visit  [facility] ARACELI Concepcion MD, Family Medicine  Apr 23, 2024      Assessment & Plan     (Z00.01) Encounter for routine adult health examination with abnormal findings  (primary encounter diagnosis)  Comment: doing well considering all the changes and stressors. Discussed colonoscopy - will schedule that later this year. Yearly paps due to hpv history.   Plan:     (F41.1) GEOVANY (generalized anxiety disorder)  Comment: doing well on this med plus buspar and prn hydroxyzine. Interested in therapy - referral made. The patient indicates understanding of these issues and agrees with the plan.   Plan: sertraline (ZOLOFT) 100 MG tablet, Adult Mental        Health  Referral, hydrOXYzine HCl         (ATARAX) 10 MG tablet, busPIRone (BUSPAR) 10 MG        tablet            (F41.8) Depression with anxiety  Comment: refilled   Plan: sertraline (ZOLOFT) 100 MG tablet            (Z12.11) Screen for colon cancer  Comment: discussed   Plan: Colonoscopy Screening  Referral            (E78.00) Hypercholesteremia  Comment: future order   Plan: Lipid panel reflex to direct LDL Non-fasting            (R06.83) Snoring  Comment: she requests referral. Long history of snoring   Plan: Adult Sleep Eval & Management          Referral            (R53.81) Physical deconditioning  Comment: she has had multiple surgeries and is needing some help knowing how to get strong again.   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 We discussed using effexor 75mg for hot flashes but would likely need to cut back on sertraline. She isn't ready to do this now, will consider it   Plan:         Patient has been advised of split billing requirements and indicates understanding: Yes          BMI  Estimated body mass index is 26.56 kg/m  as calculated from the following:    Height as of this encounter: 1.6 m (5' 2.99\").    Weight as of this encounter: 68 kg (149 lb 14.4 oz).       Counseling  Appropriate preventive services were discussed with this patient, including applicable screening as appropriate for fall prevention, nutrition, physical activity, Tobacco-use cessation, weight loss and cognition.  Checklist reviewing preventive services available has been given to the patient.  Reviewed patient's diet, addressing concerns and/or questions.   The patient's PHQ-9 score is consistent with moderate depression. She was provided with information regarding depression.       Regular exercise    Subjective   Avis is a 43 year old, presenting for the following:  Physical        4/23/2024     4:05 PM   Additional Questions   Roomed by LUX Lucas   Accompanied by self        Health Care Directive  Patient does not have a Health Care Directive or Living Will: Discussed advance care planning with patient; information given to patient to review.    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 Worsened   Are you having other symptoms that might be associated with anxiety? Yes:     Have you had a significant life event? OTHER: her health/ procedures and her son overdosed at age 28    Are you feeling depressed? Yes:     Do you have any concerns with your use of alcohol or other drugs? No    Social History     Tobacco Use    Smoking status: Former     Current packs/day: 0.00     Average packs/day: 1 pack/day for 10.0 years (10.0 ttl pk-yrs)     Types: Cigarettes     Start date: 3/28/2013     Quit date: 2023     Years since quittin.0     Passive exposure: Past    Smokeless tobacco: Never   Substance Use Topics    Alcohol use: Not Currently    Drug use: Yes     Types: Marijuana     Comment: daily smoking         2/3/2023    10:51 AM 3/16/2023     4:51 PM 2023     9:56 AM   GEOVANY-7 SCORE   Total Score  15 (severe anxiety) 10 (moderate anxiety)   Total Score 21 15 10         2/3/2023    10:51 AM 3/16/2023     4:50 PM 2023     9:56 AM   PHQ   PHQ-9 Total Score 15 0 4   Q9: Thoughts of better off dead/self-harm past 2 weeks Several days Not at all Not at all         2024     5:25 PM   Last PHQ-9   1.  Little interest or pleasure in doing things 2   2.  Feeling down, depressed, or hopeless 3   3.  Trouble falling or staying asleep, or sleeping too much 1   4.  Feeling tired or having little energy 2   5.  Poor appetite or overeating 2   6.  Feeling bad about yourself 2   7.  Trouble concentrating 3   8.  Moving slowly or restless 0   Q9: Thoughts of better off dead/self-harm past 2 weeks 0   PHQ-9 Total Score 15   Difficulty at work, home, or with people Somewhat difficult           2024   General Health   How would you rate " your overall physical health? Good   Feel stress (tense, anxious, or unable to sleep) Very much   (!) STRESS CONCERN      2024   Nutrition   Three or more servings of calcium each day? Yes   Diet: Regular (no restrictions)   How many servings of fruit and vegetables per day? (!) 2-3   How many sweetened beverages each day? 0-1         2024   Exercise   Days per week of moderate/strenous exercise 0 days   Average minutes spent exercising at this level 0 min   (!) EXERCISE CONCERN      2024   Social Factors   Frequency of gathering with friends or relatives Once a week   Worry food won't last until get money to buy more No   Food not last or not have enough money for food? No   Do you have housing?  Yes   Are you worried about losing your housing? No   Lack of transportation? No   Unable to get utilities (heat,electricity)? No         2024   Dental   Dentist two times every year? Yes         2024   TB Screening   Were you born outside of the [place]? No         Today's PHQ-9 Score:       2024     5:25 PM   PHQ-9 SCORE   PHQ-9 Total Score 15           2024   Substance Use   Alcohol more than 3/day or more than 7/wk No   Do you use any other substances recreationally? (!) CANNABIS PRODUCTS    (!) PRESCRIPTION DRUGS     Social History     Tobacco Use    Smoking status: Former     Current packs/day: 0.00     Average packs/day: 1 pack/day for 10.0 years (10.0 ttl pk-yrs)     Types: Cigarettes     Start date: 3/28/2013     Quit date: 2023     Years since quittin.0     Passive exposure: Past    Smokeless tobacco: Never   Substance Use Topics    Alcohol use: Not Currently    Drug use: Yes     Types: Marijuana     Comment: daily smoking           3/16/2023   LAST FHS-7 RESULTS   1st degree relative breast or ovarian cancer No   Any relative bilateral breast cancer No   Any male have breast cancer No   Any ONE woman have BOTH breast AND ovarian cancer No   Any woman with breast cancer  "before 50yrs No   2 or more relatives with breast AND/OR ovarian cancer No   2 or more relatives with breast AND/OR bowel cancer No                4/16/2024   STI Screening   New sexual partner(s) since last STI/HIV test? No     History of abnormal Pap smear:         Latest Ref Rng & Units 2/3/2023    10:50 AM 3/7/2019     2:48 PM 3/7/2019     2:27 PM   PAP / HPV   PAP  Negative for Intraepithelial Lesion or Malignancy (NILM)      PAP (Historical)   NIL     HPV 16 DNA Negative Negative   Negative    HPV 18 DNA Negative Negative   Negative    Other HR HPV Negative Negative   Negative      ASCVD Risk   The 10-year ASCVD risk score (Melodie MARCELINO, et al., 2019) is: 0.7%    Values used to calculate the score:      Age: 43 years      Sex: Female      Is Non- : No      Diabetic: No      Tobacco smoker: No      Systolic Blood Pressure: 126 mmHg      Is BP treated: No      HDL Cholesterol: 45 mg/dL      Total Cholesterol: 155 mg/dL       Reviewed and updated as needed this visit by Provider                          Review of Systems  Constitutional, HEENT, cardiovascular, pulmonary, gi and gu systems are negative, except as otherwise noted.     Objective    Exam  LMP 03/22/2024 (Exact Date)    Estimated body mass index is 26.04 kg/m  as calculated from the following:    Height as of 4/16/24: 1.6 m (5' 3\").    Weight as of 4/16/24: 66.7 kg (147 lb).    Physical Exam  GENERAL: alert and no distress  EYES: Eyes grossly normal to inspection, PERRL and conjunctivae and sclerae normal  NECK: no adenopathy, no asymmetry, masses, or scars  RESP: lungs clear to auscultation - no rales, rhonchi or wheezes  CV: regular rate and rhythm, normal S1 S2, no S3 or S4, no murmur, click or rub, no peripheral edema  MS: no gross musculoskeletal defects noted, no edema  SKIN: no suspicious lesions or rashes  NEURO: Normal strength and tone, mentation intact and speech normal  PSYCH: mentation appears normal, affect " normal/bright      Signed Electronically by: Jessi Concepcion MD

## 2024-04-23 NOTE — PATIENT INSTRUCTIONS
For hot flashes,     Consider trial of venlafaxine/effexor 37.5mg x one week then increase to 75mg x one week    Would like you to cut back on the sertraline/zoloft if we add the venlafaxine/effexor.

## 2024-05-02 ENCOUNTER — MYC MEDICAL ADVICE (OUTPATIENT)
Dept: FAMILY MEDICINE | Facility: CLINIC | Age: 44
End: 2024-05-02
Payer: COMMERCIAL

## 2024-05-02 ENCOUNTER — PATIENT OUTREACH (OUTPATIENT)
Dept: ONCOLOGY | Facility: HOSPITAL | Age: 44
End: 2024-05-02
Payer: COMMERCIAL

## 2024-05-02 DIAGNOSIS — C50.112 MALIGNANT NEOPLASM OF CENTRAL PORTION OF LEFT BREAST IN FEMALE, ESTROGEN RECEPTOR POSITIVE (H): Primary | ICD-10-CM

## 2024-05-02 DIAGNOSIS — F33.41 MAJOR DEPRESSIVE DISORDER, RECURRENT EPISODE, IN PARTIAL REMISSION (H): Primary | ICD-10-CM

## 2024-05-02 DIAGNOSIS — N95.1 MENOPAUSAL SYNDROME (HOT FLASHES): ICD-10-CM

## 2024-05-02 DIAGNOSIS — Z17.0 MALIGNANT NEOPLASM OF CENTRAL PORTION OF LEFT BREAST IN FEMALE, ESTROGEN RECEPTOR POSITIVE (H): Primary | ICD-10-CM

## 2024-05-02 RX ORDER — LETROZOLE 2.5 MG/1
2.5 TABLET, FILM COATED ORAL DAILY
Qty: 60 TABLET | Refills: 1 | Status: SHIPPED | OUTPATIENT
Start: 2024-05-02 | End: 2024-08-19

## 2024-05-06 ENCOUNTER — MYC MEDICAL ADVICE (OUTPATIENT)
Dept: ONCOLOGY | Facility: CLINIC | Age: 44
End: 2024-05-06
Payer: COMMERCIAL

## 2024-05-06 ENCOUNTER — TELEPHONE (OUTPATIENT)
Dept: FAMILY MEDICINE | Facility: CLINIC | Age: 44
End: 2024-05-06

## 2024-05-06 RX ORDER — VENLAFAXINE HYDROCHLORIDE 37.5 MG/1
150 TABLET, EXTENDED RELEASE ORAL DAILY
Qty: 120 TABLET | Refills: 1 | Status: SHIPPED | OUTPATIENT
Start: 2024-05-06 | End: 2024-07-16

## 2024-05-17 NOTE — PATIENT INSTRUCTIONS
It was a pleasure seeing you in clinic today-please reach out if there are any further questions that arise following today's visit.  During business hours, you may reach me at [phone].  For urgent/emergent questions after business hours, you may reach the on-call Gynecologic Oncology Resident through the [facility]  at [phone].    All normal test results are usually communicated via letter or [employer] message.  Abnormal results (those that require a change in the previously discussed plan of care) are usually communicated via a phone call.    I recommend signing up for Mobile System 7 access if you have not already done so.  This allows you online access to your lab results and also helps you communicate efficiently with your clinic should any questions arise in your care.    No follow-up in gyn oncology needed  Continue regular visits with primary care and gynecology  Please call if you have any questions or concerns related to your recent surgery or recovery     Dr Aide Mclean RN  Phone:  [phone]  Fax:  [phone]

## 2024-05-26 ENCOUNTER — ANCILLARY PROCEDURE (OUTPATIENT)
Dept: GENERAL RADIOLOGY | Facility: CLINIC | Age: 44
End: 2024-05-26
Attending: NURSE PRACTITIONER
Payer: COMMERCIAL

## 2024-05-26 ENCOUNTER — OFFICE VISIT (OUTPATIENT)
Dept: URGENT CARE | Facility: URGENT CARE | Age: 44
End: 2024-05-26
Payer: COMMERCIAL

## 2024-05-26 VITALS
DIASTOLIC BLOOD PRESSURE: 77 MMHG | HEART RATE: 88 BPM | RESPIRATION RATE: 16 BRPM | OXYGEN SATURATION: 100 % | TEMPERATURE: 98.1 F | WEIGHT: 149 LBS | SYSTOLIC BLOOD PRESSURE: 123 MMHG | BODY MASS INDEX: 26.4 KG/M2

## 2024-05-26 DIAGNOSIS — S62.614A CLOSED DISPLACED FRACTURE OF PROXIMAL PHALANX OF RIGHT RING FINGER, INITIAL ENCOUNTER: ICD-10-CM

## 2024-05-26 DIAGNOSIS — S69.91XA INJURY OF FINGER OF RIGHT HAND, INITIAL ENCOUNTER: Primary | ICD-10-CM

## 2024-05-26 DIAGNOSIS — S69.91XA INJURY OF FINGER OF RIGHT HAND, INITIAL ENCOUNTER: ICD-10-CM

## 2024-05-26 PROCEDURE — 99213 OFFICE O/P EST LOW 20 MIN: CPT | Performed by: NURSE PRACTITIONER

## 2024-05-26 PROCEDURE — 73140 X-RAY EXAM OF FINGER(S): CPT | Mod: TC | Performed by: STUDENT IN AN ORGANIZED HEALTH CARE EDUCATION/TRAINING PROGRAM

## 2024-05-26 NOTE — PROGRESS NOTES
SUBJECTIVE:  Zainab Bolton is a 43 year old female who sustained a right hand injury to little finger  days ago.  Mechanism of injury: tripped and hyperextended her little finger .   Immediate symptoms: immediate swelling.   Symptoms have been gradual since that time.   Current pain level: 3/10  Prior history of related problems: no prior problems with this area in the past.      Past Medical History:   Diagnosis Date    Anxiety 2010    BRCA2 positive     Breast cancer (H) 2023    Depression     Depressive disorder     Essential hypertension 2023    HSIL (high grade squamous intraepithelial lesion) on Pap smear of cervix 2015    LSIL/+ HR HPV    Hypercholesteremia 2010    Obesity 2010    S/P breast reconstruction, bilateral     TOBACCO ABUSE-CONTINUOUS        Social History     Tobacco Use    Smoking status: Former     Current packs/day: 0.00     Average packs/day: 1 pack/day for 10.0 years (10.0 ttl pk-yrs)     Types: Cigarettes     Start date: 3/28/2013     Quit date: 2023     Years since quittin.1     Passive exposure: Past    Smokeless tobacco: Never   Substance Use Topics    Alcohol use: Not Currently    Drug use: Yes     Types: Marijuana     Comment: daily smoking       ROS:  Review of systems negative except as stated above.      OBJECTIVE:  Last menstrual period 2024, not currently breastfeeding.  Appearance: in no apparent distress.  Hand exam: soft tissue tenderness and swelling at the MPI with limited range of motions  to little finger     X-ray:   X-ray reviewed and interpreted by myself in office  will await final radiology report .  Results for orders placed or performed in visit on 24   XR Finger Right G/E 2 Views     Status: None    Narrative    EXAM: XR FINGER RIGHT G/E 2 VIEWS  LOCATION: [facility]  DATE: 2024    INDICATION: Pain.  COMPARISON: None.      Impression    IMPRESSION: Subtle lucency along the  radial margin of the distal fourth proximal phalangeal shaft, which could represent a nondisplaced fracture, however this could be artifactual nature. Recommend correlation with point tenderness.         ASSESSMENT/PLAN  (S69.91XA) Injury of finger of right hand, initial encounter  (primary encounter diagnosis)  Comment: Strain of the little finger  Plan: XR Finger Right G/E 2 Views            (E58.123X) Closed displaced fracture of proximal phalanx of right ring finger, initial encounter  Comment: Was questionable fracture to the fourth proximal phalange E tenderness mainly at the base of the fifth finger some mild tenderness at the fourth finger will put in an ulnar gutter splint and recommend repeat x-ray in 1 week we will have her follow-up with her primary care provider and if there is a persistent fracture noted on the repeat x-ray then would have her see orthopedics  Plan:       THOMAS Redman CNP

## 2024-05-30 ENCOUNTER — MYC REFILL (OUTPATIENT)
Dept: FAMILY MEDICINE | Facility: CLINIC | Age: 44
End: 2024-05-30
Payer: COMMERCIAL

## 2024-05-30 DIAGNOSIS — F41.1 GAD (GENERALIZED ANXIETY DISORDER): ICD-10-CM

## 2024-05-30 ASSESSMENT — ANXIETY QUESTIONNAIRES
8. IF YOU CHECKED OFF ANY PROBLEMS, HOW DIFFICULT HAVE THESE MADE IT FOR YOU TO DO YOUR WORK, TAKE CARE OF THINGS AT HOME, OR GET ALONG WITH OTHER PEOPLE?: SOMEWHAT DIFFICULT
IF YOU CHECKED OFF ANY PROBLEMS ON THIS QUESTIONNAIRE, HOW DIFFICULT HAVE THESE PROBLEMS MADE IT FOR YOU TO DO YOUR WORK, TAKE CARE OF THINGS AT HOME, OR GET ALONG WITH OTHER PEOPLE: SOMEWHAT DIFFICULT
GAD7 TOTAL SCORE: 9
5. BEING SO RESTLESS THAT IT IS HARD TO SIT STILL: SEVERAL DAYS
2. NOT BEING ABLE TO STOP OR CONTROL WORRYING: SEVERAL DAYS
4. TROUBLE RELAXING: SEVERAL DAYS
1. FEELING NERVOUS, ANXIOUS, OR ON EDGE: SEVERAL DAYS
3. WORRYING TOO MUCH ABOUT DIFFERENT THINGS: SEVERAL DAYS
GAD7 TOTAL SCORE: 9
7. FEELING AFRAID AS IF SOMETHING AWFUL MIGHT HAPPEN: MORE THAN HALF THE DAYS
7. FEELING AFRAID AS IF SOMETHING AWFUL MIGHT HAPPEN: MORE THAN HALF THE DAYS
6. BECOMING EASILY ANNOYED OR IRRITABLE: MORE THAN HALF THE DAYS
GAD7 TOTAL SCORE: 9

## 2024-05-31 ENCOUNTER — OFFICE VISIT (OUTPATIENT)
Dept: FAMILY MEDICINE | Facility: CLINIC | Age: 44
End: 2024-05-31
Payer: COMMERCIAL

## 2024-05-31 ENCOUNTER — ANCILLARY PROCEDURE (OUTPATIENT)
Dept: GENERAL RADIOLOGY | Facility: CLINIC | Age: 44
End: 2024-05-31
Attending: STUDENT IN AN ORGANIZED HEALTH CARE EDUCATION/TRAINING PROGRAM
Payer: COMMERCIAL

## 2024-05-31 ENCOUNTER — ONCOLOGY VISIT (OUTPATIENT)
Dept: ONCOLOGY | Facility: HOSPITAL | Age: 44
End: 2024-05-31
Attending: INTERNAL MEDICINE
Payer: COMMERCIAL

## 2024-05-31 VITALS
RESPIRATION RATE: 16 BRPM | HEART RATE: 89 BPM | OXYGEN SATURATION: 98 % | TEMPERATURE: 99.1 F | DIASTOLIC BLOOD PRESSURE: 82 MMHG | BODY MASS INDEX: 26.35 KG/M2 | SYSTOLIC BLOOD PRESSURE: 136 MMHG | WEIGHT: 148.7 LBS | HEIGHT: 63 IN

## 2024-05-31 VITALS
OXYGEN SATURATION: 98 % | HEART RATE: 90 BPM | RESPIRATION RATE: 24 BRPM | HEIGHT: 63 IN | BODY MASS INDEX: 26.17 KG/M2 | DIASTOLIC BLOOD PRESSURE: 88 MMHG | SYSTOLIC BLOOD PRESSURE: 128 MMHG | WEIGHT: 147.7 LBS | TEMPERATURE: 97.9 F

## 2024-05-31 DIAGNOSIS — M81.0 AGE-RELATED OSTEOPOROSIS WITHOUT CURRENT PATHOLOGICAL FRACTURE: Primary | ICD-10-CM

## 2024-05-31 DIAGNOSIS — S69.91XS HAND INJURY, RIGHT, SEQUELA: Primary | ICD-10-CM

## 2024-05-31 DIAGNOSIS — S69.91XS HAND INJURY, RIGHT, SEQUELA: ICD-10-CM

## 2024-05-31 PROCEDURE — 73140 X-RAY EXAM OF FINGER(S): CPT | Mod: TC | Performed by: RADIOLOGY

## 2024-05-31 PROCEDURE — 99214 OFFICE O/P EST MOD 30 MIN: CPT | Performed by: INTERNAL MEDICINE

## 2024-05-31 PROCEDURE — 99213 OFFICE O/P EST LOW 20 MIN: CPT | Performed by: INTERNAL MEDICINE

## 2024-05-31 PROCEDURE — 90471 IMMUNIZATION ADMIN: CPT | Performed by: STUDENT IN AN ORGANIZED HEALTH CARE EDUCATION/TRAINING PROGRAM

## 2024-05-31 PROCEDURE — 90677 PCV20 VACCINE IM: CPT | Performed by: STUDENT IN AN ORGANIZED HEALTH CARE EDUCATION/TRAINING PROGRAM

## 2024-05-31 PROCEDURE — 99213 OFFICE O/P EST LOW 20 MIN: CPT | Mod: 25 | Performed by: STUDENT IN AN ORGANIZED HEALTH CARE EDUCATION/TRAINING PROGRAM

## 2024-05-31 RX ORDER — BUSPIRONE HYDROCHLORIDE 10 MG/1
10 TABLET ORAL 2 TIMES DAILY
Qty: 60 TABLET | Refills: 4 | OUTPATIENT
Start: 2024-05-31

## 2024-05-31 ASSESSMENT — PAIN SCALES - GENERAL
PAINLEVEL: MILD PAIN (2)
PAINLEVEL: MILD PAIN (2)

## 2024-05-31 NOTE — PROGRESS NOTES
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     Initial Vitals: /82 (BP Location: Left arm, Patient Position: Sitting, Cuff Size: Adult Regular)   Pulse 89   Temp 99.1  F (37.3  C) (Tympanic)   Resp 16   Ht 1.6 m (5' 3\")   Wt 67.4 kg (148 lb 11.2 oz)   SpO2 98%   BMI 26.34 kg/m   Estimated body mass index is 26.34 kg/m  as calculated from the following:    Height as of this encounter: 1.6 m (5' 3\").    Weight as of this encounter: 67.4 kg (148 lb 11.2 oz). Body surface area is 1.73 meters squared.  Mild Pain (2) Comment: Data Unavailable   No LMP recorded. Patient has had a hysterectomy.  Allergies reviewed: Yes  Medications reviewed: Yes    Medications: Medication refills not needed today.  Pharmacy name entered into [employer]:    [id number] - [place], [place] - [address]  [place], [place] - [facility] [facility]    Frailty Screening:   Is the patient here for a new oncology consult visit in cancer care? 2. No      Clinical concerns: none.       Afia Au CMA              "

## 2024-05-31 NOTE — RESULT ENCOUNTER NOTE
Ilan Bolton,     It is a pleasure providing you with medical care. I have received and reviewed your results, and have the following recommendations:     Based on the results of your x-ray, you, the good news is there was no acute or healing bone fracture seen on repeat imaging.  However, given your physical exam findings in clinic today (when you were unable to make a fist) I have placed a referral to have you speak with our hand specialist to see if there is any concern for potential tendon injury.     Sincerely,     Jody Wiley MD

## 2024-05-31 NOTE — LETTER
5/31/2024         RE: Zainab Bolton  [address] [address] [id number]        Dear Colleague,    Thank you for referring your patient, Zainab Bolton, to the [facility]. Please see a copy of my visit note below.                            [facility] Hematology and Oncology Progress Note    Patient: Zainab Bolton  MRN: [id number]  Date of Service: 09/06/2023        Reason for Visit    Chief Complaint   Patient presents with     Oncology Clinic Visit     2 month return visit related to Malignant neoplasm of central portion of left breast in female, estrogen receptor positive.       Assessment and Plan     Cancer Staging   Malignant neoplasm of central portion of left breast in female, estrogen receptor positive (H)  Staging form: Breast, AJCC 8th Edition  - Pathologic: Stage IA (pT2, pN0, cM0, G1, ER+, AK+, HER2-, Oncotype DX score: 17) - Signed by Candida Espinosa MD on 10/18/2023    Breast cancer, ER/AK positive, Her2 negative, left side, oncotype 17, T2N0M0, BRCA positive: s/p bilateral mastectomy.  I offered her adjuvant chemotherapy for intermediate Oncotype DX score but she declined.  Started on tamoxifen and Zoladex.  Unfortunately developed significant side effects from Zoladex which was discontinued in October 2023.  Was on tamoxifen only.  Simply underwent breast reconstruction along with bilateral salpingo-oophorectomy and hysterectomy.  So we will switch to letrozole.  Discussed the rationale behind this.  Discussed the potential side effects and complications associated with letrozole.  She will need a DEXA scan.  Also recommended to start taking vitamin D and calcium supplementation.  I will see her back in 4 months.        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 Espinosa    DIAGNOSIS: Breast cancer, left breast, found on screening mammogram.  Patient did have some mild breast pain for 9 months prior.  18 mm mammogram, 22 mm on MRI.  -Biopsy showed low-grade invasive ductal carcinoma.  ER positive, GA positive, HER2/doug negative by FISH.  -BRCA2 mutation testing done and is positive      TREATMENT:   -5/31/23: Patient had bilateral mastectomy with left sentinel lymph node biopsy.  Path shows 2.3 cm invasive ductal carcinoma of the left breast, grade 1.  Margins negative.  She had some background DCIS.  The lymph node was negative.  Patient had Oncotype done and result was 17.  -Tamoxifen, started roughly June 2023.   -Zoladex monthly injections started July 2023.  Zoladex discontinued in October 2023 due to significant side effects.    Underwent hysterectomy with bilateral salpingo-oophorectomy on 3/28/2024    Endocrine therapy switched to letrozole in May 2024    INTERIM HISTORY:   She was on tamoxifen for endocrine therapy.  Recently underwent hysterectomy with bilateral salpingo-oophorectomy.  Has not started letrozole yet.  Has recovered well from surgery.  Denies any new issues today.      Review of Systems    Pertinent  "items are noted in HPI.    Past History    Past Medical History:   Diagnosis Date     Anxiety 08/16/2010     BRCA2 positive      Breast cancer (H) 05/19/2023     Depression      Depressive disorder      Essential hypertension 03/03/2023     HSIL (high grade squamous intraepithelial lesion) on Pap smear of cervix 05/05/2015    LSIL/+ HR HPV     Hypercholesteremia 08/16/2010     Obesity 08/16/2010     S/P breast reconstruction, bilateral      TOBACCO ABUSE-CONTINUOUS        PHYSICAL EXAM  /82 (BP Location: Left arm, Patient Position: Sitting, Cuff Size: Adult Regular)   Pulse 89   Temp 99.1  F (37.3  C) (Tympanic)   Resp 16   Ht 1.6 m (5' 3\")   Wt 67.4 kg (148 lb 11.2 oz)   SpO2 98%   BMI 26.34 kg/m      GENERAL: Alert, cooperative, in no distress  LNs: Presence of bilateral axillary adenopathy  CNS: Alert and oriented x 3    Lab Results    No results found for this or any previous visit (from the past 168 hour(s)).      Imaging    MR Hand Right w/o Contrast    Result Date: 6/19/2024  EXAM: MR HAND RIGHT W/O CONTRAST LOCATION: [facility] DATE: 06/18/2024 INDICATION: Trauma to right 5th digit with inability to flex the DIP and a resting boutonniere deformity. Concern for possible jersey finger. COMPARISON: Right small finger radiographic exam 05/26/2024. TECHNIQUE: Unenhanced. FINDINGS: TENDONS: -Extensor tendons: Full-thickness tear distal aspect of the central slip extensor digiti minimi at the middle phalangeal base small finger with minimal proximal tendon retraction with the tendon edge at the proximal phalangeal head. Of note, the lateral bands appear to be intact at the middle phalanx and attach at the distal phalangeal base as usual. No significant extensor tenosynovitis. -Flexor tendons: No significant small finger flexor tendinopathy or tendon tear. Mild flexor tenosynovitis. LIGAMENTS: -Visualized collateral and capsular ligaments: Dorsal capsular disruption " distal aspect right small finger PIP joint. Evidence for subacute sprains of both the radial and ulnar collateral ligaments at the small finger PIP joint with pericapsular edema/inflammation. JOINTS AND BONES: -Persistent flexion at the PIP joint small finger. No acute fracture. Benign cystic change in the small and long finger metacarpal head/neck junctions. No focal cartilage defect. Joint effusion or synovitis at the small finger PIP joint. MUSCLES AND SOFT TISSUES: -No muscle atrophy or edema. No soft tissue mass. Soft tissue swelling surrounds the PIP joint small finger. No drainable subcutaneous fluid collection.     IMPRESSION: 1.  Full-thickness tear distal aspect central slip extensor digiti minimi at the middle phalangeal base dorsal with minimal proximal tendon retraction to the proximal phalangeal head. Associated persistent flexion of the small finger PIP joint. 2.  Dorsal capsular disruption distal aspect right small finger PIP joint at the middle phalangeal base. 3.  Mild small finger flexor tenosynovitis without tendon tear. 4.  Sprains of the radial and ulnar collateral ligaments at the small finger PIP joint with pericapsular edema/inflammation. 5.  Joint effusion or synovitis of the small finger PIP joint. 6.  No acute fracture right small finger.    DX Bone Density    Result Date: 6/6/2024  EXAM: DX AXIAL HIPS/SPINE LOCATION: [facility] DATE: 6/6/2024 INDICATION: Age-related osteoporosis without current pathological fracture. Previous hysterectomy with bilateral oophorectomy. DEMOGRAPHICS: Age- 43 years. Gender- Female. Menopausal status- Postmenopausal. COMPARISON: No prior studies available on the current scanner. TECHNIQUE: Dual-energy x-ray absorptiometry (DXA) performed with routine technique. FINDINGS: DXA RESULTS -Lumbar Spine: L1-L4: BMD: 1.286 g/cm2. T-score: 0.7. Z-score: 0.8. -RIGHT Hip Total: BMD: 1.285 g/cm2. T-score: 2.2. Z-score: 2.5. -RIGHT Hip Femoral  neck: BMD: 1.170 g/cm2. T-score: 0.9. Z-score: 1.5. -LEFT Hip Total: BMD: 1.277 g/cm2. T-score: 2.1. Z-score: 2.4. -LEFT Hip Femoral neck: BMD: 1.079 g/cm2. T-score: 0.3. Z-score: 0.9. WHO T-SCORE CRITERIA -Normal: T score at or above -1 SD -Osteopenia: T score between -1 and -2.5 SD -Osteoporosis: T score at or below -2.5 SD The World Health Organization (WHO) criteria is applicable to perimenopausal females, postmenopausal females, and men aged 50 years or older. FRACTURE RISK -The FRAX risk calculator is not applicable due to normal bone mineral density.     IMPRESSION: NORMAL. Bone mineral density measurements are within normal limits using T score.    XR Finger Right G/E 2 Views    Result Date: 5/31/2024  EXAM: XR FINGER RIGHT G/E 2 VIEWS DATE/TIME: 5/31/2024 10:38 AM INDICATION: Hand injury, right, sequela COMPARISON: 5/26/2024     IMPRESSION: Normal joint spaces and alignment. Previously described lucency of the radial fourth proximal phalanx is no longer visualized and was possibly artifactual. No acute or healing fracture evident.  ELIU TENORIO DO   SYSTEM ID:  [id number]    XR Finger Right G/E 2 Views    Result Date: 5/26/2024  EXAM: XR FINGER RIGHT G/E 2 VIEWS LOCATION: [facility] DATE: 5/26/2024 INDICATION: Pain. COMPARISON: None.     IMPRESSION: Subtle lucency along the radial margin of the distal fourth proximal phalangeal shaft, which could represent a nondisplaced fracture, however this could be artifactual nature. Recommend correlation with point tenderness.          Signed by: Candida Espinosa MD    Oncology Rooming Note    May 31, 2024 2:09 PM   Zainab Bolton is a 43 year old female who presents for:    Chief Complaint   Patient presents with     Oncology Clinic Visit     2 month return visit related to Malignant neoplasm of central portion of left breast in female, estrogen receptor positive.     Initial Vitals: /82 (BP Location: Left arm, Patient  "Position: Sitting, Cuff Size: Adult Regular)   Pulse 89   Temp 99.1  F (37.3  C) (Tympanic)   Resp 16   Ht 1.6 m (5' 3\")   Wt 67.4 kg (148 lb 11.2 oz)   SpO2 98%   BMI 26.34 kg/m   Estimated body mass index is 26.34 kg/m  as calculated from the following:    Height as of this encounter: 1.6 m (5' 3\").    Weight as of this encounter: 67.4 kg (148 lb 11.2 oz). Body surface area is 1.73 meters squared.  Mild Pain (2) Comment: Data Unavailable   No LMP recorded. Patient has had a hysterectomy.  Allergies reviewed: Yes  Medications reviewed: Yes    Medications: Medication refills not needed today.  Pharmacy name entered into [facility]:    [facility] [place], [place] - [address] - [address]    Frailty Screening:   Is the patient here for a new oncology consult visit in cancer care? 2. No      Clinical concerns: none.       Afia Au CMA                Again, thank you for allowing me to participate in the care of your patient.        Sincerely,        Candida Espinosa MD  "

## 2024-05-31 NOTE — PROGRESS NOTES
"  Assessment & Plan     Hand injury, right, sequela  > Overall the patient states that her ring finger does not hurt, although she admits that she has more difficulty making a fist with her pinky finger and has difficulty flexing the DIP  > XR Finger Right G/E 2 Views; No active or healing fracture appreciated  -Passive movement of the DIP did result in pain, this in the setting of inability to voluntarily flex DIP does give me concern for possible tendon injury   - Orthopedic  Referral; Future      Subjective   Avis is a 43 year old, presenting for the following health issues:  UC Follow-Up and Health Maintenance (Would like to discuss pneumonia vaccine)        5/31/2024     9:51 AM   Additional Questions   Roomed by Yanci LANDON LPN   Accompanied by self         5/31/2024     9:51 AM   Patient Reported Additional Medications   Patient reports taking the following new medications no new meds     HPI     ED/UC Followup:    Facility:  [facility]  Date of visit: 5/26/2024  Reason for visit: finger injury; right hand  Current Status: has had it wrapped and supported since leaving . Checks it every day. \"Pinky still looks bad\" patient states. Has swelling still, unable to bend pinky finger. 2/10 currently on pain scale, has been using ibuprofen to help. Pain doesn't get much higher than 2/10.    Patient was going too fast up the stairs and fell and caught herself with her right hand   Has been limiting her range of motion of her right hand/fingers   Reports pain wise and appearance wise \"it looks better and it feels better too\"   Has been using a wrap     ROS:   As above         Objective    /88 (BP Location: Right arm, Patient Position: Sitting, Cuff Size: Adult Regular)   Pulse 90   Temp 97.9  F (36.6  C) (Tympanic)   Resp 24   Ht 1.6 m (5' 3\")   Wt 67 kg (147 lb 11.2 oz)   LMP  (LMP Unknown)   SpO2 98%   BMI 26.16 kg/m    Body mass index is 26.16 " kg/m .  Physical Exam  Constitutional:       General: She is not in acute distress.     Appearance: Normal appearance.   HENT:      Head: Normocephalic and atraumatic.      Right Ear: External ear normal.      Left Ear: External ear normal.   Eyes:      General: No scleral icterus.        Right eye: No discharge.         Left eye: No discharge.      Extraocular Movements: Extraocular movements intact.      Conjunctiva/sclera: Conjunctivae normal.   Pulmonary:      Effort: Pulmonary effort is normal. No respiratory distress.   Musculoskeletal:         General: Signs of injury present.      Cervical back: Normal range of motion and neck supple.      Comments: Patient was not able to flex the DIP of the 5th right hand digit, and forced flexion of the DIP of the 5th right hand digit resulted in pain   There was point tenderness to the MCP and PIP of the right hand 5th digit   There was no point tenderness along the bony structures of the 4th right hand digit, patient does have full range of motion of the 4th digit of the right hand, but has difficulty with making a fist due to decreased range of motion of the right 5th hand digit   Radial pulses are 2+ bilaterally   Good sensation to temperature    Skin:     General: Skin is warm.      Comments: No rash on exposed skin   Neurological:      General: No focal deficit present.      Mental Status: She is alert and oriented to person, place, and time.   Psychiatric:         Mood and Affect: Mood normal.         Behavior: Behavior normal.              Signed Electronically by: SHERWIN HAYES MD    Answers submitted by the patient for this visit:  Patient Health Questionnaire (Submitted on 5/30/2024)  If you checked off any problems, how difficult have these problems made it for you to do your work, take care of things at home, or get along with other people?: Not difficult at all  PHQ9 TOTAL SCORE: 0  GEOVANY-7 (Submitted on 5/30/2024)  GEOVANY 7 TOTAL SCORE: 9

## 2024-05-31 NOTE — PROGRESS NOTES
[facility] Hematology and Oncology Progress Note    Patient: Zainab Bolton  MRN: [id number]  Date of Service: 09/06/2023        Reason for Visit    Chief Complaint   Patient presents with    Oncology Clinic Visit     2 month return visit related to Malignant neoplasm of central portion of left breast in female, estrogen receptor positive.       Assessment and Plan     Cancer Staging   Malignant neoplasm of central portion of left breast in female, estrogen receptor positive (H)  Staging form: Breast, AJCC 8th Edition  - Pathologic: Stage IA (pT2, pN0, cM0, G1, ER+, TN+, HER2-, Oncotype DX score: 17) - Signed by Candida Espinosa MD on 10/18/2023    Breast cancer, ER/TN positive, Her2 negative, left side, oncotype 17, T2N0M0, BRCA positive: s/p bilateral mastectomy.  I offered her adjuvant chemotherapy for intermediate Oncotype DX score but she declined.  Started on tamoxifen and Zoladex.  Unfortunately developed significant side effects from Zoladex which was discontinued in October 2023.  Was on tamoxifen only.  Simply underwent breast reconstruction along with bilateral salpingo-oophorectomy and hysterectomy.  So we will switch to letrozole.  Discussed the rationale behind this.  Discussed the potential side effects and complications associated with letrozole.  She will need a DEXA scan.  Also recommended to start taking vitamin D and calcium supplementation.  I will see her back in 4 months.        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 papillomavirus infection    Papanicolaou smear of cervix with low grade squamous intraepithelial lesion (LGSIL)    GEOVANY (generalized anxiety  disorder)    BRCA2 positive    Malignant neoplasm of central portion of left breast in female, estrogen receptor positive (H)    Cellulitis of right breast    Major depressive disorder, recurrent episode, in partial remission (H24)    H/O breast reconstruction    Snoring    Physical deconditioning    Menopausal syndrome (hot flashes)        ______________________________________________________________________________    History of Present Illness    Oncologist: Dr. Espinosa    DIAGNOSIS: Breast cancer, left breast, found on screening mammogram.  Patient did have some mild breast pain for 9 months prior.  18 mm mammogram, 22 mm on MRI.  -Biopsy showed low-grade invasive ductal carcinoma.  ER positive, NM positive, HER2/doug negative by FISH.  -BRCA2 mutation testing done and is positive      TREATMENT:   -5/31/23: Patient had bilateral mastectomy with left sentinel lymph node biopsy.  Path shows 2.3 cm invasive ductal carcinoma of the left breast, grade 1.  Margins negative.  She had some background DCIS.  The lymph node was negative.  Patient had Oncotype done and result was 17.  -Tamoxifen, started roughly June 2023.   -Zoladex monthly injections started July 2023.  Zoladex discontinued in October 2023 due to significant side effects.    Underwent hysterectomy with bilateral salpingo-oophorectomy on 3/28/2024    Endocrine therapy switched to letrozole in May 2024    INTERIM HISTORY:   She was on tamoxifen for endocrine therapy.  Recently underwent hysterectomy with bilateral salpingo-oophorectomy.  Has not started letrozole yet.  Has recovered well from surgery.  Denies any new issues today.      Review of Systems    Pertinent items are noted in HPI.    Past History    Past Medical History:   Diagnosis Date    Anxiety 08/16/2010    BRCA2 positive     Breast cancer (H) 05/19/2023    Depression     Depressive disorder     Essential hypertension 03/03/2023    HSIL (high grade squamous intraepithelial lesion) on Pap  "smear of cervix 05/05/2015    LSIL/+ HR HPV    Hypercholesteremia 08/16/2010    Obesity 08/16/2010    S/P breast reconstruction, bilateral     TOBACCO ABUSE-CONTINUOUS        PHYSICAL EXAM  /82 (BP Location: Left arm, Patient Position: Sitting, Cuff Size: Adult Regular)   Pulse 89   Temp 99.1  F (37.3  C) (Tympanic)   Resp 16   Ht 1.6 m (5' 3\")   Wt 67.4 kg (148 lb 11.2 oz)   SpO2 98%   BMI 26.34 kg/m      GENERAL: Alert, cooperative, in no distress  LNs: Presence of bilateral axillary adenopathy  CNS: Alert and oriented x 3    Lab Results    No results found for this or any previous visit (from the past 168 hour(s)).      Imaging    MR Hand Right w/o Contrast    Result Date: 6/19/2024  EXAM: MR HAND RIGHT W/O CONTRAST LOCATION: [facility] DATE: 06/18/2024 INDICATION: Trauma to right 5th digit with inability to flex the DIP and a resting boutonniere deformity. Concern for possible jersey finger. COMPARISON: Right small finger radiographic exam 05/26/2024. TECHNIQUE: Unenhanced. FINDINGS: TENDONS: -Extensor tendons: Full-thickness tear distal aspect of the central slip extensor digiti minimi at the middle phalangeal base small finger with minimal proximal tendon retraction with the tendon edge at the proximal phalangeal head. Of note, the lateral bands appear to be intact at the middle phalanx and attach at the distal phalangeal base as usual. No significant extensor tenosynovitis. -Flexor tendons: No significant small finger flexor tendinopathy or tendon tear. Mild flexor tenosynovitis. LIGAMENTS: -Visualized collateral and capsular ligaments: Dorsal capsular disruption distal aspect right small finger PIP joint. Evidence for subacute sprains of both the radial and ulnar collateral ligaments at the small finger PIP joint with pericapsular edema/inflammation. JOINTS AND BONES: -Persistent flexion at the PIP joint small finger. No acute fracture. Benign cystic change in the " small and long finger metacarpal head/neck junctions. No focal cartilage defect. Joint effusion or synovitis at the small finger PIP joint. MUSCLES AND SOFT TISSUES: -No muscle atrophy or edema. No soft tissue mass. Soft tissue swelling surrounds the PIP joint small finger. No drainable subcutaneous fluid collection.     IMPRESSION: 1.  Full-thickness tear distal aspect central slip extensor digiti minimi at the middle phalangeal base dorsal with minimal proximal tendon retraction to the proximal phalangeal head. Associated persistent flexion of the small finger PIP joint. 2.  Dorsal capsular disruption distal aspect right small finger PIP joint at the middle phalangeal base. 3.  Mild small finger flexor tenosynovitis without tendon tear. 4.  Sprains of the radial and ulnar collateral ligaments at the small finger PIP joint with pericapsular edema/inflammation. 5.  Joint effusion or synovitis of the small finger PIP joint. 6.  No acute fracture right small finger.    DX Bone Density    Result Date: 6/6/2024  EXAM: DX AXIAL HIPS/SPINE LOCATION: [facility] DATE: 6/6/2024 INDICATION: Age-related osteoporosis without current pathological fracture. Previous hysterectomy with bilateral oophorectomy. DEMOGRAPHICS: Age- 43 years. Gender- Female. Menopausal status- Postmenopausal. COMPARISON: No prior studies available on the current scanner. TECHNIQUE: Dual-energy x-ray absorptiometry (DXA) performed with routine technique. FINDINGS: DXA RESULTS -Lumbar Spine: L1-L4: BMD: 1.286 g/cm2. T-score: 0.7. Z-score: 0.8. -RIGHT Hip Total: BMD: 1.285 g/cm2. T-score: 2.2. Z-score: 2.5. -RIGHT Hip Femoral neck: BMD: 1.170 g/cm2. T-score: 0.9. Z-score: 1.5. -LEFT Hip Total: BMD: 1.277 g/cm2. T-score: 2.1. Z-score: 2.4. -LEFT Hip Femoral neck: BMD: 1.079 g/cm2. T-score: 0.3. Z-score: 0.9. WHO T-SCORE CRITERIA -Normal: T score at or above -1 SD -Osteopenia: T score between -1 and -2.5 SD -Osteoporosis: T score  at or below -2.5 SD The World Health Organization (WHO) criteria is applicable to perimenopausal females, postmenopausal females, and men aged 50 years or older. FRACTURE RISK -The FRAX risk calculator is not applicable due to normal bone mineral density.     IMPRESSION: NORMAL. Bone mineral density measurements are within normal limits using T score.    XR Finger Right G/E 2 Views    Result Date: 5/31/2024  EXAM: XR FINGER RIGHT G/E 2 VIEWS DATE/TIME: 5/31/2024 10:38 AM INDICATION: Hand injury, right, sequela COMPARISON: 5/26/2024     IMPRESSION: Normal joint spaces and alignment. Previously described lucency of the radial fourth proximal phalanx is no longer visualized and was possibly artifactual. No acute or healing fracture evident.  ELIU TENORIO DO   SYSTEM ID:  [id number]    XR Finger Right G/E 2 Views    Result Date: 5/26/2024  EXAM: XR FINGER RIGHT G/E 2 VIEWS LOCATION: [facility] DATE: 5/26/2024 INDICATION: Pain. COMPARISON: None.     IMPRESSION: Subtle lucency along the radial margin of the distal fourth proximal phalangeal shaft, which could represent a nondisplaced fracture, however this could be artifactual nature. Recommend correlation with point tenderness.          Signed by: Candida Espinosa MD

## 2024-05-31 NOTE — NURSING NOTE
Immunizations Administered       Name Date Dose VIS Date Route    Pneumococcal 20 valent Conjugate (Prevnar 20) 5/31/24 10:26 AM 0.5 mL 05/12/2023, Given Today Intramuscular          Prior to immunization administration, verified patients identity using patient s name and date of birth. Please see Immunization Activity for additional information.     Screening Questionnaire for Adult Immunization    Are you sick today?   No   Do you have allergies to medications, food, a vaccine component or latex?   No   Have you ever had a serious reaction after receiving a vaccination?   No   Do you have a long-term health problem with heart, lung, kidney, or metabolic disease (e.g., diabetes), asthma, a blood disorder, no spleen, complement component deficiency, a cochlear implant, or a spinal fluid leak?  Are you on long-term aspirin therapy?   No   Do you have cancer, leukemia, HIV/AIDS, or any other immune system problem?   No   Do you have a parent, brother, or sister with an immune system problem?   No   In the past 3 months, have you taken medications that affect  your immune system, such as prednisone, other steroids, or anticancer drugs; drugs for the treatment of rheumatoid arthritis, Crohn s disease, or psoriasis; or have you had radiation treatments?   No   Have you had a seizure, or a brain or other nervous system problem?   No   During the past year, have you received a transfusion of blood or blood    products, or been given immune (gamma) globulin or antiviral drug?   No   For women: Are you pregnant or is there a chance you could become       pregnant during the next month?   No   Have you received any vaccinations in the past 4 weeks?   No     Immunization questionnaire answers were all negative.      Patient instructed to remain in clinic for 15 minutes afterwards, and to report any adverse reactions.     Screening performed by Yanci LANDON LPN on 5/31/2024 at 10:30 AM.

## 2024-06-04 ENCOUNTER — OFFICE VISIT (OUTPATIENT)
Dept: ORTHOPEDICS | Facility: OTHER | Age: 44
End: 2024-06-04
Attending: STUDENT IN AN ORGANIZED HEALTH CARE EDUCATION/TRAINING PROGRAM
Payer: COMMERCIAL

## 2024-06-04 ENCOUNTER — PATIENT OUTREACH (OUTPATIENT)
Dept: CARE COORDINATION | Facility: CLINIC | Age: 44
End: 2024-06-04

## 2024-06-04 VITALS
HEIGHT: 63 IN | BODY MASS INDEX: 26.35 KG/M2 | DIASTOLIC BLOOD PRESSURE: 82 MMHG | WEIGHT: 148.7 LBS | SYSTOLIC BLOOD PRESSURE: 136 MMHG

## 2024-06-04 DIAGNOSIS — S69.91XS HAND INJURY, RIGHT, SEQUELA: Primary | ICD-10-CM

## 2024-06-04 DIAGNOSIS — M20.021 BOUTONNIERE DEFORMITY OF FINGER OF RIGHT HAND: ICD-10-CM

## 2024-06-04 PROCEDURE — 99204 OFFICE O/P NEW MOD 45 MIN: CPT | Performed by: STUDENT IN AN ORGANIZED HEALTH CARE EDUCATION/TRAINING PROGRAM

## 2024-06-04 ASSESSMENT — PAIN SCALES - GENERAL: PAINLEVEL: MILD PAIN (2)

## 2024-06-04 NOTE — PROGRESS NOTES
Social Work - Distress Screen Intervention  [facility]    Identified Concern and Score from Distress Screenin. How concerned are you about your ability to eat? 0     2. How concerned are you about unintended weight loss or your current weight? 0     3. How concerned are you about feeling depressed or very sad?  7     4. How concerned are you about feeling anxious or very scared?  (!) 9     5. Do you struggle with the loss of meaning and kaila in your life?  Quite a bit     6. How concerned are you about work and home life issues that may be affected by your cancer?  (!) 10     7. How concerned are you about knowing what resources are available to help you?  0     8. Do you currently have what you would describe as Caodaism or spiritual struggles? Not at all     9. If you want to be contacted by one of our professionals, I can send a message to them right now.  Oncology Social Worker       Date of Distress Screen: 24  Data: At time of last visit, patient scored positive on distress screening.  outreached to patient today to follow up on elevated distress and introduce psychosocial services and support.  Intervention/Education provided:  contacted patient by phone to discuss distress screening results. Avis's son  8 months ago so she is experiencing intense grief. She has a therapist and is working closely with her primary MD re medication. Provided active listening, validation of feelings and grief support as she shared her experience. She has good support in place so no additional follow up. She will reach out if any needs arise.   Follow-up Required:  will remain available to patient for support as needed    Oly Francois, MARK, [facility]  Adult Oncology Clinics  [place] (M,W), [place] (T) & [place] (Th)  *I am off Friday  Office: [phone]

## 2024-06-04 NOTE — PATIENT INSTRUCTIONS
MRI Scheduling Instructions  Please follow both steps below    1.  Advanced imaging is done by appointment. Please call [facility] ([facility]/[place]) [phone] to schedule your MRI at your earliest convenience.   - Some insurance companies may require a prior authorization to be completed which can delay the time until you are able to schedule your appointment.     - If you are active on MyChart, you may have access to your test results before your provider is able to review the study and advise on next steps.      2. After the date of your MRI has been scheduled, please call [phone] to get on my schedule for an in-person or telephone follow up appointment to discuss the results and updated treatment recommendations. This follow up should be scheduled for 1-2 days after the date of your MRI.

## 2024-06-04 NOTE — LETTER
2024      Zainab Bolton  [id number] [address] [address]      Dear Colleague,    Thank you for referring your patient, Zainab Bolton, to the [facility] SPORTS MEDICINE [facility] [place]. Please see a copy of my visit note below.    Zainab Bolton  :  1980  DOS: 2024  MRN: [id number]  PCP: Jessi Concepcion    Sports Medicine Clinic Visit      HPI  Zainab Bolton is a 43 year old female who is seen in consultation at the request of  Jody Wiley M.D. presenting with right hand pain after an injury.     - Mechanism of Injury:    - Injury when she fell up the stairs 10 days ago  - Pertinent history and prior evaluations:    - Hx of breast CA, MDD, GEOVANY.  - Seen in UC on 24 and then by referring physician on 2024 and there were concerns about inability to actively flex the DIP joint of the right little finger with pain during passive motion of the DIP.  Not especially painful at rest.   - XR R finger 2024 shows a small intra-articular osteophyte on the dorsal lateral aspect of the fifth digit DIP joint.  There is also some note of a small cortical irregularity and lucency within the fourth digit proximal phalanx that may be artifactual.  This was not evident on follow-up x-ray on 2024 which showed a similar appearance to the fifth digit PIP.    - Pain Character:    - Location: Right little finger  - Character:  Swollen and achy pain  - Duration:  10 days  - Course:  no change  - Endorses:    -  inability to flex at right small finger DIP joint , weakness, deadened sensation, swelling, bruising  - Denies:    - clicking/popping, grinding, mechanical locking symptoms, instability  - Alleviating factors:    -  immobilization  - Aggravating factors:    -  finger flexion  - Other treatments tried:    - wrapping. Radial gutter orthoglass splint. [place] taping. Alumofoam splint. Icing. Ibuprofen on occasion. Activity modification    - Patient Goals:    - get a  formal diagnosis, discuss treatment options  - Social History:   -  currently between jobs       Review of Systems  Musculoskeletal: as above  Remainder of review of systems is negative including constitutional, CV, pulmonary, GI, Skin and Neurologic except as noted in HPI or medical history.    Past Medical History:   Diagnosis Date     Anxiety 08/16/2010     BRCA2 positive      Breast cancer (H) 05/19/2023     Depression      Depressive disorder      Essential hypertension 03/03/2023     HSIL (high grade squamous intraepithelial lesion) on Pap smear of cervix 05/05/2015    LSIL/+ HR HPV     Hypercholesteremia 08/16/2010     Obesity 08/16/2010     S/P breast reconstruction, bilateral      TOBACCO ABUSE-CONTINUOUS      Past Surgical History:   Procedure Laterality Date     BIOPSY NODE SENTINEL Left 05/31/2023    Procedure: LEFT SENTINEL LYMPH NODE BIOPSY;  Surgeon: Kylee Villasenor MD;  Location: [facility]     CONIZATION LEEP N/A 07/16/2015    Procedure: CONIZATION LEEP;  Surgeon: Omayra Cunningham DO;  Location:  OR     EYE SURGERY  1/5/2016     GRAFT FAT TO BREAST Bilateral 3/28/2024    Procedure: Bilateral breast fat grafting from flanks abdomen and thighs;  Surgeon: MARIA E Aaron MD;  Location: [facility] OR     GRAFT FREE VASCULARIZED TRANSVERSE RECTUS ABDOMINIS MYOCUTANEOUS Bilateral 09/25/2023    Procedure: Bilateral breast reconstruction with free abdominal flaps, resensation, SPY;  Surgeon: MARIA E Aaron MD;  Location: [facility] OR     LAPAROSCOPIC HYSTERECTOMY TOTAL, BILATERAL SALPINGO-OOPHORECTOMY, COMBINED Bilateral 3/28/2024    Procedure: Total laparoscopic hysterectomy, bilateral salpingo-oophorectomy, Cystoscopy;  Surgeon: Marco Watkins MD;  Location: [facility] OR     LAPAROSCOPIC TUBAL LIGATION  10/03/2003     MASTECTOMY SIMPLE Bilateral 05/31/2023    Procedure: BILATERAL MASTECTOMIES;  Surgeon: Kylee Villasenor MD;  Location: [facility]     RECONSTRUCT BREAST, INSERT TISSUE EXPANDER,  "COMBINED Bilateral 05/31/2023    Procedure: RECONSTRUCTION, BREAST, BILATERAL TISSUE EXPANDERS;  Surgeon: Basil Aguilar MD;  Location: [facility]     REMOVE TISSUE EXPANDER TRUNK Bilateral 08/25/2023    Procedure: REMOVAL, TISSUE EXPANDER, BILATERAL BREAST;  Surgeon: Basil Aguilar MD;  Location: [facility] OR     REVISE RECONSTRUCTED BREAST BILATERAL Bilateral 3/28/2024    Procedure: bilateral revision of breasts, umbilicoplasty, scar revision of abdomen;  Surgeon: MARIA E Aaron MD;  Location:  OR     Family History   Problem Relation Age of Onset     Liver Cancer Father      Stomach Cancer Father      Other Cancer Father         Stomach and liver cancer diagnosis     Arthritis Maternal Grandmother      Hypertension Maternal Grandmother      Diabetes Maternal Grandmother      Thyroid Disease Maternal Grandmother      Leukemia Maternal Grandfather      Heart Disease Maternal Aunt         MI approx age 46     Anesthesia Reaction No family hx of      Clotting Disorder No family hx of          Objective  /82   Ht 1.6 m (5' 3\")   Wt 67.4 kg (148 lb 11.2 oz)   LMP  (LMP Unknown)   BMI 26.34 kg/m      General: healthy, alert and in no acute distress.    HEENT: no scleral icterus or conjunctival erythema.   Skin: no suspicious lesions or rash. No jaundice.   CV: regular rhythm by palpation, 2+ distal pulses.  Resp: normal respiratory effort without conversational dyspnea.   Psych: normal mood and affect.    Gait: nonantalgic, appropriate coordination and balance.     Neuro:        - Sensation to light touch:    - Intact throughout the BUE including all peripheral nerve distributions.     MSK - Wrist/Hand:        - Inspection:    -Slight boutonniere deformity present of the right fifth digit with some swelling at the DIP and PIP joints.       - ROM:    - Limited in active flexion of the fifth digit DIP.       - Palpation:    - TTP at the fifth digit DIP dorsally and laterally.   - NTTP " elsewhere.        - Strength:  (*antalgic)   - FDP V 1  - FDS V 3+  - EDM   5  - FDM  5  - ADM  5            - Special tests:   - TFCC compression:  Neg       Radiology  I independently reviewed the available relevant imaging in the chart with my interpretations as above in HPI.       Assessment  1. Hand injury, right, sequela    2. 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  Not entirely sure how the finger was impacted but likely jammed it.  It has been painful and swollen since that time.  She presents with a slight boutonniere deformity of the finger and significant difficulty with flexing the DIP joint of the fifth digit actively.  Passively, the deformity is reducible.  We discussed that she may have simply sprained the finger but there is at least some level of concern for possible jersey finger, which do not tend to heal as well on their own and often require surgery so would be best to evaluate this with advanced imaging (MRI).  MRI order has been placed and instructions given for scheduling the MRI and follow-up with me afterward.  If a jersey finger is observed, she will be referred to one of our hand surgeons for treatment options.  In the meantime, she was provided with a longer AlumaFoam splint to assist with immobilization.  May use NSAIDs/Tylenol and Voltaren gel as needed for comfort and pain control.  May ice frequently for swelling control.  Recommend immobilizing the finger for now until we get results of the MRI.  We will follow-up when results of the MRI are obtained.      Irineo Orellana DO, CARLA  [facility] Sports Medicine  [facility] - Department of Orthopedic Surgery       Disclaimer:  This note was prepared and written using Dragon Medical dictation software. As a result, there may be errors  in the script that have gone undetected. Please consider this when interpreting the information in this note.       Again, thank you for allowing me to participate in the care of your patient.        Sincerely,        Irineo Orellana, DO

## 2024-06-04 NOTE — PROGRESS NOTES
Zainab Bolton  :  1980  DOS: 2024  MRN: [id number]  PCP: Jessi Concepcion    Sports Medicine Clinic Visit      HPI  Zainab Bolton is a 43 year old female who is seen in consultation at the request of  Jody Wiley M.D. presenting with right hand pain after an injury.     - Mechanism of Injury:    - Injury when she fell up the stairs 10 days ago  - Pertinent history and prior evaluations:    - Hx of breast CA, MDD, GEOVANY.  - Seen in UC on 24 and then by referring physician on 2024 and there were concerns about inability to actively flex the DIP joint of the right little finger with pain during passive motion of the DIP.  Not especially painful at rest.   - XR R finger 2024 shows a small intra-articular osteophyte on the dorsal lateral aspect of the fifth digit DIP joint.  There is also some note of a small cortical irregularity and lucency within the fourth digit proximal phalanx that may be artifactual.  This was not evident on follow-up x-ray on 2024 which showed a similar appearance to the fifth digit PIP.    - Pain Character:    - Location: Right little finger  - Character:  Swollen and achy pain  - Duration:  10 days  - Course:  no change  - Endorses:    -  inability to flex at right small finger DIP joint , weakness, deadened sensation, swelling, bruising  - Denies:    - clicking/popping, grinding, mechanical locking symptoms, instability  - Alleviating factors:    -  immobilization  - Aggravating factors:    -  finger flexion  - Other treatments tried:    - wrapping. Radial gutter orthoglass splint. [place] taping. Alumofoam splint. Icing. Ibuprofen on occasion. Activity modification    - Patient Goals:    - get a formal diagnosis, discuss treatment options  - Social History:   -  currently between jobs       Review of Systems  Musculoskeletal: as above  Remainder of review of systems is negative including constitutional, CV, pulmonary, GI, Skin and Neurologic except as  noted in HPI or medical history.    Past Medical History:   Diagnosis Date    Anxiety 08/16/2010    BRCA2 positive     Breast cancer (H) 05/19/2023    Depression     Depressive disorder     Essential hypertension 03/03/2023    HSIL (high grade squamous intraepithelial lesion) on Pap smear of cervix 05/05/2015    LSIL/+ HR HPV    Hypercholesteremia 08/16/2010    Obesity 08/16/2010    S/P breast reconstruction, bilateral     TOBACCO ABUSE-CONTINUOUS      Past Surgical History:   Procedure Laterality Date    BIOPSY NODE SENTINEL Left 05/31/2023    Procedure: LEFT SENTINEL LYMPH NODE BIOPSY;  Surgeon: Kylee Villasenor MD;  Location: [facility] OR    CONIZATION LEEP N/A 07/16/2015    Procedure: CONIZATION LEEP;  Surgeon: Omayra Cunningham DO;  Location:  OR    EYE SURGERY  1/5/2016    GRAFT FAT TO BREAST Bilateral 3/28/2024    Procedure: Bilateral breast fat grafting from flanks abdomen and thighs;  Surgeon: MARIA E Aaron MD;  Location: [facility] OR    GRAFT FREE VASCULARIZED TRANSVERSE RECTUS ABDOMINIS MYOCUTANEOUS Bilateral 09/25/2023    Procedure: Bilateral breast reconstruction with free abdominal flaps, resensation, SPY;  Surgeon: MARIA E Aaron MD;  Location: [facility] OR    LAPAROSCOPIC HYSTERECTOMY TOTAL, BILATERAL SALPINGO-OOPHORECTOMY, COMBINED Bilateral 3/28/2024    Procedure: Total laparoscopic hysterectomy, bilateral salpingo-oophorectomy, Cystoscopy;  Surgeon: Marco Watkins MD;  Location: [facility] OR    LAPAROSCOPIC TUBAL LIGATION  10/03/2003    MASTECTOMY SIMPLE Bilateral 05/31/2023    Procedure: BILATERAL MASTECTOMIES;  Surgeon: Kylee Villasenor MD;  Location: [facility] OR    RECONSTRUCT BREAST, INSERT TISSUE EXPANDER, COMBINED Bilateral 05/31/2023    Procedure: RECONSTRUCTION, BREAST, BILATERAL TISSUE EXPANDERS;  Surgeon: Basil Aguilar MD;  Location: [facility] OR    REMOVE TISSUE EXPANDER TRUNK Bilateral 08/25/2023    Procedure: REMOVAL, TISSUE EXPANDER, BILATERAL BREAST;  Surgeon:  "Basil Aguilar MD;  Location: [facility] OR    REVISE RECONSTRUCTED BREAST BILATERAL Bilateral 3/28/2024    Procedure: bilateral revision of breasts, umbilicoplasty, scar revision of abdomen;  Surgeon: MARIA E Aaron MD;  Location:  OR     Family History   Problem Relation Age of Onset    Liver Cancer Father     Stomach Cancer Father     Other Cancer Father         Stomach and liver cancer diagnosis    Arthritis Maternal Grandmother     Hypertension Maternal Grandmother     Diabetes Maternal Grandmother     Thyroid Disease Maternal Grandmother     Leukemia Maternal Grandfather     Heart Disease Maternal Aunt         MI approx age 46    Anesthesia Reaction No family hx of     Clotting Disorder No family hx of          Objective  /82   Ht 1.6 m (5' 3\")   Wt 67.4 kg (148 lb 11.2 oz)   LMP  (LMP Unknown)   BMI 26.34 kg/m      General: healthy, alert and in no acute distress.    HEENT: no scleral icterus or conjunctival erythema.   Skin: no suspicious lesions or rash. No jaundice.   CV: regular rhythm by palpation, 2+ distal pulses.  Resp: normal respiratory effort without conversational dyspnea.   Psych: normal mood and affect.    Gait: nonantalgic, appropriate coordination and balance.     Neuro:        - Sensation to light touch:    - Intact throughout the BUE including all peripheral nerve distributions.     MSK - Wrist/Hand:        - Inspection:    -Slight boutonniere deformity present of the right fifth digit with some swelling at the DIP and PIP joints.       - ROM:    - Limited in active flexion of the fifth digit DIP.       - Palpation:    - TTP at the fifth digit DIP dorsally and laterally.   - NTTP elsewhere.        - Strength:  (*antalgic)   - FDP V 1  - FDS V 3+  - EDM   5  - FDM  5  - ADM  5            - Special tests:   - TFCC compression:  Neg       Radiology  I independently reviewed the available relevant imaging in the chart with my interpretations as above in HPI. "       Assessment  1. Hand injury, right, sequela    2. 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  Not entirely sure how the finger was impacted but likely jammed it.  It has been painful and swollen since that time.  She presents with a slight boutonniere deformity of the finger and significant difficulty with flexing the DIP joint of the fifth digit actively.  Passively, the deformity is reducible.  We discussed that she may have simply sprained the finger but there is at least some level of concern for possible jersey finger, which do not tend to heal as well on their own and often require surgery so would be best to evaluate this with advanced imaging (MRI).  MRI order has been placed and instructions given for scheduling the MRI and follow-up with me afterward.  If a jersey finger is observed, she will be referred to one of our hand surgeons for treatment options.  In the meantime, she was provided with a longer AlumaFoam splint to assist with immobilization.  May use NSAIDs/Tylenol and Voltaren gel as needed for comfort and pain control.  May ice frequently for swelling control.  Recommend immobilizing the finger for now until we get results of the MRI.  We will follow-up when results of the MRI are obtained.      Irineo Orellana DO, CARLA  [facility]  [facility] - Department of Orthopedic Surgery       Disclaimer:  This note was prepared and written using Dragon Medical dictation software. As a result, there may be errors in the script that have gone undetected. Please consider this when interpreting the information in this note.

## 2024-06-06 ENCOUNTER — HOSPITAL ENCOUNTER (OUTPATIENT)
Dept: BONE DENSITY | Facility: CLINIC | Age: 44
Discharge: HOME | End: 2024-06-06
Attending: INTERNAL MEDICINE | Admitting: INTERNAL MEDICINE
Payer: COMMERCIAL

## 2024-06-06 DIAGNOSIS — M81.0 AGE-RELATED OSTEOPOROSIS WITHOUT CURRENT PATHOLOGICAL FRACTURE: ICD-10-CM

## 2024-06-06 PROCEDURE — 77080 DXA BONE DENSITY AXIAL: CPT

## 2024-06-14 NOTE — PROGRESS NOTES
Zainab Bolton  :  1980  DOS: 2024  MRN: [id number]  PCP: Jessi Concepcion    Sports Medicine Clinic Visit  Interim History - 2024  - Last seen on 2024 for a right small finger boutonniere deformity, suspected central slip injury.     - MRI 2024 right hand IMPRESSION:  1.  Full-thickness tear distal aspect central slip extensor digiti minimi at the middle phalangeal base dorsal with minimal proximal tendon retraction to the proximal phalangeal head. Associated persistent flexion of the small finger PIP joint.  2.  Dorsal capsular disruption distal aspect right small finger PIP joint at the middle phalangeal base.  3.  Mild small finger flexor tenosynovitis without tendon tear.  4.  Sprains of the radial and ulnar collateral ligaments at the small finger PIP joint with pericapsular edema/inflammation.  5.  Joint effusion or synovitis of the small finger PIP joint.  6.  No acute fracture right small finger.    - Since the last visit, slight improvement in right little finger pain.  Continues to have boutonierre deformity as noted on prior exam.  Wearing kristina tape and alumafoam splints at home. Voltaren gel is helpful.   - No interim injury.       Initial Visit: 2024  HPI  Zainab Bolton is a 43 year old female who is seen in consultation at the request of  Jody Wiley M.D. presenting with right hand pain after an injury.     - Mechanism of Injury:    - Injury when she fell up the stairs 10 days ago  - Pertinent history and prior evaluations:    - Hx of breast CA, MDD, GEOVANY.  - Seen in UC on 24 and then by referring physician on 2024 and there were concerns about inability to actively flex the DIP joint of the right little finger with pain during passive motion of the DIP.  Not especially painful at rest.   - XR R finger 2024 shows a small intra-articular osteophyte on the dorsal lateral aspect of the fifth digit DIP joint.  There is also some note of a  small cortical irregularity and lucency within the fourth digit proximal phalanx that may be artifactual.  This was not evident on follow-up x-ray on 5/31/2024 which showed a similar appearance to the fifth digit PIP.    - Pain Character:    - Location: Right little finger  - Character:  Swollen and achy pain  - Duration:  10 days  - Course:  no change  - Endorses:    -  inability to flex at right small finger DIP joint , weakness, deadened sensation, swelling, bruising  - Denies:    - clicking/popping, grinding, mechanical locking symptoms, instability  - Alleviating factors:    -  immobilization  - Aggravating factors:    -  finger flexion  - Other treatments tried:    - wrapping. Radial gutter orthoglass splint. [place] taping. Alumofoam splint. Icing. Ibuprofen on occasion. Activity modification    - Patient Goals:    - get a formal diagnosis, discuss treatment options  - Social History:   -  currently between jobs       Review of Systems  Musculoskeletal: as above  Remainder of review of systems is negative including constitutional, CV, pulmonary, GI, Skin and Neurologic except as noted in HPI or medical history.    Past Medical History:   Diagnosis Date    Anxiety 08/16/2010    BRCA2 positive     Breast cancer (H) 05/19/2023    Depression     Depressive disorder     Essential hypertension 03/03/2023    HSIL (high grade squamous intraepithelial lesion) on Pap smear of cervix 05/05/2015    LSIL/+ HR HPV    Hypercholesteremia 08/16/2010    Obesity 08/16/2010    S/P breast reconstruction, bilateral     TOBACCO ABUSE-CONTINUOUS      Past Surgical History:   Procedure Laterality Date    BIOPSY NODE SENTINEL Left 05/31/2023    Procedure: LEFT SENTINEL LYMPH NODE BIOPSY;  Surgeon: Kylee Villasenor MD;  Location: [facility] OR    CONIZATION LEEP N/A 07/16/2015    Procedure: CONIZATION LEEP;  Surgeon: Omayra Cunningham DO;  Location:  OR    EYE SURGERY  1/5/2016    GRAFT FAT TO BREAST Bilateral 3/28/2024     "Procedure: Bilateral breast fat grafting from flanks abdomen and thighs;  Surgeon: MARIA E Aaron MD;  Location: [facility] OR    GRAFT FREE VASCULARIZED TRANSVERSE RECTUS ABDOMINIS MYOCUTANEOUS Bilateral 09/25/2023    Procedure: Bilateral breast reconstruction with free abdominal flaps, resensation, SPY;  Surgeon: MARIA E Aaron MD;  Location: [facility] OR    LAPAROSCOPIC HYSTERECTOMY TOTAL, BILATERAL SALPINGO-OOPHORECTOMY, COMBINED Bilateral 3/28/2024    Procedure: Total laparoscopic hysterectomy, bilateral salpingo-oophorectomy, Cystoscopy;  Surgeon: Marco Watkins MD;  Location: [facility] OR    LAPAROSCOPIC TUBAL LIGATION  10/03/2003    MASTECTOMY SIMPLE Bilateral 05/31/2023    Procedure: BILATERAL MASTECTOMIES;  Surgeon: Kylee Villasenor MD;  Location: [facility] OR    RECONSTRUCT BREAST, INSERT TISSUE EXPANDER, COMBINED Bilateral 05/31/2023    Procedure: RECONSTRUCTION, BREAST, BILATERAL TISSUE EXPANDERS;  Surgeon: Basil Aguilar MD;  Location: [facility] OR    REMOVE TISSUE EXPANDER TRUNK Bilateral 08/25/2023    Procedure: REMOVAL, TISSUE EXPANDER, BILATERAL BREAST;  Surgeon: Basil Aguilar MD;  Location: [facility] OR    REVISE RECONSTRUCTED BREAST BILATERAL Bilateral 3/28/2024    Procedure: bilateral revision of breasts, umbilicoplasty, scar revision of abdomen;  Surgeon: MARIA E Aaron MD;  Location: [facility] OR     Family History   Problem Relation Age of Onset    Liver Cancer Father     Stomach Cancer Father     Other Cancer Father         Stomach and liver cancer diagnosis    Arthritis Maternal Grandmother     Hypertension Maternal Grandmother     Diabetes Maternal Grandmother     Thyroid Disease Maternal Grandmother     Leukemia Maternal Grandfather     Heart Disease Maternal Aunt         MI approx age 46    Anesthesia Reaction No family hx of     Clotting Disorder No family hx of          Objective  Ht 1.6 m (5' 3\")   Wt 67.1 kg (148 lb)   LMP  (LMP Unknown)   BMI 26.22 kg/m      General: " healthy, alert and in no acute distress.    HEENT: no scleral icterus or conjunctival erythema.   Skin: no suspicious lesions or rash. No jaundice.   CV: regular rhythm by palpation, 2+ distal pulses.  Resp: normal respiratory effort without conversational dyspnea.   Psych: normal mood and affect.    Gait: nonantalgic, appropriate coordination and balance.     Neuro:        - Sensation to light touch:    - Intact throughout the BUE including all peripheral nerve distributions.     MSK - Wrist/Hand:        - Inspection:    - Boutonniere deformity present of the right fifth digit with some swelling at the DIP and PIP joints.       - ROM:    - Limited in active flexion of the fifth digit DIP and extension of the PIP joint.       - Palpation:    - TTP at the fifth digit DIP dorsally and laterally and PIP joint dorsally.   - NTTP elsewhere.        - Strength:  (*antalgic)   - FDP V 3*  - FDS V 3+*  - EDM   3*  - ADM  5            - Special tests:   - Mehul test:  Pos      Radiology  I independently reviewed the available relevant imaging in the chart with my interpretations as above in HPI.       Assessment  1. Central slip extensor tendon injury (boutonniere), left        Plan  Zainab Bolton is a pleasant 43 year old female that presents for follow up of her right little finger injury when she fell on the stairs approximately 10 days ago.  Not entirely sure how the finger was impacted but likely jammed it.  It has been painful and swollen since that time.  She presented with a slight boutonniere deformity of the finger and significant difficulty with flexing the DIP joint of the fifth digit actively.  Passively, the deformity is reducible.  We discussed that she may have simply sprained the finger but there is at least some level of concern for possible jersey finger or central slip injury based on the current deformity and weakness, which do not tend to heal as well on their own and often require surgery so would  be best to evaluate this with advanced imaging (MRI).  MRI was ordered and confirmed a full-thickness tear of the central slip of the EDM with associated dorsal capsular disruption and joint effusion.  Also showed sprains of the RCL and UCL at the PIP joint and flexor tenosynovitis.    We discussed the nature of this injury and potential for incomplete healing and progression to a permanent boutonniere deformity of the finger and it would be best to discuss surgical repair of the extensor tendon and any other associated surgery sooner rather than later, and that surgery has a high likelihood of being offered.  At this time, she would prefer to avoid surgery but understands the risks associated and would be happy to have the consultation.  An orthopedic  referral has been placed for Dr. Nixon, hand surgeon, in [place] due to proximity of her home and she will look out for a call from the orthopedic scheduling team.    In the meantime, she will continue to use buddy taping and splinting for stability and support, oval 8 splints given today in clinic.  She will continue to use oral NSAIDs/Tylenol and Voltaren gel as needed for comfort and pain control.  She may contact the clinic for questions/concerns as needed.      Irineo Orellana DO, CARLA  [facility] - Sports Medicine  [facility] - Department of Orthopedic Surgery       Disclaimer:  This note was prepared and written using Dragon Medical dictation software. As a result, there may be errors in the script that have gone undetected. Please consider this when interpreting the information in this note.

## 2024-06-18 ENCOUNTER — HOSPITAL ENCOUNTER (OUTPATIENT)
Dept: MRI IMAGING | Facility: CLINIC | Age: 44
Discharge: HOME | End: 2024-06-18
Attending: STUDENT IN AN ORGANIZED HEALTH CARE EDUCATION/TRAINING PROGRAM | Admitting: STUDENT IN AN ORGANIZED HEALTH CARE EDUCATION/TRAINING PROGRAM
Payer: COMMERCIAL

## 2024-06-18 DIAGNOSIS — S69.91XS HAND INJURY, RIGHT, SEQUELA: ICD-10-CM

## 2024-06-18 PROCEDURE — 73218 MRI UPPER EXTREMITY W/O DYE: CPT | Mod: RT

## 2024-06-20 ENCOUNTER — OFFICE VISIT (OUTPATIENT)
Dept: ORTHOPEDICS | Facility: CLINIC | Age: 44
End: 2024-06-20
Payer: COMMERCIAL

## 2024-06-20 VITALS — WEIGHT: 148 LBS | HEIGHT: 63 IN | BODY MASS INDEX: 26.22 KG/M2

## 2024-06-20 DIAGNOSIS — M20.022 CENTRAL SLIP EXTENSOR TENDON INJURY (BOUTONNIERE), LEFT: Primary | ICD-10-CM

## 2024-06-20 PROCEDURE — 99214 OFFICE O/P EST MOD 30 MIN: CPT | Performed by: STUDENT IN AN ORGANIZED HEALTH CARE EDUCATION/TRAINING PROGRAM

## 2024-06-20 NOTE — LETTER
2024      Zainab Bolton  [address] [facility] [address]      Dear Colleague,    Thank you for referring your patient, Zainab Bolton, to the [facility] [place]. Please see a copy of my visit note below.    Zainab Bolton  :  1980  DOS: 2024  MRN: [id number]  PCP: Jessi Concepcion    Sports Medicine Clinic Visit  Interim History - 2024  - Last seen on 2024 for a right small finger boutonniere deformity, suspected central slip injury.     - MRI 2024 right hand IMPRESSION:  1.  Full-thickness tear distal aspect central slip extensor digiti minimi at the middle phalangeal base dorsal with minimal proximal tendon retraction to the proximal phalangeal head. Associated persistent flexion of the small finger PIP joint.  2.  Dorsal capsular disruption distal aspect right small finger PIP joint at the middle phalangeal base.  3.  Mild small finger flexor tenosynovitis without tendon tear.  4.  Sprains of the radial and ulnar collateral ligaments at the small finger PIP joint with pericapsular edema/inflammation.  5.  Joint effusion or synovitis of the small finger PIP joint.  6.  No acute fracture right small finger.    - Since the last visit, slight improvement in right little finger pain.  Continues to have boutonierre deformity as noted on prior exam.  Wearing kristina tape and alumafoam splints at home. Voltaren gel is helpful.   - No interim injury.       Initial Visit: 2024  HPI  Zainab Bolton is a 43 year old female who is seen in consultation at the request of  Jody Wiley M.D. presenting with right hand pain after an injury.     - Mechanism of Injury:    - Injury when she fell up the stairs 10 days ago  - Pertinent history and prior evaluations:    - Hx of breast CA, MDD, GEOVANY.  - Seen in  on 24 and then by referring physician on 2024 and there were concerns about inability to actively flex the DIP joint  of the right little finger with pain during passive motion of the DIP.  Not especially painful at rest.   - XR R finger 5/26/2024 shows a small intra-articular osteophyte on the dorsal lateral aspect of the fifth digit DIP joint.  There is also some note of a small cortical irregularity and lucency within the fourth digit proximal phalanx that may be artifactual.  This was not evident on follow-up x-ray on 5/31/2024 which showed a similar appearance to the fifth digit PIP.    - Pain Character:    - Location: Right little finger  - Character:  Swollen and achy pain  - Duration:  10 days  - Course:  no change  - Endorses:    -  inability to flex at right small finger DIP joint , weakness, deadened sensation, swelling, bruising  - Denies:    - clicking/popping, grinding, mechanical locking symptoms, instability  - Alleviating factors:    -  immobilization  - Aggravating factors:    -  finger flexion  - Other treatments tried:    - wrapping. Radial gutter orthoglass splint. [place] taping. Alumofoam splint. Icing. Ibuprofen on occasion. Activity modification    - Patient Goals:    - get a formal diagnosis, discuss treatment options  - Social History:   -  currently between jobs       Review of Systems  Musculoskeletal: as above  Remainder of review of systems is negative including constitutional, CV, pulmonary, GI, Skin and Neurologic except as noted in HPI or medical history.    Past Medical History:   Diagnosis Date     Anxiety 08/16/2010     BRCA2 positive      Breast cancer (H) 05/19/2023     Depression      Depressive disorder      Essential hypertension 03/03/2023     HSIL (high grade squamous intraepithelial lesion) on Pap smear of cervix 05/05/2015    LSIL/+ HR HPV     Hypercholesteremia 08/16/2010     Obesity 08/16/2010     S/P breast reconstruction, bilateral      TOBACCO ABUSE-CONTINUOUS      Past Surgical History:   Procedure Laterality Date     BIOPSY NODE SENTINEL Left 05/31/2023    Procedure: LEFT SENTINEL  LYMPH NODE BIOPSY;  Surgeon: Kylee Villasenor MD;  [place] [facility]     CONIZATION LEEP N/A 07/16/2015    Procedure: CONIZATION LEEP;  Surgeon: Omayra Cunningham DO;  Location:  OR     EYE SURGERY  1/5/2016     GRAFT FAT TO BREAST Bilateral 3/28/2024    Procedure: Bilateral breast fat grafting from flanks abdomen and thighs;  Surgeon: MARIA E Aaron MD;  [facility]     GRAFT FREE VASCULARIZED TRANSVERSE RECTUS ABDOMINIS MYOCUTANEOUS Bilateral 09/25/2023    Procedure: Bilateral breast reconstruction with free abdominal flaps, resensation, SPY;  Surgeon: MARIA E Aaron MD;  Location: [facility] OR     LAPAROSCOPIC HYSTERECTOMY TOTAL, BILATERAL SALPINGO-OOPHORECTOMY, COMBINED Bilateral 3/28/2024    Procedure: Total laparoscopic hysterectomy, bilateral salpingo-oophorectomy, Cystoscopy;  Surgeon: Marco Watkins MD;  Location:  OR     LAPAROSCOPIC TUBAL LIGATION  10/03/2003     MASTECTOMY SIMPLE Bilateral 05/31/2023    Procedure: BILATERAL MASTECTOMIES;  Surgeon: Kylee Villasenor MD;  [place] [facility]     RECONSTRUCT BREAST, INSERT TISSUE EXPANDER, COMBINED Bilateral 05/31/2023    Procedure: RECONSTRUCTION, BREAST, BILATERAL TISSUE EXPANDERS;  Surgeon: Basil Aguilar MD;  [place] [facility]     REMOVE TISSUE EXPANDER TRUNK Bilateral 08/25/2023    Procedure: REMOVAL, TISSUE EXPANDER, BILATERAL BREAST;  Surgeon: Basil Aguilar MD;  [facility] [facility]     REVISE RECONSTRUCTED BREAST BILATERAL Bilateral 3/28/2024    Procedure: bilateral revision of breasts, umbilicoplasty, scar revision of abdomen;  Surgeon: MARIA E Aaron MD;  [facility]     Family History   Problem Relation Age of Onset     Liver Cancer Father      Stomach Cancer Father      Other Cancer Father         Stomach and liver cancer diagnosis     Arthritis Maternal Grandmother      Hypertension Maternal Grandmother      Diabetes Maternal Grandmother      Thyroid Disease Maternal  "Grandmother      Leukemia Maternal Grandfather      Heart Disease Maternal Aunt         MI approx age 46     Anesthesia Reaction No family hx of      Clotting Disorder No family hx of          Objective  Ht 1.6 m (5' 3\")   Wt 67.1 kg (148 lb)   LMP  (LMP Unknown)   BMI 26.22 kg/m      General: healthy, alert and in no acute distress.    HEENT: no scleral icterus or conjunctival erythema.   Skin: no suspicious lesions or rash. No jaundice.   CV: regular rhythm by palpation, 2+ distal pulses.  Resp: normal respiratory effort without conversational dyspnea.   Psych: normal mood and affect.    Gait: nonantalgic, appropriate coordination and balance.     Neuro:        - Sensation to light touch:    - Intact throughout the BUE including all peripheral nerve distributions.     MSK - Wrist/Hand:        - Inspection:    - Boutonniere deformity present of the right fifth digit with some swelling at the DIP and PIP joints.       - ROM:    - Limited in active flexion of the fifth digit DIP and extension of the PIP joint.       - Palpation:    - TTP at the fifth digit DIP dorsally and laterally and PIP joint dorsally.   - NTTP elsewhere.        - Strength:  (*antalgic)   - FDP V 3*  - FDS V 3+*  - EDM   3*  - ADM  5            - Special tests:   - Mehul test:  Pos      Radiology  I independently reviewed the available relevant imaging in the chart with my interpretations as above in HPI.       Assessment  1. Central slip extensor tendon injury (boutonniere), left        Plan  Zainab Bolton is a pleasant 43 year old female that presents for follow up of her right little finger injury when she fell on the stairs approximately 10 days ago.  Not entirely sure how the finger was impacted but likely jammed it.  It has been painful and swollen since that time.  She presented with a slight boutonniere deformity of the finger and significant difficulty with flexing the DIP joint of the fifth digit actively.  Passively, the " deformity is reducible.  We discussed that she may have simply sprained the finger but there is at least some level of concern for possible jersey finger or central slip injury based on the current deformity and weakness, which do not tend to heal as well on their own and often require surgery so would be best to evaluate this with advanced imaging (MRI).  MRI was ordered and confirmed a full-thickness tear of the central slip of the EDM with associated dorsal capsular disruption and joint effusion.  Also showed sprains of the RCL and UCL at the PIP joint and flexor tenosynovitis.    We discussed the nature of this injury and potential for incomplete healing and progression to a permanent boutonniere deformity of the finger and it would be best to discuss surgical repair of the extensor tendon and any other associated surgery sooner rather than later, and that surgery has a high likelihood of being offered.  At this time, she would prefer to avoid surgery but understands the risks associated and would be happy to have the consultation.  An orthopedic  referral has been placed for Dr. Nixon, hand surgeon, in [place] due to proximity of her home and she will look out for a call from the orthopedic scheduling team.    In the meantime, she will continue to use buddy taping and splinting for stability and support, oval 8 splints given today in clinic.  She will continue to use oral NSAIDs/Tylenol and Voltaren gel as needed for comfort and pain control.  She may contact the clinic for questions/concerns as needed.      Irineo Orellana DO, CAQSM  [facility] - Sports Medicine  [facility] - Department of Orthopedic Surgery       Disclaimer:  This note was prepared and written using Dragon Medical dictation software. As a result, there may be errors in the script that have gone undetected. Please consider this when interpreting the information in this note.       Again, thank you for  allowing me to participate in the care of your patient.        Sincerely,        Irineo Orellana, DO

## 2024-07-12 ENCOUNTER — PATIENT OUTREACH (OUTPATIENT)
Dept: CARE COORDINATION | Facility: CLINIC | Age: 44
End: 2024-07-12
Payer: COMMERCIAL

## 2024-07-13 DIAGNOSIS — F33.41 MAJOR DEPRESSIVE DISORDER, RECURRENT EPISODE, IN PARTIAL REMISSION (H): ICD-10-CM

## 2024-07-13 DIAGNOSIS — N95.1 MENOPAUSAL SYNDROME (HOT FLASHES): ICD-10-CM

## 2024-07-16 RX ORDER — VENLAFAXINE HYDROCHLORIDE 37.5 MG/1
CAPSULE, EXTENDED RELEASE ORAL
Qty: 120 CAPSULE | Refills: 0 | Status: SHIPPED | OUTPATIENT
Start: 2024-07-16 | End: 2024-08-13

## 2024-07-18 ENCOUNTER — TELEPHONE (OUTPATIENT)
Dept: GASTROENTEROLOGY | Facility: CLINIC | Age: 44
End: 2024-07-18
Payer: COMMERCIAL

## 2024-07-18 NOTE — TELEPHONE ENCOUNTER
"Endoscopy Scheduling Screen    Have you had a positive Covid test in the last 14 days?  No    What is your communication preference for Instructions and/or Bowel Prep?   MyChart    What insurance is in the chart?  Other:  [employer] -  DOES NOT HAVE INSURANCE INFO YET.    Ordering/Referring Provider:     JOAQUIN CANNON      (If ordering provider performs procedure, schedule with ordering provider unless otherwise instructed. )    BMI: Estimated body mass index is 26.22 kg/m  as calculated from the following:    Height as of 6/20/24: 1.6 m (5' 3\").    Weight as of 6/20/24: 67.1 kg (148 lb).     Sedation Ordered  moderate sedation.   If patient BMI > 50 do not schedule in [facility].    If patient BMI > 45 do not schedule at [facility].    Are you taking methadone or Suboxone?  No    Have you had difficulties, pain, or discomfort during past endoscopy procedures?  No    Are you taking any prescription medications for pain 3 or more times per week?   NO, No RN review required.    Do you have a history of malignant hyperthermia?  No    (Females) Are you currently pregnant?   No     Have you been diagnosed or told you have pulmonary hypertension?   No    Do you have an LVAD?  No    Have you been told you have moderate to severe sleep apnea?  No    Have you been told you have COPD, asthma, or any other lung disease?  No    Do you have any heart conditions?  No     Have you ever had or are you waiting for an organ transplant?  No. Continue scheduling, no site restrictions.    Have you had a stroke or transient ischemic attack (TIA aka \"mini stroke\" in the last 6 months?   No    Have you been diagnosed with or been told you have cirrhosis of the liver?   No    Are you currently on dialysis?   No    Do you need assistance transferring?   No    BMI: Estimated body mass index is 26.22 kg/m  as calculated from the following:    Height as of 6/20/24: 1.6 m (5' 3\").    Weight as of 6/20/24: 67.1 kg (148 lb).     Is patients BMI > 40 and " scheduling location [facility]?  No    Do you take an injectable medication for weight loss or diabetes (excluding insulin)?  No    Do you take the medication Naltrexone?  No    Do you take blood thinners?  No       Prep   Are you currently on dialysis or do you have chronic kidney disease?  No    Do you have a diagnosis of diabetes?  No    Do you have a diagnosis of cystic fibrosis (CF)?  No    On a regular basis do you go 3 -5 days between bowel movements?  No    BMI > 40?  No    Preferred Pharmacy:    [id number] [address] [address]  [address] [address]  Phone: [phone] Fax: [phone]    Final Scheduling Details     Procedure scheduled  Colonoscopy    Surgeon:  ABDIEL     Date of procedure:  10/23/2024     Pre-OP / PAC:   No - Not required for this site.    Location  [facility] - Per order.    Sedation   MAC/Deep Sedation  PER LOCATION/ANXIETY      Patient Reminders:   You will receive a call from a Nurse to review instructions and health history.  This assessment must be completed prior to your procedure.  Failure to complete the Nurse assessment may result in the procedure being cancelled.      On the day of your procedure, please designate an adult(s) who can drive you home stay with you for the next 24 hours. The medicines used in the exam will make you sleepy. You will not be able to drive.      You cannot take public transportation, ride share services, or non-medical taxi service without a responsible caregiver.  Medical transport services are allowed with the requirement that a responsible caregiver will receive you at your destination.  We require that drivers and caregivers are confirmed prior to your procedure.

## 2024-08-12 DIAGNOSIS — N95.1 MENOPAUSAL SYNDROME (HOT FLASHES): ICD-10-CM

## 2024-08-12 DIAGNOSIS — F41.1 GAD (GENERALIZED ANXIETY DISORDER): ICD-10-CM

## 2024-08-12 DIAGNOSIS — F33.41 MAJOR DEPRESSIVE DISORDER, RECURRENT EPISODE, IN PARTIAL REMISSION (H): ICD-10-CM

## 2024-08-12 RX ORDER — HYDROXYZINE HYDROCHLORIDE 10 MG/1
10 TABLET, FILM COATED ORAL 3 TIMES DAILY PRN
Qty: 90 TABLET | Refills: 2 | Status: SHIPPED | OUTPATIENT
Start: 2024-08-12

## 2024-08-13 RX ORDER — VENLAFAXINE HYDROCHLORIDE 37.5 MG/1
CAPSULE, EXTENDED RELEASE ORAL
Qty: 120 CAPSULE | Refills: 0 | Status: SHIPPED | OUTPATIENT
Start: 2024-08-13 | End: 2024-09-11

## 2024-08-19 DIAGNOSIS — Z17.0 MALIGNANT NEOPLASM OF CENTRAL PORTION OF LEFT BREAST IN FEMALE, ESTROGEN RECEPTOR POSITIVE (H): ICD-10-CM

## 2024-08-19 DIAGNOSIS — C50.112 MALIGNANT NEOPLASM OF CENTRAL PORTION OF LEFT BREAST IN FEMALE, ESTROGEN RECEPTOR POSITIVE (H): ICD-10-CM

## 2024-08-19 RX ORDER — LETROZOLE 2.5 MG/1
2.5 TABLET, FILM COATED ORAL DAILY
Qty: 60 TABLET | Refills: 1 | Status: SHIPPED | OUTPATIENT
Start: 2024-08-19

## 2024-09-10 DIAGNOSIS — N95.1 MENOPAUSAL SYNDROME (HOT FLASHES): ICD-10-CM

## 2024-09-10 DIAGNOSIS — F33.41 MAJOR DEPRESSIVE DISORDER, RECURRENT EPISODE, IN PARTIAL REMISSION (H): ICD-10-CM

## 2024-09-11 RX ORDER — VENLAFAXINE HYDROCHLORIDE 37.5 MG/1
CAPSULE, EXTENDED RELEASE ORAL
Qty: 120 CAPSULE | Refills: 0 | Status: SHIPPED | OUTPATIENT
Start: 2024-09-11

## 2024-09-27 ENCOUNTER — ONCOLOGY VISIT (OUTPATIENT)
Dept: ONCOLOGY | Facility: HOSPITAL | Age: 44
End: 2024-09-27
Attending: INTERNAL MEDICINE
Payer: COMMERCIAL

## 2024-09-27 VITALS
RESPIRATION RATE: 20 BRPM | BODY MASS INDEX: 26.06 KG/M2 | OXYGEN SATURATION: 97 % | HEIGHT: 63 IN | HEART RATE: 96 BPM | TEMPERATURE: 99.1 F | WEIGHT: 147.1 LBS | DIASTOLIC BLOOD PRESSURE: 82 MMHG | SYSTOLIC BLOOD PRESSURE: 129 MMHG

## 2024-09-27 DIAGNOSIS — C50.112 MALIGNANT NEOPLASM OF CENTRAL PORTION OF LEFT BREAST IN FEMALE, ESTROGEN RECEPTOR POSITIVE (H): Primary | ICD-10-CM

## 2024-09-27 DIAGNOSIS — Z15.01 BRCA2 POSITIVE: ICD-10-CM

## 2024-09-27 DIAGNOSIS — Z17.0 MALIGNANT NEOPLASM OF CENTRAL PORTION OF LEFT BREAST IN FEMALE, ESTROGEN RECEPTOR POSITIVE (H): Primary | ICD-10-CM

## 2024-09-27 DIAGNOSIS — Z15.09 BRCA2 POSITIVE: ICD-10-CM

## 2024-09-27 PROCEDURE — 99214 OFFICE O/P EST MOD 30 MIN: CPT | Performed by: INTERNAL MEDICINE

## 2024-09-27 PROCEDURE — G2211 COMPLEX E/M VISIT ADD ON: HCPCS | Performed by: INTERNAL MEDICINE

## 2024-09-27 ASSESSMENT — PAIN SCALES - GENERAL: PAINLEVEL: NO PAIN (0)

## 2024-09-27 NOTE — LETTER
"9/27/2024      Zainab Bolton  [address] [facility] [address]      Dear Colleague,    Thank you for referring your patient, Zainab Bolton, to the [facility]. Please see a copy of my visit note below.    Oncology Rooming Note    September 27, 2024 12:26 PM   Zainab Bolton is a 44 year old female who presents for:    Chief Complaint   Patient presents with     Oncology Clinic Visit     Age-related osteoporosis without current pathological fracture     Initial Vitals: /82 (BP Location: Left arm, Patient Position: Sitting, Cuff Size: Adult Regular)   Pulse 96   Temp 99.1  F (37.3  C) (Oral)   Resp 20   Ht 1.6 m (5' 3\")   Wt 66.7 kg (147 lb 1.6 oz)   LMP  (LMP Unknown)   SpO2 97%   BMI 26.06 kg/m   Estimated body mass index is 26.06 kg/m  as calculated from the following:    Height as of this encounter: 1.6 m (5' 3\").    Weight as of this encounter: 66.7 kg (147 lb 1.6 oz). Body surface area is 1.72 meters squared.  No Pain (0) Comment: Data Unavailable   No LMP recorded (lmp unknown). Patient has had a hysterectomy.  Allergies reviewed: Yes  Medications reviewed: Yes    Medications: MEDICATION REFILLS NEEDED TODAY. Provider was notified.  Pharmacy name entered into [facility]:    [facility] - [place], [place] - [address]  [place], [place] - [address] [facility] [address]    Frailty Screening:   Is the patient here for a new oncology consult visit in cancer care? 2. No      Clinical concerns: Next steps.  Dr Espinosa was notified.      Filomena Matos MA                                      [facility] Hematology and Oncology Progress Note    Patient: Zainab Bolton  MRN: [id number]  Date of Service: 09/06/2023        Reason for Visit    Chief Complaint   Patient presents with     Oncology Clinic Visit     Age-related osteoporosis without current pathological fracture       Assessment and Plan     Cancer " Staging   Malignant neoplasm of central portion of left breast in female, estrogen receptor positive (H)  Staging form: Breast, AJCC 8th Edition  - Pathologic: Stage IA (pT2, pN0, cM0, G1, ER+, HI+, HER2-, Oncotype DX score: 17) - Signed by Candida Espinosa MD on 10/18/2023    Breast cancer, ER/HI positive, Her2 negative, left side, oncotype 17, T2N0M0, BRCA positive: s/p bilateral mastectomy.  I offered her adjuvant chemotherapy for intermediate Oncotype DX score but she declined.  Started on tamoxifen and Zoladex.  Unfortunately developed significant side effects from Zoladex which was discontinued in October 2023.  Was on tamoxifen only.  Simply underwent breast reconstruction along with bilateral salpingo-oophorectomy and hysterectomy.  Switched to letrozole in May.  Has done fairly well on it.  No significant side effects except for some hot flashes which is expected.  Physical exam unremarkable today.  No evidence of bilateral axillar adenopathy.  She has bilateral mastectomy status post reconstruction.  She is also BRCA positive.  We discussed about pancreatic cancer surveillance.  Unfortunately there is no clear guidelines as to what is the right imaging modality for surveillance for pancreatic cancer.  Potentially could consider EUS versus MRI of the pancreas.  Will continue to discuss about this in the future.  She had a CT abdomen pelvis last year which was unremarkable.  My plan is to continue to see her every 4 months or so for the first couple of years she will come back in 4 months to see me for physical exam.  She is agreeable to the plan.  She had a DEXA scan done recently which showed normal bone density.  Continue vitamin D and calcium supplementation.        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 papillomavirus infection     Papanicolaou smear of cervix with low grade squamous intraepithelial lesion (LGSIL)     GEOVANY (generalized anxiety disorder)     BRCA2 positive     Malignant neoplasm of central portion of left breast in female, estrogen receptor positive (H)     Cellulitis of right breast     Major depressive disorder, recurrent episode, in partial remission (H)     H/O breast reconstruction     Snoring     Physical deconditioning     Menopausal syndrome (hot flashes)        ______________________________________________________________________________    History of Present Illness    Oncologist: Dr. Espinosa    DIAGNOSIS: Breast cancer, left breast, found on screening mammogram.  Patient did have some mild breast pain for 9 months prior.  18 mm mammogram, 22 mm on MRI.  -Biopsy showed low-grade invasive ductal carcinoma.  ER positive, IA positive, HER2/doug negative by FISH.  -BRCA2 mutation testing done and is positive      TREATMENT:   -5/31/23: Patient had bilateral mastectomy with left sentinel lymph node biopsy.  Path shows 2.3 cm invasive ductal carcinoma of the left breast, grade 1.  Margins negative.  She had some background DCIS.  The lymph node was negative.  Patient had Oncotype done and result was 17.  -Tamoxifen, started roughly June 2023.   -Zoladex monthly injections started July 2023.  Zoladex discontinued in October 2023 due to significant side effects.    Underwent hysterectomy with bilateral salpingo-oophorectomy on 3/28/2024    Endocrine therapy switched to letrozole in May 2024    INTERIM HISTORY:   We switched her to letrozole a few months ago.  Has done fairly well on it.  Denies any new issues today.  Continues to have some hot flashes.  No new lumps or bumps reported.  No bone pain.  No weight loss.      Review of Systems    Pertinent items are noted in HPI.    Past History    Past Medical History:   Diagnosis Date      "Anxiety 08/16/2010     BRCA2 positive      Breast cancer (H) 05/19/2023     Depression      Depressive disorder      Essential hypertension 03/03/2023     HSIL (high grade squamous intraepithelial lesion) on Pap smear of cervix 05/05/2015    LSIL/+ HR HPV     Hypercholesteremia 08/16/2010     Obesity 08/16/2010     S/P breast reconstruction, bilateral      TOBACCO ABUSE-CONTINUOUS        PHYSICAL EXAM  /82 (BP Location: Left arm, Patient Position: Sitting, Cuff Size: Adult Regular)   Pulse 96   Temp 99.1  F (37.3  C) (Oral)   Resp 20   Ht 1.6 m (5' 3\")   Wt 66.7 kg (147 lb 1.6 oz)   LMP  (LMP Unknown)   SpO2 97%   BMI 26.06 kg/m      GENERAL: Alert, cooperative, in no distress  LNs: no evidence of bilateral axillary adenopathy  CNS: Alert and oriented x 3    Lab Results    No results found for this or any previous visit (from the past 168 hour(s)).      Imaging    No results found.    A total of 35 min was spent today on this visit which included face to face conversation with the patient, EMR review, counseling and co-ordination of care and medical documentation.      The longitudinal plan of care for the diagnosis(es)/condition(s) as documented were addressed during this visit. Due to the added complexity in care, I will continue to support Avis in the subsequent management and with ongoing continuity of care.    Signed by: Candida Espinosa MD      Again, thank you for allowing me to participate in the care of your patient.        Sincerely,        Candida Espinosa MD  "

## 2024-09-27 NOTE — PROGRESS NOTES
"Oncology Rooming Note    September 27, 2024 12:26 PM   Zainab Bolton is a 44 year old female who presents for:    Chief Complaint   Patient presents with    Oncology Clinic Visit     Age-related osteoporosis without current pathological fracture     Initial Vitals: /82 (BP Location: Left arm, Patient Position: Sitting, Cuff Size: Adult Regular)   Pulse 96   Temp 99.1  F (37.3  C) (Oral)   Resp 20   Ht 1.6 m (5' 3\")   Wt 66.7 kg (147 lb 1.6 oz)   LMP  (LMP Unknown)   SpO2 97%   BMI 26.06 kg/m   Estimated body mass index is 26.06 kg/m  as calculated from the following:    Height as of this encounter: 1.6 m (5' 3\").    Weight as of this encounter: 66.7 kg (147 lb 1.6 oz). Body surface area is 1.72 meters squared.  No Pain (0) Comment: Data Unavailable   No LMP recorded (lmp unknown). Patient has had a hysterectomy.  Allergies reviewed: Yes  Medications reviewed: Yes    Medications: MEDICATION REFILLS NEEDED TODAY. Provider was notified.  Pharmacy name entered into [facility]:    [facility] - [place], [place] - [address]  [place], [place] - [facility] [facility]    Frailty Screening:   Is the patient here for a new oncology consult visit in cancer care? 2. No      Clinical concerns: Next steps.  Dr Espinosa was notified.      Filomena Matos MA            "

## 2024-09-27 NOTE — PROGRESS NOTES
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  I offered her adjuvant chemotherapy for intermediate Oncotype DX score but she declined.  Started on tamoxifen and Zoladex.  Unfortunately developed significant side effects from Zoladex which was discontinued in October 2023.  Was on tamoxifen only.  Simply underwent breast reconstruction along with bilateral salpingo-oophorectomy and hysterectomy.  Switched to letrozole in May.  Has done fairly well on it.  No significant side effects except for some hot flashes which is expected.  Physical exam unremarkable today.  No evidence of bilateral axillar adenopathy.  She has bilateral mastectomy status post reconstruction.  She is also BRCA positive.  We discussed about pancreatic cancer surveillance.  Unfortunately there is no clear guidelines as to what is the right imaging modality for surveillance for pancreatic cancer.  Potentially could consider EUS versus MRI of the pancreas.  Will continue to discuss about this in the future.  She had a CT abdomen pelvis last year which was unremarkable.  My plan is to continue to see her every 4 months or so for the first couple of years she will come back in 4 months to see me for physical exam.  She is agreeable to the plan.  She had a  DEXA scan done recently which showed normal bone density.  Continue vitamin D and calcium supplementation.        ECOG Performance    0 - Independent    Distress Screening (within last 30 days)    No data recorded     Pain  Pain Score: No Pain (0)    Problem List    Patient Active Problem List   Diagnosis    Depression with anxiety    Hypercholesteremia    S/P LEEP of cervix    High risk human papillomavirus infection    Papanicolaou smear of cervix with low grade squamous intraepithelial lesion (LGSIL)    GEOVANY (generalized anxiety disorder)    BRCA2 positive    Malignant neoplasm of central portion of left breast in female, estrogen receptor positive (H)    Cellulitis of right breast    Major depressive disorder, recurrent episode, in partial remission (H)    H/O breast reconstruction    Snoring    Physical deconditioning    Menopausal syndrome (hot flashes)        ______________________________________________________________________________    History of Present Illness    Oncologist: Dr. Espinosa    DIAGNOSIS: Breast cancer, left breast, found on screening mammogram.  Patient did have some mild breast pain for 9 months prior.  18 mm mammogram, 22 mm on MRI.  -Biopsy showed low-grade invasive ductal carcinoma.  ER positive, NC positive, HER2/doug negative by FISH.  -BRCA2 mutation testing done and is positive      TREATMENT:   -5/31/23: Patient had bilateral mastectomy with left sentinel lymph node biopsy.  Path shows 2.3 cm invasive ductal carcinoma of the left breast, grade 1.  Margins negative.  She had some background DCIS.  The lymph node was negative.  Patient had Oncotype done and result was 17.  -Tamoxifen, started roughly June 2023.   -Zoladex monthly injections started July 2023.  Zoladex discontinued in October 2023 due to significant side effects.    Underwent hysterectomy with bilateral salpingo-oophorectomy on 3/28/2024    Endocrine therapy switched to letrozole in May 2024    INTERIM HISTORY:   We  "switched her to letrozole a few months ago.  Has done fairly well on it.  Denies any new issues today.  Continues to have some hot flashes.  No new lumps or bumps reported.  No bone pain.  No weight loss.      Review of Systems    Pertinent items are noted in HPI.    Past History    Past Medical History:   Diagnosis Date    Anxiety 08/16/2010    BRCA2 positive     Breast cancer (H) 05/19/2023    Depression     Depressive disorder     Essential hypertension 03/03/2023    HSIL (high grade squamous intraepithelial lesion) on Pap smear of cervix 05/05/2015    LSIL/+ HR HPV    Hypercholesteremia 08/16/2010    Obesity 08/16/2010    S/P breast reconstruction, bilateral     TOBACCO ABUSE-CONTINUOUS        PHYSICAL EXAM  /82 (BP Location: Left arm, Patient Position: Sitting, Cuff Size: Adult Regular)   Pulse 96   Temp 99.1  F (37.3  C) (Oral)   Resp 20   Ht 1.6 m (5' 3\")   Wt 66.7 kg (147 lb 1.6 oz)   LMP  (LMP Unknown)   SpO2 97%   BMI 26.06 kg/m      GENERAL: Alert, cooperative, in no distress  LNs: no evidence of bilateral axillary adenopathy  CNS: Alert and oriented x 3    Lab Results    No results found for this or any previous visit (from the past 168 hour(s)).      Imaging    No results found.    A total of 35 min was spent today on this visit which included face to face conversation with the patient, EMR review, counseling and co-ordination of care and medical documentation.      The longitudinal plan of care for the diagnosis(es)/condition(s) as documented were addressed during this visit. Due to the added complexity in care, I will continue to support Avis in the subsequent management and with ongoing continuity of care.    Signed by: Candida Espinosa MD  "

## 2024-10-11 DIAGNOSIS — F33.41 MAJOR DEPRESSIVE DISORDER, RECURRENT EPISODE, IN PARTIAL REMISSION (H): ICD-10-CM

## 2024-10-11 DIAGNOSIS — N95.1 MENOPAUSAL SYNDROME (HOT FLASHES): ICD-10-CM

## 2024-10-14 RX ORDER — VENLAFAXINE HYDROCHLORIDE 150 MG/1
150 CAPSULE, EXTENDED RELEASE ORAL DAILY
Qty: 90 CAPSULE | Refills: 1 | Status: SHIPPED | OUTPATIENT
Start: 2024-10-14

## 2024-10-14 RX ORDER — VENLAFAXINE HYDROCHLORIDE 37.5 MG/1
CAPSULE, EXTENDED RELEASE ORAL
Qty: 120 CAPSULE | Refills: 0 | Status: SHIPPED | OUTPATIENT
Start: 2024-10-14 | End: 2024-10-14

## 2024-10-14 NOTE — TELEPHONE ENCOUNTER
I believe she is on 150. I am switching her to the single capsule of 150, please let her know   RAMESH Concepcion MD

## 2024-10-15 NOTE — TELEPHONE ENCOUNTER
Call placed to Patient.  Relayed that Dr Concepcion refilled Venlafaxine 150 mg for 90 days with 1 refill.  Verbalized understanding.  Cuate Segal RN

## 2024-10-21 ENCOUNTER — ANESTHESIA EVENT (OUTPATIENT)
Dept: SURGERY | Facility: AMBULATORY SURGERY CENTER | Age: 44
End: 2024-10-21
Payer: COMMERCIAL

## 2024-10-23 ENCOUNTER — ANESTHESIA (OUTPATIENT)
Dept: SURGERY | Facility: AMBULATORY SURGERY CENTER | Age: 44
End: 2024-10-23
Payer: COMMERCIAL

## 2024-10-23 ENCOUNTER — HOSPITAL ENCOUNTER (OUTPATIENT)
Facility: AMBULATORY SURGERY CENTER | Age: 44
Discharge: HOME | End: 2024-10-23
Attending: SURGERY
Payer: COMMERCIAL

## 2024-10-23 VITALS
HEART RATE: 73 BPM | RESPIRATION RATE: 16 BRPM | OXYGEN SATURATION: 99 % | HEIGHT: 63 IN | WEIGHT: 143 LBS | DIASTOLIC BLOOD PRESSURE: 80 MMHG | BODY MASS INDEX: 25.34 KG/M2 | TEMPERATURE: 96.8 F | SYSTOLIC BLOOD PRESSURE: 125 MMHG

## 2024-10-23 DIAGNOSIS — Z12.11 SCREEN FOR COLON CANCER: ICD-10-CM

## 2024-10-23 LAB — COLONOSCOPY: NORMAL

## 2024-10-23 RX ORDER — ONDANSETRON 4 MG/1
4 TABLET, ORALLY DISINTEGRATING ORAL EVERY 30 MIN PRN
Status: DISCONTINUED | OUTPATIENT
Start: 2024-10-23 | End: 2024-10-27 | Stop reason: HOSPADM

## 2024-10-23 RX ORDER — PROPOFOL 10 MG/ML
INJECTION, EMULSION INTRAVENOUS PRN
Status: DISCONTINUED | OUTPATIENT
Start: 2024-10-23 | End: 2024-10-23

## 2024-10-23 RX ORDER — ACETAMINOPHEN 325 MG/1
975 TABLET ORAL ONCE
Status: DISCONTINUED | OUTPATIENT
Start: 2024-10-23 | End: 2024-10-27 | Stop reason: HOSPADM

## 2024-10-23 RX ORDER — LIDOCAINE 40 MG/G
CREAM TOPICAL
Status: DISCONTINUED | OUTPATIENT
Start: 2024-10-23 | End: 2024-10-27 | Stop reason: HOSPADM

## 2024-10-23 RX ORDER — SODIUM CHLORIDE, SODIUM LACTATE, POTASSIUM CHLORIDE, CALCIUM CHLORIDE 600; 310; 30; 20 MG/100ML; MG/100ML; MG/100ML; MG/100ML
INJECTION, SOLUTION INTRAVENOUS CONTINUOUS
Status: DISCONTINUED | OUTPATIENT
Start: 2024-10-23 | End: 2024-10-27 | Stop reason: HOSPADM

## 2024-10-23 RX ORDER — PROPOFOL 10 MG/ML
INJECTION, EMULSION INTRAVENOUS CONTINUOUS PRN
Status: DISCONTINUED | OUTPATIENT
Start: 2024-10-23 | End: 2024-10-23

## 2024-10-23 RX ORDER — ONDANSETRON 2 MG/ML
4 INJECTION INTRAMUSCULAR; INTRAVENOUS EVERY 30 MIN PRN
Status: DISCONTINUED | OUTPATIENT
Start: 2024-10-23 | End: 2024-10-27 | Stop reason: HOSPADM

## 2024-10-23 RX ORDER — DEXAMETHASONE SODIUM PHOSPHATE 4 MG/ML
4 INJECTION, SOLUTION INTRA-ARTICULAR; INTRALESIONAL; INTRAMUSCULAR; INTRAVENOUS; SOFT TISSUE
Status: DISCONTINUED | OUTPATIENT
Start: 2024-10-23 | End: 2024-10-27 | Stop reason: HOSPADM

## 2024-10-23 RX ORDER — NALOXONE HYDROCHLORIDE 0.4 MG/ML
0.1 INJECTION, SOLUTION INTRAMUSCULAR; INTRAVENOUS; SUBCUTANEOUS
Status: DISCONTINUED | OUTPATIENT
Start: 2024-10-23 | End: 2024-10-27 | Stop reason: HOSPADM

## 2024-10-23 RX ORDER — GLYCOPYRROLATE 0.2 MG/ML
INJECTION, SOLUTION INTRAMUSCULAR; INTRAVENOUS PRN
Status: DISCONTINUED | OUTPATIENT
Start: 2024-10-23 | End: 2024-10-23

## 2024-10-23 RX ORDER — LIDOCAINE HYDROCHLORIDE 20 MG/ML
INJECTION, SOLUTION INFILTRATION; PERINEURAL PRN
Status: DISCONTINUED | OUTPATIENT
Start: 2024-10-23 | End: 2024-10-23

## 2024-10-23 RX ADMIN — PROPOFOL 40 MG: 10 INJECTION, EMULSION INTRAVENOUS at 07:08

## 2024-10-23 RX ADMIN — PROPOFOL 200 MCG/KG/MIN: 10 INJECTION, EMULSION INTRAVENOUS at 07:08

## 2024-10-23 RX ADMIN — LIDOCAINE HYDROCHLORIDE 3 ML: 20 INJECTION, SOLUTION INFILTRATION; PERINEURAL at 07:08

## 2024-10-23 RX ADMIN — GLYCOPYRROLATE 0.2 MG: 0.2 INJECTION, SOLUTION INTRAMUSCULAR; INTRAVENOUS at 07:08

## 2024-10-23 NOTE — H&P
General Surgery H&P  Zainab Bolton MRN# [id number]   Age/Sex: 44 year old female YOB: 1980     Reason for visit: Colonoscopy       Referring physician: Dr. Concepcion                    Assessment and Plan:   Assessment:  colonoscopy    Plan:  -To the OR for colonoscopy  -Risk and benefits of procedure explained detail to the patient.  Risks include infection, bleeding, damage to the surrounding structures and possible perforation of the hollow organs.  Patient verbalized understanding provided consent to undergo the procedure above.          Chief Complaint:   Presenting for colonoscopy     History is obtained from the patient    HPI:   Zainab Bolton is a 44 year old female who presents for colonoscopy.  Patient tolerated the prep well.  This is patient's first colonoscopy.  Family history shows no history of colon cancer.  No new complaints.           Past Medical History:     Past Medical History:   Diagnosis Date    Anxiety 08/16/2010    BRCA2 positive     Breast cancer (H) 05/19/2023    Depression     Depressive disorder     HSIL (high grade squamous intraepithelial lesion) on Pap smear of cervix 05/05/2015    LSIL/+ HR HPV    Hypercholesteremia 08/16/2010    Obesity 08/16/2010    S/P breast reconstruction, bilateral     TOBACCO ABUSE-CONTINUOUS               Past Surgical History:     Past Surgical History:   Procedure Laterality Date    BIOPSY NODE SENTINEL Left 05/31/2023    Procedure: LEFT SENTINEL LYMPH NODE BIOPSY;  Surgeon: Kylee Villasenor MD;  Location: [facility] [place] OR    CONIZATION LEEP N/A 07/16/2015    Procedure: CONIZATION LEEP;  Surgeon: Omayra Cunningham DO;  Location:  OR    EYE SURGERY  1/5/2016    GRAFT FAT TO BREAST Bilateral 3/28/2024    Procedure: Bilateral breast fat grafting from flanks abdomen and thighs;  Surgeon: MARIA E Aaron MD;  Location:  OR    GRAFT FREE VASCULARIZED TRANSVERSE RECTUS ABDOMINIS MYOCUTANEOUS Bilateral 09/25/2023    Procedure:  Bilateral breast reconstruction with free abdominal flaps, resensation, SPY;  Surgeon: MARIA E Aaron MD;  Location: [facility] OR    LAPAROSCOPIC HYSTERECTOMY TOTAL, BILATERAL SALPINGO-OOPHORECTOMY, COMBINED Bilateral 3/28/2024    Procedure: Total laparoscopic hysterectomy, bilateral salpingo-oophorectomy, Cystoscopy;  Surgeon: Marco Watkins MD;  Location:  OR    LAPAROSCOPIC TUBAL LIGATION  10/03/2003    MASTECTOMY SIMPLE Bilateral 05/31/2023    Procedure: BILATERAL MASTECTOMIES;  Surgeon: Kylee Villasenor MD;  Location: [facility] OR    RECONSTRUCT BREAST, INSERT TISSUE EXPANDER, COMBINED Bilateral 05/31/2023    Procedure: RECONSTRUCTION, BREAST, BILATERAL TISSUE EXPANDERS;  Surgeon: Basil Aguilar MD;  Location: [facility] OR    REMOVE TISSUE EXPANDER TRUNK Bilateral 08/25/2023    Procedure: REMOVAL, TISSUE EXPANDER, BILATERAL BREAST;  Surgeon: Basil Aguilar MD;  Location: [facility] OR    REVISE RECONSTRUCTED BREAST BILATERAL Bilateral 3/28/2024    Procedure: bilateral revision of breasts, umbilicoplasty, scar revision of abdomen;  Surgeon: MARIA E Aaron MD;  Location:  OR             Social History:    reports that she quit smoking about 18 months ago. Her smoking use included cigarettes. She started smoking about 11 years ago. She has a 10 pack-year smoking history. She has been exposed to tobacco smoke. She has never used smokeless tobacco. She reports that she does not currently use alcohol. She reports current drug use. Drug: Marijuana.           Family History:     Family History   Problem Relation Age of Onset    Liver Cancer Father     Stomach Cancer Father     Other Cancer Father         Stomach and liver cancer diagnosis    Arthritis Maternal Grandmother     Hypertension Maternal Grandmother     Diabetes Maternal Grandmother     Thyroid Disease Maternal Grandmother     Leukemia Maternal Grandfather     Heart Disease Maternal Aunt         MI approx age 46    Anesthesia  Reaction No family hx of     Clotting Disorder No family hx of               Allergies:   No Known Allergies           Medications:     Prior to Admission medications    Medication Sig Start Date End Date Taking? Authorizing Provider   busPIRone (BUSPAR) 10 MG tablet Take 1 tablet (10 mg) by mouth 2 times daily 4/23/24  Yes Jessi Concepcion MD   calcium carbonate (OS-SAM) 1500 (600 Ca) MG tablet Take 600 mg by mouth 2 times daily   Yes Unknown, Entered By History   Cyanocobalamin 500 MCG TBDP Take 2 Gum by mouth every evening   Yes Unknown, Entered By History   hydrOXYzine HCl (ATARAX) 10 MG tablet TAKE 1 TABLET BY MOUTH 3 TIMES DAILY AS NEEDED FOR ANXIETY 8/12/24  Yes Jessi Concepcion MD   ibuprofen (ADVIL/MOTRIN) 400 MG tablet Take 400 mg by mouth as needed for moderate pain   Yes Reported, Patient   lactobacillus rhamnosus, GG, (CULTURELL) capsule Take 1 capsule by mouth every evening   Yes Reported, Patient   letrozole (FEMARA) 2.5 MG tablet Take 1 tablet (2.5 mg) by mouth daily 8/19/24  Yes Candida Espinosa MD   ondansetron (ZOFRAN ODT) 4 MG ODT tab Take 1 tablet (4 mg) by mouth every 8 hours as needed for nausea 3/28/24  Yes MARIA E Aaron MD   ondansetron (ZOFRAN) 4 MG tablet  8/25/23  Yes Reported, Patient   senna-docusate (SENOKOT-S/PERICOLACE) 8.6-50 MG tablet Take 1-2 tablets by mouth 2 times daily 3/28/24  Yes MARIA E Aaron MD   venlafaxine (EFFEXOR XR) 150 MG 24 hr capsule Take 1 capsule (150 mg) by mouth daily. 10/14/24  Yes Jessi Concepcion MD   vitamin B complex with vitamin C (VITAMIN  B COMPLEX) tablet Take 1 tablet by mouth every evening   Yes Reported, Patient   vitamin C (ASCORBIC ACID) 1000 MG TABS Take 1,000 mg by mouth every evening Also contains 20mg zinc   Yes Reported, Patient   Vitamin D3 (CHOLECALCIFEROL) 25 mcg (1000 units) tablet Take 25 mcg by mouth every evening   Yes Reported, Patient   zinc gluconate 50 MG tablet Take 50 mg by mouth every evening   Yes  "Reported, Patient   acetaminophen (TYLENOL) 500 MG tablet Take 2 tablets (1,000 mg) by mouth 3 times daily 9/27/23   MARIA E Aaron MD   sertraline (ZOLOFT) 100 MG tablet Take 1 tablet (100 mg) by mouth every morning  Patient not taking: Reported on 5/31/2024 4/23/24   Jessi Concepcion MD              Review of Systems:   A 12 point Review of Systems is negative other than noted in the HPI            Physical Exam:   Patient Vitals for the past 24 hrs:   BP Temp Temp src Resp SpO2 Height Weight   10/23/24 0649 128/81 96.8  F (36  C) Temporal 16 100 % 1.6 m (5' 3\") 64.9 kg (143 lb)        No intake or output data in the 24 hours ending 10/23/24 0655   Constitutional:   awake, alert, cooperative, no apparent distress, and appears stated age       Eyes:   PERRL, conjunctiva/corneas clear, EOM's intact; no scleral edema or icterus noted        ENT:   Normocephalic, without obvious abnormality, atraumatic, Lips, mucosa, and tongue normal        Hematologic / Lymphatic:   No lymphadenopathy       Lungs:   Normal respiratory effort, no accessory muscle use, breath sounds bilaterally on auscultation       Cardiovascular:   Regular rate and rhythm       Abdomen:   Soft, nondistended, nontender to palpation       Musculoskeletal:   No obvious swelling, bruising or deformity       Skin:   Skin color and texture normal for patient, no rashes or lesions              Data:          DO Jamaal Abbasi,   General Surgeon  [facility]  Surgery [address] [address]?  Office: [phone]  Employed by - [employer] [facility] [facility]  Pager: [phone]         "

## 2024-10-23 NOTE — DISCHARGE INSTRUCTIONS
"Learning About Colonoscopy  What is a colonoscopy?     A colonoscopy is a test (also called a procedure) that lets a doctor look inside your large intestine. The doctor uses a thin, lighted tube called a colonoscope. The doctor uses it to look for small growths called polyps, colon or rectal cancer (colorectal cancer), or other problems like bleeding.  During the procedure, the doctor can take samples of tissue. The samples can then be checked for cancer or other conditions. The doctor can also take out polyps.  How is a colonoscopy done?  This procedure is done in a doctor's office or a clinic or hospital. You will get medicine to help you relax and not feel pain. Some people find that they don't remember having the test because of the medicine.  The doctor gently moves the colonoscope, or scope, through the colon. The scope is also a small video camera. It lets the doctor see the colon and take pictures.  How do you prepare for the procedure?  You need to clean out your colon before the procedure so the doctor can see your colon. This depends on which \"colon prep\" your doctor recommends.  To clean out your colon, you'll do a \"colon prep\" before the test. This means you stop eating solid foods and drink only clear liquids. You can have water, tea, coffee, clear juices, clear broths, flavored ice pops, and gelatin (such as Jell-O). Do not drink anything red or purple.  The day or night before the procedure, you drink a large amount of a special liquid. This causes loose, frequent stools. You will go to the bathroom a lot. Your doctor may have you drink part of the liquid the evening before and the rest on the day of the test. It's very important to drink all of the liquid. If you have problems drinking it, call your doctor.  Arrange to have someone take you home after the test.  What can you expect after a colonoscopy?  Your doctor will tell you when you can eat and do your usual activities.  Drink a lot of fluid " "after the test to replace the fluids you may have lost during the colon prep. But don't drink alcohol.  Your doctor will talk to you about when you'll need your next colonoscopy. The results of your test and your risk for colorectal cancer will help your doctor decide how often you need to be checked.  After the test, you may be bloated or have gas pains. You may need to pass gas. If a biopsy was done or a polyp was removed, you may have streaks of blood in your stool (feces) for a few days. Check with your doctor to see when it is safe to take aspirin and nonsteroidal anti-inflammatory drugs (NSAIDs) again.  Problems such as heavy rectal bleeding may not occur until several weeks after the test. This isn't common. But it can happen after polyps are removed.  Follow-up care is a key part of your treatment and safety. Be sure to make and go to all appointments, and call your doctor if you are having problems. It's also a good idea to know your test results and keep a list of the medicines you take.  Where can you learn more?  Go to https://www.[employer].net/patiented  Enter Z368 in the search box to learn more about \"Learning About Colonoscopy.\"  Current as of: October 25, 2023  Content Version: 14.2 2024 [employer].   Care instructions adapted under license by your healthcare professional. If you have questions about a medical condition or this instruction, always ask your healthcare professional. [employer] disclaims any warranty or liability for your use of this information.    If you have any questions or concerns regarding your procedure, please contact Dr. Brooke, his office number is [phone].   Adult Discharge Orders & Instructions     For 24 hours after surgery    Get plenty of rest.  A responsible adult must stay with you for at least 24 hours after you leave the hospital.   Do not drive or use heavy equipment.  If you have weakness or tingling, don't drive or use heavy " equipment until this feeling goes away.  Do not drink alcohol.  Avoid strenuous or risky activities.  Ask for help when climbing stairs.   You may feel lightheaded.  IF so, sit for a few minutes before standing.  Have someone help you get up.   If you have nausea (feel sick to your stomach): Drink only clear liquids such as apple juice, ginger ale, broth or 7-Up.  Rest may also help.  Be sure to drink enough fluids.  Move to a regular diet as you feel able.  You may have a slight fever. Call the doctor if your fever is over 100 F (37.7 C) (taken under the tongue) or lasts longer than 24 hours.  You may have a dry mouth, a sore throat, muscle aches or trouble sleeping.  These should go away after 24 hours.  Do not make important or legal decisions.     Call your doctor for any of the followin.  Signs of infection (fever, growing tenderness at the surgery site, a large amount of drainage or bleeding, severe pain, foul-smelling drainage, redness, swelling).    2. It has been over 8 to 10 hours since surgery and you are still not able to urinate (pass water).    3.  Headache for over 24 hours.

## 2024-10-23 NOTE — ANESTHESIA CARE TRANSFER NOTE
Patient: Zainab Bolton    Procedure: Procedure(s):  COLONOSCOPY, Biopsy x2       Diagnosis: Screen for colon cancer [Z12.11]  Diagnosis Additional Information: No value filed.    Anesthesia Type:   MAC     Note:    Oropharynx: oropharynx clear of all foreign objects and spontaneously breathing  Level of Consciousness: awake  Oxygen Supplementation: room air    Independent Airway: airway patency satisfactory and stable  Dentition: dentition unchanged  Vital Signs Stable: post-procedure vital signs reviewed and stable  Report to RN Given: handoff report given  Patient transferred to: Phase II    Handoff Report: Identifed the Patient, Identified the Reponsible Provider, Reviewed the pertinent medical history, Discussed the surgical course, Reviewed Intra-OP anesthesia mangement and issues during anesthesia, Set expectations for post-procedure period and Allowed opportunity for questions and acknowledgement of understanding      Vitals:  Vitals Value Taken Time   /82 10/23/24 0734   Temp 96.8  F (36  C) 10/23/24 0734   Pulse 66 10/23/24 0734   Resp 16 10/23/24 0734   SpO2 100 % 10/23/24 0734       Electronically Signed By: THOMAS Lala CRNA  October 23, 2024  7:36 AM

## 2024-10-23 NOTE — ANESTHESIA PREPROCEDURE EVALUATION
Anesthesia Pre-Procedure Evaluation    Patient: Zainab Bolton   MRN: [id number] : 1980        Procedure : Procedure(s):  COLONOSCOPY          Past Medical History:   Diagnosis Date    Anxiety 2010    BRCA2 positive     Breast cancer (H) 2023    Depression     Depressive disorder     HSIL (high grade squamous intraepithelial lesion) on Pap smear of cervix 2015    LSIL/+ HR HPV    Hypercholesteremia 2010    Obesity 2010    S/P breast reconstruction, bilateral     TOBACCO ABUSE-CONTINUOUS       Past Surgical History:   Procedure Laterality Date    BIOPSY NODE SENTINEL Left 2023    Procedure: LEFT SENTINEL LYMPH NODE BIOPSY;  Surgeon: Kylee Villasenor MD;  [place] [facility] [place]    CONIZATION LEEP N/A 2015    Procedure: CONIZATION LEEP;  Surgeon: Omayra Cunningham DO;  Location:  OR    EYE SURGERY  2016    GRAFT FAT TO BREAST Bilateral 3/28/2024    Procedure: Bilateral breast fat grafting from flanks abdomen and thighs;  Surgeon: MARIA E Aaron MD;  Location: [facility]    GRAFT FREE VASCULARIZED TRANSVERSE RECTUS ABDOMINIS MYOCUTANEOUS Bilateral 2023    Procedure: Bilateral breast reconstruction with free abdominal flaps, resensation, SPY;  Surgeon: MARIA E Aaron MD;  Location: [facility] OR    LAPAROSCOPIC HYSTERECTOMY TOTAL, BILATERAL SALPINGO-OOPHORECTOMY, COMBINED Bilateral 3/28/2024    Procedure: Total laparoscopic hysterectomy, bilateral salpingo-oophorectomy, Cystoscopy;  Surgeon: Marco Watkins MD;  Location:  OR    LAPAROSCOPIC TUBAL LIGATION  10/03/2003    MASTECTOMY SIMPLE Bilateral 2023    Procedure: BILATERAL MASTECTOMIES;  Surgeon: Kylee Villasenor MD;  [place] [facility] [place]    RECONSTRUCT BREAST, INSERT TISSUE EXPANDER, COMBINED Bilateral 2023    Procedure: RECONSTRUCTION, BREAST, BILATERAL TISSUE EXPANDERS;  Surgeon: Basil Aguilar MD;  [place] [facility] [place]    REMOVE TISSUE EXPANDER TRUNK Bilateral  2023    Procedure: REMOVAL, TISSUE EXPANDER, BILATERAL BREAST;  Surgeon: Basil Aguilar MD;  [place] [facility] [place]    REVISE RECONSTRUCTED BREAST BILATERAL Bilateral 3/28/2024    Procedure: bilateral revision of breasts, umbilicoplasty, scar revision of abdomen;  Surgeon: MARIA E Aaron MD;  Location:  OR      No Known Allergies   Social History     Tobacco Use    Smoking status: Former     Current packs/day: 0.00     Average packs/day: 1 pack/day for 10.0 years (10.0 ttl pk-yrs)     Types: Cigarettes     Start date: 3/28/2013     Quit date: 2023     Years since quittin.5     Passive exposure: Past    Smokeless tobacco: Never   Substance Use Topics    Alcohol use: Not Currently      Wt Readings from Last 1 Encounters:   24 66.7 kg (147 lb 1.6 oz)        Anesthesia Evaluation   Pt has had prior anesthetic.     No history of anesthetic complications       ROS/MED HX  ENT/Pulmonary:  - neg pulmonary ROS     Neurologic:  - neg neurologic ROS     Cardiovascular:  - neg cardiovascular ROS   (+) Dyslipidemia - -   -  - -                                      METS/Exercise Tolerance:     Hematologic:  - neg hematologic  ROS     Musculoskeletal:  - neg musculoskeletal ROS     GI/Hepatic:  - neg GI/hepatic ROS     Renal/Genitourinary:  - neg Renal ROS     Endo:  - neg endo ROS     Psychiatric/Substance Use:  - neg psychiatric ROS   (+) psychiatric history anxiety and depression       Infectious Disease:  - neg infectious disease ROS     Malignancy:  - neg malignancy ROS (+) Malignancy, History of Breast.Breast CA Remission status post.      Other:  - neg other ROS          Physical Exam    Airway        Mallampati: II   TM distance: > 3 FB   Neck ROM: full   Mouth opening: > 3 cm    Respiratory Devices and Support         Dental       (+) Minor Abnormalities - some fillings, tiny chips      Cardiovascular   cardiovascular exam normal          Pulmonary   pulmonary exam normal       "          OUTSIDE LABS:  CBC:   Lab Results   Component Value Date    WBC 8.5 03/06/2024    WBC 10.9 09/26/2023    HGB 13.0 03/06/2024    HGB 10.7 (L) 09/26/2023    HCT 39.5 03/06/2024    HCT 32.5 (L) 09/26/2023     03/06/2024     09/26/2023     BMP:   Lab Results   Component Value Date     03/06/2024     09/26/2023    POTASSIUM 4.0 03/06/2024    POTASSIUM 3.8 09/26/2023    CHLORIDE 102 03/06/2024    CHLORIDE 106 09/26/2023    CO2 27 03/06/2024    CO2 25 09/26/2023    BUN 13.4 03/06/2024    BUN 7.7 09/26/2023    CR 0.66 03/06/2024    CR 0.71 09/26/2023    GLC 98 03/06/2024     (H) 09/27/2023     COAGS: No results found for: \"PTT\", \"INR\", \"FIBR\"  POC:   Lab Results   Component Value Date    BGM 76 03/21/2019    HCG neg 07/16/2015     HEPATIC:   Lab Results   Component Value Date    ALBUMIN 4.3 03/06/2024    PROTTOTAL 7.2 03/06/2024    ALT 17 03/06/2024    AST 16 03/06/2024    ALKPHOS 71 03/06/2024    BILITOTAL 0.6 03/06/2024     OTHER:   Lab Results   Component Value Date    PH 7.5 (H) 03/05/2002    LACT 2.5 (H) 09/25/2023    A1C 5.4 05/11/2016    SAM 9.2 03/06/2024    MAG 1.8 09/27/2023    TSH 2.89 03/21/2019       Anesthesia Plan    ASA Status:  2    NPO Status:  NPO Appropriate    Anesthesia Type: MAC.     - Reason for MAC: immobility needed, straight local not clinically adequate   Induction: Propofol.   Maintenance: TIVA.        Consents    Anesthesia Plan(s) and associated risks, benefits, and realistic alternatives discussed. Questions answered and patient/representative(s) expressed understanding.     - Discussed:     - Discussed with:  Patient            Postoperative Care    Pain management: Multi-modal analgesia.        Comments:    Other Comments: Propofol    Anesthetic risks, benefits, and options reviewed. Patient agrees to proceed.               Mathieu Fritz MD    I have reviewed the pertinent notes and labs in the chart from the past 30 days and (re)examined the " "patient.  Any updates or changes from those notes are reflected in this note.                       # Overweight: Estimated body mass index is 25.33 kg/m  as calculated from the following:    Height as of this encounter: 1.6 m (5' 3\").    Weight as of this encounter: 64.9 kg (143 lb).             "

## 2024-10-23 NOTE — ANESTHESIA POSTPROCEDURE EVALUATION
Patient: Zainab Bolton    Procedure: Procedure(s):  COLONOSCOPY, Biopsy x2       Anesthesia Type:  MAC    Note:  Disposition: Outpatient   Postop Pain Control:    PONV:    Neuro/Psych:    Airway/Respiratory:    CV/Hemodynamics:    Other NRE:    DID A NON-ROUTINE EVENT OCCUR?            Last vitals:  Vitals Value Taken Time   /80 10/23/24 0745   Temp 96.8  F (36  C) 10/23/24 0734   Pulse 68 10/23/24 0758   Resp 16 10/23/24 0745   SpO2 100 % 10/23/24 0758   Vitals shown include unfiled device data.    Electronically Signed By: Mathieu Fritz MD  October 23, 2024  8:18 AM

## 2024-10-31 DIAGNOSIS — F41.1 GAD (GENERALIZED ANXIETY DISORDER): ICD-10-CM

## 2024-11-01 RX ORDER — BUSPIRONE HYDROCHLORIDE 10 MG/1
10 TABLET ORAL 2 TIMES DAILY
Qty: 60 TABLET | Refills: 3 | Status: SHIPPED | OUTPATIENT
Start: 2024-11-01

## 2024-11-04 NOTE — RESULT ENCOUNTER NOTE
Called patient with results. Your colonoscopy results showed polyp(s).  Recommendation is to repeat colonoscopy in 3 years.           Jamaal Brooke DO  General Surgeon  [facility]  Surgery [facility] - [address] [address]?  Office: [phone]  Employed by - [employer]  Pager: [phone]

## 2024-11-07 ENCOUNTER — PATIENT OUTREACH (OUTPATIENT)
Dept: GASTROENTEROLOGY | Facility: CLINIC | Age: 44
End: 2024-11-07
Payer: COMMERCIAL

## 2024-11-19 ENCOUNTER — OFFICE VISIT (OUTPATIENT)
Dept: PLASTIC SURGERY | Facility: CLINIC | Age: 44
End: 2024-11-19
Attending: FAMILY MEDICINE
Payer: COMMERCIAL

## 2024-11-19 VITALS
DIASTOLIC BLOOD PRESSURE: 86 MMHG | OXYGEN SATURATION: 97 % | HEIGHT: 63 IN | TEMPERATURE: 98.8 F | SYSTOLIC BLOOD PRESSURE: 128 MMHG | WEIGHT: 148 LBS | HEART RATE: 92 BPM | BODY MASS INDEX: 26.22 KG/M2

## 2024-11-19 DIAGNOSIS — Z98.890 S/P FLAP GRAFT: Primary | ICD-10-CM

## 2024-11-19 PROCEDURE — 99213 OFFICE O/P EST LOW 20 MIN: CPT | Performed by: PLASTIC SURGERY

## 2024-11-19 ASSESSMENT — PAIN SCALES - GENERAL: PAINLEVEL_OUTOF10: NO PAIN (0)

## 2024-11-19 NOTE — PROGRESS NOTES
PRESENTING COMPLAINT:  Post-operative visit s/p stage III breast reconstruction for breast cancer on 3/28/2024     HISTORY OF PRESENTING COMPLAINT: The patient is here for post-operative visit.  The patient is being seen in the presence of my nurse.      The patient is here a year after her Judi flap and at least 9 months after her surgery for her third stage.  She is fully healed very happy the results.  Had some concerns about some nonspecific soreness in the left upper chest.  This has subsequently disappeared as of the last week.  She has never noticed anything concerning like a lump or changes in the skin.  She does not wake up with pain.  Additionally she would like to proceed with nipple reconstruction after living without nipples for the last 6 to 9 months.    On exam: Vital signs stable afebrile.  All incisions healing well.  Breasts are symmetric and aesthetic.  There is nothing pathologic in the left upper chest area.     ASSESSMENT AND PLAN:  Based upon the above findings, the patient is here for post-operative visit.      I reassured the patient.  We will plan nipple reconstruction at my [place] clinic.  All questions answered.

## 2024-11-19 NOTE — NURSING NOTE
"Oncology Rooming Note    November 19, 2024 7:54 AM   Zainab Bolton is a 44 year old female who presents for:    Chief Complaint   Patient presents with    Oncology Clinic Visit     Follow up - breast pain     Initial Vitals: /86   Pulse 92   Temp 98.8  F (37.1  C) (Oral)   Ht 1.6 m (5' 3\")   Wt 67.1 kg (148 lb)   LMP  (LMP Unknown)   SpO2 97%   BMI 26.22 kg/m   Estimated body mass index is 26.22 kg/m  as calculated from the following:    Height as of this encounter: 1.6 m (5' 3\").    Weight as of this encounter: 67.1 kg (148 lb). Body surface area is 1.73 meters squared.  No Pain (0) Comment: Data Unavailable   No LMP recorded (lmp unknown). Patient has had a hysterectomy.  Allergies reviewed: Yes  Medications reviewed: Yes    Medications: Medication refills not needed today.  Pharmacy name entered into [facility]:    [facility] [place], [place] - [address]  [place], [place] - [address]    Frailty Screening:   Is the patient here for a new oncology consult visit in cancer care? 2. No      Clinical concerns: none      Marty Ojeda LPN            "

## 2024-11-19 NOTE — LETTER
11/19/2024      Zainab Bolton  [address] [address] [address]      Dear Colleague,    Thank you for referring your patient, Zainab Bolton, to the [facility] BRE[facility]. Please see a copy of my visit note below.    PRESENTING COMPLAINT:  Post-operative visit s/p stage III breast reconstruction for breast cancer on 3/28/2024     HISTORY OF PRESENTING COMPLAINT: The patient is here for post-operative visit.  The patient is being seen in the presence of my nurse.      The patient is here a year after her Judi flap and at least 9 months after her surgery for her third stage.  She is fully healed very happy the results.  Had some concerns about some nonspecific soreness in the left upper chest.  This has subsequently disappeared as of the last week.  She has never noticed anything concerning like a lump or changes in the skin.  She does not wake up with pain.  Additionally she would like to proceed with nipple reconstruction after living without nipples for the last 6 to 9 months.    On exam: Vital signs stable afebrile.  All incisions healing well.  Breasts are symmetric and aesthetic.  There is nothing pathologic in the left upper chest area.     ASSESSMENT AND PLAN:  Based upon the above findings, the patient is here for post-operative visit.      I reassured the patient.  We will plan nipple reconstruction at my [place] clinic.  All questions answered.      Again, thank you for allowing me to participate in the care of your patient.        Sincerely,        MARIA E Aaron MD

## 2025-01-31 PROBLEM — K43.9 ABDOMINAL WALL HERNIA: Status: ACTIVE | Noted: 2025-01-31

## 2025-01-31 PROBLEM — F33.1 MODERATE RECURRENT MAJOR DEPRESSION (H): Status: RESOLVED | Noted: 2025-01-31 | Resolved: 2025-01-31

## 2025-01-31 PROBLEM — I73.81 ERYTHROMELALGIA: Status: ACTIVE | Noted: 2025-01-31

## 2025-01-31 PROBLEM — F33.1 MODERATE RECURRENT MAJOR DEPRESSION (H): Status: ACTIVE | Noted: 2025-01-31

## 2025-01-31 PROBLEM — G56.03 BILATERAL CARPAL TUNNEL SYNDROME: Status: ACTIVE | Noted: 2025-01-31

## 2025-02-04 ENCOUNTER — PATIENT OUTREACH (OUTPATIENT)
Dept: ONCOLOGY | Facility: HOSPITAL | Age: 45
End: 2025-02-04

## 2025-02-04 NOTE — PROGRESS NOTES
[facility]: Cancer Care  Chart Check                                                                                            Situation: Patient chart reviewed by care coordinator.    Background: Patient with a diagnosis of left breast cancer, grade 1, ER/OK positive and HER2/doug negative.  On 5/31/23, patient had bilateral mastectomy with left sentinel lymph node biopsy.  Pathology showed a 2.3 cm invasive ductal carcinoma of the left breast, grade 1.  Margins negative.  She had some background DCIS.  The lymph node was negative.  Patient had Oncotype done and result was 17.  -Tamoxifen, started roughly June 2023.   -Zoladex monthly injections started July 2023.  Zoladex discontinued in October 2023 due to significant side effects.     Underwent hysterectomy with bilateral salpingo-oophorectomy on 3/28/2024     Endocrine therapy switched to letrozole in May 2024    Assessment: Patient was last in the clinic to see Dr Espinosa on 1/31/2025.  Patient seems to be tolerating letrozole with no significant side effects.  She is having some hot flashes which are tolerable.  He would like her to come back in 4 months for physical exam.    Plan/Recommendations: Patient has been scheduled to see Dr Espinosa on May 30.    Signature:  Heidi Hinojosa RN

## 2025-02-18 ENCOUNTER — OFFICE VISIT (OUTPATIENT)
Dept: SURGERY | Facility: CLINIC | Age: 45
End: 2025-02-18
Attending: FAMILY MEDICINE
Payer: COMMERCIAL

## 2025-02-18 VITALS
HEIGHT: 63 IN | SYSTOLIC BLOOD PRESSURE: 118 MMHG | BODY MASS INDEX: 27 KG/M2 | WEIGHT: 152.4 LBS | TEMPERATURE: 98.3 F | HEART RATE: 91 BPM | DIASTOLIC BLOOD PRESSURE: 81 MMHG

## 2025-02-18 DIAGNOSIS — Z98.890 H/O BREAST RECONSTRUCTION: ICD-10-CM

## 2025-02-18 DIAGNOSIS — Z15.01 BRCA2 POSITIVE: ICD-10-CM

## 2025-02-18 DIAGNOSIS — E66.3 OVERWEIGHT (BMI 25.0-29.9): ICD-10-CM

## 2025-02-18 DIAGNOSIS — K43.9 ABDOMINAL WALL HERNIA: Primary | ICD-10-CM

## 2025-02-18 DIAGNOSIS — Z15.09 BRCA2 POSITIVE: ICD-10-CM

## 2025-02-18 PROCEDURE — 3074F SYST BP LT 130 MM HG: CPT | Performed by: SURGERY

## 2025-02-18 PROCEDURE — 3079F DIAST BP 80-89 MM HG: CPT | Performed by: SURGERY

## 2025-02-18 PROCEDURE — 99203 OFFICE O/P NEW LOW 30 MIN: CPT | Performed by: SURGERY

## 2025-02-18 NOTE — LETTER
2/18/2025      Zainab Bolton  [address] [address] [facility] [address]      Dear Colleague,    Thank you for referring your patient, Zainab Bolton, to the [facility]. Please see a copy of my visit note below.      Assessment & Plan  Problem List Items Addressed This Visit          Other    BRCA2 positive    H/O breast reconstruction    Abdominal wall hernia - Primary    Relevant Orders    CT Abdomen Pelvis w Contrast     Other Visit Diagnoses       Overweight (BMI 25.0-29.9)               This is a 44-year-old female presents concerns of the abdominal hernia   -Patient had history of a BENIGNO flap bilaterally for her breast cancer reconstruction several years ago.  -There was no defect or hernia content on today's exam.  -Thus I recommend an abdominal CT of the abdomen and pelvis-to make sure that there is indeed a hernia.  -I will call her with the results and she may need another office appointment if there is indeed a hernia in place.   -Patient is understanding of this.    Face to Face/patient Contact total time: 20 minutes  Pre Charting time: 10 minutes; Post charting time, communication and other activities: 10 minutes;   Total time:  40 minutes        No follow-ups on file.      Subjective  Avis is a 44 year old, presenting for the following health issues:  Consult (Hernia - )    Left mid abdomen - noted bulge; reducible   Noted for the past 2-3 months; Reduced at least a few times.    Hx of BENIGNO flap 2/2 breast construction 2/2 left invasive ductal; known BRCA2- 2023  Pt then has bilateral salpingo-oophorectomy and hysterectomy  Very pain when this is out.   Currently not out and it is comfortable  No issues with bowel obstruction.    No mesh placed.             Review of Systems  Constitutional, neuro, ENT, endocrine, pulmonary, cardiac, gastrointestinal, genitourinary, musculoskeletal, integument and psychiatric systems are negative, except as otherwise noted.      Objective  "  /81 (BP Location: Right arm, Patient Position: Sitting, Cuff Size: Adult Regular)   Pulse 91   Temp 98.3  F (36.8  C) (Tympanic)   Ht 1.6 m (5' 2.99\")   Wt 69.1 kg (152 lb 6.4 oz)   LMP  (LMP Unknown)   BMI 27.00 kg/m    Body mass index is 27 kg/m .  Physical Exam  Vitals reviewed.   Eyes:      Conjunctiva/sclera: Conjunctivae normal.   Pulmonary:      Effort: Pulmonary effort is normal.   Abdominal:       Neurological:      Mental Status: She is alert.                Signed Electronically by: Bora Rene MD        Again, thank you for allowing me to participate in the care of your patient.        Sincerely,        Bora Rene MD    Electronically signed"

## 2025-02-18 NOTE — PROGRESS NOTES
"  Assessment & Plan   Problem List Items Addressed This Visit          Other    BRCA2 positive    H/O breast reconstruction    Abdominal wall hernia - Primary    Relevant Orders    CT Abdomen Pelvis w Contrast     Other Visit Diagnoses       Overweight (BMI 25.0-29.9)               This is a 44-year-old female presents concerns of the abdominal hernia   -Patient had history of a BENIGNO flap bilaterally for her breast cancer reconstruction several years ago.  -There was no defect or hernia content on today's exam.  -Thus I recommend an abdominal CT of the abdomen and pelvis-to make sure that there is indeed a hernia.  -I will call her with the results and she may need another office appointment if there is indeed a hernia in place.   -Patient is understanding of this.    Face to Face/patient Contact total time: 20 minutes  Pre Charting time: 10 minutes; Post charting time, communication and other activities: 10 minutes;   Total time:  40 minutes        No follow-ups on file.      Lashay Albarran is a 44 year old, presenting for the following health issues:  Consult (Hernia - )    Left mid abdomen - noted bulge; reducible   Noted for the past 2-3 months; Reduced at least a few times.    Hx of BENIGNO flap 2/2 breast construction 2/2 left invasive ductal; known BRCA2- 2023  Pt then has bilateral salpingo-oophorectomy and hysterectomy  Very pain when this is out.   Currently not out and it is comfortable  No issues with bowel obstruction.    No mesh placed.             Review of Systems  Constitutional, neuro, ENT, endocrine, pulmonary, cardiac, gastrointestinal, genitourinary, musculoskeletal, integument and psychiatric systems are negative, except as otherwise noted.      Objective    /81 (BP Location: Right arm, Patient Position: Sitting, Cuff Size: Adult Regular)   Pulse 91   Temp 98.3  F (36.8  C) (Tympanic)   Ht 1.6 m (5' 2.99\")   Wt 69.1 kg (152 lb 6.4 oz)   LMP  (LMP Unknown)   BMI 27.00 kg/m    Body " mass index is 27 kg/m .  Physical Exam  Vitals reviewed.   Eyes:      Conjunctiva/sclera: Conjunctivae normal.   Pulmonary:      Effort: Pulmonary effort is normal.   Abdominal:       Neurological:      Mental Status: She is alert.                Signed Electronically by: Bora Rene MD

## 2025-02-18 NOTE — NURSING NOTE
"Initial /81 (BP Location: Right arm, Patient Position: Sitting, Cuff Size: Adult Regular)   Pulse 91   Temp 98.3  F (36.8  C) (Tympanic)   Ht 1.6 m (5' 2.99\")   Wt 69.1 kg (152 lb 6.4 oz)   LMP  (LMP Unknown)   BMI 27.00 kg/m   Estimated body mass index is 27 kg/m  as calculated from the following:    Height as of this encounter: 1.6 m (5' 2.99\").    Weight as of this encounter: 69.1 kg (152 lb 6.4 oz). .  Kiarra Tam MA    "

## 2025-03-07 ENCOUNTER — HOSPITAL ENCOUNTER (OUTPATIENT)
Dept: CT IMAGING | Facility: CLINIC | Age: 45
Discharge: HOME | End: 2025-03-07
Attending: SURGERY | Admitting: SURGERY
Payer: COMMERCIAL

## 2025-03-07 DIAGNOSIS — K43.9 ABDOMINAL WALL HERNIA: ICD-10-CM

## 2025-03-07 PROCEDURE — 74177 CT ABD & PELVIS W/CONTRAST: CPT

## 2025-03-07 PROCEDURE — 250N000011 HC RX IP 250 OP 636: Performed by: SURGERY

## 2025-03-07 PROCEDURE — 250N000009 HC RX 250: Performed by: SURGERY

## 2025-03-07 RX ORDER — IOPAMIDOL 755 MG/ML
75 INJECTION, SOLUTION INTRAVASCULAR ONCE
Status: COMPLETED | OUTPATIENT
Start: 2025-03-07 | End: 2025-03-07

## 2025-03-07 RX ADMIN — SODIUM CHLORIDE 59 ML: 9 INJECTION, SOLUTION INTRAVENOUS at 08:38

## 2025-03-07 RX ADMIN — IOPAMIDOL 75 ML: 755 INJECTION, SOLUTION INTRAVENOUS at 08:38

## 2025-03-28 DIAGNOSIS — F41.1 GAD (GENERALIZED ANXIETY DISORDER): ICD-10-CM

## 2025-03-31 RX ORDER — HYDROXYZINE HYDROCHLORIDE 10 MG/1
TABLET, FILM COATED ORAL
Qty: 90 TABLET | Refills: 2 | Status: SHIPPED | OUTPATIENT
Start: 2025-03-31

## 2025-03-31 NOTE — TELEPHONE ENCOUNTER
Has appointment scheduled for 5/27/25.      Requested Prescriptions   Pending Prescriptions Disp Refills    hydrOXYzine HCl (ATARAX) 10 MG tablet [Pharmacy Med Name: HYDROXYZINE HYDROCHLORIDE 10MG TABLET] 90 tablet 2     Sig: TAKE ONE (1) TABLET BY MOUTH THREE (3) TIMES DAILY AS NEEDED FOR ANXIETY       Antihistamines Protocol Failed - 3/31/2025  8:57 AM        Failed - Medication is active on med list and the sig matches. RN to manually verify dose and sig if red X/fail.     If the protocol passes (green check), you do not need to verify med dose and sig.    A prescription matches if they are the same clinical intention.    For Example: once daily and every morning are the same.    The protocol can not identify upper and lower case letters as matching and will fail.     For Example: Take 1 tablet (50 mg) by mouth daily     TAKE 1 TABLET (50 MG) BY MOUTH DAILY    For all fails (red x), verify dose and sig.    If the refill does match what is on file, the RN can still proceed to approve the refill request.       If they do not match, route to the appropriate provider.             Passed - Patient is age 3 or older     Apply age and/or weight-based dosing for peds patients age 3 and older.    Forward request to provider for patients under the age of 3.          Passed - Recent (12 month) or future (90 days) visit with authorizing provider s specialty     The patient must have completed an in-person or virtual visit within the past 12 months or has a future visit scheduled within the next 90 days with the authorizing provider s specialty.  Urgent care and e-visits do not qualify as an office visit for this protocol.          Passed - Medication indicated for associated diagnosis     The medication is associated with one or more of the following diagnoses:  Allergies  Rhinitis  Upper respiratory tract allergy  Urticaria  Itching  Cystic Fibrosis  Bronchiectasis

## 2025-04-14 DIAGNOSIS — Z17.0 MALIGNANT NEOPLASM OF CENTRAL PORTION OF LEFT BREAST IN FEMALE, ESTROGEN RECEPTOR POSITIVE (H): ICD-10-CM

## 2025-04-14 DIAGNOSIS — C50.112 MALIGNANT NEOPLASM OF CENTRAL PORTION OF LEFT BREAST IN FEMALE, ESTROGEN RECEPTOR POSITIVE (H): ICD-10-CM

## 2025-04-14 RX ORDER — LETROZOLE 2.5 MG/1
2.5 TABLET, FILM COATED ORAL DAILY
Qty: 90 TABLET | Refills: 0 | Status: SHIPPED | OUTPATIENT
Start: 2025-04-14

## 2025-05-20 SDOH — HEALTH STABILITY: PHYSICAL HEALTH: ON AVERAGE, HOW MANY DAYS PER WEEK DO YOU ENGAGE IN MODERATE TO STRENUOUS EXERCISE (LIKE A BRISK WALK)?: 5 DAYS

## 2025-05-20 SDOH — HEALTH STABILITY: PHYSICAL HEALTH: ON AVERAGE, HOW MANY MINUTES DO YOU ENGAGE IN EXERCISE AT THIS LEVEL?: 30 MIN

## 2025-05-20 ASSESSMENT — SOCIAL DETERMINANTS OF HEALTH (SDOH): HOW OFTEN DO YOU GET TOGETHER WITH FRIENDS OR RELATIVES?: ONCE A WEEK

## 2025-05-26 ASSESSMENT — PATIENT HEALTH QUESTIONNAIRE - PHQ9
SUM OF ALL RESPONSES TO PHQ QUESTIONS 1-9: 7
10. IF YOU CHECKED OFF ANY PROBLEMS, HOW DIFFICULT HAVE THESE PROBLEMS MADE IT FOR YOU TO DO YOUR WORK, TAKE CARE OF THINGS AT HOME, OR GET ALONG WITH OTHER PEOPLE: SOMEWHAT DIFFICULT
SUM OF ALL RESPONSES TO PHQ QUESTIONS 1-9: 7

## 2025-05-27 ENCOUNTER — OFFICE VISIT (OUTPATIENT)
Dept: FAMILY MEDICINE | Facility: CLINIC | Age: 45
End: 2025-05-27
Payer: COMMERCIAL

## 2025-05-27 VITALS
HEART RATE: 91 BPM | HEIGHT: 62 IN | OXYGEN SATURATION: 98 % | TEMPERATURE: 98 F | RESPIRATION RATE: 16 BRPM | SYSTOLIC BLOOD PRESSURE: 118 MMHG | DIASTOLIC BLOOD PRESSURE: 78 MMHG | WEIGHT: 148.6 LBS | BODY MASS INDEX: 27.34 KG/M2

## 2025-05-27 DIAGNOSIS — Z15.09 BRCA2 POSITIVE: ICD-10-CM

## 2025-05-27 DIAGNOSIS — E78.00 HYPERCHOLESTEREMIA: ICD-10-CM

## 2025-05-27 DIAGNOSIS — Z98.890 H/O BREAST RECONSTRUCTION: ICD-10-CM

## 2025-05-27 DIAGNOSIS — Z13.6 SCREENING FOR CARDIOVASCULAR CONDITION: ICD-10-CM

## 2025-05-27 DIAGNOSIS — N95.1 MENOPAUSAL SYNDROME (HOT FLASHES): ICD-10-CM

## 2025-05-27 DIAGNOSIS — Z15.01 BRCA2 POSITIVE: ICD-10-CM

## 2025-05-27 DIAGNOSIS — C50.112 MALIGNANT NEOPLASM OF CENTRAL PORTION OF LEFT BREAST IN FEMALE, ESTROGEN RECEPTOR POSITIVE (H): ICD-10-CM

## 2025-05-27 DIAGNOSIS — Z17.0 MALIGNANT NEOPLASM OF CENTRAL PORTION OF LEFT BREAST IN FEMALE, ESTROGEN RECEPTOR POSITIVE (H): ICD-10-CM

## 2025-05-27 DIAGNOSIS — Z00.01 ENCOUNTER FOR ROUTINE ADULT MEDICAL EXAM WITH ABNORMAL FINDINGS: Primary | ICD-10-CM

## 2025-05-27 DIAGNOSIS — R53.81 PHYSICAL DECONDITIONING: ICD-10-CM

## 2025-05-27 DIAGNOSIS — G56.03 BILATERAL CARPAL TUNNEL SYNDROME: ICD-10-CM

## 2025-05-27 DIAGNOSIS — R19.8 LAXITY OF ABDOMINAL WALL: ICD-10-CM

## 2025-05-27 DIAGNOSIS — F33.41 MAJOR DEPRESSIVE DISORDER, RECURRENT EPISODE, IN PARTIAL REMISSION: ICD-10-CM

## 2025-05-27 DIAGNOSIS — I73.81 ERYTHROMELALGIA: ICD-10-CM

## 2025-05-27 DIAGNOSIS — F41.1 GAD (GENERALIZED ANXIETY DISORDER): ICD-10-CM

## 2025-05-27 PROBLEM — K43.9 ABDOMINAL WALL HERNIA: Status: RESOLVED | Noted: 2025-01-31 | Resolved: 2025-05-27

## 2025-05-27 PROBLEM — N61.0 CELLULITIS OF RIGHT BREAST: Status: RESOLVED | Noted: 2023-08-12 | Resolved: 2025-05-27

## 2025-05-27 LAB
ALBUMIN SERPL BCG-MCNC: 4.3 G/DL (ref 3.5–5.2)
ALP SERPL-CCNC: 111 U/L (ref 40–150)
ALT SERPL W P-5'-P-CCNC: 24 U/L (ref 0–50)
ANION GAP SERPL CALCULATED.3IONS-SCNC: 11 MMOL/L (ref 7–15)
AST SERPL W P-5'-P-CCNC: 26 U/L (ref 0–45)
BILIRUB SERPL-MCNC: 0.4 MG/DL
BUN SERPL-MCNC: 13 MG/DL (ref 6–20)
CALCIUM SERPL-MCNC: 9.5 MG/DL (ref 8.8–10.4)
CHLORIDE SERPL-SCNC: 105 MMOL/L (ref 98–107)
CHOLEST SERPL-MCNC: 183 MG/DL
CREAT SERPL-MCNC: 0.71 MG/DL (ref 0.51–0.95)
EGFRCR SERPLBLD CKD-EPI 2021: >90 ML/MIN/1.73M2
ERYTHROCYTE [DISTWIDTH] IN BLOOD BY AUTOMATED COUNT: 12.6 % (ref 10–15)
FASTING STATUS PATIENT QL REPORTED: YES
FASTING STATUS PATIENT QL REPORTED: YES
GLUCOSE SERPL-MCNC: 98 MG/DL (ref 70–99)
HCO3 SERPL-SCNC: 29 MMOL/L (ref 22–29)
HCT VFR BLD AUTO: 39.3 % (ref 35–47)
HDLC SERPL-MCNC: 47 MG/DL
HGB BLD-MCNC: 13.4 G/DL (ref 11.7–15.7)
LDLC SERPL CALC-MCNC: 124 MG/DL
MCH RBC QN AUTO: 29.5 PG (ref 26.5–33)
MCHC RBC AUTO-ENTMCNC: 34.1 G/DL (ref 31.5–36.5)
MCV RBC AUTO: 87 FL (ref 78–100)
NONHDLC SERPL-MCNC: 136 MG/DL
PLATELET # BLD AUTO: 271 10E3/UL (ref 150–450)
POTASSIUM SERPL-SCNC: 4.8 MMOL/L (ref 3.4–5.3)
PROT SERPL-MCNC: 7.1 G/DL (ref 6.4–8.3)
RBC # BLD AUTO: 4.54 10E6/UL (ref 3.8–5.2)
SODIUM SERPL-SCNC: 145 MMOL/L (ref 135–145)
TRIGL SERPL-MCNC: 62 MG/DL
WBC # BLD AUTO: 6.1 10E3/UL (ref 4–11)

## 2025-05-27 PROCEDURE — 3078F DIAST BP <80 MM HG: CPT | Performed by: FAMILY MEDICINE

## 2025-05-27 PROCEDURE — 99214 OFFICE O/P EST MOD 30 MIN: CPT | Mod: 25 | Performed by: FAMILY MEDICINE

## 2025-05-27 PROCEDURE — 80053 COMPREHEN METABOLIC PANEL: CPT | Performed by: FAMILY MEDICINE

## 2025-05-27 PROCEDURE — 99396 PREV VISIT EST AGE 40-64: CPT | Performed by: FAMILY MEDICINE

## 2025-05-27 PROCEDURE — 3074F SYST BP LT 130 MM HG: CPT | Performed by: FAMILY MEDICINE

## 2025-05-27 PROCEDURE — 36415 COLL VENOUS BLD VENIPUNCTURE: CPT | Performed by: FAMILY MEDICINE

## 2025-05-27 PROCEDURE — 96127 BRIEF EMOTIONAL/BEHAV ASSMT: CPT | Performed by: FAMILY MEDICINE

## 2025-05-27 PROCEDURE — 80061 LIPID PANEL: CPT | Performed by: FAMILY MEDICINE

## 2025-05-27 PROCEDURE — 85027 COMPLETE CBC AUTOMATED: CPT | Performed by: FAMILY MEDICINE

## 2025-05-27 ASSESSMENT — ANXIETY QUESTIONNAIRES
3. WORRYING TOO MUCH ABOUT DIFFERENT THINGS: NEARLY EVERY DAY
7. FEELING AFRAID AS IF SOMETHING AWFUL MIGHT HAPPEN: NEARLY EVERY DAY
IF YOU CHECKED OFF ANY PROBLEMS ON THIS QUESTIONNAIRE, HOW DIFFICULT HAVE THESE PROBLEMS MADE IT FOR YOU TO DO YOUR WORK, TAKE CARE OF THINGS AT HOME, OR GET ALONG WITH OTHER PEOPLE: SOMEWHAT DIFFICULT
5. BEING SO RESTLESS THAT IT IS HARD TO SIT STILL: MORE THAN HALF THE DAYS
GAD7 TOTAL SCORE: 18
6. BECOMING EASILY ANNOYED OR IRRITABLE: NEARLY EVERY DAY
1. FEELING NERVOUS, ANXIOUS, OR ON EDGE: NEARLY EVERY DAY
2. NOT BEING ABLE TO STOP OR CONTROL WORRYING: NEARLY EVERY DAY
GAD7 TOTAL SCORE: 18

## 2025-05-27 ASSESSMENT — PATIENT HEALTH QUESTIONNAIRE - PHQ9: 5. POOR APPETITE OR OVEREATING: SEVERAL DAYS

## 2025-05-27 NOTE — PROGRESS NOTES
Preventive Care Visit  [facility] ARACELI Concepcion MD, Family Medicine  May 27, 2025      Assessment & Plan     (Z00.01) Encounter for routine adult medical exam with abnormal findings  (primary encounter diagnosis)  Comment: doing well overall. Energy increasing. Working out more, trying to improve core strength. Discussed diet. Discussed vaccines and screenings.   Plan: CBC with platelets, Comprehensive metabolic         panel (BMP + Alb, Alk Phos, ALT, AST, Total.         Bili, TP)            (Z13.6) Screening for cardiovascular condition  Comment: discussed   Plan: Lipid panel reflex to direct LDL Fasting            (E78.00) Hypercholesteremia  Comment: discussed   Plan: Lipid panel reflex to direct LDL Fasting            (G56.03) Bilateral carpal tunnel syndrome  Comment: feeling better with night braces   Plan:     (F41.1) GEOVANY (generalized anxiety disorder)  Comment: doing well overall. On venlafaxine and buspar   Plan:     (R19.8) Laxity of abdominal wall  Comment: discussed with her. Feels symptoms are improved somewhat. Interested in doing some PT - referral made   Plan: Physical Therapy  Referral            (C50.112,  Z17.0) Malignant neoplasm of central portion of left breast in female, estrogen receptor positive (H)  Comment: on letrozole . In remission   Plan: CBC with platelets, Comprehensive metabolic         panel (BMP + Alb, Alk Phos, ALT, AST, Total.         Bili, TP)            (F33.41) Major depressive disorder, recurrent episode, in partial remission  Comment: doing well.   Plan:     (N95.1) Menopausal syndrome (hot flashes)  Comment: managing symptoms   Plan:     (R53.81) Physical deconditioning  Comment: referral made to PT   Plan:     (Z15.01,  Z15.09) BRCA2 positive  Comment: monitored through oncology   Plan:     (Z98.890) H/O breast reconstruction  Comment: well healed. Normal exam today   Plan:       (I73.81) Erythromelalgia  Comment: reviewed uptodate  recommendations with her. Minimal pain, mostly she is frustrated with discoloration and swelling. Doesn't want to start on a medication today. Discussed nonpharm approaches. She will let me know if symptoms worsen/change. The patient indicates understanding of these issues and agrees with the plan.   Plan:       Patient has been advised of split billing requirements and indicates understanding: Yes        Counseling  Appropriate preventive services were addressed with this patient via screening, questionnaire, or discussion as appropriate for fall prevention, nutrition, physical activity, Tobacco-use cessation, social engagement, weight loss and cognition.  Checklist reviewing preventive services available has been given to the patient.  Reviewed patient's diet, addressing concerns and/or questions.   She is at risk for psychosocial distress and has been provided with information to reduce risk.   The patient's PHQ-9 score is consistent with mild depression. She was provided with information regarding depression.       Lashay Albarran is a 44 year old, presenting for the following:  Physical        5/27/2025     8:03 AM   Additional Questions   Roomed by LUX Lucas   Accompanied by Self           HPI    Fasting today for lab work     Erythromelalgia-  in hands, knees, and feet.   Dx by her oncologist.   Has a twin sister and she also has this.    - red hands, swelling in feet and hand    Worsens with exercise/heat/motion  Occasional tingling   Reports having it for years     Derm appointment upcoming     Cruise in a month - brandy     Doesn't have abd wall hernia - diabnosed as laxity of abdominal wall   Working on core strength   Sitting on a ball at work     Saw two different surgeons -    CT 3/7/25 :  IMPRESSION:   1.  There is mild asymmetric thickening of the left rectus abdominis musculature, new since the previous exam, but with no associated intramuscular mass or fluid collection. This finding is of  uncertain clinical significance, but could be related to   postoperative changes in the anterior abdominal wall.  2.  No other cause for left abdominal bulging is identified.     Advance Care Planning    Discussed advance care planning with patient; however, patient declined at this time.        5/20/2025   General Health   How would you rate your overall physical health? Good   Feel stress (tense, anxious, or unable to sleep) Rather much   (!) STRESS CONCERN      5/20/2025   Nutrition   Three or more servings of calcium each day? Yes   Diet: Regular (no restrictions)   How many servings of fruit and vegetables per day? (!) 2-3   How many sweetened beverages each day? 0-1         5/20/2025   Exercise   Days per week of moderate/strenous exercise 5 days   Average minutes spent exercising at this level 30 min         5/20/2025   Social Factors   Frequency of gathering with friends or relatives Once a week   Worry food won't last until get money to buy more No   Food not last or not have enough money for food? No   Do you have housing? (Housing is defined as stable permanent housing and does not include staying outside in a car, in a tent, in an abandoned building, in an overnight shelter, or couch-surfing.) Yes   Are you worried about losing your housing? No   Lack of transportation? No   Unable to get utilities (heat,electricity)? No         5/20/2025   Dental   Dentist two times every year? Yes       Today's PHQ-9 Score:       5/26/2025     9:39 PM   PHQ-9 SCORE   PHQ-9 Total Score MyChart 7 (Mild depression)   PHQ-9 Total Score 7        Patient-reported         5/20/2025   Substance Use   Alcohol more than 3/day or more than 7/wk Not Applicable   Do you use any other substances recreationally? (!) CANNABIS PRODUCTS     Social History     Tobacco Use    Smoking status: Former     Current packs/day: 0.00     Average packs/day: 1 pack/day for 10.0 years (10.0 ttl pk-yrs)     Types: Cigarettes     Start date: 3/28/2013  "    Quit date: 2023     Years since quittin.1     Passive exposure: Past    Smokeless tobacco: Never   Vaping Use    Vaping status: Never Used   Substance Use Topics    Alcohol use: Not Currently    Drug use: Yes     Types: Marijuana     Comment: daily smoking           3/16/2023   LAST FHS-7 RESULTS   1st degree relative breast or ovarian cancer No   Any relative bilateral breast cancer No   Any male have breast cancer No   Any ONE woman have BOTH breast AND ovarian cancer No   Any woman with breast cancer before 50yrs No   2 or more relatives with breast AND/OR ovarian cancer No   2 or more relatives with breast AND/OR bowel cancer No                2025   STI Screening   New sexual partner(s) since last STI/HIV test? (!) YES      History of abnormal Pap smear:         Latest Ref Rng & Units 2/3/2023    10:50 AM 3/7/2019     2:48 PM 3/7/2019     2:27 PM   PAP / HPV   PAP  Negative for Intraepithelial Lesion or Malignancy (NILM)      PAP (Historical)   NIL     HPV 16 DNA Negative Negative   Negative    HPV 18 DNA Negative Negative   Negative    Other HR HPV Negative Negative   Negative      ASCVD Risk   The 10-year ASCVD risk score (Melodie MARCELINO, et al., 2019) is: 0.7%    Values used to calculate the score:      Age: 44 years      Sex: Female      Is Non- : No      Diabetic: No      Tobacco smoker: No      Systolic Blood Pressure: 118 mmHg      Is BP treated: No      HDL Cholesterol: 45 mg/dL      Total Cholesterol: 155 mg/dL       Reviewed and updated as needed this visit by Provider                    Review of Systems  Constitutional, HEENT, cardiovascular, pulmonary, gi and gu systems are negative, except as otherwise noted.     Objective    Exam  /78   Pulse 91   Temp 98  F (36.7  C) (Tympanic)   Resp 16   Ht 1.575 m (5' 2\")   Wt 67.4 kg (148 lb 9.6 oz)   LMP  (LMP Unknown)   SpO2 98%   BMI 27.18 kg/m     Estimated body mass index is 27.18 kg/m  as " "calculated from the following:    Height as of this encounter: 1.575 m (5' 2\").    Weight as of this encounter: 67.4 kg (148 lb 9.6 oz).    Physical Exam  GENERAL: alert and no distress  EYES: Eyes grossly normal to inspection, PERRL and conjunctivae and sclerae normal  HENT: ear canals and TM's normal, nose and mouth without ulcers or lesions  NECK: no adenopathy, no asymmetry, masses, or scars  RESP: lungs clear to auscultation - no rales, rhonchi or wheezes  BREAST: normal without masses, tenderness or nipple discharge, no palpable axillary masses or adenopathy, and well healed breast augmentation. No nipples.  CV: regular rate and rhythm, normal S1 S2, no S3 or S4, no murmur, click or rub, no peripheral edema  ABDOMEN: soft, nontender, no hepatosplenomegaly, no masses and bowel sounds normal  MS: no gross musculoskeletal defects noted, no edema  SKIN: no suspicious lesions or rashes  NEURO: Normal strength and tone, mentation intact and speech normal  PSYCH: mentation appears normal, affect normal/bright        Signed Electronically by: Jessi Concepcion MD    "

## 2025-05-27 NOTE — PATIENT INSTRUCTIONS
Patient Education   Preventive Care Advice   This is general advice given by our system to help you stay healthy. However, your care team may have specific advice just for you. Please talk to your care team about your preventive care needs.  Nutrition  Eat 5 or more servings of fruits and vegetables each day.  Try wheat bread, brown rice and whole grain pasta (instead of white bread, rice, and pasta).  Get enough calcium and vitamin D. Check the label on foods and aim for 100% of the RDA (recommended daily allowance).  Lifestyle  Exercise at least 150 minutes each week  (30 minutes a day, 5 days a week).  Do muscle strengthening activities 2 days a week. These help control your weight and prevent disease.  No smoking.  Wear sunscreen to prevent skin cancer.  Have a dental exam and cleaning every 6 months.  Yearly exams  See your health care team every year to talk about:  Any changes in your health.  Any medicines your care team has prescribed.  Preventive care, family planning, and ways to prevent chronic diseases.  Shots (vaccines)   HPV shots (up to age 26), if you've never had them before.  Hepatitis B shots (up to age 59), if you've never had them before.  COVID-19 shot: Get this shot when it's due.  Flu shot: Get a flu shot every year.  Tetanus shot: Get a tetanus shot every 10 years.  Pneumococcal, hepatitis A, and RSV shots: Ask your care team if you need these based on your risk.  Shingles shot (for age 50 and up)  General health tests  Diabetes screening:  Starting at age 35, Get screened for diabetes at least every 3 years.  If you are younger than age 35, ask your care team if you should be screened for diabetes.  Cholesterol test: At age 39, start having a cholesterol test every 5 years, or more often if advised.  Bone density scan (DEXA): At age 50, ask your care team if you should have this scan for osteoporosis (brittle bones).  Hepatitis C: Get tested at least once in your life.  STIs (sexually  transmitted infections)  Before age 24: Ask your care team if you should be screened for STIs.  After age 24: Get screened for STIs if you're at risk. You are at risk for STIs (including HIV) if:  You are sexually active with more than one person.  You don't use condoms every time.  You or a partner was diagnosed with a sexually transmitted infection.  If you are at risk for HIV, ask about PrEP medicine to prevent HIV.  Get tested for HIV at least once in your life, whether you are at risk for HIV or not.  Cancer screening tests  Cervical cancer screening: If you have a cervix, begin getting regular cervical cancer screening tests starting at age 21.  Breast cancer scan (mammogram): If you've ever had breasts, begin having regular mammograms starting at age 40. This is a scan to check for breast cancer.  Colon cancer screening: It is important to start screening for colon cancer at age 45.  Have a colonoscopy test every 10 years (or more often if you're at risk) Or, ask your provider about stool tests like a FIT test every year or Cologuard test every 3 years.  To learn more about your testing options, visit:   .  For help making a decision, visit:   https://bit.ly/zi65207.  Prostate cancer screening test: If you have a prostate, ask your care team if a prostate cancer screening test (PSA) at age 55 is right for you.  Lung cancer screening: If you are a current or former smoker ages 50 to 80, ask your care team if ongoing lung cancer screenings are right for you.  For informational purposes only. Not to replace the advice of your health care provider. Copyright   2023 [facility]. All rights reserved. Clinically reviewed by the [facility] Transitions [facility]. [employer] 520899 - REV 01/24.  Learning About Stress  What is stress?     Stress is your body's response to a hard situation. Your body can have a physical, emotional, or mental response. Stress is a fact of life for most people, and it  affects everyone differently. What causes stress for you may not be stressful for someone else.  A lot of things can cause stress. You may feel stress when you go on a job interview, take a test, or run a race. This kind of short-term stress is normal and even useful. It can help you if you need to work hard or react quickly. For example, stress can help you finish an important job on time.  Long-term stress is caused by ongoing stressful situations or events. Examples of long-term stress include long-term health problems, ongoing problems at work, or conflicts in your family. Long-term stress can harm your health.  How does stress affect your health?  When you are stressed, your body responds as though you are in danger. It makes hormones that speed up your heart, make you breathe faster, and give you a burst of energy. This is called the fight-or-flight stress response. If the stress is over quickly, your body goes back to normal and no harm is done.  But if stress happens too often or lasts too long, it can have bad effects. Long-term stress can make you more likely to get sick, and it can make symptoms of some diseases worse. If you tense up when you are stressed, you may develop neck, shoulder, or low back pain. Stress is linked to high blood pressure and heart disease.  Stress also harms your emotional health. It can make you garcia, tense, or depressed. Your relationships may suffer, and you may not do well at work or school.  What can you do to manage stress?  You can try these things to help manage stress:   Do something active. Exercise or activity can help reduce stress. Walking is a great way to get started. Even everyday activities such as housecleaning or yard work can help.  Try yoga or chang chi. These techniques combine exercise and meditation. You may need some training at first to learn them.  Do something you enjoy. For example, listen to music or go to a movie. Practice your hobby or do volunteer  "work.  Meditate. This can help you relax, because you are not worrying about what happened before or what may happen in the future.  Do guided imagery. Imagine yourself in any setting that helps you feel calm. You can use online videos, books, or a teacher to guide you.  Do breathing exercises. For example:  From a standing position, bend forward from the waist with your knees slightly bent. Let your arms dangle close to the floor.  Breathe in slowly and deeply as you return to a standing position. Roll up slowly and lift your head last.  Hold your breath for just a few seconds in the standing position.  Breathe out slowly and bend forward from the waist.  Let your feelings out. Talk, laugh, cry, and express anger when you need to. Talking with supportive friends or family, a counselor, or a shaquille leader about your feelings is a healthy way to relieve stress. Avoid discussing your feelings with people who make you feel worse.  Write. It may help to write about things that are bothering you. This helps you find out how much stress you feel and what is causing it. When you know this, you can find better ways to cope.  What can you do to prevent stress?  You might try some of these things to help prevent stress:  Manage your time. This helps you find time to do the things you want and need to do.  Get enough sleep. Your body recovers from the stresses of the day while you are sleeping.  Get support. Your family, friends, and community can make a difference in how you experience stress.  Limit your news feed. Avoid or limit time on social media or news that may make you feel stressed.  Do something active. Exercise or activity can help reduce stress. Walking is a great way to get started.  Where can you learn more?  Go to https://www.[employer].net/patiented  Enter N032 in the search box to learn more about \"Learning About Stress.\"  Current as of: October 24, 2024  Content Version: 14.4 2024-2025 [employer]   Care instructions adapted under license by your healthcare professional. If you have questions about a medical condition or this instruction, always ask your healthcare professional. [employer] disclaims any warranty or liability for your use of this information.    Learning About Depression Screening  What is depression screening?  Depression screening is a way to see if you have depression symptoms. It may be done by a doctor or counselor. It's often part of a routine checkup. That's because your mental health is just as important as your physical health.  Depression is a mental health condition that affects how you feel, think, and act. You may:  Have less energy.  Lose interest in your daily activities.  Feel sad and grouchy for a long time.  Depression is very common. It affects people of all ages.  Many things can lead to depression. Some people become depressed after they have a stroke or find out they have a major illness like cancer or heart disease. The death of a loved one or a breakup may lead to depression. It can run in families. Most experts believe that a combination of inherited genes and stressful life events can cause it.  What happens during screening?  You may be asked to fill out a form about your depression symptoms. You and the doctor will discuss your answers. The doctor may ask you more questions to learn more about how you think, act, and feel.  What happens after screening?  If you have symptoms of depression, your doctor will talk to you about your options.  Doctors usually treat depression with medicines or counseling. Often, combining the two works best. Many people don't get help because they think that they'll get over the depression on their own. But people with depression may not get better unless they get treatment.  The cause of depression is not well understood. There may be many factors involved. But if you have depression, it's not your fault.  A serious  "symptom of depression is thinking about death or suicide. If you or someone you care about talks about this or about feeling hopeless, get help right away.  It's important to know that depression can be treated. Medicine, counseling, and self-care may help.  Where can you learn more?  Go to https://www.[employer].net/patiented  Enter T185 in the search box to learn more about \"Learning About Depression Screening.\"  Current as of: July 31, 2024  Content Version: 14.4    0575-9389 [employer]   Care instructions adapted under license by your healthcare professional. If you have questions about a medical condition or this instruction, always ask your healthcare professional. [employer] disclaims any warranty or liability for your use of this information.    Substance Use Disorder: Care Instructions  Overview     You can improve your life and health by stopping your use of alcohol or drugs. When you don't drink or use drugs, you may feel and sleep better. You may get along better with your family, friends, and coworkers. There are medicines and programs that can help with substance use disorder.  How can you care for yourself at home?  Here are some ways to help you stay sober and prevent relapse.  If you have been given medicine to help keep you sober or reduce your cravings, be sure to take it exactly as prescribed.  Talk to your doctor about programs that can help you stop using drugs or drinking alcohol.  Do not keep alcohol or drugs in your home.  Plan ahead. Think about what you'll say if other people ask you to drink or use drugs. Try not to spend time with people who drink or use drugs.  Use the time and money spent on drinking or drugs to do something that's important to you.  Preventing a relapse  Have a plan to deal with relapse. Learn to recognize changes in your thinking that lead you to drink or use drugs. Get help before you start to drink or use drugs again.  Try to stay away from " situations, friends, or places that may lead you to drink or use drugs.  If you feel the need to drink alcohol or use drugs again, seek help right away. Call a trusted friend or family member. Some people get support from organizations such as Narcotics Anonymous or [employer] or from treatment facilities.  If you relapse, get help as soon as you can. Some people make a plan with another person that outlines what they want that person to do for them if they relapse. The plan usually includes how to handle the relapse and who to notify in case of relapse.  Don't give up. Remember that a relapse doesn't mean that you have failed. Use the experience to learn the triggers that lead you to drink or use drugs. Then quit again. Recovery is a lifelong process. Many people have several relapses before they are able to quit for good.  Follow-up care is a key part of your treatment and safety. Be sure to make and go to all appointments, and call your doctor if you are having problems. It's also a good idea to know your test results and keep a list of the medicines you take.  When should you call for help?   Call 121  anytime you think you may need emergency care. For example, call if you or someone else:    Has overdosed or has withdrawal signs. Be sure to tell the emergency workers that you are or someone else is using or trying to quit using drugs. Overdose or withdrawal signs may include:  Losing consciousness.  Seizure.  Seeing or hearing things that aren't there (hallucinations).     Is thinking or talking about suicide or harming others.   Where to get help 24 hours a day, 7 days a week   If you or someone you know talks about suicide, self-harm, a mental health crisis, a substance use crisis, or any other kind of emotional distress, get help right away. You can:    Call the Suicide and Crisis Lifeline at 948.     Call 6-570-774-TALK (1-695.444.3901).     Text HOME to 215445 to access the Crisis Text Line.   Consider  "saving these numbers in your phone.  Go to [employer] for more information or to chat online.  Call your doctor now or seek immediate medical care if:    You are having withdrawal symptoms. These may include nausea or vomiting, sweating, shakiness, and anxiety.   Watch closely for changes in your health, and be sure to contact your doctor if:    You have a relapse.     You need more help or support to stop.   Where can you learn more?  Go to https://www.[employer].net/patiented  Enter H573 in the search box to learn more about \"Substance Use Disorder: Care Instructions.\"  Current as of: August 20, 2024  Content Version: 14.4    6815-8483 [employer].   Care instructions adapted under license by your healthcare professional. If you have questions about a medical condition or this instruction, always ask your healthcare professional. [employer] disclaims any warranty or liability for your use of this information.    Safer Sex: Care Instructions  Overview  Safer sex is a way to reduce your risk of getting a sexually transmitted infection (STI). It can also help prevent pregnancy.  Several products can help you practice safer sex and reduce your chance of STIs. One of the best is a condom. There are internal and external condoms. You can use a special rubber sheet (dental dam) for protection during oral sex. Disposable gloves can keep your hands from touching blood, semen, or other body fluids that can carry infections.  Remember that birth control methods such as diaphragms, IUDs, foams, and birth control pills do not stop you from getting STIs.  Follow-up care is a key part of your treatment and safety. Be sure to make and go to all appointments, and call your doctor if you are having problems. It's also a good idea to know your test results and keep a list of the medicines you take.  How can you care for yourself at home?  Think about getting vaccinated to help prevent hepatitis A, " "hepatitis B, and human papillomavirus (HPV). They can be spread through sex.  Use a condom every time you have sex. Use an external condom, which goes on the penis. Or use an internal condom, which goes into the vagina or anus.  Make sure you use the right size external condom. A condom that's too small can break easily. A condom that's too big can slip off during sex.  Use a new condom each time you have sex. Be careful not to poke a hole in the condom when you open the wrapper.  Don't use an internal condom and an external condom at the same time.  Never use petroleum jelly (such as Vaseline), grease, hand lotion, baby oil, or anything with oil in it. These products can make holes in the condom.  After intercourse, hold the edge of the condom as you remove it. This will help keep semen from spilling out of the condom.  Do not have sex with anyone who has symptoms of an STI, such as sores on the genitals or mouth.  Do not drink a lot of alcohol or use drugs before sex.  Limit your sex partners. Sex with one partner who has sex only with you can reduce your risk of getting an STI.  Don't share sex toys. But if you do share them, use a condom and clean the sex toys between each use.  Talk to any partners before you have sex. Talk about what you feel comfortable with and whether you have any boundaries with sex. And find out if your partner or partners may be at risk for any STI. Keep in mind that a person may be able to spread an STI even if they do not have symptoms. You and any partners may want to get tested for STIs.  Where can you learn more?  Go to https://www.[employer].net/patiented  Enter B608 in the search box to learn more about \"Safer Sex: Care Instructions.\"  Current as of: April 30, 2024  Content Version: 14.4    8209-6205 [facility] [employer].   Care instructions adapted under license by your healthcare professional. If you have questions about a medical condition or this instruction, always ask your " healthcare professional. [facility] [employer] [facility] disclaims any warranty or liability for your use of this information.

## 2025-05-28 ENCOUNTER — RESULTS FOLLOW-UP (OUTPATIENT)
Dept: FAMILY MEDICINE | Facility: CLINIC | Age: 45
End: 2025-05-28

## 2025-05-30 ENCOUNTER — ONCOLOGY VISIT (OUTPATIENT)
Dept: ONCOLOGY | Facility: HOSPITAL | Age: 45
End: 2025-05-30
Attending: INTERNAL MEDICINE
Payer: COMMERCIAL

## 2025-05-30 VITALS
HEART RATE: 84 BPM | BODY MASS INDEX: 26.4 KG/M2 | TEMPERATURE: 98.7 F | SYSTOLIC BLOOD PRESSURE: 111 MMHG | HEIGHT: 63 IN | DIASTOLIC BLOOD PRESSURE: 74 MMHG | OXYGEN SATURATION: 100 % | WEIGHT: 149 LBS | RESPIRATION RATE: 16 BRPM

## 2025-05-30 DIAGNOSIS — Z17.0 MALIGNANT NEOPLASM OF CENTRAL PORTION OF LEFT BREAST IN FEMALE, ESTROGEN RECEPTOR POSITIVE (H): Primary | ICD-10-CM

## 2025-05-30 DIAGNOSIS — R63.5 WEIGHT GAIN: ICD-10-CM

## 2025-05-30 DIAGNOSIS — Z15.01 BRCA2 POSITIVE: ICD-10-CM

## 2025-05-30 DIAGNOSIS — Z15.09 BRCA2 POSITIVE: ICD-10-CM

## 2025-05-30 DIAGNOSIS — C50.112 MALIGNANT NEOPLASM OF CENTRAL PORTION OF LEFT BREAST IN FEMALE, ESTROGEN RECEPTOR POSITIVE (H): Primary | ICD-10-CM

## 2025-05-30 PROCEDURE — 99214 OFFICE O/P EST MOD 30 MIN: CPT | Performed by: INTERNAL MEDICINE

## 2025-05-30 PROCEDURE — G2211 COMPLEX E/M VISIT ADD ON: HCPCS | Performed by: INTERNAL MEDICINE

## 2025-05-30 ASSESSMENT — PAIN SCALES - GENERAL: PAINLEVEL_OUTOF10: NO PAIN (0)

## 2025-05-30 NOTE — PROGRESS NOTES
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      I offered her adjuvant chemotherapy for intermediate Oncotype DX score but she declined.  Started on tamoxifen and Zoladex.  Unfortunately developed significant side effects from Zoladex which was discontinued in October 2023.  Was on tamoxifen only.  In April of last year she underwent breast reconstruction along with bilateral salpingo-oophorectomy and hysterectomy.  Switched to letrozole in May 2024.     Overall doing well on letrozole.  She does have issues with hair loss and weight gain.      Plan  Malignant neoplasm of central portion of left breast in female, estrogen receptor positive (H)  Two years post-diagnosis and treatment, with majority of recurrences occurring within the first two years. Hormone receptor positive, continuing endocrine therapy to prevent recurrence.  Switch to every six-month follow-up. Consider MRI of the abdomen in 2027 for pancreatic cancer surveillance. Continue endocrine therapy with letrozole.  Risks and side effects: Weight gain and difficulty losing weight due to hormone blockers. Hot flashes and night sweats managed with venlafaxine.    BRCA2 positive  BRCA2 mutation requires ongoing surveillance for associated cancer risks, particularly pancreatic  cancer.  Colonoscopy every three years. MRI of the abdomen every two years, with the next one planned for 2027.    Weight gain  Weight gain associated with hormone blockers, particularly letrozole.  Maintain a calorie deficit diet and regular exercise to manage weight.        ECOG Performance    0 - Independent    Distress Screening (within last 30 days)    No data recorded     Pain  Pain Score: No Pain (0)    Problem List    Patient Active Problem List   Diagnosis    Hypercholesteremia    S/P LEEP of cervix    High risk human papillomavirus infection    Papanicolaou smear of cervix with low grade squamous intraepithelial lesion (LGSIL)    GEOVANY (generalized anxiety disorder)    BRCA2 positive    Malignant neoplasm of central portion of left breast in female, estrogen receptor positive (H)    Major depressive disorder, recurrent episode, in partial remission    H/O breast reconstruction    Snoring    Physical deconditioning    Menopausal syndrome (hot flashes)    Bilateral carpal tunnel syndrome    Erythromelalgia    Laxity of abdominal wall        ______________________________________________________________________________    History of Present Illness    Oncologist: Dr. Espinosa    DIAGNOSIS: Breast cancer, left breast, found on screening mammogram.  Patient did have some mild breast pain for 9 months prior.  18 mm mammogram, 22 mm on MRI.  -Biopsy showed low-grade invasive ductal carcinoma.  ER positive, IA positive, HER2/doug negative by FISH.  -BRCA2 mutation testing done and is positive      TREATMENT:   -5/31/23: Patient had bilateral mastectomy with left sentinel lymph node biopsy.  Path shows 2.3 cm invasive ductal carcinoma of the left breast, grade 1.  Margins negative.  She had some background DCIS.  The lymph node was negative.  Patient had Oncotype done and result was 17.  -Tamoxifen, started roughly June 2023.   -Zoladex monthly injections started July 2023.  Zoladex discontinued in October 2023 due to  significant side effects.    Underwent hysterectomy with bilateral salpingo-oophorectomy on 3/28/2024    Endocrine therapy switched to letrozole in May 2024    INTERIM HISTORY:   Currently on letrozole since May 2024.      She reports ongoing issues related to her breast cancer treatment.  Initially she was on tamoxifen and Zoladex.  Underwent bilateral salpingo-oophorectomy following which we switched her to letrozole.  She has been on letrozole for a year, which has led to hair thinning, though not in chunks, and she hopes it will stabilize soon.  Avis switched her venlafaxine intake to the evening due to concerns about coffee interference, which has helped with menopausal symptoms like hot flashes and night sweats. Night sweats have diminished significantly, but hot flashes persist intermittently. She uses fans to manage these symptoms.    Avis is experiencing difficulty with weight loss, attributing it to letrozole and menopause. Her BMI was recorded as 27, though she believes it should be lower due to a height discrepancy noted by her primary care physician. She is actively trying to maintain a calorie deficit diet and exercise regularly, despite challenges posed by the medication.     She had a CT scan in March, which showed no issues with her pancreas, and she undergoes colonoscopy every three years. Avis mentions a sensation of a weak muscle, initially suspected to be a hernia, and plans to start physical therapy to address it. She has had lymph nodes removed, which occasionally causes minor swelling in her hands. Avis is aware of the risk of cancer recurrence in scar tissue and lymph nodes, citing a case of recurrence in a public figure.    Avis is concerned about aging effects due to lack of estrogen, as her ovaries have been removed. She takes collagen and vitamin C, avoids smoking, and is mindful of her skincare routine to combat potential wrinkles. She experiences erythromelalgia, which causes her  "hands to feel hot, and manages it with cold therapy. Avis is scheduled for a dermatology appointment next month for her annual skin check.     Review of Systems    Pertinent items are noted in HPI.    Past History    Past Medical History:   Diagnosis Date    Anxiety 08/16/2010    BRCA2 positive     Breast cancer (H) 05/19/2023    Depression     Depressive disorder     HSIL (high grade squamous intraepithelial lesion) on Pap smear of cervix 05/05/2015    LSIL/+ HR HPV    Hypercholesteremia 08/16/2010    Hypertension     Obesity 08/16/2010    S/P breast reconstruction, bilateral     TOBACCO ABUSE-CONTINUOUS        PHYSICAL EXAM  /74 (BP Location: Right arm, Patient Position: Sitting, Cuff Size: Adult Regular)   Pulse 84   Temp 98.7  F (37.1  C) (Oral)   Resp 16   Ht 1.6 m (5' 3\")   Wt 67.6 kg (149 lb)   LMP  (LMP Unknown)   SpO2 100%   BMI 26.39 kg/m      GENERAL: Alert, cooperative, in no distress  LNs: no evidence of bilateral axillary adenopathy  Extremities: Has significant redness in bilateral palms most consistent with erythromelalgia  CNS: Alert and oriented x 3    Lab Results    Recent Results (from the past week)   Lipid panel reflex to direct LDL Fasting   Result Value Ref Range    Cholesterol 183 <200 mg/dL    Triglycerides 62 <150 mg/dL    Direct Measure HDL 47 (L) >=50 mg/dL    LDL Cholesterol Calculated 124 (H) <100 mg/dL    Non HDL Cholesterol 136 (H) <130 mg/dL    Patient Fasting > 8hrs? Yes    CBC with platelets   Result Value Ref Range    WBC Count 6.1 4.0 - 11.0 10e3/uL    RBC Count 4.54 3.80 - 5.20 10e6/uL    Hemoglobin 13.4 11.7 - 15.7 g/dL    Hematocrit 39.3 35.0 - 47.0 %    MCV 87 78 - 100 fL    MCH 29.5 26.5 - 33.0 pg    MCHC 34.1 31.5 - 36.5 g/dL    RDW 12.6 10.0 - 15.0 %    Platelet Count 271 150 - 450 10e3/uL   Comprehensive metabolic panel (BMP + Alb, Alk Phos, ALT, AST, Total. Bili, TP)   Result Value Ref Range    Sodium 145 135 - 145 mmol/L    Potassium 4.8 3.4 - 5.3 mmol/L "    Carbon Dioxide (CO2) 29 22 - 29 mmol/L    Anion Gap 11 7 - 15 mmol/L    Urea Nitrogen 13.0 6.0 - 20.0 mg/dL    Creatinine 0.71 0.51 - 0.95 mg/dL    GFR Estimate >90 >60 mL/min/1.73m2    Calcium 9.5 8.8 - 10.4 mg/dL    Chloride 105 98 - 107 mmol/L    Glucose 98 70 - 99 mg/dL    Alkaline Phosphatase 111 40 - 150 U/L    AST 26 0 - 45 U/L    ALT 24 0 - 50 U/L    Protein Total 7.1 6.4 - 8.3 g/dL    Albumin 4.3 3.5 - 5.2 g/dL    Bilirubin Total 0.4 <=1.2 mg/dL    Patient Fasting > 8hrs? Yes          Imaging    No results found.    The longitudinal plan of care for the diagnosis(es)/condition(s) as documented were addressed during this visit. Due to the added complexity in care, I will continue to support Avis in the subsequent management and with ongoing continuity of care.        Signed by: Candida Espinosa MD

## 2025-05-30 NOTE — Clinical Note
"5/30/2025      Zainab Bolton  [address] [address] [address]      Dear Colleague,    Thank you for referring your patient, Zainab Bolton, to the [facility]. Please see a copy of my visit note below.    Oncology Rooming Note    May 30, 2025 9:29 AM   Zainab Bolton is a 44 year old female who presents for:    Chief Complaint   Patient presents with    Oncology Clinic Visit     4 month follow up      Initial Vitals: LMP  (LMP Unknown)  Estimated body mass index is 27.18 kg/m  as calculated from the following:    Height as of 5/27/25: 1.575 m (5' 2\").    Weight as of 5/27/25: 67.4 kg (148 lb 9.6 oz). There is no height or weight on file to calculate BSA.  Data Unavailable Comment: Data Unavailable   No LMP recorded (lmp unknown). Patient has had a hysterectomy.  Allergies reviewed: Yes  Medications reviewed: Yes    Medications: Medication refills not needed today.  Pharmacy name entered into EPIC:    [place], [place] - [address] [facility] [place] [address]    Frailty Screening:   Is the patient here for a new oncology consult visit in cancer care? 2. No    PHQ9:  Did this patient require a PHQ9?: No      Clinical concerns: none       Yanci Jurado, [facility] Hematology and Oncology Progress Note    Patient: Zainab Bolton  MRN: [id number]  Date of Service: 09/06/2023        Reason for Visit    Chief Complaint   Patient presents with     Oncology Clinic Visit     4 month follow up        Assessment and Plan     Cancer Staging   Malignant neoplasm of central portion of left breast in female, estrogen receptor positive (H)  Staging form: Breast, AJCC 8th Edition  - Pathologic: Stage IA (pT2, pN0, cM0, G1, ER+, KY+, HER2-, Oncotype DX score: 17) - Signed by Candida Espinosa MD on 10/18/2023    Breast cancer, ER/KY " positive, Her2 negative, left side, oncotype 17, T2N0M0, BRCA positive: s/p bilateral mastectomy.      I offered her adjuvant chemotherapy for intermediate Oncotype DX score but she declined.  Started on tamoxifen and Zoladex.  Unfortunately developed significant side effects from Zoladex which was discontinued in October 2023.  Was on tamoxifen only.  In April of last year she underwent breast reconstruction along with bilateral salpingo-oophorectomy and hysterectomy.  Switched to letrozole in May 2024.     Overall doing well on letrozole.  No significant side effects.  This does have some hot flashes which is probably secondary to that.  Also has had some hair loss which again probably due to endocrine therapy and lack of estrogen.  I discussed switching it to exemestane to see if that helps but there is no guarantee that it will be anything different.  She wants to stick with letrozole for now.      She does have significant redness of her palms and associated tingling.  This looks typical for erythromelalgia.  She has had this for a long time now.  Appears to be idiopathic.  Recommended trying low-dose aspirin if she is having burning sensation.    Due to her history of BRCA2 mutation we will probably consider surveillance for pancreatic cancer going forward.  I am thinking about either doing an EUS or MRI of the abdomen every 2 years or so.    She also recently had a colonoscopy with removal of couple of polyps.  She also had focal high-grade dysplasia in one of the adenomas.    She had a DEXA scan done recently which showed normal bone density.  Continue vitamin D and calcium supplementation.        ECOG Performance    0 - Independent    Distress Screening (within last 30 days)    No data recorded     Pain  Pain Score: No Pain (0)    Problem List    Patient Active Problem List   Diagnosis     Hypercholesteremia     S/P LEEP of cervix     High risk human papillomavirus infection     Papanicolaou smear of cervix  with low grade squamous intraepithelial lesion (LGSIL)     GEOVANY (generalized anxiety disorder)     BRCA2 positive     Malignant neoplasm of central portion of left breast in female, estrogen receptor positive (H)     Major depressive disorder, recurrent episode, in partial remission     H/O breast reconstruction     Snoring     Physical deconditioning     Menopausal syndrome (hot flashes)     Bilateral carpal tunnel syndrome     Erythromelalgia     Laxity of abdominal wall        ______________________________________________________________________________    History of Present Illness    Oncologist: Dr. Espinosa    DIAGNOSIS: Breast cancer, left breast, found on screening mammogram.  Patient did have some mild breast pain for 9 months prior.  18 mm mammogram, 22 mm on MRI.  -Biopsy showed low-grade invasive ductal carcinoma.  ER positive, KY positive, HER2/doug negative by FISH.  -BRCA2 mutation testing done and is positive      TREATMENT:   -5/31/23: Patient had bilateral mastectomy with left sentinel lymph node biopsy.  Path shows 2.3 cm invasive ductal carcinoma of the left breast, grade 1.  Margins negative.  She had some background DCIS.  The lymph node was negative.  Patient had Oncotype done and result was 17.  -Tamoxifen, started roughly June 2023.   -Zoladex monthly injections started July 2023.  Zoladex discontinued in October 2023 due to significant side effects.    Underwent hysterectomy with bilateral salpingo-oophorectomy on 3/28/2024    Endocrine therapy switched to letrozole in May 2024    INTERIM HISTORY:   Currently on letrozole since May 2024.  Doing fairly well.  Does have some hot flashes.  Has noticed more hair loss.  Also has significant tingling in her hands and feet and redness especially in her palms.  She has had this for a long time now.  No new lumps or bumps reported.    Review of Systems    Pertinent items are noted in HPI.    Past History    Past Medical History:   Diagnosis Date      "Anxiety 08/16/2010     BRCA2 positive      Breast cancer (H) 05/19/2023     Depression      Depressive disorder      HSIL (high grade squamous intraepithelial lesion) on Pap smear of cervix 05/05/2015    LSIL/+ HR HPV     Hypercholesteremia 08/16/2010     Hypertension      Obesity 08/16/2010     S/P breast reconstruction, bilateral      TOBACCO ABUSE-CONTINUOUS        PHYSICAL EXAM  /74 (BP Location: Right arm, Patient Position: Sitting, Cuff Size: Adult Regular)   Pulse 84   Temp 98.7  F (37.1  C) (Oral)   Resp 16   Ht 1.6 m (5' 3\")   Wt 67.6 kg (149 lb)   LMP  (LMP Unknown)   SpO2 100%   BMI 26.39 kg/m      GENERAL: Alert, cooperative, in no distress  LNs: no evidence of bilateral axillary adenopathy  Extremities: Has significant redness in bilateral palms most consistent with erythromelalgia  CNS: Alert and oriented x 3    Lab Results    Recent Results (from the past week)   Lipid panel reflex to direct LDL Fasting   Result Value Ref Range    Cholesterol 183 <200 mg/dL    Triglycerides 62 <150 mg/dL    Direct Measure HDL 47 (L) >=50 mg/dL    LDL Cholesterol Calculated 124 (H) <100 mg/dL    Non HDL Cholesterol 136 (H) <130 mg/dL    Patient Fasting > 8hrs? Yes    CBC with platelets   Result Value Ref Range    WBC Count 6.1 4.0 - 11.0 10e3/uL    RBC Count 4.54 3.80 - 5.20 10e6/uL    Hemoglobin 13.4 11.7 - 15.7 g/dL    Hematocrit 39.3 35.0 - 47.0 %    MCV 87 78 - 100 fL    MCH 29.5 26.5 - 33.0 pg    MCHC 34.1 31.5 - 36.5 g/dL    RDW 12.6 10.0 - 15.0 %    Platelet Count 271 150 - 450 10e3/uL   Comprehensive metabolic panel (BMP + Alb, Alk Phos, ALT, AST, Total. Bili, TP)   Result Value Ref Range    Sodium 145 135 - 145 mmol/L    Potassium 4.8 3.4 - 5.3 mmol/L    Carbon Dioxide (CO2) 29 22 - 29 mmol/L    Anion Gap 11 7 - 15 mmol/L    Urea Nitrogen 13.0 6.0 - 20.0 mg/dL    Creatinine 0.71 0.51 - 0.95 mg/dL    GFR Estimate >90 >60 mL/min/1.73m2    Calcium 9.5 8.8 - 10.4 mg/dL    Chloride 105 98 - 107 mmol/L "    Glucose 98 70 - 99 mg/dL    Alkaline Phosphatase 111 40 - 150 U/L    AST 26 0 - 45 U/L    ALT 24 0 - 50 U/L    Protein Total 7.1 6.4 - 8.3 g/dL    Albumin 4.3 3.5 - 5.2 g/dL    Bilirubin Total 0.4 <=1.2 mg/dL    Patient Fasting > 8hrs? Yes          Imaging    No results found.    A total of 30 min was spent today on this visit which included face to face conversation with the patient, EMR review, counseling and co-ordination of care and medical documentation.    The longitudinal plan of care for the diagnosis(es)/condition(s) as documented were addressed during this visit. Due to the added complexity in care, I will continue to support Avis in the subsequent management and with ongoing continuity of care.    Signed by: Candida Espinosa MD      Again, thank you for allowing me to participate in the care of your patient.        Sincerely,        Candida Espinosa MD    Electronically signed

## 2025-05-30 NOTE — PROGRESS NOTES
"Oncology Rooming Note    May 30, 2025 9:29 AM   Zainab Bolton is a 44 year old female who presents for:    Chief Complaint   Patient presents with    Oncology Clinic Visit     4 month follow up      Initial Vitals: LMP  (LMP Unknown)  Estimated body mass index is 27.18 kg/m  as calculated from the following:    Height as of 5/27/25: 1.575 m (5' 2\").    Weight as of 5/27/25: 67.4 kg (148 lb 9.6 oz). There is no height or weight on file to calculate BSA.  Data Unavailable Comment: Data Unavailable   No LMP recorded (lmp unknown). Patient has had a hysterectomy.  Allergies reviewed: Yes  Medications reviewed: Yes    Medications: Medication refills not needed today.  Pharmacy name entered into EPIC:    [facility] [place], [place] - [address] [facility] [place] [place] - [address] [facility]    Frailty Screening:   Is the patient here for a new oncology consult visit in cancer care? 2. No    PHQ9:  Did this patient require a PHQ9?: No      Clinical concerns: none       Yanci Jurado CMA            "

## 2025-06-11 ENCOUNTER — OFFICE VISIT (OUTPATIENT)
Dept: DERMATOLOGY | Facility: CLINIC | Age: 45
End: 2025-06-11
Payer: COMMERCIAL

## 2025-06-11 DIAGNOSIS — L60.3 DYSTROPHIC NAIL: ICD-10-CM

## 2025-06-11 DIAGNOSIS — D22.9 MULTIPLE BENIGN NEVI: ICD-10-CM

## 2025-06-11 DIAGNOSIS — L82.1 SEBORRHEIC KERATOSIS: Primary | ICD-10-CM

## 2025-06-11 DIAGNOSIS — D18.01 CHERRY ANGIOMA: ICD-10-CM

## 2025-06-11 DIAGNOSIS — Z15.01 BRCA2 POSITIVE: ICD-10-CM

## 2025-06-11 DIAGNOSIS — Z15.09 BRCA2 POSITIVE: ICD-10-CM

## 2025-06-11 DIAGNOSIS — L81.4 LENTIGO: ICD-10-CM

## 2025-06-11 DIAGNOSIS — D23.9 DERMATOFIBROMA: ICD-10-CM

## 2025-06-11 PROCEDURE — 99213 OFFICE O/P EST LOW 20 MIN: CPT | Performed by: PHYSICIAN ASSISTANT

## 2025-06-11 PROCEDURE — G2211 COMPLEX E/M VISIT ADD ON: HCPCS | Performed by: PHYSICIAN ASSISTANT

## 2025-06-11 PROCEDURE — 87101 SKIN FUNGI CULTURE: CPT | Performed by: PHYSICIAN ASSISTANT

## 2025-06-11 NOTE — PROGRESS NOTES
Zainab Bolton is a pleasant 44 year old year old female patient here today for skin check. She denies any painful or bleeding skin lesions. No changing nevi. She notes more firm bumps on arm, legs. She also notes dystrophic toenail, she believes it is due to trauma.  Patient has no other skin complaints today.  Remainder of the HPI, Meds, PMH, Allergies, FH, and SH was reviewed in chart.    Pertinent Hx:   BRCA2 positive   Past Medical History:   Diagnosis Date    Anxiety 08/16/2010    BRCA2 positive     Breast cancer (H) 05/19/2023    Depression     Depressive disorder     HSIL (high grade squamous intraepithelial lesion) on Pap smear of cervix 05/05/2015    LSIL/+ HR HPV    Hypercholesteremia 08/16/2010    Hypertension     Obesity 08/16/2010    S/P breast reconstruction, bilateral     TOBACCO ABUSE-CONTINUOUS        Past Surgical History:   Procedure Laterality Date    BIOPSY NODE SENTINEL Left 05/31/2023    Procedure: LEFT SENTINEL LYMPH NODE BIOPSY;  Surgeon: Kylee Villasenor MD;  Location: [facility] OR    COLONOSCOPY N/A 10/23/2024    Procedure: COLONOSCOPY, Biopsy x2;  Surgeon: Jamaal Brooke DO;  Location: [facility] OR    CONIZATION LEEP N/A 07/16/2015    Procedure: CONIZATION LEEP;  Surgeon: Omayra Cunningham DO;  Location:  OR    EYE SURGERY  1/5/2016    GRAFT FAT TO BREAST Bilateral 3/28/2024    Procedure: Bilateral breast fat grafting from flanks abdomen and thighs;  Surgeon: MARIA E Aaron MD;  Location:  OR    GRAFT FREE VASCULARIZED TRANSVERSE RECTUS ABDOMINIS MYOCUTANEOUS Bilateral 09/25/2023    Procedure: Bilateral breast reconstruction with free abdominal flaps, resensation, SPY;  Surgeon: MARIA E Aaron MD;  Location: [facility] OR    LAPAROSCOPIC HYSTERECTOMY TOTAL, BILATERAL SALPINGO-OOPHORECTOMY, COMBINED Bilateral 3/28/2024    Procedure: Total laparoscopic hysterectomy, bilateral salpingo-oophorectomy, Cystoscopy;  Surgeon: Marco Watkins MD;  Location:  OR     LAPAROSCOPIC TUBAL LIGATION  10/03/2003    MASTECTOMY SIMPLE Bilateral 2023    Procedure: BILATERAL MASTECTOMIES;  Surgeon: Kylee Villasenor MD;  Location: [facility] OR    RECONSTRUCT BREAST, INSERT TISSUE EXPANDER, COMBINED Bilateral 2023    Procedure: RECONSTRUCTION, BREAST, BILATERAL TISSUE EXPANDERS;  Surgeon: Basil Aguilar MD;  Location: [facility] OR    REMOVE TISSUE EXPANDER TRUNK Bilateral 2023    Procedure: REMOVAL, TISSUE EXPANDER, BILATERAL BREAST;  Surgeon: Basil Aguilar MD;  Location: [facility] OR    REVISE RECONSTRUCTED BREAST BILATERAL Bilateral 3/28/2024    Procedure: bilateral revision of breasts, umbilicoplasty, scar revision of abdomen;  Surgeon: MARIA E Aaron MD;  Location:  OR        Family History   Problem Relation Age of Onset    Liver Cancer Father     Stomach Cancer Father     Other Cancer Father         Stomach and liver cancer diagnosis    Arthritis Maternal Grandmother     Hypertension Maternal Grandmother     Diabetes Maternal Grandmother     Thyroid Disease Maternal Grandmother     Leukemia Maternal Grandfather     Heart Disease Maternal Aunt         MI approx age 46    Anesthesia Reaction No family hx of     Clotting Disorder No family hx of        Social History     Socioeconomic History    Marital status: Single     Spouse name: Not on file    Number of children: 3    Years of education: 12    Highest education level: Not on file   Occupational History    Occupation: Stay at home Mother   Tobacco Use    Smoking status: Former     Current packs/day: 0.00     Average packs/day: 1 pack/day for 10.0 years (10.0 ttl pk-yrs)     Types: Cigarettes     Start date: 3/28/2013     Quit date: 2023     Years since quittin.1     Passive exposure: Past    Smokeless tobacco: Never   Vaping Use    Vaping status: Never Used   Substance and Sexual Activity    Alcohol use: Not Currently    Drug use: Yes     Types: Marijuana     Comment: daily smoking     Sexual activity: Yes     Partners: Male     Birth control/protection: Female Surgical   Other Topics Concern     Service No    Blood Transfusions No    Caffeine Concern Yes     Comment: 3 cups every 2 weeks, 2 pops per week     Occupational Exposure No    Hobby Hazards No    Sleep Concern No    Stress Concern Yes    Weight Concern Yes    Special Diet No    Back Care No    Exercise No    Bike Helmet No    Seat Belt Yes    Self-Exams No    Parent/sibling w/ CABG, MI or angioplasty before 65F 55M? No   Social History Narrative    Not on file     Social Drivers of Health     Financial Resource Strain: Low Risk  (5/20/2025)    Financial Resource Strain     Within the past 12 months, have you or your family members you live with been unable to get utilities (heat, electricity) when it was really needed?: No   Food Insecurity: Low Risk  (5/20/2025)    Food Insecurity     Within the past 12 months, did you worry that your food would run out before you got money to buy more?: No     Within the past 12 months, did the food you bought just not last and you didn t have money to get more?: No   Transportation Needs: Low Risk  (5/20/2025)    Transportation Needs     Within the past 12 months, has lack of transportation kept you from medical appointments, getting your medicines, non-medical meetings or appointments, work, or from getting things that you need?: No   Physical Activity: Sufficiently Active (5/20/2025)    Exercise Vital Sign     Days of Exercise per Week: 5 days     Minutes of Exercise per Session: 30 min   Stress: Stress Concern Present (5/20/2025)    [employer] [place] [employer] - Occupational Stress Questionnaire     Feeling of Stress : Rather much   Social Connections: Unknown (5/20/2025)    Social Connection and Isolation Panel [NHANES]     Frequency of Communication with Friends and Family: Not on file     Frequency of Social Gatherings with Friends and Family: Once a week     Attends  Restorationist Services: Not on file     Active Member of Clubs or Organizations: Not on file     Attends Club or Organization Meetings: Not on file     Marital Status: Not on file   Interpersonal Safety: Low Risk  (5/27/2025)    Interpersonal Safety     Do you feel physically and emotionally safe where you currently live?: Yes     Within the past 12 months, have you been hit, slapped, kicked or otherwise physically hurt by someone?: No     Within the past 12 months, have you been humiliated or emotionally abused in other ways by your partner or ex-partner?: No   Housing Stability: Low Risk  (5/20/2025)    Housing Stability     Do you have housing? : Yes     Are you worried about losing your housing?: No       Outpatient Encounter Medications as of 6/11/2025   Medication Sig Dispense Refill    acetaminophen (TYLENOL) 500 MG tablet Take 2 tablets (1,000 mg) by mouth 3 times daily 120 tablet 0    busPIRone (BUSPAR) 10 MG tablet Take 1 tablet (10 mg) by mouth 2 times daily. 180 tablet 3    calcium carbonate (OS-SAM) 1500 (600 Ca) MG tablet Take 600 mg by mouth 2 times daily      Collagen-Vitamin C (COLLAGEN PLUS VITAMIN C PO) daily.      Cyanocobalamin 500 MCG TBDP Take 2 Gum by mouth every evening      hydrOXYzine HCl (ATARAX) 10 MG tablet TAKE ONE (1) TABLET BY MOUTH THREE (3) TIMES DAILY AS NEEDED FOR ANXIETY 90 tablet 2    ibuprofen (ADVIL/MOTRIN) 400 MG tablet Take 400 mg by mouth as needed for moderate pain      lactobacillus rhamnosus, GG, (CULTURELL) capsule Take 1 capsule by mouth every evening      letrozole (FEMARA) 2.5 MG tablet Take 1 tablet (2.5 mg) by mouth daily. 90 tablet 0    senna-docusate (SENOKOT-S/PERICOLACE) 8.6-50 MG tablet Take 1-2 tablets by mouth 2 times daily 30 tablet 0    venlafaxine (EFFEXOR XR) 150 MG 24 hr capsule Take 1 capsule (150 mg) by mouth daily. 90 capsule 3    vitamin B complex with vitamin C (VITAMIN  B COMPLEX) tablet Take 1 tablet by mouth every evening      vitamin C (ASCORBIC  ACID) 1000 MG TABS Take 1,000 mg by mouth every evening Also contains 20mg zinc      Vitamin D-Vitamin K (VITAMIN K2-VITAMIN D3 PO)       Vitamin D3 (CHOLECALCIFEROL) 25 mcg (1000 units) tablet Take 25 mcg by mouth every evening      zinc gluconate 50 MG tablet Take 50 mg by mouth every evening       No facility-administered encounter medications on file as of 6/11/2025.             O:   NAD, WDWN, Alert & Oriented, Mood & Affect wnl, Vitals stable   Here today alone   LMP  (LMP Unknown)    General appearance normal   Vitals stable   Alert, oriented and in no acute distress     Stuck on papules and brown macules on trunk and ext   Red papules on trunk  Brown papules and macules with regular pigment network and borders on torso and extremities   Dystrophic toenail on second foot, 2nd toe   The remainder of skin exam is normal     Eyes: Conjunctivae/lids:Normal     ENT: Lips: normal    MSK:Normal    Cardiovascular: peripheral edema none    Pulm: Breathing Normal    Neuro/Psych: Orientation:Alert and Orientedx3 ; Mood/Affect:normal     A/P:  Dystrophic toenail on left foot 2nd toe   Cultured to r/o fungus, if negative recommend to see podiatry to see if nail removal would be helpful.   2. Seborrheic keratosis, lentigo, angioma, benign nevi, dermatofibroma   It was a pleasure speaking to Zainab Bolton today.  BENIGN LESIONS DISCUSSED WITH PATIENT:  I discussed the specifics of tumor, prognosis, and genetics of benign lesions.  I explained that treatment of these lesions would be purely cosmetic and not medically neccessary.  I discussed with patient different removal options including excision, cautery and /or laser.      Nature and genetics of benign skin lesions dicussed with patient.  Signs and Symptoms of skin cancer discussed with patient.  ABCDEs of melanoma reviewed with patient.  Patient encouraged to perform monthly skin exams.  UV precautions reviewed with patient.  Risks of non-melanoma skin cancer  discussed with patient   Return to clinic in one year or sooner if needed.

## 2025-06-11 NOTE — LETTER
6/11/2025      Zainab Bolton  [address] [facility] [address]      Dear Colleague,    Thank you for referring your patient, Zainab Bolton, to the [facility]. Please see a copy of my visit note below.    Zainab Bolton is a pleasant 44 year old year old female patient here today for skin check. She denies any painful or bleeding skin lesions. No changing nevi. She notes more firm bumps on arm, legs. She also notes dystrophic toenail, she believes it is due to trauma.  Patient has no other skin complaints today.  Remainder of the HPI, Meds, PMH, Allergies, FH, and SH was reviewed in chart.    Pertinent Hx:   BRCA2 positive   Past Medical History:   Diagnosis Date     Anxiety 08/16/2010     BRCA2 positive      Breast cancer (H) 05/19/2023     Depression      Depressive disorder      HSIL (high grade squamous intraepithelial lesion) on Pap smear of cervix 05/05/2015    LSIL/+ HR HPV     Hypercholesteremia 08/16/2010     Hypertension      Obesity 08/16/2010     S/P breast reconstruction, bilateral      TOBACCO ABUSE-CONTINUOUS        Past Surgical History:   Procedure Laterality Date     BIOPSY NODE SENTINEL Left 05/31/2023    Procedure: LEFT SENTINEL LYMPH NODE BIOPSY;  Surgeon: Kylee Villasenor MD;  Location: [facility] OR     COLONOSCOPY N/A 10/23/2024    Procedure: COLONOSCOPY, Biopsy x2;  Surgeon: Jamaal Brooke DO;  Location: [facility] OR     CONIZATION LEEP N/A 07/16/2015    Procedure: CONIZATION LEEP;  Surgeon: Omayra Cunningham DO;  Location:  OR     EYE SURGERY  1/5/2016     GRAFT FAT TO BREAST Bilateral 3/28/2024    Procedure: Bilateral breast fat grafting from flanks abdomen and thighs;  Surgeon: MARIA E Aaron MD;  Location: [facility] OR     GRAFT FREE VASCULARIZED TRANSVERSE RECTUS ABDOMINIS MYOCUTANEOUS Bilateral 09/25/2023    Procedure: Bilateral breast reconstruction with free abdominal flaps, resensation, SPY;  Surgeon: MARIA E Aaron MD;   Location: [facility] OR     LAPAROSCOPIC HYSTERECTOMY TOTAL, BILATERAL SALPINGO-OOPHORECTOMY, COMBINED Bilateral 3/28/2024    Procedure: Total laparoscopic hysterectomy, bilateral salpingo-oophorectomy, Cystoscopy;  Surgeon: Marco Watkins MD;  Location: [facility] OR     LAPAROSCOPIC TUBAL LIGATION  10/03/2003     MASTECTOMY SIMPLE Bilateral 05/31/2023    Procedure: BILATERAL MASTECTOMIES;  Surgeon: Kylee Villasenor MD;  Location: [facility]     RECONSTRUCT BREAST, INSERT TISSUE EXPANDER, COMBINED Bilateral 05/31/2023    Procedure: RECONSTRUCTION, BREAST, BILATERAL TISSUE EXPANDERS;  Surgeon: Basil Aguilar MD;  Location: [facility] OR     REMOVE TISSUE EXPANDER TRUNK Bilateral 08/25/2023    Procedure: REMOVAL, TISSUE EXPANDER, BILATERAL BREAST;  Surgeon: Basil Aguilar MD;  Location: [facility] OR     REVISE RECONSTRUCTED BREAST BILATERAL Bilateral 3/28/2024    Procedure: bilateral revision of breasts, umbilicoplasty, scar revision of abdomen;  Surgeon: MARIA E Aaron MD;  Location: [facility] OR        Family History   Problem Relation Age of Onset     Liver Cancer Father      Stomach Cancer Father      Other Cancer Father         Stomach and liver cancer diagnosis     Arthritis Maternal Grandmother      Hypertension Maternal Grandmother      Diabetes Maternal Grandmother      Thyroid Disease Maternal Grandmother      Leukemia Maternal Grandfather      Heart Disease Maternal Aunt         MI approx age 46     Anesthesia Reaction No family hx of      Clotting Disorder No family hx of        Social History     Socioeconomic History     Marital status: Single     Spouse name: Not on file     Number of children: 3     Years of education: 12     Highest education level: Not on file   Occupational History     Occupation: Stay at home Mother   Tobacco Use     Smoking status: Former     Current packs/day: 0.00     Average packs/day: 1 pack/day for 10.0 years (10.0 ttl pk-yrs)     Types: Cigarettes     Start date:  3/28/2013     Quit date: 2023     Years since quittin.1     Passive exposure: Past     Smokeless tobacco: Never   Vaping Use     Vaping status: Never Used   Substance and Sexual Activity     Alcohol use: Not Currently     Drug use: Yes     Types: Marijuana     Comment: daily smoking     Sexual activity: Yes     Partners: Male     Birth control/protection: Female Surgical   Other Topics Concern      Service No     Blood Transfusions No     Caffeine Concern Yes     Comment: 3 cups every 2 weeks, 2 pops per week      Occupational Exposure No     Hobby Hazards No     Sleep Concern No     Stress Concern Yes     Weight Concern Yes     Special Diet No     Back Care No     Exercise No     Bike Helmet No     Seat Belt Yes     Self-Exams No     Parent/sibling w/ CABG, MI or angioplasty before 65F 55M? No   Social History Narrative     Not on file     Social Drivers of Health     Financial Resource Strain: Low Risk  (2025)    Financial Resource Strain      Within the past 12 months, have you or your family members you live with been unable to get utilities (heat, electricity) when it was really needed?: No   Food Insecurity: Low Risk  (2025)    Food Insecurity      Within the past 12 months, did you worry that your food would run out before you got money to buy more?: No      Within the past 12 months, did the food you bought just not last and you didn t have money to get more?: No   Transportation Needs: Low Risk  (2025)    Transportation Needs      Within the past 12 months, has lack of transportation kept you from medical appointments, getting your medicines, non-medical meetings or appointments, work, or from getting things that you need?: No   Physical Activity: Sufficiently Active (2025)    Exercise Vital Sign      Days of Exercise per Week: 5 days      Minutes of Exercise per Session: 30 min   Stress: Stress Concern Present (2025)    [employer] [place] [employer] -  Occupational Stress Questionnaire      Feeling of Stress : Rather much   Social Connections: Unknown (5/20/2025)    Social Connection and Isolation Panel [NHANES]      Frequency of Communication with Friends and Family: Not on file      Frequency of Social Gatherings with Friends and Family: Once a week      Attends Worship Services: Not on file      Active Member of Clubs or Organizations: Not on file      Attends Club or Organization Meetings: Not on file      Marital Status: Not on file   Interpersonal Safety: Low Risk  (5/27/2025)    Interpersonal Safety      Do you feel physically and emotionally safe where you currently live?: Yes      Within the past 12 months, have you been hit, slapped, kicked or otherwise physically hurt by someone?: No      Within the past 12 months, have you been humiliated or emotionally abused in other ways by your partner or ex-partner?: No   Housing Stability: Low Risk  (5/20/2025)    Housing Stability      Do you have housing? : Yes      Are you worried about losing your housing?: No       Outpatient Encounter Medications as of 6/11/2025   Medication Sig Dispense Refill     acetaminophen (TYLENOL) 500 MG tablet Take 2 tablets (1,000 mg) by mouth 3 times daily 120 tablet 0     busPIRone (BUSPAR) 10 MG tablet Take 1 tablet (10 mg) by mouth 2 times daily. 180 tablet 3     calcium carbonate (OS-SAM) 1500 (600 Ca) MG tablet Take 600 mg by mouth 2 times daily       Collagen-Vitamin C (COLLAGEN PLUS VITAMIN C PO) daily.       Cyanocobalamin 500 MCG TBDP Take 2 Gum by mouth every evening       hydrOXYzine HCl (ATARAX) 10 MG tablet TAKE ONE (1) TABLET BY MOUTH THREE (3) TIMES DAILY AS NEEDED FOR ANXIETY 90 tablet 2     ibuprofen (ADVIL/MOTRIN) 400 MG tablet Take 400 mg by mouth as needed for moderate pain       lactobacillus rhamnosus, GG, (CULTURELL) capsule Take 1 capsule by mouth every evening       letrozole (FEMARA) 2.5 MG tablet Take 1 tablet (2.5 mg) by mouth daily. 90 tablet 0      senna-docusate (SENOKOT-S/PERICOLACE) 8.6-50 MG tablet Take 1-2 tablets by mouth 2 times daily 30 tablet 0     venlafaxine (EFFEXOR XR) 150 MG 24 hr capsule Take 1 capsule (150 mg) by mouth daily. 90 capsule 3     vitamin B complex with vitamin C (VITAMIN  B COMPLEX) tablet Take 1 tablet by mouth every evening       vitamin C (ASCORBIC ACID) 1000 MG TABS Take 1,000 mg by mouth every evening Also contains 20mg zinc       Vitamin D-Vitamin K (VITAMIN K2-VITAMIN D3 PO)        Vitamin D3 (CHOLECALCIFEROL) 25 mcg (1000 units) tablet Take 25 mcg by mouth every evening       zinc gluconate 50 MG tablet Take 50 mg by mouth every evening       No facility-administered encounter medications on file as of 6/11/2025.             O:   NAD, WDWN, Alert & Oriented, Mood & Affect wnl, Vitals stable   Here today alone   LMP  (LMP Unknown)    General appearance normal   Vitals stable   Alert, oriented and in no acute distress     Stuck on papules and brown macules on trunk and ext   Red papules on trunk  Brown papules and macules with regular pigment network and borders on torso and extremities   Dystrophic toenail on second foot, 2nd toe   The remainder of skin exam is normal     Eyes: Conjunctivae/lids:Normal     ENT: Lips: normal    MSK:Normal    Cardiovascular: peripheral edema none    Pulm: Breathing Normal    Neuro/Psych: Orientation:Alert and Orientedx3 ; Mood/Affect:normal     A/P:  Dystrophic toenail on left foot 2nd toe   Cultured to r/o fungus, if negative recommend to see podiatry to see if nail removal would be helpful.   2. Seborrheic keratosis, lentigo, angioma, benign nevi, dermatofibroma   It was a pleasure speaking to Zainab Bolton today.  BENIGN LESIONS DISCUSSED WITH PATIENT:  I discussed the specifics of tumor, prognosis, and genetics of benign lesions.  I explained that treatment of these lesions would be purely cosmetic and not medically neccessary.  I discussed with patient different removal options  including excision, cautery and /or laser.      Nature and genetics of benign skin lesions dicussed with patient.  Signs and Symptoms of skin cancer discussed with patient.  ABCDEs of melanoma reviewed with patient.  Patient encouraged to perform monthly skin exams.  UV precautions reviewed with patient.  Risks of non-melanoma skin cancer discussed with patient   Return to clinic in one year or sooner if needed.       Again, thank you for allowing me to participate in the care of your patient.        Sincerely,        Lisa Castaneda PA-C    Electronically signed

## 2025-06-12 LAB — BACTERIA SPEC CULT: NORMAL

## 2025-06-19 LAB — BACTERIA SPEC CULT: NORMAL

## 2025-06-24 ENCOUNTER — THERAPY VISIT (OUTPATIENT)
Age: 45
End: 2025-06-24
Attending: FAMILY MEDICINE
Payer: COMMERCIAL

## 2025-06-24 DIAGNOSIS — R19.8 LAXITY OF ABDOMINAL WALL: ICD-10-CM

## 2025-06-24 PROCEDURE — 97110 THERAPEUTIC EXERCISES: CPT | Mod: GP

## 2025-06-24 PROCEDURE — 97161 PT EVAL LOW COMPLEX 20 MIN: CPT | Mod: GP

## 2025-06-26 LAB — BACTERIA SPEC CULT: NORMAL

## 2025-07-03 LAB — BACTERIA SPEC CULT: NORMAL

## 2025-07-09 ENCOUNTER — THERAPY VISIT (OUTPATIENT)
Age: 45
End: 2025-07-09
Payer: COMMERCIAL

## 2025-07-09 DIAGNOSIS — R19.8 LAXITY OF ABDOMINAL WALL: Primary | ICD-10-CM

## 2025-07-09 LAB — BACTERIA SPEC CULT: NO GROWTH

## 2025-07-09 PROCEDURE — 97140 MANUAL THERAPY 1/> REGIONS: CPT | Mod: GP

## 2025-07-09 PROCEDURE — 97530 THERAPEUTIC ACTIVITIES: CPT | Mod: GP

## 2025-07-09 PROCEDURE — 97110 THERAPEUTIC EXERCISES: CPT | Mod: GP

## 2025-07-16 ENCOUNTER — THERAPY VISIT (OUTPATIENT)
Age: 45
End: 2025-07-16
Payer: COMMERCIAL

## 2025-07-16 DIAGNOSIS — R19.8 LAXITY OF ABDOMINAL WALL: Primary | ICD-10-CM

## 2025-07-16 PROCEDURE — 97110 THERAPEUTIC EXERCISES: CPT | Mod: GP

## 2025-07-16 NOTE — PROGRESS NOTES
DISCHARGE  Reason for Discharge: Patient has met all goals.    Equipment Issued:     Discharge Plan: Patient to continue home program.    Referring Provider:  Jessi Concepcion        07/16/25 0500   Appointment Info   Signing clinician's name / credentials Maris Lyles PT, DPT   Total/Authorized Visits E&T   Visits Used 3   Medical Diagnosis Laxity of abdominal wall   PT Tx Diagnosis Laxity of abdominal wall  (decreased core strength and activation s/p abdominal surgery)   Progress Note/Certification   Onset of illness/injury or Date of Surgery 05/27/25   Therapy Frequency 1x/week   Predicted Duration 10 weeks   Progress Note Completed Date 06/24/25   PT Goal 1   Goal Identifier ADL   Goal Description Pt will be able to perform dressing and self care activities without increase in abdominal pain   Rationale to maximize safety and independence with performance of ADLs and functional tasks   Target Date 07/29/25   Date Met 07/16/25   PT Goal 2   Goal Identifier return to exercise   Goal Description Pt will be able to return to moderate level exercise without increase in abdominal pain   Rationale to maximize safety and independence with performance of ADLs and functional tasks   Target Date 09/02/25   Date Met 07/16/25   Subjective Report   Subjective Report Pt reports no incidents of abdominal muscles popping out. She feels like she has improved her abdominal control and has met PT goals.   Therapeutic Procedure/Exercise   Therapeutic Procedures: strength, endurance, ROM, flexibility minutes (48075) 25   Ther Proc 1 Verbal review of HEP. Pt noticed that she has had weaker glutes and wanted to work on that at home.   PTRx Ther Proc 1 Briding- trialed double and single leg   PTRx Ther Proc 1 - Details x10 double-- x5 B single with cues for glute activation and maintaining neutral hips   PTRx Ther Proc 2 clamshell   PTRx Ther Proc 2 - Details x10 B gtb cues for avoiding posterior hip and torso roll   PTRx Ther  Proc 3 sidestep   PTRx Ther Proc 3 - Details demonstration with band at ankles   PTRx Ther Proc 4 squat   PTRx Ther Proc 4 - Details pt showed form and was given cues for maintaining neutral torso and keeping knees behind toes as well as sitting back vs anterior knee translation   Skilled Intervention Verbal review of HEP and progression back into regular exercise.   Patient Response/Progress pt noted understanding   Education   Learner/Method Patient;Listening;Reading;Demonstration;Pictures/Video;No Barriers to Learning   Education Comments Educated pt on presenting problem, plan of care, and HEP   Plan   Home program PTRx   Updates to plan of care TA activation and relaxation principles- belly breathing   Plan for next session plan to [place] home with continued self management with HEP   Total Session Time   Timed Code Treatment Minutes 25   Total Treatment Time (sum of timed and untimed services) 25

## 2025-07-30 ENCOUNTER — MYC REFILL (OUTPATIENT)
Dept: ONCOLOGY | Facility: CLINIC | Age: 45
End: 2025-07-30
Payer: COMMERCIAL

## 2025-07-30 DIAGNOSIS — Z17.0 MALIGNANT NEOPLASM OF CENTRAL PORTION OF LEFT BREAST IN FEMALE, ESTROGEN RECEPTOR POSITIVE (H): ICD-10-CM

## 2025-07-30 DIAGNOSIS — C50.112 MALIGNANT NEOPLASM OF CENTRAL PORTION OF LEFT BREAST IN FEMALE, ESTROGEN RECEPTOR POSITIVE (H): ICD-10-CM

## 2025-07-30 RX ORDER — LETROZOLE 2.5 MG/1
2.5 TABLET, FILM COATED ORAL DAILY
Qty: 90 TABLET | Refills: 0 | Status: SHIPPED | OUTPATIENT
Start: 2025-07-30

## 2025-08-25 ENCOUNTER — ANCILLARY PROCEDURE (OUTPATIENT)
Dept: GENERAL RADIOLOGY | Facility: CLINIC | Age: 45
End: 2025-08-25
Attending: NURSE PRACTITIONER
Payer: COMMERCIAL

## 2025-08-25 ENCOUNTER — OFFICE VISIT (OUTPATIENT)
Dept: FAMILY MEDICINE | Facility: CLINIC | Age: 45
End: 2025-08-25
Payer: COMMERCIAL

## 2025-08-25 VITALS
BODY MASS INDEX: 25.69 KG/M2 | OXYGEN SATURATION: 100 % | DIASTOLIC BLOOD PRESSURE: 74 MMHG | HEIGHT: 63 IN | HEART RATE: 87 BPM | SYSTOLIC BLOOD PRESSURE: 106 MMHG | WEIGHT: 145 LBS | TEMPERATURE: 96.9 F | RESPIRATION RATE: 16 BRPM

## 2025-08-25 DIAGNOSIS — M25.531 RIGHT WRIST PAIN: ICD-10-CM

## 2025-08-25 DIAGNOSIS — Z17.0 MALIGNANT NEOPLASM OF CENTRAL PORTION OF LEFT BREAST IN FEMALE, ESTROGEN RECEPTOR POSITIVE (H): ICD-10-CM

## 2025-08-25 DIAGNOSIS — C50.112 MALIGNANT NEOPLASM OF CENTRAL PORTION OF LEFT BREAST IN FEMALE, ESTROGEN RECEPTOR POSITIVE (H): ICD-10-CM

## 2025-08-25 DIAGNOSIS — M25.531 RIGHT WRIST PAIN: Primary | ICD-10-CM

## 2025-08-25 PROCEDURE — 99214 OFFICE O/P EST MOD 30 MIN: CPT | Performed by: NURSE PRACTITIONER

## 2025-08-25 PROCEDURE — 73110 X-RAY EXAM OF WRIST: CPT | Mod: TC | Performed by: RADIOLOGY

## 2025-08-25 PROCEDURE — 3078F DIAST BP <80 MM HG: CPT | Performed by: NURSE PRACTITIONER

## 2025-08-25 PROCEDURE — 1125F AMNT PAIN NOTED PAIN PRSNT: CPT | Performed by: NURSE PRACTITIONER

## 2025-08-25 PROCEDURE — 3074F SYST BP LT 130 MM HG: CPT | Performed by: NURSE PRACTITIONER

## 2025-08-25 ASSESSMENT — PAIN SCALES - GENERAL: PAINLEVEL_OUTOF10: MILD PAIN (3)

## (undated) DEVICE — DRSG XEROFORM 5X9" CUR253590W

## (undated) DEVICE — SU MONOCRYL 4-0 PC-5 18" UND Y823G

## (undated) DEVICE — DRESSING XEROFORM PETROLATUM 5X9 33605

## (undated) DEVICE — WIPE INSTRUMENT MEROCEL 400200

## (undated) DEVICE — SU DERMABOND ADVANCED .7ML DNX12

## (undated) DEVICE — CONNECTOR 5 TO 1 STRL 271502

## (undated) DEVICE — SYR BULB IRRIG DOVER 60 ML LATEX FREE 67000

## (undated) DEVICE — ESU ENDO SCISSORS 5MM CVD 5DCS

## (undated) DEVICE — SYR 10ML LL W/O NDL 302995

## (undated) DEVICE — TUBING INFUSION INFILTRATION LIPOSUCTION 156" 24-6008

## (undated) DEVICE — GLOVE SURG PI ULTRA TOUCH M SZ 6-1/2 LF

## (undated) DEVICE — SU MONOCRYL 4-0 PS-2 27" UND Y426H

## (undated) DEVICE — KIT PATIENT POSITIONING PIGAZZI LATEX FREE 40580

## (undated) DEVICE — TUBING ASPIRATING BYRON 3/8"X12' PT-5558

## (undated) DEVICE — NDL INSUFFLATION 13GA 120MM C2201

## (undated) DEVICE — DRSG DRAIN 4X4" 7086

## (undated) DEVICE — SU DERMABOND PRINEO 22CM CLR222US

## (undated) DEVICE — SUCTION MANIFOLD NEPTUNE 2 SYS 1 PORT 702-025-000

## (undated) DEVICE — WIPES FOLEY CARE SURESTEP PROVON DFC100

## (undated) DEVICE — ESU PENCIL SMOKE EVAC W/ROCKER SWITCH 0703-047-000

## (undated) DEVICE — SOL WATER IRRIG 1000ML BOTTLE 2F7114

## (undated) DEVICE — BANDAGE ELASTIC VELCRO 6IN REB3016

## (undated) DEVICE — CLIP GEM MICRO GEM2431

## (undated) DEVICE — SYSTEM FAT PROCESSING RESOLVE RV0001

## (undated) DEVICE — GLOVE BIOGEL PI MICRO INDICATOR UNDERGLOVE SZ 7.5 48975

## (undated) DEVICE — KIT PROCEDURE SPY-PHI SGL HH9001

## (undated) DEVICE — DRAPE CHEST 100X72X124 89227

## (undated) DEVICE — SPONGE LAP 18X18" X8435

## (undated) DEVICE — DRAPE SHEET REV FOLD 3/4 9349

## (undated) DEVICE — SUTURE VICRYL+ 3-0 8-18 SH/CR VLT VCP774D

## (undated) DEVICE — SPECIMEN CONTAINER 5OZ STERILE 2600SA

## (undated) DEVICE — TUBING IRRIG CYSTO/BLADDER SET 81" LF 2C4040

## (undated) DEVICE — ADH LIQUID MASTISOL TOPICAL VIAL 2-3ML 0523-48

## (undated) DEVICE — DRAPE U SPLIT 74X120" 29440

## (undated) DEVICE — TUBING SUCTION 10'X3/16" N510

## (undated) DEVICE — GOWN LG DISP 9515

## (undated) DEVICE — NDL 21GA 1.5"

## (undated) DEVICE — ESU ELEC BLADE 6" COATED E1450-6

## (undated) DEVICE — DRAPE IOBAN INCISE 23X17" 6650EZ

## (undated) DEVICE — PREP CHLORAPREP 26ML TINTED HI-LITE ORANGE 930815

## (undated) DEVICE — RETR BLADE LONE STAR 16X20MM 4 FINGER 3334-4G

## (undated) DEVICE — CLIP HORIZON MED BLUE 002200

## (undated) DEVICE — A3 SUPPLIES- SEE NURSING INFO PAGE

## (undated) DEVICE — DRAPE POUCH INSTRUMENT 1018

## (undated) DEVICE — GLOVE GAMMEX NEOPRENE ULTRA SZ 7.5 LF 8515

## (undated) DEVICE — DRSG BIOPATCH GERMICIDAL SPLIT SPONGE 4MM MED 4150

## (undated) DEVICE — CUSTOM PACK GEN MAJOR SBA5BGMHEA

## (undated) DEVICE — DRSG PRIMAPORE 02X3" 7133

## (undated) DEVICE — BNDG ELASTIC 6"X5YDS STERILE 6611-6S

## (undated) DEVICE — DRSG TEGADERM 4X4 3/4" 1626W

## (undated) DEVICE — SU ETHILON 8-0 BV130-4 5" 2815G

## (undated) DEVICE — Device

## (undated) DEVICE — DRSG GAUZE 2X2" TRAY 1806

## (undated) DEVICE — ESU PENCIL W/COATED BLADE E2450H

## (undated) DEVICE — BLADE CLIPPER SGL USE 9680

## (undated) DEVICE — NEEDLE HYPO MAGELLAN SAFETY 22GA 1 1/2IN 8881850215

## (undated) DEVICE — SU PDS II 0 CTX 36" Z370T

## (undated) DEVICE — LABEL MEDICATION SYSTEM 3303-P

## (undated) DEVICE — TEST TUBE W/SCREW CAP 17361

## (undated) DEVICE — SUCTION TIP YANKAUER W/O VENT K86

## (undated) DEVICE — PROTECTOR ARM ONE-STEP TRENDELENBURG 40418

## (undated) DEVICE — ENDO TROCAR SLEEVE KII Z-THREADED 05X100MM CTS02

## (undated) DEVICE — UNIVERSAL FILL KIT

## (undated) DEVICE — CLOSURE SYS SKIN PREMIERPRO EXOFIN FUSION 4X22CM STRL 3472

## (undated) DEVICE — ESU GROUND PAD ADULT W/CORD E7507

## (undated) DEVICE — SU ETHILON 3-0 PS-1 18" 1663H

## (undated) DEVICE — SU PROLENE 5-0 RB-1DA 36"  8556H

## (undated) DEVICE — KIT SPY ELITE DISP LC3006

## (undated) DEVICE — SUCTION IRR STRYKERFLOW II W/TIP 250-070-520

## (undated) DEVICE — SYR PISTON URETHRAL 60ML 68000

## (undated) DEVICE — STPL SKIN 35W ROTATING HEAD PRW35

## (undated) DEVICE — DRAIN RESERVOIR 100ML JP 0070740

## (undated) DEVICE — SU DERMABOND PRINEO 42CM CLR422US

## (undated) DEVICE — PLATE GROUNDING ADULT W/CORD 9165L

## (undated) DEVICE — GLOVE BIOGEL PI MICRO SZ 6.5 48565

## (undated) DEVICE — RETR ELASTIC STAYS LONE STAR SHARP 5MM 8/PACK 3311-8G

## (undated) DEVICE — BNDG ABDOMINAL BINDER 9X45-62" 79-89071

## (undated) DEVICE — GLOVE UNDER INDICATOR PI SZ 7.0 LF 41670

## (undated) DEVICE — SUTURE VICRYL 0 SH UNDYED J418H

## (undated) DEVICE — GLOVE BIOGEL PI MICRO SZ 7.0 48570

## (undated) DEVICE — GLOVE BIOGEL PI MICRO INDICATOR UNDERGLOVE SZ 7.0 48970

## (undated) DEVICE — DRSG KERLIX 4 1/2"X4YDS ROLL 6715

## (undated) DEVICE — SPECIMEN TRAP MUCOUS 40ML LUKI C30200A

## (undated) DEVICE — DECANTER VIAL 2006S

## (undated) DEVICE — SU MONOCRYL 2-0 SH 27" UND Y417H

## (undated) DEVICE — GLOVE BIOGEL PI INDICATOR 8.0 LF 41680

## (undated) DEVICE — SYR 03ML LL W/O NDL 309657

## (undated) DEVICE — SU SILK 2-0 FS-1 18" 685G

## (undated) DEVICE — PITCHER STERILE 1000ML  SSK9004A

## (undated) DEVICE — COLLECTION DEVICE SYSTEM TISSUE REVOLVE RV0001 4 PACK RV0004

## (undated) DEVICE — LINEN TOWEL PACK X6 WHITE 5487

## (undated) DEVICE — RETR ELEV / UTERINE MANIPULATOR V-CARE LG CUP 60-6085-202A

## (undated) DEVICE — DRAPE MAYO STAND 23X54 8337

## (undated) DEVICE — NDL 25GA 2"  8881200441

## (undated) DEVICE — DRAIN JACKSON PRATT RESERVOIR 100ML SU130-1305

## (undated) DEVICE — ESU LIGASURE LAPAROSCOPIC BLUNT TIP SEALER 5MMX37CM LF1837

## (undated) DEVICE — CATH TRAY FOLEY SURESTEP 16FR DRAIN BAG STATOCK A899916

## (undated) DEVICE — ESU ELEC BLADE 6" COATED/INSULATED E1455-6

## (undated) DEVICE — BLADE KNIFE SURG 10 371110

## (undated) DEVICE — CLEANER CAUTERY TIP V8101

## (undated) DEVICE — CABLE DOPPLER FLOW EXTENSION  DP-CAB01

## (undated) DEVICE — SU STRATAFIX MONOCRYL 4-0 SPIRAL PS-2 SXMP1B118

## (undated) DEVICE — ENDO TROCAR FIRST ENTRY KII FIOS Z-THRD 05X100MM CTF03

## (undated) DEVICE — DRSG TEGADERM 4X10" 1627

## (undated) DEVICE — SU MONOCRYL 3-0 PS-2 18" UND MCP497G

## (undated) DEVICE — SUCTION MANIFOLD NEPTUNE 2 SYS 4 PORT 0702-020-000

## (undated) DEVICE — SU MONOCRYL 3-0 SH 27" UND Y416H

## (undated) DEVICE — SU PROLENE 2-0 SH 30" 8833H

## (undated) DEVICE — SU VICRYL 3-0 SH 27" UND J416H

## (undated) DEVICE — JELLY LUBRICATING SURGILUBE 2OZ TUBE

## (undated) DEVICE — PREP DYNA-HEX 4% CHG SCRUB 4OZ BOTTLE MDS098710

## (undated) DEVICE — DRSG ABDOMINAL 07 1/2X8" 7197D

## (undated) DEVICE — SYR 10ML FINGER CONTROL W/O NDL 309695

## (undated) DEVICE — ESU ELEC BLADE 2.75" COATED/INSULATED E1455

## (undated) DEVICE — DRAIN JACKSON PRATT CHANNEL 15FR ROUND HUBLESS SIL JP-2228

## (undated) DEVICE — NDL SPINAL 22GA 3.5" QUINCKE 405181

## (undated) DEVICE — STPL SKIN 35W 6.9MM  PXW35

## (undated) DEVICE — SU MONOCRYL+ 4-0 18IN PS2 UND MCP496G

## (undated) DEVICE — DRSG DRAIN 2X2" 7087

## (undated) DEVICE — SU PLAIN FAST ABSORB 5-0 PC-1 18" 1915G

## (undated) DEVICE — SPONGE COTTONOID 1/2X3" 20-07S

## (undated) DEVICE — BASIN EMESIS STERILE  SSK9005A

## (undated) DEVICE — LINEN TOWEL PACK X30 5481

## (undated) DEVICE — SUCTION CARDIAC FRAZIER TIP 6FR STERILE 3" 10053

## (undated) DEVICE — DRSG ABD TNDRSRB WET PRUF 8IN X 10IN STRL  9194A

## (undated) DEVICE — SU ETHILON 9-0 BV100-4 5" 2829G

## (undated) DEVICE — CATH TRAY FOLEY SURESTEP 16FR W/URNE MTR STLK LATEX A303316A

## (undated) DEVICE — TUBING SMOKE EVAC PNEUMOCLEAR HIGH FLOW 0620050250

## (undated) DEVICE — SYR 01ML LL W/O NDL LATEX FREE 309628

## (undated) DEVICE — SOL RINGERS LACTATED 1000ML BAG 07953-09

## (undated) DEVICE — LINEN TOWEL PACK X5 5464

## (undated) DEVICE — GOWN IMPERVIOUS ZONED XLG 9041

## (undated) DEVICE — DRAPE MICROSCOPE LEICA 54X120" 09-MK653

## (undated) DEVICE — SUTURE VICRYL+ 2-0 CT-2 CR 8X18" VCP726D

## (undated) DEVICE — SU WND CLOSURE VLOC 180 ABS 0 9" GS-21 VLOCL0346

## (undated) DEVICE — NDL BLUNT 18GA 1" W/O FILTER 305181

## (undated) DEVICE — DRAPE ISOLATION BAG 1003

## (undated) DEVICE — SU STRATAFIX MONOCRYL 3-0 SPIRAL PS-2 45CM SXMP1B107

## (undated) DEVICE — COVER ULTRASOUND PROBE FLEXI-FEEL 4X96" 25-FF496

## (undated) DEVICE — CLIP HORIZON SM RED WIDE SLOT 001201

## (undated) DEVICE — BLADE KNIFE SURG 15 371115

## (undated) DEVICE — KOH COLPOTOMIZER OCCLUDER  CPO-6

## (undated) DEVICE — SYR 50ML LL W/O NDL 309653

## (undated) DEVICE — CLOSURE SYS SKIN PREMIERPRO EXOFINFUSION 4X60CM 3473

## (undated) DEVICE — NEEDLE HYPO 25X1 SAFETY 305916

## (undated) DEVICE — DRSG GAUZE 4X4" TRAY 6939

## (undated) DEVICE — SPONGE RAY-TEC 4X8" 7318

## (undated) DEVICE — DRAIN BLAKE 15FR SIL 2229

## (undated) DEVICE — GEL ULTRASOUND AQUASONIC 20GM 01-01

## (undated) DEVICE — SOL NACL 0.9% INJ 1000ML BAG 2B1324X

## (undated) DEVICE — ESU FCP BIPOLAR NONSTICK STR 4"X0.4MM W/CORD 19-3002AU

## (undated) DEVICE — BNDG ESMARK 4" STERILE LF 820-3412

## (undated) DEVICE — NDL ANGIOCATH 24GA 0.75" 4053

## (undated) DEVICE — SOL NACL 0.9% IRRIG 1000ML BOTTLE 2F7124

## (undated) DEVICE — SU PROLENE 6-0 P-1 18" 8697G

## (undated) DEVICE — DRSG STERI STRIP 1/2X4" R1547

## (undated) DEVICE — DRAPE SHEET MED 44X70" 9355

## (undated) DEVICE — DRSG XEROFORM 1X8"

## (undated) DEVICE — CLIP GEM MICRO GEM1521

## (undated) DEVICE — DRAPE STERI TOWEL LG 1010

## (undated) DEVICE — DRSG TEGADERM 2 3/8X2 3/4" 1624W

## (undated) DEVICE — APPLICATORS COTTON TIP 6"X2 STERILE LF C15053-006

## (undated) DEVICE — BNDG ELASTIC 6" DBL LENGTH UNSTERILE 6611-16

## (undated) DEVICE — SU ETHILON 2-0 FS 18" 664H

## (undated) DEVICE — TIP CAUTERY L HOOK 36CM E377336C

## (undated) RX ORDER — PROPOFOL 10 MG/ML
INJECTION, EMULSION INTRAVENOUS
Status: DISPENSED
Start: 2023-05-31

## (undated) RX ORDER — DEXAMETHASONE SODIUM PHOSPHATE 4 MG/ML
INJECTION, SOLUTION INTRA-ARTICULAR; INTRALESIONAL; INTRAMUSCULAR; INTRAVENOUS; SOFT TISSUE
Status: DISPENSED
Start: 2024-03-28

## (undated) RX ORDER — CALCIUM CHLORIDE 100 MG/ML
INJECTION INTRAVENOUS; INTRAVENTRICULAR
Status: DISPENSED
Start: 2023-09-25

## (undated) RX ORDER — PROPOFOL 10 MG/ML
INJECTION, EMULSION INTRAVENOUS
Status: DISPENSED
Start: 2024-03-28

## (undated) RX ORDER — GINSENG 100 MG
CAPSULE ORAL
Status: DISPENSED
Start: 2023-08-25

## (undated) RX ORDER — PROPOFOL 10 MG/ML
INJECTION, EMULSION INTRAVENOUS
Status: DISPENSED
Start: 2023-09-25

## (undated) RX ORDER — PROPOFOL 10 MG/ML
INJECTION, EMULSION INTRAVENOUS
Status: DISPENSED
Start: 2023-08-25

## (undated) RX ORDER — FENTANYL CITRATE 50 UG/ML
INJECTION, SOLUTION INTRAMUSCULAR; INTRAVENOUS
Status: DISPENSED
Start: 2024-03-28

## (undated) RX ORDER — ONDANSETRON 2 MG/ML
INJECTION INTRAMUSCULAR; INTRAVENOUS
Status: DISPENSED
Start: 2024-03-28

## (undated) RX ORDER — FENTANYL CITRATE 50 UG/ML
INJECTION, SOLUTION INTRAMUSCULAR; INTRAVENOUS
Status: DISPENSED
Start: 2023-05-31

## (undated) RX ORDER — LIDOCAINE HYDROCHLORIDE 10 MG/ML
INJECTION, SOLUTION EPIDURAL; INFILTRATION; INTRACAUDAL; PERINEURAL
Status: DISPENSED
Start: 2023-08-25

## (undated) RX ORDER — CEFAZOLIN SODIUM/WATER 2 G/20 ML
SYRINGE (ML) INTRAVENOUS
Status: DISPENSED
Start: 2024-03-28

## (undated) RX ORDER — OXYCODONE HYDROCHLORIDE 5 MG/1
TABLET ORAL
Status: DISPENSED
Start: 2024-03-28

## (undated) RX ORDER — DEXAMETHASONE SODIUM PHOSPHATE 10 MG/ML
INJECTION, SOLUTION INTRAMUSCULAR; INTRAVENOUS
Status: DISPENSED
Start: 2023-08-25

## (undated) RX ORDER — CEFAZOLIN SODIUM/WATER 2 G/20 ML
SYRINGE (ML) INTRAVENOUS
Status: DISPENSED
Start: 2023-09-25

## (undated) RX ORDER — ONDANSETRON 2 MG/ML
INJECTION INTRAMUSCULAR; INTRAVENOUS
Status: DISPENSED
Start: 2023-09-25

## (undated) RX ORDER — HYDROMORPHONE HCL IN WATER/PF 6 MG/30 ML
PATIENT CONTROLLED ANALGESIA SYRINGE INTRAVENOUS
Status: DISPENSED
Start: 2024-03-28

## (undated) RX ORDER — FENTANYL CITRATE 50 UG/ML
INJECTION, SOLUTION INTRAMUSCULAR; INTRAVENOUS
Status: DISPENSED
Start: 2023-08-25

## (undated) RX ORDER — FENTANYL CITRATE-0.9 % NACL/PF 10 MCG/ML
PLASTIC BAG, INJECTION (ML) INTRAVENOUS
Status: DISPENSED
Start: 2023-09-25

## (undated) RX ORDER — INDOCYANINE GREEN AND WATER 25 MG
KIT INJECTION
Status: DISPENSED
Start: 2023-05-31

## (undated) RX ORDER — FENTANYL CITRATE 50 UG/ML
INJECTION, SOLUTION INTRAMUSCULAR; INTRAVENOUS
Status: DISPENSED
Start: 2023-09-25

## (undated) RX ORDER — FENTANYL CITRATE-0.9 % NACL/PF 10 MCG/ML
PLASTIC BAG, INJECTION (ML) INTRAVENOUS
Status: DISPENSED
Start: 2024-03-28

## (undated) RX ORDER — EPHEDRINE SULFATE 50 MG/ML
INJECTION, SOLUTION INTRAMUSCULAR; INTRAVENOUS; SUBCUTANEOUS
Status: DISPENSED
Start: 2024-03-28

## (undated) RX ORDER — HYDROMORPHONE HYDROCHLORIDE 1 MG/ML
INJECTION, SOLUTION INTRAMUSCULAR; INTRAVENOUS; SUBCUTANEOUS
Status: DISPENSED
Start: 2024-03-28

## (undated) RX ORDER — ONDANSETRON 2 MG/ML
INJECTION INTRAMUSCULAR; INTRAVENOUS
Status: DISPENSED
Start: 2023-08-25

## (undated) RX ORDER — HEPARIN SODIUM 1000 [USP'U]/ML
INJECTION, SOLUTION INTRAVENOUS; SUBCUTANEOUS
Status: DISPENSED
Start: 2023-09-25

## (undated) RX ORDER — HYDROMORPHONE HYDROCHLORIDE 1 MG/ML
INJECTION, SOLUTION INTRAMUSCULAR; INTRAVENOUS; SUBCUTANEOUS
Status: DISPENSED
Start: 2023-09-25

## (undated) RX ORDER — DEXTROSE MONOHYDRATE, SODIUM CHLORIDE, AND POTASSIUM CHLORIDE 50; 1.49; 4.5 G/1000ML; G/1000ML; G/1000ML
INJECTION, SOLUTION INTRAVENOUS
Status: DISPENSED
Start: 2023-09-25

## (undated) RX ORDER — CEFAZOLIN SODIUM 1 G/3ML
INJECTION, POWDER, FOR SOLUTION INTRAMUSCULAR; INTRAVENOUS
Status: DISPENSED
Start: 2023-09-25

## (undated) RX ORDER — GLYCOPYRROLATE 0.2 MG/ML
INJECTION, SOLUTION INTRAMUSCULAR; INTRAVENOUS
Status: DISPENSED
Start: 2023-09-25

## (undated) RX ORDER — EPHEDRINE SULFATE 50 MG/ML
INJECTION, SOLUTION INTRAMUSCULAR; INTRAVENOUS; SUBCUTANEOUS
Status: DISPENSED
Start: 2023-09-25

## (undated) RX ORDER — CEFAZOLIN SODIUM 1 G/3ML
INJECTION, POWDER, FOR SOLUTION INTRAMUSCULAR; INTRAVENOUS
Status: DISPENSED
Start: 2023-05-31

## (undated) RX ORDER — ASPIRIN 81 MG/1
TABLET, CHEWABLE ORAL
Status: DISPENSED
Start: 2023-09-25

## (undated) RX ORDER — CEFAZOLIN SODIUM 1 G/3ML
INJECTION, POWDER, FOR SOLUTION INTRAMUSCULAR; INTRAVENOUS
Status: DISPENSED
Start: 2023-08-25

## (undated) RX ORDER — PAPAVERINE HYDROCHLORIDE 30 MG/ML
INJECTION INTRAMUSCULAR; INTRAVENOUS
Status: DISPENSED
Start: 2023-09-25

## (undated) RX ORDER — ENOXAPARIN SODIUM 100 MG/ML
INJECTION SUBCUTANEOUS
Status: DISPENSED
Start: 2023-09-25

## (undated) RX ORDER — GLYCOPYRROLATE 0.2 MG/ML
INJECTION, SOLUTION INTRAMUSCULAR; INTRAVENOUS
Status: DISPENSED
Start: 2023-05-31

## (undated) RX ORDER — DEXAMETHASONE SODIUM PHOSPHATE 4 MG/ML
INJECTION, SOLUTION INTRA-ARTICULAR; INTRALESIONAL; INTRAMUSCULAR; INTRAVENOUS; SOFT TISSUE
Status: DISPENSED
Start: 2023-09-25

## (undated) RX ORDER — ONDANSETRON 2 MG/ML
INJECTION INTRAMUSCULAR; INTRAVENOUS
Status: DISPENSED
Start: 2023-05-31

## (undated) RX ORDER — LABETALOL HYDROCHLORIDE 5 MG/ML
INJECTION, SOLUTION INTRAVENOUS
Status: DISPENSED
Start: 2024-03-28

## (undated) RX ORDER — LIDOCAINE HYDROCHLORIDE AND EPINEPHRINE 10; 10 MG/ML; UG/ML
INJECTION, SOLUTION INFILTRATION; PERINEURAL
Status: DISPENSED
Start: 2024-03-28

## (undated) RX ORDER — CEFAZOLIN SODIUM 1 G/3ML
INJECTION, POWDER, FOR SOLUTION INTRAMUSCULAR; INTRAVENOUS
Status: DISPENSED
Start: 2024-03-28